# Patient Record
Sex: MALE | Race: WHITE | NOT HISPANIC OR LATINO | Employment: UNEMPLOYED | URBAN - METROPOLITAN AREA
[De-identification: names, ages, dates, MRNs, and addresses within clinical notes are randomized per-mention and may not be internally consistent; named-entity substitution may affect disease eponyms.]

---

## 2017-05-03 LAB
25(OH)D3 SERPL-MCNC: 21 NG/ML (ref 30–100)
ALBUMIN SERPL-MCNC: 4.1 G/DL (ref 3.6–5.1)
ALBUMIN/GLOB SERPL: 1.5 (CALC) (ref 1–2.5)
ALP SERPL-CCNC: 70 U/L (ref 40–115)
ALT SERPL-CCNC: 16 U/L (ref 9–46)
APPEARANCE UR: CLEAR
AST SERPL-CCNC: 14 U/L (ref 10–35)
BASOPHILS # BLD AUTO: 38 CELLS/UL (ref 0–200)
BASOPHILS NFR BLD AUTO: 0.5 %
BILIRUB DIRECT SERPL-MCNC: 0.1 MG/DL
BILIRUB INDIRECT SERPL-MCNC: 0.4 MG/DL (CALC) (ref 0.2–1.2)
BILIRUB SERPL-MCNC: 0.5 MG/DL (ref 0.2–1.2)
BILIRUB UR QL STRIP: NEGATIVE
BUN SERPL-MCNC: 14 MG/DL (ref 7–25)
BUN/CREAT SERPL: ABNORMAL (CALC) (ref 6–22)
CALCIUM SERPL-MCNC: 9.5 MG/DL (ref 8.6–10.3)
CHLORIDE SERPL-SCNC: 104 MMOL/L (ref 98–110)
CHOLEST SERPL-MCNC: 203 MG/DL (ref 125–200)
CHOLEST/HDLC SERPL: 4.7 (CALC)
CO2 SERPL-SCNC: 19 MMOL/L (ref 20–31)
COLOR UR: YELLOW
CREAT SERPL-MCNC: 1.06 MG/DL (ref 0.7–1.33)
EOSINOPHIL # BLD AUTO: 175 CELLS/UL (ref 15–500)
EOSINOPHIL NFR BLD AUTO: 2.3 %
ERYTHROCYTE [DISTWIDTH] IN BLOOD BY AUTOMATED COUNT: 12.9 % (ref 11–15)
EST. AVERAGE GLUCOSE BLD GHB EST-MCNC: 117 (CALC)
EST. AVERAGE GLUCOSE BLD GHB EST-SCNC: 6.5 (CALC)
GLOBULIN SER CALC-MCNC: 2.8 G/DL (CALC) (ref 1.9–3.7)
GLUCOSE SERPL-MCNC: 98 MG/DL (ref 65–99)
GLUCOSE UR QL STRIP: NEGATIVE
HBA1C MFR BLD: 5.7 % OF TOTAL HGB
HCT VFR BLD AUTO: 48.3 % (ref 38.5–50)
HDLC SERPL-MCNC: 43 MG/DL
HGB BLD-MCNC: 16 G/DL (ref 13.2–17.1)
HGB UR QL STRIP: NEGATIVE
KETONES UR QL STRIP: NEGATIVE
LDLC SERPL CALC-MCNC: 123 MG/DL (CALC)
LEUKOCYTE ESTERASE UR QL STRIP: NEGATIVE
LYMPHOCYTES # BLD AUTO: 2219 CELLS/UL (ref 850–3900)
LYMPHOCYTES NFR BLD AUTO: 29.2 %
MAGNESIUM SERPL-MCNC: 2.1 MG/DL (ref 1.5–2.5)
MCH RBC QN AUTO: 30.8 PG (ref 27–33)
MCHC RBC AUTO-ENTMCNC: 33.1 G/DL (ref 32–36)
MCV RBC AUTO: 93 FL (ref 80–100)
MONOCYTES # BLD AUTO: 684 CELLS/UL (ref 200–950)
MONOCYTES NFR BLD AUTO: 9 %
NEUTROPHILS # BLD AUTO: 4484 CELLS/UL (ref 1500–7800)
NEUTROPHILS NFR BLD AUTO: 59 %
NITRITE UR QL STRIP: NEGATIVE
NONHDLC SERPL-MCNC: 160 MG/DL (CALC)
PH UR STRIP: 6 [PH] (ref 5–8)
PLATELET # BLD AUTO: 251 THOUSAND/UL (ref 140–400)
PMV BLD REES-ECKER: 8.2 FL (ref 7.5–12.5)
POTASSIUM SERPL-SCNC: 4.9 MMOL/L (ref 3.5–5.3)
PROT SERPL-MCNC: 6.9 G/DL (ref 6.1–8.1)
PROT UR QL STRIP: NEGATIVE
RBC # BLD AUTO: 5.2 MILLION/UL (ref 4.2–5.8)
SL AMB EGFR AFRICAN AMERICAN: 92 ML/MIN/1.73M2
SL AMB EGFR NON AFRICAN AMERICAN: 80 ML/MIN/1.73M2
SODIUM SERPL-SCNC: 135 MMOL/L (ref 135–146)
SP GR UR STRIP: 1.01 (ref 1–1.03)
T4 FREE SERPL-MCNC: 0.9 NG/DL (ref 0.8–1.8)
TRIGL SERPL-MCNC: 185 MG/DL
TSH SERPL-ACNC: 3.65 MIU/L (ref 0.4–4.5)
VIT B12 SERPL-MCNC: 434 PG/ML (ref 200–1100)
WBC # BLD AUTO: 7.6 THOUSAND/UL (ref 3.8–10.8)

## 2017-08-29 LAB
ALBUMIN SERPL-MCNC: 4.1 G/DL (ref 3.6–5.1)
ALBUMIN/GLOB SERPL: 1.7 (CALC) (ref 1–2.5)
ALP SERPL-CCNC: 69 U/L (ref 40–115)
ALT SERPL-CCNC: 17 U/L (ref 9–46)
APPEARANCE UR: CLEAR
AST SERPL-CCNC: 13 U/L (ref 10–35)
BASOPHILS # BLD AUTO: 81 CELLS/UL (ref 0–200)
BASOPHILS NFR BLD AUTO: 1 %
BILIRUB DIRECT SERPL-MCNC: 0.1 MG/DL
BILIRUB INDIRECT SERPL-MCNC: 0.3 MG/DL (CALC) (ref 0.2–1.2)
BILIRUB SERPL-MCNC: 0.4 MG/DL (ref 0.2–1.2)
BILIRUB UR QL STRIP: NEGATIVE
BUN SERPL-MCNC: 21 MG/DL (ref 7–25)
BUN/CREAT SERPL: ABNORMAL (CALC) (ref 6–22)
CALCIUM SERPL-MCNC: 9.1 MG/DL (ref 8.6–10.3)
CHLORIDE SERPL-SCNC: 102 MMOL/L (ref 98–110)
CHOLEST SERPL-MCNC: 155 MG/DL
CHOLEST/HDLC SERPL: 4.1 (CALC)
CO2 SERPL-SCNC: 24 MMOL/L (ref 20–31)
COLOR UR: YELLOW
CREAT SERPL-MCNC: 0.89 MG/DL (ref 0.7–1.33)
EOSINOPHIL # BLD AUTO: 211 CELLS/UL (ref 15–500)
EOSINOPHIL NFR BLD AUTO: 2.6 %
ERYTHROCYTE [DISTWIDTH] IN BLOOD BY AUTOMATED COUNT: 12 % (ref 11–15)
GLOBULIN SER CALC-MCNC: 2.4 G/DL (CALC) (ref 1.9–3.7)
GLUCOSE SERPL-MCNC: 101 MG/DL (ref 65–99)
GLUCOSE UR QL STRIP: NEGATIVE
HCT VFR BLD AUTO: 46.2 % (ref 38.5–50)
HDLC SERPL-MCNC: 38 MG/DL
HGB BLD-MCNC: 15.7 G/DL (ref 13.2–17.1)
HGB UR QL STRIP: NEGATIVE
KETONES UR QL STRIP: NEGATIVE
LDLC SERPL CALC-MCNC: 90 MG/DL (CALC)
LEUKOCYTE ESTERASE UR QL STRIP: NEGATIVE
LYMPHOCYTES # BLD AUTO: 2122 CELLS/UL (ref 850–3900)
LYMPHOCYTES NFR BLD AUTO: 26.2 %
MCH RBC QN AUTO: 30.6 PG (ref 27–33)
MCHC RBC AUTO-ENTMCNC: 34 G/DL (ref 32–36)
MCV RBC AUTO: 90.1 FL (ref 80–100)
MONOCYTES # BLD AUTO: 1029 CELLS/UL (ref 200–950)
MONOCYTES NFR BLD AUTO: 12.7 %
NEUTROPHILS # BLD AUTO: 4658 CELLS/UL (ref 1500–7800)
NEUTROPHILS NFR BLD AUTO: 57.5 %
NITRITE UR QL STRIP: NEGATIVE
NONHDLC SERPL-MCNC: 117 MG/DL (CALC)
PH UR STRIP: 6 [PH] (ref 5–8)
PLATELET # BLD AUTO: 241 THOUSAND/UL (ref 140–400)
PMV BLD REES-ECKER: 10.3 FL (ref 7.5–12.5)
POTASSIUM SERPL-SCNC: 4.9 MMOL/L (ref 3.5–5.3)
PROT SERPL-MCNC: 6.5 G/DL (ref 6.1–8.1)
PROT UR QL STRIP: NEGATIVE
RBC # BLD AUTO: 5.13 MILLION/UL (ref 4.2–5.8)
SL AMB EGFR AFRICAN AMERICAN: 113 ML/MIN/1.73M2
SL AMB EGFR NON AFRICAN AMERICAN: 98 ML/MIN/1.73M2
SODIUM SERPL-SCNC: 135 MMOL/L (ref 135–146)
SP GR UR STRIP: 1.02 (ref 1–1.03)
TRIGL SERPL-MCNC: 174 MG/DL
WBC # BLD AUTO: 8.1 THOUSAND/UL (ref 3.8–10.8)

## 2018-06-26 DIAGNOSIS — M54.50 LOW BACK PAIN WITHOUT SCIATICA, UNSPECIFIED BACK PAIN LATERALITY, UNSPECIFIED CHRONICITY: Primary | ICD-10-CM

## 2018-06-26 RX ORDER — MELOXICAM 7.5 MG/1
TABLET ORAL
Qty: 30 TABLET | Refills: 1 | Status: SHIPPED | OUTPATIENT
Start: 2018-06-26 | End: 2018-09-01 | Stop reason: SDUPTHER

## 2018-07-02 DIAGNOSIS — I10 ESSENTIAL HYPERTENSION: Primary | ICD-10-CM

## 2018-07-03 PROCEDURE — 4010F ACE/ARB THERAPY RXD/TAKEN: CPT | Performed by: INTERNAL MEDICINE

## 2018-07-03 RX ORDER — LISINOPRIL 10 MG/1
TABLET ORAL
Qty: 90 TABLET | Refills: 3 | Status: SHIPPED | OUTPATIENT
Start: 2018-07-03 | End: 2019-09-19 | Stop reason: SDUPTHER

## 2018-07-09 ENCOUNTER — OFFICE VISIT (OUTPATIENT)
Dept: FAMILY MEDICINE CLINIC | Facility: CLINIC | Age: 53
End: 2018-07-09
Payer: COMMERCIAL

## 2018-07-09 VITALS
SYSTOLIC BLOOD PRESSURE: 132 MMHG | BODY MASS INDEX: 43.98 KG/M2 | HEART RATE: 96 BPM | TEMPERATURE: 98.4 F | WEIGHT: 307.2 LBS | RESPIRATION RATE: 16 BRPM | HEIGHT: 70 IN | DIASTOLIC BLOOD PRESSURE: 78 MMHG | OXYGEN SATURATION: 95 %

## 2018-07-09 DIAGNOSIS — H60.312 CHRONIC DIFFUSE OTITIS EXTERNA OF LEFT EAR: ICD-10-CM

## 2018-07-09 DIAGNOSIS — J32.8 OTHER CHRONIC SINUSITIS: Primary | ICD-10-CM

## 2018-07-09 DIAGNOSIS — I10 ESSENTIAL HYPERTENSION: ICD-10-CM

## 2018-07-09 DIAGNOSIS — J43.9 PULMONARY EMPHYSEMA, UNSPECIFIED EMPHYSEMA TYPE (HCC): ICD-10-CM

## 2018-07-09 DIAGNOSIS — E11.9 DIABETES MELLITUS WITHOUT COMPLICATION (HCC): ICD-10-CM

## 2018-07-09 DIAGNOSIS — F17.200 TOBACCO DEPENDENCE SYNDROME: ICD-10-CM

## 2018-07-09 PROCEDURE — 99214 OFFICE O/P EST MOD 30 MIN: CPT | Performed by: INTERNAL MEDICINE

## 2018-07-09 RX ORDER — TAMSULOSIN HYDROCHLORIDE 0.4 MG/1
CAPSULE ORAL EVERY 24 HOURS
COMMUNITY
Start: 2018-01-29 | End: 2018-07-21 | Stop reason: SDUPTHER

## 2018-07-09 RX ORDER — OFLOXACIN 3 MG/ML
10 SOLUTION AURICULAR (OTIC) 2 TIMES DAILY
Qty: 5 ML | Refills: 1 | Status: SHIPPED | OUTPATIENT
Start: 2018-07-09 | End: 2020-02-20

## 2018-07-09 RX ORDER — PREDNISONE 1 MG/1
5 TABLET ORAL DAILY
Qty: 21 TABLET | Refills: 0 | Status: SHIPPED | OUTPATIENT
Start: 2018-07-09 | End: 2018-12-19 | Stop reason: SURG

## 2018-07-09 RX ORDER — CLINDAMYCIN HYDROCHLORIDE 300 MG/1
300 CAPSULE ORAL 2 TIMES DAILY WITH MEALS
Qty: 20 CAPSULE | Refills: 1 | Status: SHIPPED | OUTPATIENT
Start: 2018-07-09 | End: 2018-07-29

## 2018-07-09 RX ORDER — FLUTICASONE PROPIONATE 50 MCG
2 SPRAY, SUSPENSION (ML) NASAL DAILY
Qty: 1 BOTTLE | Refills: 3 | Status: SHIPPED | OUTPATIENT
Start: 2018-07-09 | End: 2018-09-12 | Stop reason: SDUPTHER

## 2018-07-09 RX ORDER — ERGOCALCIFEROL 1.25 MG/1
CAPSULE ORAL
Refills: 0 | COMMUNITY
Start: 2018-06-30 | End: 2019-03-22 | Stop reason: SDUPTHER

## 2018-07-09 RX ORDER — HALOPERIDOL 2 MG/1
2 TABLET ORAL
Refills: 0 | COMMUNITY
Start: 2018-04-24 | End: 2020-02-20

## 2018-07-09 RX ORDER — LEVOTHYROXINE SODIUM 0.03 MG/1
TABLET ORAL
Refills: 0 | COMMUNITY
Start: 2018-04-24 | End: 2018-07-21 | Stop reason: SDUPTHER

## 2018-07-09 RX ORDER — ATORVASTATIN CALCIUM 20 MG/1
TABLET, FILM COATED ORAL
Refills: 0 | COMMUNITY
Start: 2018-07-05 | End: 2018-10-01 | Stop reason: SDUPTHER

## 2018-07-09 RX ORDER — GLYCOPYRROLATE 25 UG/ML
SOLUTION RESPIRATORY (INHALATION)
Refills: 0 | COMMUNITY
Start: 2018-04-13 | End: 2018-07-09 | Stop reason: ALTCHOICE

## 2018-07-09 NOTE — PROGRESS NOTES
Assessment/Plan:         Diagnoses and all orders for this visit:    Other chronic sinusitis: Stop Smoking  Life Style Mod   Try : 3 weeks of :  -     clindamycin (CLEOCIN) 300 MG capsule; Take 1 capsule (300 mg total) by mouth 2 (two) times a day with meals for 20 days  -     fluticasone (FLONASE) 50 mcg/act nasal spray; 2 sprays into each nostril daily  -     predniSONE 5 mg tablet; Take 1 tablet (5 mg total) by mouth daily With food   RTC in 3-4 weeks w Blood work  Essential hypertension : Stable  Continue same Meds  RTc in 1mo w Blood work  -     Basic metabolic panel; Future  -     CBC and differential; Future  -     Lipid panel; Future  -     Hepatic function panel; Future    Tobacco dependence syndrome : Pt still smokes about 2 PPD? ? Advised in Detail about smoking and Consequences and Complications       Pulmonary emphysema, unspecified emphysema type ([facility] ): He is doing well on Lonhala magnair :  -     Glycopyrrolate (LONHALA MAGNAIR REFILL KIT) 25 MCG/ML SOLN; Inhale 1 mL 2 (two) times a day    Diabetes mellitus without complication ([facility] ):  Life Style Mod  RTC in 1mo w Blood work  -     HEMOGLOBIN A1C W/ EAG ESTIMATION; Future  Left otitis Externa : Try floxin otic Susp  3 drops LT Ear Bid for 1 week    Other orders  -     fluticasone (ARNUITY ELLIPTA) 100 MCG/ACT AEPB inhaler; every 24 hours  -     atorvastatin (LIPITOR) 20 mg tablet;   -     ergocalciferol (VITAMIN D2) 50,000 units;   -     tamsulosin (FLOMAX) 0 4 mg; every 24 hours  -     Discontinue: LONHALA MAGNAIR STARTER KIT 25 MCG/ML SOLN;   -     haloperidol (HALDOL) 2 mg tablet;   -     levothyroxine 25 mcg tablet;   -     metFORMIN (GLUCOPHAGE) 500 mg tablet; Every 12 hours  -     indacaterol-glycopyrrolate (UTIBRON NEOHALER) 27 5-15 6 MCG inhaler; Every 12 hours  -     SHINGRIX 50 MCG SUSR;         Subjective:      Patient ID: Gurinder Hayes is a 46 y o  male  Nice 46 Y O man with H/O COPD/Smoking/D M /HTN/Obesity     Is doing fairly well, Except for Increasing Sinus pressure, Left ear pain, Dizziness, HA    For 3-4 weeks,,  Also would refill on some meds    No Chest pain, palpitations ,Abdominal pain    No Other symptoms    No Recent Blood work      Sinus Problem   Associated symptoms include coughing, shortness of breath, sinus pressure and sneezing  Pertinent negatives include no chills, congestion, headaches or sore throat  The following portions of the patient's history were reviewed and updated as appropriate: allergies, current medications, past family history, past medical history, past social history, past surgical history and problem list     Review of Systems   Constitutional: Positive for fatigue  Negative for chills and fever  HENT: Positive for dental problem, postnasal drip, sinus pain, sinus pressure and sneezing  Negative for congestion, facial swelling, sore throat, trouble swallowing and voice change  Eyes: Negative for pain, discharge and visual disturbance  Respiratory: Positive for cough, shortness of breath and wheezing  Cardiovascular: Negative for chest pain, palpitations and leg swelling  Gastrointestinal: Negative for abdominal pain, blood in stool, constipation, diarrhea and nausea  Endocrine: Negative for polydipsia, polyphagia and polyuria  Genitourinary: Negative for difficulty urinating, hematuria and urgency  Musculoskeletal: Negative for arthralgias and myalgias  Skin: Negative for rash  Neurological: Negative for dizziness, tremors, weakness and headaches  Hematological: Negative for adenopathy  Does not bruise/bleed easily  Psychiatric/Behavioral: Negative for dysphoric mood, sleep disturbance and suicidal ideas           Objective:      /78 (BP Location: Right arm, Patient Position: Sitting, Cuff Size: Adult)   Pulse 96   Temp 98 4 °F (36 9 °C) (Oral)   Resp 16   Ht 5' 10 2" (1 783 m)   Wt (!) 139 kg (307 lb 3 2 oz)   SpO2 95%   BMI 43 83 kg/m²          Physical Exam Constitutional: He is oriented to person, place, and time  He appears well-nourished  No distress  HENT:   Head: Normocephalic  Mouth/Throat: Oropharynx is clear and moist  No oropharyngeal exudate  Chronic Sinusitis,   And left Chronic  Otitis Externa      Eyes: Conjunctivae are normal  Pupils are equal, round, and reactive to light  No scleral icterus  Neck: Neck supple  No thyromegaly present  Cardiovascular: Normal rate, regular rhythm and normal heart sounds  No murmur heard  Pulmonary/Chest: Effort normal  No respiratory distress  He has wheezes  He has rales  Decrease Brathing Sounds Bilt Bases  Pt still smokes about 2 packs Cig/day ???   Abdominal: Soft  Bowel sounds are normal  He exhibits no distension  There is no tenderness  There is no rebound and no guarding  Musculoskeletal: He exhibits no edema  Lymphadenopathy:     He has no cervical adenopathy  Neurological: He is alert and oriented to person, place, and time  No cranial nerve deficit  Coordination normal    Skin: No rash noted  No erythema  Psychiatric: He has a normal mood and affect

## 2018-07-13 DIAGNOSIS — E11.9 DIABETES MELLITUS WITHOUT COMPLICATION (HCC): Primary | ICD-10-CM

## 2018-07-21 DIAGNOSIS — N40.1 BENIGN PROSTATIC HYPERPLASIA WITH LOWER URINARY TRACT SYMPTOMS, SYMPTOM DETAILS UNSPECIFIED: Primary | ICD-10-CM

## 2018-07-21 DIAGNOSIS — E03.9 HYPOTHYROIDISM, UNSPECIFIED TYPE: ICD-10-CM

## 2018-07-24 RX ORDER — LEVOTHYROXINE SODIUM 0.03 MG/1
TABLET ORAL
Qty: 90 TABLET | Refills: 1 | Status: SHIPPED | OUTPATIENT
Start: 2018-07-24 | End: 2020-02-20

## 2018-07-24 RX ORDER — TAMSULOSIN HYDROCHLORIDE 0.4 MG/1
CAPSULE ORAL
Qty: 90 CAPSULE | Refills: 1 | Status: SHIPPED | OUTPATIENT
Start: 2018-07-24 | End: 2019-07-11 | Stop reason: SDUPTHER

## 2018-08-13 ENCOUNTER — APPOINTMENT (OUTPATIENT)
Dept: LAB | Facility: HOSPITAL | Age: 53
End: 2018-08-13
Payer: COMMERCIAL

## 2018-08-13 DIAGNOSIS — E11.9 DIABETES MELLITUS WITHOUT COMPLICATION (HCC): ICD-10-CM

## 2018-08-13 LAB
EST. AVERAGE GLUCOSE BLD GHB EST-MCNC: 137 MG/DL
HBA1C MFR BLD: 6.4 % (ref 4.2–6.3)

## 2018-08-13 PROCEDURE — 83036 HEMOGLOBIN GLYCOSYLATED A1C: CPT

## 2018-08-13 PROCEDURE — 36415 COLL VENOUS BLD VENIPUNCTURE: CPT

## 2018-09-01 DIAGNOSIS — M54.50 LOW BACK PAIN WITHOUT SCIATICA, UNSPECIFIED BACK PAIN LATERALITY, UNSPECIFIED CHRONICITY: ICD-10-CM

## 2018-09-04 RX ORDER — MELOXICAM 7.5 MG/1
TABLET ORAL
Qty: 60 TABLET | Refills: 0 | Status: SHIPPED | OUTPATIENT
Start: 2018-09-04 | End: 2018-11-05 | Stop reason: SDUPTHER

## 2018-09-12 ENCOUNTER — OFFICE VISIT (OUTPATIENT)
Dept: FAMILY MEDICINE CLINIC | Facility: CLINIC | Age: 53
End: 2018-09-12
Payer: COMMERCIAL

## 2018-09-12 VITALS
TEMPERATURE: 97.9 F | WEIGHT: 311 LBS | HEIGHT: 70 IN | OXYGEN SATURATION: 97 % | RESPIRATION RATE: 16 BRPM | SYSTOLIC BLOOD PRESSURE: 134 MMHG | DIASTOLIC BLOOD PRESSURE: 84 MMHG | BODY MASS INDEX: 44.52 KG/M2 | HEART RATE: 87 BPM

## 2018-09-12 DIAGNOSIS — J32.8 OTHER CHRONIC SINUSITIS: ICD-10-CM

## 2018-09-12 DIAGNOSIS — Z72.0 TOBACCO ABUSE: ICD-10-CM

## 2018-09-12 DIAGNOSIS — J43.9 PULMONARY EMPHYSEMA, UNSPECIFIED EMPHYSEMA TYPE (HCC): ICD-10-CM

## 2018-09-12 DIAGNOSIS — H61.22 CERUMEN DEBRIS ON TYMPANIC MEMBRANE OF LEFT EAR: ICD-10-CM

## 2018-09-12 DIAGNOSIS — E11.9 DIABETES MELLITUS WITHOUT COMPLICATION (HCC): ICD-10-CM

## 2018-09-12 DIAGNOSIS — R60.9 EDEMA, UNSPECIFIED TYPE: Primary | ICD-10-CM

## 2018-09-12 DIAGNOSIS — I10 ESSENTIAL HYPERTENSION: ICD-10-CM

## 2018-09-12 PROCEDURE — 99214 OFFICE O/P EST MOD 30 MIN: CPT | Performed by: INTERNAL MEDICINE

## 2018-09-12 RX ORDER — FUROSEMIDE 10 MG/ML
40 INJECTION INTRAMUSCULAR; INTRAVENOUS ONCE
Status: COMPLETED | OUTPATIENT
Start: 2018-09-12 | End: 2018-09-12

## 2018-09-12 RX ORDER — FLUTICASONE PROPIONATE 50 MCG
2 SPRAY, SUSPENSION (ML) NASAL DAILY
Qty: 1 BOTTLE | Refills: 5 | Status: SHIPPED | OUTPATIENT
Start: 2018-09-12 | End: 2019-07-29 | Stop reason: SDUPTHER

## 2018-09-12 RX ORDER — AMITRIPTYLINE HYDROCHLORIDE 25 MG/1
25 TABLET, FILM COATED ORAL
Refills: 0 | COMMUNITY
Start: 2018-07-11 | End: 2022-02-22

## 2018-09-12 RX ORDER — BUPROPION HYDROCHLORIDE 150 MG/1
150 TABLET, EXTENDED RELEASE ORAL DAILY
Refills: 0 | COMMUNITY
Start: 2018-07-11 | End: 2020-02-20

## 2018-09-12 RX ADMIN — FUROSEMIDE 40 MG: 10 INJECTION INTRAMUSCULAR; INTRAVENOUS at 11:44

## 2018-09-12 NOTE — PROGRESS NOTES
Assessment/Plan:         Diagnoses and all orders for this visit:    Edema, unspecified type: Bilt Lower exts :Try :  -     furosemide (LASIX) injection 40 mg; Inject 4 mL (40 mg total) into a muscle once       Tobacco abuse: advised again about smoking and consequences    Essential hypertension: life Style mod  Lose weight    Continue same meds  RTC in 3mos w blood work    Cerumen debris on tympanic membrane of left ear:  -     Ear cerumen removal  Now    Other chronic sinusitis/Allergic Rhinitis : Try :  -     fluticasone (FLONASE) 50 mcg/act nasal spray; 2 sprays into each nostril daily  Stop smoking    Pulmonary emphysema, unspecified emphysema type ([address] ): Stop smoking  Renew :  -     indacaterol-glycopyrrolate (Christella Delavan) 27 5-15 6 MCG inhaler; Place 1 capsule into inhaler and inhale 2 (two) times a day  -     fluticasone (ARNUITY ELLIPTA) 100 MCG/ACT AEPB inhaler; Inhale 1 puff daily    Other orders  -     amitriptyline (ELAVIL) 25 mg tablet; Take 25 mg by mouth daily at bedtime  -     buPROPion (WELLBUTRIN SR) 150 mg 12 hr tablet; Take 150 mg by mouth daily        Subjective:      Patient ID: Luz Vitale is a 46 y o  male  Janet Costello with D M ,HTN,Obesity,smoking,  Is here for Regular check up     Recent blood work and med List Reviewed w pt in Detail    No new Symptoms  he continues to smoke    The following portions of the patient's history were reviewed and updated as appropriate: allergies, current medications, past family history, past medical history, past social history, past surgical history and problem list     Review of Systems   Constitutional: Positive for fatigue  Negative for chills and fever  HENT: Positive for postnasal drip and rhinorrhea  Negative for congestion, facial swelling, sore throat, trouble swallowing and voice change  Eyes: Negative for pain, discharge and visual disturbance  Respiratory: Positive for cough, shortness of breath and wheezing  Cardiovascular: Negative for chest pain, palpitations and leg swelling  Gastrointestinal: Negative for abdominal pain, blood in stool, constipation, diarrhea and nausea  Endocrine: Negative for polydipsia, polyphagia and polyuria  Genitourinary: Negative for difficulty urinating, hematuria and urgency  Musculoskeletal: Negative for arthralgias and myalgias  Skin: Negative for rash  Neurological: Negative for dizziness, tremors, weakness and headaches  Hematological: Negative for adenopathy  Does not bruise/bleed easily  Psychiatric/Behavioral: Negative for dysphoric mood, sleep disturbance and suicidal ideas  Objective:      /84 (BP Location: Left arm, Patient Position: Sitting, Cuff Size: Large)   Pulse 87   Temp 97 9 °F (36 6 °C) (Oral)   Resp 16   Ht 5' 10 2" (1 783 m)   Wt (!) 141 kg (311 lb)   SpO2 97%   BMI 44 37 kg/m²          Physical Exam   Constitutional: He is oriented to person, place, and time  He appears well-nourished  No distress  HENT:   Head: Normocephalic  Mouth/Throat: Oropharynx is clear and moist  No oropharyngeal exudate  Post Nasal Drip   Eyes: Conjunctivae are normal  Pupils are equal, round, and reactive to light  No scleral icterus  Neck: Neck supple  No thyromegaly present  Cardiovascular: Normal rate, regular rhythm and normal heart sounds  No murmur heard  Pulmonary/Chest: Effort normal and breath sounds normal  No respiratory distress  He has no wheezes  He has no rales  Abdominal: Soft  Bowel sounds are normal  He exhibits no distension  There is no tenderness  There is no rebound and no guarding  Severe Obese   Musculoskeletal: He exhibits edema  He exhibits no tenderness  Lymphadenopathy:     He has no cervical adenopathy  Neurological: He is alert and oriented to person, place, and time  No cranial nerve deficit  Coordination normal    Skin: No rash noted  No erythema  No pallor     Psychiatric: He has a normal mood and affect

## 2018-10-01 DIAGNOSIS — E78.49 OTHER HYPERLIPIDEMIA: Primary | ICD-10-CM

## 2018-10-01 RX ORDER — ATORVASTATIN CALCIUM 20 MG/1
TABLET, FILM COATED ORAL
Qty: 90 TABLET | Refills: 1 | Status: SHIPPED | OUTPATIENT
Start: 2018-10-01 | End: 2019-03-06 | Stop reason: SDUPTHER

## 2018-11-05 DIAGNOSIS — M54.50 LOW BACK PAIN WITHOUT SCIATICA, UNSPECIFIED BACK PAIN LATERALITY, UNSPECIFIED CHRONICITY: ICD-10-CM

## 2018-11-05 RX ORDER — MELOXICAM 7.5 MG/1
TABLET ORAL
Qty: 60 TABLET | Refills: 0 | Status: SHIPPED | OUTPATIENT
Start: 2018-11-05 | End: 2019-01-03 | Stop reason: SDUPTHER

## 2018-12-19 ENCOUNTER — OFFICE VISIT (OUTPATIENT)
Dept: FAMILY MEDICINE CLINIC | Facility: CLINIC | Age: 53
End: 2018-12-19
Payer: COMMERCIAL

## 2018-12-19 VITALS
SYSTOLIC BLOOD PRESSURE: 116 MMHG | RESPIRATION RATE: 18 BRPM | HEART RATE: 90 BPM | TEMPERATURE: 98.5 F | DIASTOLIC BLOOD PRESSURE: 66 MMHG | WEIGHT: 313 LBS | BODY MASS INDEX: 44.81 KG/M2 | OXYGEN SATURATION: 96 % | HEIGHT: 70 IN

## 2018-12-19 DIAGNOSIS — J30.1 ALLERGIC RHINITIS DUE TO POLLEN, UNSPECIFIED SEASONALITY: ICD-10-CM

## 2018-12-19 DIAGNOSIS — Z72.0 TOBACCO ABUSE: ICD-10-CM

## 2018-12-19 DIAGNOSIS — E78.5 HYPERLIPIDEMIA ASSOCIATED WITH TYPE 2 DIABETES MELLITUS (HCC): ICD-10-CM

## 2018-12-19 DIAGNOSIS — L98.9 SKIN LESIONS: ICD-10-CM

## 2018-12-19 DIAGNOSIS — E11.8 TYPE 2 DIABETES MELLITUS WITH COMPLICATION, WITHOUT LONG-TERM CURRENT USE OF INSULIN (HCC): Primary | ICD-10-CM

## 2018-12-19 DIAGNOSIS — E11.69 HYPERLIPIDEMIA ASSOCIATED WITH TYPE 2 DIABETES MELLITUS (HCC): ICD-10-CM

## 2018-12-19 DIAGNOSIS — J43.9 PULMONARY EMPHYSEMA, UNSPECIFIED EMPHYSEMA TYPE (HCC): ICD-10-CM

## 2018-12-19 DIAGNOSIS — J44.9 COPD (CHRONIC OBSTRUCTIVE PULMONARY DISEASE) WITH CHRONIC BRONCHITIS (HCC): ICD-10-CM

## 2018-12-19 DIAGNOSIS — Z23 NEED FOR INFLUENZA VACCINATION: ICD-10-CM

## 2018-12-19 DIAGNOSIS — E66.01 MORBID OBESITY (HCC): ICD-10-CM

## 2018-12-19 DIAGNOSIS — E66.9 OBESITY (BMI 30-39.9): ICD-10-CM

## 2018-12-19 PROCEDURE — 90682 RIV4 VACC RECOMBINANT DNA IM: CPT | Performed by: INTERNAL MEDICINE

## 2018-12-19 PROCEDURE — 90471 IMMUNIZATION ADMIN: CPT | Performed by: INTERNAL MEDICINE

## 2018-12-19 PROCEDURE — 99214 OFFICE O/P EST MOD 30 MIN: CPT | Performed by: INTERNAL MEDICINE

## 2018-12-19 RX ORDER — PREDNISONE 10 MG/1
TABLET ORAL
Qty: 20 TABLET | Refills: 0 | Status: SHIPPED | OUTPATIENT
Start: 2018-12-19 | End: 2019-03-21 | Stop reason: ALTCHOICE

## 2018-12-19 NOTE — PATIENT INSTRUCTIONS
Low Fat Diet   AMBULATORY CARE:   A low-fat diet  is an eating plan that is low in total fat, unhealthy fat, and cholesterol  You may need to follow a low-fat diet if you have trouble digesting or absorbing fat  You may also need to follow this diet if you have high cholesterol  You can also lower your cholesterol by increasing the amount of fiber in your diet  Soluble fiber is a type of fiber that helps to decrease cholesterol levels  Different types of fat in food:   · Limit unhealthy fats  A diet that is high in cholesterol, saturated fat, and trans fat may cause unhealthy cholesterol levels  Unhealthy cholesterol levels increase your risk of heart disease  ¨ Cholesterol:  Limit intake of cholesterol to less than 200 mg per day  Cholesterol is found in meat, eggs, and dairy  ¨ Saturated fat:  Limit saturated fat to less than 7% of your total daily calories  Ask your dietitian how many calories you need each day  Saturated fat is found in butter, cheese, ice cream, whole milk, and palm oil  Saturated fat is also found in meat, such as beef, pork, chicken skin, and processed meats  Processed meats include sausage, hot dogs, and bologna  ¨ Trans fat:  Avoid trans fat as much as possible  Trans fat is used in fried and baked foods  Foods that say trans fat free on the label may still have up to 0 5 grams of trans fat per serving  · Include healthy fats  Replace foods that are high in saturated and trans fat with foods high in healthy fats  This may help to decrease high cholesterol levels  ¨ Monounsaturated fats: These are found in avocados, nuts, and vegetable oils, such as olive, canola, and sunflower oil  ¨ Polyunsaturated fats: These can be found in vegetable oils, such as soybean or corn oil  Omega-3 fats can help to decrease the risk of heart disease  Omega-3 fats are found in fish, such as salmon, herring, trout, and tuna   Omega-3 fats can also be found in plant foods, such as walnuts, flaxseed, soybeans, and canola oil    Foods to limit or avoid:   · Grains:      ¨ Snacks that are made with partially hydrogenated oils, such as chips, regular crackers, and butter-flavored popcorn    ¨ High-fat baked goods, such as biscuits, croissants, doughnuts, pies, cookies, and pastries    · Dairy:      ¨ Whole milk, 2% milk, and yogurt and ice cream made with whole milk    ¨ Half and half creamer, heavy cream, and whipping cream    ¨ Cheese, cream cheese, and sour cream    · Meats and proteins:      ¨ High-fat cuts of meat (T-bone steak, regular hamburger, and ribs)    ¨ Fried meat, poultry (turkey and chicken), and fish    ¨ Poultry (chicken and turkey) with skin    ¨ Cold cuts (salami or bologna), hot dogs, taylor, and sausage    ¨ Whole eggs and egg yolks    · Vegetables and fruits with added fat:      ¨ Fried vegetables or vegetables in butter or high-fat sauces, such as cream or cheese sauces    ¨ Fried fruit or fruit served with butter or cream    · Fats:      ¨ Butter, stick margarine, and shortening    ¨ Coconut, palm oil, and palm kernel oil  Foods to include:   · Grains:      ¨ Whole-grain breads, cereals, pasta, and brown rice    ¨ Low-fat crackers and pretzels    · Vegetables and fruits:      ¨ Fresh, frozen, or canned vegetables (no salt or low-sodium)    ¨ Fresh, frozen, dried, or canned fruit (canned in light syrup or fruit juice)    ¨ Avocado    · Low-fat dairy products:      ¨ Nonfat (skim) or 1% milk    ¨ Nonfat or low-fat cheese, yogurt, and cottage cheese    · Meats and proteins:      ¨ Chicken or turkey with no skin    ¨ Baked or broiled fish    ¨ Lean beef and pork (loin, round, extra lean hamburger)    ¨ Beans and peas, unsalted nuts, soy products    ¨ Egg whites and substitutes    ¨ Seeds and nuts    · Fats:      ¨ Unsaturated oil, such as canola, olive, peanut, soybean, or sunflower oil    ¨ Soft or liquid margarine and vegetable oil spread    ¨ Low-fat salad dressing  Other ways to decrease fat:   · Read food labels before you buy foods  Choose foods that have less than 30% of calories from fat  Choose low-fat or fat-free dairy products  Remember that fat free does not mean calorie free  These foods still contain calories, and too many calories can lead to weight gain  · Trim fat from meat and avoid fried food  Trim all visible fat from meat before you cook it  Remove the skin from poultry  Do not barrios meat, fish, or poultry  Bake, roast, boil, or broil these foods instead  Avoid fried foods  Eat a baked potato instead of Western Sindy fries  Steam vegetables instead of sautéing them in butter  · Add less fat to foods  Use imitation taylor bits on salads and baked potatoes instead of regular taylor bits  Use fat-free or low-fat salad dressings instead of regular dressings  Use low-fat or nonfat butter-flavored topping instead of regular butter or margarine on popcorn and other foods  Ways to decrease fat in recipes:  Replace high-fat ingredients with low-fat or nonfat ones  This may cause baked goods to be drier than usual  You may need to use nonfat cooking spray on pans to prevent food from sticking  You also may need to change the amount of other ingredients, such as water, in the recipe  Try the following:  · Use low-fat or light margarine instead of regular margarine or shortening  · Use lean ground turkey breast or chicken, or lean ground beef (less than 5% fat) instead of hamburger  · Add 1 teaspoon of canola oil to 8 ounces of skim milk instead of using cream or half and half  · Use grated zucchini, carrots, or apples in breads instead of coconut  · Use blenderized, low-fat cottage cheese, plain tofu, or low-fat ricotta cheese instead of cream cheese  · Use 1 egg white and 1 teaspoon of canola oil, or use ¼ cup (2 ounces) of fat-free egg substitute instead of a whole egg       · Replace half of the oil that is called for in a recipe with applesauce when you bake  Use 3 tablespoons of cocoa powder and 1 tablespoon of canola oil instead of a square of baking chocolate  How to increase fiber:  Eat enough high-fiber foods to get 20 to 30 grams of fiber every day  Slowly increase your fiber intake to avoid stomach cramps, gas, and other problems  · Eat 3 ounces of whole-grain foods each day  An ounce is about 1 slice of bread  Eat whole-grain breads, such as whole-wheat bread  Whole wheat, whole-wheat flour, or other whole grains should be listed as the first ingredient on the food label  Replace white flour with whole-grain flour or use half of each in recipes  Whole-grain flour is heavier than white flour, so you may have to add more yeast or baking powder  · Eat a high-fiber cereal for breakfast   Oatmeal is a good source of soluble fiber  Look for cereals that have bran or fiber in the name  Choose whole-grain products, such as brown rice, barley, and whole-wheat pasta  · Eat more beans, peas, and lentils  For example, add beans to soups or salads  Eat at least 5 cups of fruits and vegetables each day  Eat fruits and vegetables with the peel because the peel is high in fiber  © 2017 [employer] Information is for End User's use only and may not be sold, redistributed or otherwise used for commercial purposes  All illustrations and images included in CareNotes® are the copyrighted property of [employer]  or [employer]  The above information is an  only  It is not intended as medical advice for individual conditions or treatments  Talk to your doctor, nurse or pharmacist before following any medical regimen to see if it is safe and effective for you  Heart Healthy Diet   AMBULATORY CARE:   A heart healthy diet  is an eating plan low in total fat, unhealthy fats, and sodium (salt)  A heart healthy diet helps decrease your risk for heart disease and stroke   Limit the amount of fat you eat to 25% to 35% of your total daily calories  Limit sodium to less than 2,300 mg each day  Healthy fats:  Healthy fats can help improve cholesterol levels  The risk for heart disease is decreased when cholesterol levels are normal  Choose healthy fats, such as the following:  · Unsaturated fat  is found in foods such as soybean, canola, olive, corn, and safflower oils  It is also found in soft tub margarine that is made with liquid vegetable oil  · Omega-3 fat  is found in certain fish, such as salmon, tuna, and trout, and in walnuts and flaxseed  Unhealthy fats:  Unhealthy fats can cause unhealthy cholesterol levels in your blood and increase your risk of heart disease  Limit unhealthy fats, such as the following:  · Cholesterol  is found in animal foods, such as eggs and lobster, and in dairy products made from whole milk  Limit cholesterol to less than 300 milligrams (mg) each day  You may need to limit cholesterol to 200 mg each day if you have heart disease  · Saturated fat  is found in meats, such as taylor and hamburger  It is also found in chicken or turkey skin, whole milk, and butter  Limit saturated fat to less than 7% of your total daily calories  Limit saturated fat to less than 6% if you have heart disease or are at increased risk for it  · Trans fat  is found in packaged foods, such as potato chips and cookies  It is also in hard margarine, some fried foods, and shortening  Avoid trans fats as much as possible    Heart healthy foods and drinks to include:  Ask your dietitian or healthcare provider how many servings to have from each of the following food groups:  · Grains:      ¨ Whole-wheat breads, cereals, and pastas, and brown rice    ¨ Low-fat, low-sodium crackers and chips    · Vegetables:      ¨ Broccoli, green beans, green peas, and spinach    ¨ Collards, kale, and lima beans    ¨ Carrots, sweet potatoes, tomatoes, and peppers    ¨ Canned vegetables with no salt added    · Fruits:      ¨ Bananas, peaches, pears, and pineapple    ¨ Grapes, raisins, and dates    ¨ Oranges, tangerines, grapefruit, orange juice, and grapefruit juice    ¨ Apricots, mangoes, melons, and papaya    ¨ Raspberries and strawberries    ¨ Canned fruit with no added sugar    · Low-fat dairy products:      ¨ Nonfat (skim) milk, 1% milk, and low-fat almond, cashew, or soy milks fortified with calcium    ¨ Low-fat cheese, regular or frozen yogurt, and cottage cheese    · Meats and proteins , such as lean cuts of beef and pork (loin, leg, round), skinless chicken and turkey, legumes, soy products, egg whites, and nuts  Foods and drinks to limit or avoid:  Ask your dietitian or healthcare provider about these and other foods that are high in unhealthy fat, sodium, and sugar:  · Snack or packaged foods , such as frozen dinners, cookies, macaroni and cheese, and cereals with more than 300 mg of sodium per serving    · Canned or dry mixes  for cakes, soups, sauces, or gravies    · Vegetables with added sodium , such as instant potatoes, vegetables with added sauces, or regular canned vegetables    · Other foods high in sodium , such as ketchup, barbecue sauce, salad dressing, pickles, olives, soy sauce, and miso    · High-fat dairy foods  such as whole or 2% milk, cream cheese, or sour cream, and cheeses     · High-fat protein foods  such as high-fat cuts of beef (T-bone steaks, ribs), chicken or turkey with skin, and organ meats, such as liver    · Cured or smoked meats , such as hot dogs, taylor, and sausage    · Unhealthy fats and oils , such as butter, stick margarine, shortening, and cooking oils such as coconut or palm oil    · Food and drinks high in sugar , such as soft drinks (soda), sports drinks, sweetened tea, candy, cake, cookies, pies, and doughnuts  Other diet guidelines to follow:   · Eat more foods containing omega-3 fats  Eat fish high in omega-3 fats at least 2 times a week  · Limit alcohol    Too much alcohol can damage your heart and raise your blood pressure  Women should limit alcohol to 1 drink a day  Men should limit alcohol to 2 drinks a day  A drink of alcohol is 12 ounces of beer, 5 ounces of wine, or 1½ ounces of liquor  · Choose low-sodium foods  High-sodium foods can lead to high blood pressure  Add little or no salt to food you prepare  Use herbs and spices in place of salt  · Eat more fiber  to help lower cholesterol levels  Eat at least 5 servings of fruits and vegetables each day  Eat 3 ounces of whole-grain foods each day  Legumes (beans) are also a good source of fiber  Lifestyle guidelines:   · Do not smoke  Nicotine and other chemicals in cigarettes and cigars can cause lung and heart damage  Ask your healthcare provider for information if you currently smoke and need help to quit  E-cigarettes or smokeless tobacco still contain nicotine  Talk to your healthcare provider before you use these products  · Exercise regularly  to help you maintain a healthy weight and improve your blood pressure and cholesterol levels  Ask your healthcare provider about the best exercise plan for you  Do not start an exercise program without asking your healthcare provider  Follow up with your healthcare provider as directed:  Write down your questions so you remember to ask them during your visits  © 2017 [facility] [facility] Information is for End User's use only and may not be sold, redistributed or otherwise used for commercial purposes  All illustrations and images included in CareNotes® are the copyrighted property of [employer]  or [employer]  The above information is an  only  It is not intended as medical advice for individual conditions or treatments  Talk to your doctor, nurse or pharmacist before following any medical regimen to see if it is safe and effective for you  Calorie Counting Diet   WHAT YOU NEED TO KNOW:   What is a calorie counting diet?   It is a meal plan based on counting calories each day to reach a healthy body weight  You will need to eat fewer calories if you are trying to lose weight  Weight loss may decrease your risk for certain health problems or improve your health if you have health problems  Some of these health problems include heart disease, high blood pressure, and diabetes  What foods should I avoid? Your dietitian will tell you if you need to avoid certain foods based on your body weight and health condition  You may need to avoid high-fat foods if you are at risk for or have heart disease  You may need to eat fewer foods from the breads and starches food group if you have diabetes  How many calories are in foods? The following is a list of foods and drinks with the approximate number of calories in each  Check the food label to find the exact number of calories  A dietitian can tell you how many calories you should have from each food group each day    · Carbohydrate:      ¨ ½ of a 3-inch bagel, 1 slice of bread, or ½ of a hamburger bun or hot dog bun (80)    ¨ 1 (8-inch) flour tortilla or ½ cup of cooked rice (100)    ¨ 1 (6-inch) corn tortilla (80)    ¨ 1 (6-inch) pancake or 1 cup of bran flakes cereal (110)    ¨ ½ cup of cooked cereal (80)    ¨ ½ cup of cooked pasta (85)    ¨ 1 ounce of pretzels (100)    ¨ 3 cups of air-popped popcorn without butter or oil (80)    · Dairy:      ¨ 1 cup of skim or 1% milk (90)    ¨ 1 cup of 2% milk (120)    ¨ 1 cup of whole milk (160)    ¨ 1 cup of 2% chocolate milk (220)    ¨ 1 ounce of low-fat cheese with 3 grams of fat per ounce (70)    ¨ 1 ounce of cheddar cheese (114)    ¨ ½ cup of 1% fat cottage cheese (80)    ¨ 1 cup of plain or sugar-free, fat-free yogurt (90)    · Protein foods:      ¨ 3 ounces of fish (not breaded or fried) (95)    ¨ 3 ounces of breaded, fried fish (195)    ¨ ¾ cup of tuna canned in water (105)    ¨ 3 ounces of chicken breast without skin (105)    ¨ 1 fried chicken breast with skin (350)    ¨ ¼ cup of fat free egg substitute (40)    ¨ 1 large egg (75)    ¨ 3 ounces of lean beef or pork (165)    ¨ 3 ounces of fried pork chop or ham (185)    ¨ ½ cup of cooked dried beans, such as kidney, alan, lentils, or navy (115)    ¨ 3 ounces of bologna or lunch meat (225)    ¨ 2 links of breakfast sausage (140)    · Vegetables:      ¨ ½ cup of sliced mushrooms (10)    ¨ 1 cup of salad greens, such as lettuce, spinach, or simran (15)    ¨ ½ cup of steamed asparagus (20)    ¨ ½ cup of cooked summer squash, zucchini squash, or green or wax beans (25)    ¨ 1 cup of broccoli or cauliflower florets, or 1 medium tomato (25)    ¨ 1 large raw carrot or ½ cup of cooked carrots (40)    ¨ ? of a medium cucumber or 1 stalk of celery (5)    ¨ 1 small baked potato (160)    ¨ 1 cup of breaded, fried vegetables (230)    · Fruit:      ¨ 1 (6-inch) banana (55)     ¨ ½ of a 4-inch grapefruit (55)    ¨ 15 grapes (60)    ¨ 1 medium orange or apple (70)    ¨ 1 large peach (65)    ¨ 1 cup of fresh pineapple chunks (75)    ¨ 1 cup of melon cubes (50)    ¨ 1¼ cups of whole strawberries (45)    ¨ ½ cup of fruit canned in juice (55)    ¨ ½ cup of fruit canned in heavy syrup (110)    ¨ ?  cup of raisins (130)    ¨ ½ cup of unsweetened fruit juice (60)    ¨ ½ cup of grape, cranberry, or prune juice (90)    · Fat:      ¨ 10 peanuts or 2 teaspoons of peanut butter (55)    ¨ 2 tablespoons of avocado or 1 tablespoon of regular salad dressing (45)    ¨ 2 slices of taylor (90)    ¨ 1 teaspoon of oil, such as safflower, canola, corn, or olive oil (45)    ¨ 2 teaspoons of low-fat margarine, or 1 tablespoon of low-fat mayonnaise (50)    ¨ 1 teaspoon of regular margarine (40)    ¨ 1 tablespoon of regular mayonnaise (135)    ¨ 1 tablespoon of cream cheese or 2 tablespoons of low-fat cream cheese (45)    ¨ 2 tablespoons of vegetable shortening (215)    · Dessert and sweets:      ¨ 8 animal crackers or 5 vanilla wafers (80)    ¨ 1 frozen fruit juice bar (80)    ¨ ½ cup of ice milk or low-fat frozen yogurt (90)    ¨ ½ cup of sherbet or sorbet (125)    ¨ ½ cup of sugar-free pudding or custard (60)    ¨ ½ cup of ice cream (140)    ¨ ½ cup of pudding or custard (175)    ¨ 1 (2-inch) square chocolate brownie (185)    · Combination foods:      ¨ Bean burrito made with an 8-inch tortilla, without cheese (275)    ¨ Chicken breast sandwich with lettuce and tomato (325)    ¨ 1 cup of chicken noodle soup (60)    ¨ 1 beef taco (175)    ¨ Regular hamburger with lettuce and tomato (310)    ¨ Regular cheeseburger with lettuce and tomato (410)     ¨ ¼ of a 12-inch cheese pizza (280)    ¨ Fried fish sandwich with lettuce and tomato (425)    ¨ Hot dog and bun (275)    ¨ 1½ cups of macaroni and cheese (310)    ¨ Taco salad with a fried tortilla shell (870)    · Low-calorie foods:      ¨ 1 tablespoon of ketchup or 1 tablespoon of fat free sour cream (15)    ¨ 1 teaspoon of mustard (5)    ¨ ¼ cup of salsa (20)    ¨ 1 large dill pickle (15)    ¨ 1 tablespoon of fat free salad dressing (10)    ¨ 2 teaspoons of low-sugar, light jam or jelly, or 1 tablespoon of sugar-free syrup (15)    ¨ 1 sugar-free popsicle (15)    ¨ 1 cup of club soda, seltzer water, or diet soda (0)  CARE AGREEMENT:   You have the right to help plan your care  Discuss treatment options with your caregivers to decide what care you want to receive  You always have the right to refuse treatment  The above information is an  only  It is not intended as medical advice for individual conditions or treatments  Talk to your doctor, nurse or pharmacist before following any medical regimen to see if it is safe and effective for you  © 2017 [address] [employer] Information is for End User's use only and may not be sold, redistributed or otherwise used for commercial purposes   All illustrations and images included in CareNotes® are the copyrighted property of [employer]  or [facility] [employer]

## 2018-12-19 NOTE — PROGRESS NOTES
Assessment/Plan:         Diagnoses and all orders for this visit:    Type 2 diabetes mellitus with complication, without long-term current use of insulin ([facility] ): life Style mod  Copntinue same meds  RTC in 3mos w Blood work    Hyperlipidemia associated with type 2 diabetes mellitus ([facility] ): life style mod  Continue same meds    Obesity (BMI 30-39  9): life Style Mod    COPD (chronic obstructive pulmonary disease) with chronic bronchitis ([facility] ): stop smoking  Use inhalers        Tobacco abuse: life style mod    Morbid obesity ([facility] )  -     Ambulatory referral to Nutrition Services; Future     Patient's shoes and socks removed  Right Foot/Ankle   Right Foot Inspection  Skin Exam: skin normal, skin intact, dry skin, callus and callus no warmth, no erythema, no maceration, no abnormal color and no pre-ulcer                          Toe Exam: ROM and strength within normal limits, swelling and erythema  Sensory   Vibration: diminished  Proprioception: diminished     Vascular    The right DP pulse is 1+  The right PT pulse is 1+  Left Foot/Ankle  Left Foot Inspection  Skin Exam: skin normal, skin intact, dry skin and callusno warmth, no maceration, normal color, no pre-ulcer and no ulcer                         Toe Exam: ROM and strength within normal limits and swellingno tenderness                   Sensory   Vibration: diminished  Proprioception: diminished  Monofilament: diminished  Vascular    The left DP pulse is 1+  The left PT pulse is 1+  Assign Risk Category:  No deformity present; No loss of protective sensation; No weak pulses       Risk: 1      Subjective:      Patient ID: Melva Galeazzi is a 46 y o  male  46 Y O man with h/O DM,Obesity,Smoking,  Is here for Regular check up, No Blood work, Med list reviewed w pt in detail, he still smokes             The following portions of the patient's history were reviewed and updated as appropriate: allergies, current medications, past family history, past medical history, past social history, past surgical history and problem list     Review of Systems   Constitutional: Positive for fatigue  Negative for chills and fever  HENT: Positive for postnasal drip and rhinorrhea  Negative for congestion, facial swelling, sore throat, trouble swallowing and voice change  Eyes: Negative for pain, discharge and visual disturbance  Respiratory: Positive for shortness of breath  Negative for cough and wheezing  Cardiovascular: Negative for chest pain, palpitations and leg swelling  Gastrointestinal: Negative for abdominal pain, blood in stool, constipation, diarrhea and nausea  Endocrine: Negative for polydipsia, polyphagia and polyuria  Genitourinary: Negative for difficulty urinating, hematuria and urgency  Musculoskeletal: Negative for arthralgias and myalgias  Skin: Negative for rash  Neurological: Negative for dizziness, tremors, weakness and headaches  Hematological: Negative for adenopathy  Does not bruise/bleed easily  Psychiatric/Behavioral: Negative for dysphoric mood, sleep disturbance and suicidal ideas  Objective:      /66 (BP Location: Left arm, Patient Position: Sitting, Cuff Size: Standard)   Pulse 90   Temp 98 5 °F (36 9 °C) (Oral)   Resp 18   Ht 5' 10 2" (1 783 m)   Wt (!) 142 kg (313 lb)   SpO2 96%   BMI 44 66 kg/m²          Physical Exam   Constitutional: He is oriented to person, place, and time  He appears well-nourished  No distress  HENT:   Head: Normocephalic  Mouth/Throat: Oropharynx is clear and moist  No oropharyngeal exudate  Allergic rhinitis   Eyes: Pupils are equal, round, and reactive to light  Conjunctivae are normal  No scleral icterus  Neck: Neck supple  No thyromegaly present  Cardiovascular: Normal rate, regular rhythm and normal heart sounds  Pulses are no weak pulses  No murmur heard  Pulses:       Dorsalis pedis pulses are 1+ on the right side, and 1+ on the left side  Posterior tibial pulses are 1+ on the right side, and 1+ on the left side  Pulmonary/Chest: Effort normal and breath sounds normal  No respiratory distress  He has no wheezes  He has no rales  Abdominal: Soft  Bowel sounds are normal  He exhibits no distension  There is no tenderness  There is no rebound and no guarding  Obese,severe   Musculoskeletal: He exhibits no edema or tenderness  Feet:   Right Foot:   Skin Integrity: Positive for callus and dry skin  Negative for skin breakdown, erythema or warmth  Left Foot:   Skin Integrity: Positive for callus and dry skin  Negative for ulcer, skin breakdown or warmth  Lymphadenopathy:     He has no cervical adenopathy  Neurological: He is alert and oriented to person, place, and time  No cranial nerve deficit  Coordination normal    Skin: No rash noted  No erythema  No pallor  Psychiatric: He has a normal mood and affect  BMI Counseling: Body mass index is 44 66 kg/m²  Discussed the patient's BMI with him  The BMI is above average  BMI counseling and education was provided to the patient  Nutrition recommendations include reducing portion sizes

## 2019-01-02 ENCOUNTER — OFFICE VISIT (OUTPATIENT)
Dept: URGENT CARE | Age: 54
End: 2019-01-02
Payer: COMMERCIAL

## 2019-01-02 VITALS
BODY MASS INDEX: 44.09 KG/M2 | HEART RATE: 100 BPM | DIASTOLIC BLOOD PRESSURE: 81 MMHG | OXYGEN SATURATION: 95 % | WEIGHT: 308 LBS | RESPIRATION RATE: 22 BRPM | TEMPERATURE: 99.6 F | SYSTOLIC BLOOD PRESSURE: 172 MMHG | HEIGHT: 70 IN

## 2019-01-02 DIAGNOSIS — K08.89 TOOTHACHE: Primary | ICD-10-CM

## 2019-01-02 PROCEDURE — 99203 OFFICE O/P NEW LOW 30 MIN: CPT | Performed by: PHYSICIAN ASSISTANT

## 2019-01-02 RX ORDER — AMOXICILLIN 500 MG/1
500 CAPSULE ORAL EVERY 8 HOURS SCHEDULED
Qty: 21 CAPSULE | Refills: 0 | Status: SHIPPED | OUTPATIENT
Start: 2019-01-02 | End: 2019-01-09

## 2019-01-03 DIAGNOSIS — M54.50 LOW BACK PAIN WITHOUT SCIATICA, UNSPECIFIED BACK PAIN LATERALITY, UNSPECIFIED CHRONICITY: ICD-10-CM

## 2019-01-03 NOTE — PROGRESS NOTES
[address] [facility] [employer]        NAME: Kay Seen is a 46 y o  male  : 1965    MRN: [id number]  DATE: 2019  TIME: 7:37 PM    Assessment and Plan   Toothache [K08 89]  1  Toothache  amoxicillin (AMOXIL) 500 mg capsule         Patient [address]  Address: [address] [place] [address] [address], [facility], [address] [place] [address]   Phone: [phone]    Chief Complaint     Chief Complaint   Patient presents with    Oral Pain     Pt c/o lower right dental pain for the past few days and today woke up with right facial swelling and feeling hot  Pt has been taking extra strength Tylenol for symptoms  History of Present Illness       Oral Pain    Associated symptoms include sinus pressure  Pertinent negatives include no fever  Review of Systems   Review of Systems   Constitutional: Negative for chills, diaphoresis, fatigue and fever  HENT: Positive for dental problem and sinus pressure  Negative for congestion, drooling, postnasal drip, sinus pain, sneezing, sore throat and trouble swallowing  Eyes: Negative  Respiratory: Negative for chest tightness, shortness of breath and wheezing  Cardiovascular: Negative  Negative for chest pain and palpitations  Gastrointestinal: Negative for abdominal pain, diarrhea, nausea and vomiting  Endocrine: Negative  Genitourinary: Negative for dysuria  Musculoskeletal: Negative  Skin: Negative for rash  Allergic/Immunologic: Negative  Neurological: Negative  Negative for light-headedness and headaches  Hematological: Negative  Psychiatric/Behavioral: Negative            Current Medications       Current Outpatient Prescriptions:     amitriptyline (ELAVIL) 25 mg tablet, Take 25 mg by mouth daily at bedtime, Disp: , Rfl: 0    amoxicillin (AMOXIL) 500 mg capsule, Take 1 capsule (500 mg total) by mouth every 8 (eight) hours for 7 days, Disp: 21 capsule, Rfl: 0    atorvastatin (LIPITOR) 20 mg tablet, take 1 tablet by mouth once daily, Disp: 90 tablet, Rfl: 1    buPROPion (WELLBUTRIN SR) 150 mg 12 hr tablet, Take 150 mg by mouth daily, Disp: , Rfl: 0    ergocalciferol (VITAMIN D2) 50,000 units, , Disp: , Rfl: 0    fluticasone (ARNUITY ELLIPTA) 100 MCG/ACT AEPB inhaler, Inhale 1 puff daily, Disp: 1 Inhaler, Rfl: 5    fluticasone (FLONASE) 50 mcg/act nasal spray, 2 sprays into each nostril daily, Disp: 1 Bottle, Rfl: 5    Glycopyrrolate (LONHALA MAGNAIR REFILL KIT) 25 MCG/ML SOLN, Inhale 1 mL 2 (two) times a day, Disp: 60 mL, Rfl: 3    haloperidol (HALDOL) 2 mg tablet, , Disp: , Rfl: 0    indacaterol-glycopyrrolate (Pamela Michelle) 27 5-15 6 MCG inhaler, Place 1 capsule into inhaler and inhale 2 (two) times a day, Disp: 60 capsule, Rfl: 5    levothyroxine 25 mcg tablet, take 1 tablet by mouth once daily, Disp: 90 tablet, Rfl: 1    lisinopril (ZESTRIL) 10 mg tablet, take 1 tablet (10MG)  by oral route  every day, Disp: 90 tablet, Rfl: 3    meloxicam (MOBIC) 7 5 mg tablet, take 1 tablet by mouth once daily with food, Disp: 60 tablet, Rfl: 0    metFORMIN (GLUCOPHAGE) 500 mg tablet, take 1 tablet by mouth twice a day with food, Disp: 60 tablet, Rfl: 3    ofloxacin (FLOXIN) 0 3 % otic solution, Administer 10 drops into the left ear 2 (two) times a day (Patient not taking: Reported on 9/12/2018 ), Disp: 5 mL, Rfl: 1    predniSONE 10 mg tablet, Take 3 Tabs Daily with breakfast for 3 days then 2 Tabs daily for 3 Days Then one Tab daily for 3 Days then stop, Disp: 20 tablet, Rfl: 0    SHINGRIX 50 MCG SUSR, , Disp: , Rfl:     tamsulosin (FLOMAX) 0 4 mg, take 1 capsule by mouth once daily 1/2 HOUR FOLLOWING THE SAME MEAL EACH DAY, Disp: 90 capsule, Rfl: 1    Current Allergies     Allergies as of 01/02/2019 - Reviewed 01/02/2019   Allergen Reaction Noted    No active allergies  06/11/2018            The following portions of the patient's history were reviewed and updated as appropriate: allergies, current medications, past family history, past medical history, past social history, past surgical history and problem list      Past Medical History:   Diagnosis Date    COPD (chronic obstructive pulmonary disease) ([facility] )     Depression     Diabetes mellitus ([facility] )     type 2    High triglycerides     Hypertension     Hypothyroidism 02/20/2013    Obesity     Sciatica 12/03/2013       Past Surgical History:   Procedure Laterality Date    UMBILICAL HERNIA REPAIR         Family History   Problem Relation Age of Onset    No Known Problems Mother     Coronary artery disease Father     Hyperlipidemia Father     Coronary artery disease Family          Medications have been verified  Objective   BP (!) 172/81   Pulse 100   Temp 99 6 °F (37 6 °C)   Resp 22   Ht 5' 10" (1 778 m)   Wt (!) 140 kg (308 lb)   SpO2 95%   BMI 44 19 kg/m²        Physical Exam     Physical Exam   Constitutional: He is oriented to person, place, and time  He appears well-developed and well-nourished  No distress  HENT:   Head: Normocephalic and atraumatic  Right Ear: External ear normal    Left Ear: External ear normal    Nose: Nose normal    Mouth/Throat: Oropharynx is clear and moist  No oropharyngeal exudate  Poor dentition diffusely throughout mouth  Pain is in the right lower gum line  Erythema seen  No abscess seen  Eyes: Conjunctivae are normal  Right eye exhibits no discharge  Left eye exhibits no discharge  Neck: Normal range of motion  Neck supple  Cardiovascular: Normal rate, regular rhythm, normal heart sounds and intact distal pulses  Pulmonary/Chest: Effort normal and breath sounds normal  No respiratory distress  He has no wheezes  He has no rales  Musculoskeletal: He exhibits no deformity  Lymphadenopathy:     He has no cervical adenopathy  Neurological: He is alert and oriented to person, place, and time  Skin: Skin is warm  No rash noted  He is not diaphoretic     Nursing note and vitals reviewed

## 2019-01-03 NOTE — PATIENT INSTRUCTIONS
[facility]  Address: [address], [place], [address] [facility]   Phone: [phone]    Toothache   WHAT YOU NEED TO KNOW:   A toothache is pain that is caused by irritation of the nerves in the center of your tooth  The irritation may be caused by several problems, such as a cavity, an infection, a cracked tooth, or gum disease  It is very important to follow up with your dentist so the cause of your toothache can be diagnosed and treated  This can help prevent more serious problems  DISCHARGE INSTRUCTIONS:   Medicines: You may  need any of the following:  · NSAIDs  decrease swelling and pain  This medicine can be bought with or without a doctor's order  This medicine can cause stomach bleeding or kidney problems in certain people  If you take blood thinner medicine, always ask your healthcare provider if NSAIDs are safe for you  Always read the medicine label and follow the directions on it before using this medicine  · Acetaminophen  decreases pain  It is available without a doctor's order  Ask how much to take and how often to take it  Follow directions  Acetaminophen can cause liver damage if not taken correctly  · Pain medicine  may be given as a pill or as medicine that you put directly on your tooth or gums  Do not wait until the pain is severe before you take this medicine  · Antibiotics  help fight or prevent an infection caused by bacteria  Take them as directed  · Take your medicine as directed  Contact your healthcare provider if you think your medicine is not helping or if you have side effects  Tell him of her if you are allergic to any medicine  Keep a list of the medicines, vitamins, and herbs you take  Include the amounts, and when and why you take them  Bring the list or the pill bottles to follow-up visits  Carry your medicine list with you in case of an emergency  Follow up with your dentist as directed: You may be referred to a dental surgeon   Write down your questions so you remember to ask them during your visits  Self-care:   · Rinse your mouth with warm salt water 4 times a day or as directed  · You may need to eat soft foods to help relieve pain caused by chewing  Contact your dentist if:   · You have questions or concerns about your condition or care  Return to the emergency department if:   · You have trouble breathing  · You have swelling in your face or neck  · You have a fever and chills  · You have trouble speaking or swallowing  · You have trouble opening or closing your mouth  © 2017 [facility] Information is for End User's use only and may not be sold, redistributed or otherwise used for commercial purposes  All illustrations and images included in CareNotes® are the copyrighted property of A D A M [employer] Inc  or [employer]  The above information is an  only  It is not intended as medical advice for individual conditions or treatments  Talk to your doctor, nurse or pharmacist before following any medical regimen to see if it is safe and effective for you

## 2019-01-04 RX ORDER — MELOXICAM 7.5 MG/1
TABLET ORAL
Qty: 60 TABLET | Refills: 0 | Status: SHIPPED | OUTPATIENT
Start: 2019-01-04 | End: 2019-03-21 | Stop reason: ALTCHOICE

## 2019-03-05 ENCOUNTER — APPOINTMENT (OUTPATIENT)
Dept: LAB | Facility: HOSPITAL | Age: 54
End: 2019-03-05
Payer: COMMERCIAL

## 2019-03-05 DIAGNOSIS — Z72.0 TOBACCO ABUSE: ICD-10-CM

## 2019-03-05 DIAGNOSIS — E11.8 TYPE 2 DIABETES MELLITUS WITH COMPLICATION, WITHOUT LONG-TERM CURRENT USE OF INSULIN (HCC): ICD-10-CM

## 2019-03-05 DIAGNOSIS — E66.01 MORBID OBESITY (HCC): ICD-10-CM

## 2019-03-05 LAB
ALBUMIN SERPL BCP-MCNC: 4.5 G/DL (ref 3–5.2)
ALP SERPL-CCNC: 72 U/L (ref 43–122)
ALT SERPL W P-5'-P-CCNC: 19 U/L (ref 9–52)
ANION GAP SERPL CALCULATED.3IONS-SCNC: 7 MMOL/L (ref 5–14)
AST SERPL W P-5'-P-CCNC: 21 U/L (ref 17–59)
BASOPHILS # BLD AUTO: 0.1 THOUSANDS/ΜL (ref 0–0.1)
BASOPHILS NFR BLD AUTO: 1 % (ref 0–1)
BILIRUB DIRECT SERPL-MCNC: 0.1 MG/DL
BILIRUB SERPL-MCNC: 0.6 MG/DL
BILIRUB UR QL STRIP: NEGATIVE
BUN SERPL-MCNC: 16 MG/DL (ref 5–25)
CALCIUM SERPL-MCNC: 9.7 MG/DL (ref 8.4–10.2)
CHLORIDE SERPL-SCNC: 97 MMOL/L (ref 97–108)
CHOLEST SERPL-MCNC: 224 MG/DL
CLARITY UR: CLEAR
CO2 SERPL-SCNC: 32 MMOL/L (ref 22–30)
COLOR UR: YELLOW
CREAT SERPL-MCNC: 1.08 MG/DL (ref 0.7–1.5)
EOSINOPHIL # BLD AUTO: 0.2 THOUSAND/ΜL (ref 0–0.4)
EOSINOPHIL NFR BLD AUTO: 3 % (ref 0–6)
ERYTHROCYTE [DISTWIDTH] IN BLOOD BY AUTOMATED COUNT: 12.5 %
EST. AVERAGE GLUCOSE BLD GHB EST-MCNC: 134 MG/DL
GFR SERPL CREATININE-BSD FRML MDRD: 77 ML/MIN/1.73SQ M
GLUCOSE P FAST SERPL-MCNC: 113 MG/DL (ref 70–99)
GLUCOSE UR STRIP-MCNC: NEGATIVE MG/DL
HBA1C MFR BLD: 6.3 % (ref 4.2–6.3)
HCT VFR BLD AUTO: 48.3 % (ref 41–53)
HDLC SERPL-MCNC: 40 MG/DL (ref 40–59)
HGB BLD-MCNC: 16.2 G/DL (ref 13.5–17.5)
HGB UR QL STRIP.AUTO: NEGATIVE
KETONES UR STRIP-MCNC: NEGATIVE MG/DL
LDLC SERPL CALC-MCNC: 138 MG/DL
LEUKOCYTE ESTERASE UR QL STRIP: NEGATIVE
LYMPHOCYTES # BLD AUTO: 1.5 THOUSANDS/ΜL (ref 0.5–4)
LYMPHOCYTES NFR BLD AUTO: 24 % (ref 25–45)
MAGNESIUM SERPL-MCNC: 2.2 MG/DL (ref 1.6–2.3)
MCH RBC QN AUTO: 31.5 PG (ref 26–34)
MCHC RBC AUTO-ENTMCNC: 33.5 G/DL (ref 31–36)
MCV RBC AUTO: 94 FL (ref 80–100)
MONOCYTES # BLD AUTO: 0.7 THOUSAND/ΜL (ref 0.2–0.9)
MONOCYTES NFR BLD AUTO: 11 % (ref 1–10)
NEUTROPHILS # BLD AUTO: 3.9 THOUSANDS/ΜL (ref 1.8–7.8)
NEUTS SEG NFR BLD AUTO: 62 % (ref 45–65)
NITRITE UR QL STRIP: NEGATIVE
NONHDLC SERPL-MCNC: 184 MG/DL
PH UR STRIP.AUTO: 7 [PH]
PLATELET # BLD AUTO: 257 THOUSANDS/UL (ref 150–450)
PMV BLD AUTO: 8.2 FL (ref 8.9–12.7)
POTASSIUM SERPL-SCNC: 4.9 MMOL/L (ref 3.6–5)
PROT SERPL-MCNC: 7.7 G/DL (ref 5.9–8.4)
PROT UR STRIP-MCNC: NEGATIVE MG/DL
PSA SERPL-MCNC: 0.4 NG/ML (ref 0–4)
RBC # BLD AUTO: 5.15 MILLION/UL (ref 4.5–5.9)
SODIUM SERPL-SCNC: 136 MMOL/L (ref 137–147)
SP GR UR STRIP.AUTO: 1.01 (ref 1–1.04)
TRIGL SERPL-MCNC: 231 MG/DL
TSH SERPL DL<=0.05 MIU/L-ACNC: 2.88 UIU/ML (ref 0.47–4.68)
UROBILINOGEN UA: NEGATIVE MG/DL
WBC # BLD AUTO: 6.4 THOUSAND/UL (ref 4.5–11)

## 2019-03-05 PROCEDURE — 83036 HEMOGLOBIN GLYCOSYLATED A1C: CPT

## 2019-03-05 PROCEDURE — 80048 BASIC METABOLIC PNL TOTAL CA: CPT

## 2019-03-05 PROCEDURE — 85025 COMPLETE CBC W/AUTO DIFF WBC: CPT

## 2019-03-05 PROCEDURE — 36415 COLL VENOUS BLD VENIPUNCTURE: CPT

## 2019-03-05 PROCEDURE — 84443 ASSAY THYROID STIM HORMONE: CPT

## 2019-03-05 PROCEDURE — 84153 ASSAY OF PSA TOTAL: CPT

## 2019-03-05 PROCEDURE — 80076 HEPATIC FUNCTION PANEL: CPT

## 2019-03-05 PROCEDURE — 83735 ASSAY OF MAGNESIUM: CPT

## 2019-03-05 PROCEDURE — 80061 LIPID PANEL: CPT

## 2019-03-06 DIAGNOSIS — E78.49 OTHER HYPERLIPIDEMIA: ICD-10-CM

## 2019-03-06 RX ORDER — FENOFIBRATE 145 MG/1
145 TABLET, COATED ORAL DAILY
Qty: 90 TABLET | Refills: 3 | Status: SHIPPED | OUTPATIENT
Start: 2019-03-06 | End: 2020-07-31

## 2019-03-06 RX ORDER — ATORVASTATIN CALCIUM 20 MG/1
20 TABLET, FILM COATED ORAL DAILY
Qty: 90 TABLET | Refills: 1 | Status: SHIPPED | OUTPATIENT
Start: 2019-03-06 | End: 2020-01-27

## 2019-03-07 ENCOUNTER — DOCUMENTATION (OUTPATIENT)
Dept: FAMILY MEDICINE CLINIC | Facility: CLINIC | Age: 54
End: 2019-03-07

## 2019-03-07 DIAGNOSIS — M54.50 LOW BACK PAIN WITHOUT SCIATICA, UNSPECIFIED BACK PAIN LATERALITY, UNSPECIFIED CHRONICITY: ICD-10-CM

## 2019-03-07 RX ORDER — MELOXICAM 7.5 MG/1
TABLET ORAL
Qty: 60 TABLET | Refills: 0 | Status: SHIPPED | OUTPATIENT
Start: 2019-03-07 | End: 2019-03-21 | Stop reason: ALTCHOICE

## 2019-03-07 NOTE — PROGRESS NOTES
Called Pt re blood work result  Dr Francesco Gomez ordered Atorvastatin 20 mg Daily with Dinner and Fenofibrate 145 Mg daily with dinner and to repeat Fasting : Lipid LFT CK in 2-3 months

## 2019-03-21 ENCOUNTER — OFFICE VISIT (OUTPATIENT)
Dept: FAMILY MEDICINE CLINIC | Facility: CLINIC | Age: 54
End: 2019-03-21
Payer: COMMERCIAL

## 2019-03-21 VITALS
WEIGHT: 309 LBS | BODY MASS INDEX: 44.24 KG/M2 | OXYGEN SATURATION: 97 % | SYSTOLIC BLOOD PRESSURE: 136 MMHG | HEART RATE: 70 BPM | TEMPERATURE: 97.5 F | RESPIRATION RATE: 13 BRPM | DIASTOLIC BLOOD PRESSURE: 82 MMHG | HEIGHT: 70 IN

## 2019-03-21 DIAGNOSIS — Z11.59 ENCOUNTER FOR HEPATITIS C SCREENING TEST FOR LOW RISK PATIENT: ICD-10-CM

## 2019-03-21 DIAGNOSIS — E11.69 HYPERLIPIDEMIA ASSOCIATED WITH TYPE 2 DIABETES MELLITUS (HCC): ICD-10-CM

## 2019-03-21 DIAGNOSIS — F17.210 CIGARETTE SMOKER: ICD-10-CM

## 2019-03-21 DIAGNOSIS — E11.00 TYPE 2 DIABETES MELLITUS WITH HYPEROSMOLARITY WITHOUT COMA, WITH LONG-TERM CURRENT USE OF INSULIN (HCC): ICD-10-CM

## 2019-03-21 DIAGNOSIS — Z79.4 TYPE 2 DIABETES MELLITUS WITH HYPEROSMOLARITY WITHOUT COMA, WITH LONG-TERM CURRENT USE OF INSULIN (HCC): ICD-10-CM

## 2019-03-21 DIAGNOSIS — E78.5 HYPERLIPIDEMIA ASSOCIATED WITH TYPE 2 DIABETES MELLITUS (HCC): ICD-10-CM

## 2019-03-21 DIAGNOSIS — Z00.01 ENCOUNTER FOR GENERAL ADULT MEDICAL EXAMINATION WITH ABNORMAL FINDINGS: Primary | ICD-10-CM

## 2019-03-21 DIAGNOSIS — E11.8 TYPE 2 DIABETES MELLITUS WITH COMPLICATION, WITHOUT LONG-TERM CURRENT USE OF INSULIN (HCC): ICD-10-CM

## 2019-03-21 DIAGNOSIS — E03.9 HYPOTHYROIDISM, UNSPECIFIED TYPE: ICD-10-CM

## 2019-03-21 PROCEDURE — 99214 OFFICE O/P EST MOD 30 MIN: CPT | Performed by: INTERNAL MEDICINE

## 2019-03-21 PROCEDURE — G0439 PPPS, SUBSEQ VISIT: HCPCS | Performed by: INTERNAL MEDICINE

## 2019-03-21 PROCEDURE — 3008F BODY MASS INDEX DOCD: CPT | Performed by: INTERNAL MEDICINE

## 2019-03-21 NOTE — PROGRESS NOTES
Assessment and Plan:    Problem List Items Addressed This Visit        Endocrine    Type 2 diabetes mellitus ([facility] ) - Primary      Other Visit Diagnoses     Encounter for hepatitis C screening test for low risk patient            Health Maintenance Due   Topic Date Due    Hepatitis C Screening  1965   [facility] Medicare Annual Wellness Visit (AWV)  1965    CRC Screening: Colonoscopy  1965    DM Eye Exam  03/09/1974    URINE MICROALBUMIN  03/09/1974    HEPATITIS B VACCINES (1 of 3 - Risk 3-dose series) 03/09/1983    DTaP,Tdap,and Td Vaccines (1 - Tdap) 03/09/1985         HPI:  Harpreet Brandt is a 53 y o  male here for his Subsequent Wellness Visit      Patient Active Problem List   Diagnosis    Hypertension    Type 2 diabetes mellitus ([facility] )    Tobacco dependence syndrome    Chronic obstructive pulmonary disease (HCC)    Benign essential hypertension     Past Medical History:   Diagnosis Date    COPD (chronic obstructive pulmonary disease) ([facility] )     Depression     Diabetes mellitus (HCC)     type 2    High triglycerides     Hypertension     Hypothyroidism 02/20/2013    Obesity     Sciatica 12/03/2013     Past Surgical History:   Procedure Laterality Date    UMBILICAL HERNIA REPAIR       Family History   Problem Relation Age of Onset    No Known Problems Mother     Coronary artery disease Father     Hyperlipidemia Father     Coronary artery disease Family      Social History     Tobacco Use   Smoking Status Current Some Day Smoker    Packs/day: 2 00    Types: Cigarettes   Smokeless Tobacco Never Used     Social History     Substance and Sexual Activity   Alcohol Use No      Social History     Substance and Sexual Activity   Drug Use No       Current Outpatient Medications   Medication Sig Dispense Refill    amitriptyline (ELAVIL) 25 mg tablet Take 25 mg by mouth daily at bedtime  0    atorvastatin (LIPITOR) 20 mg tablet Take 1 tablet (20 mg total) by mouth daily With Dinner 90 tablet 1    buPROPion (WELLBUTRIN SR) 150 mg 12 hr tablet Take 150 mg by mouth daily  0    ergocalciferol (VITAMIN D2) 50,000 units   0    fenofibrate (TRICOR) 145 mg tablet Take 1 tablet (145 mg total) by mouth daily With Dinner daily 90 tablet 3    fluticasone (ARNUITY ELLIPTA) 100 MCG/ACT AEPB inhaler Inhale 1 puff daily 1 Inhaler 5    fluticasone (FLONASE) 50 mcg/act nasal spray 2 sprays into each nostril daily 1 Bottle 5    Glycopyrrolate (LONHALA MAGNAIR REFILL KIT) 25 MCG/ML SOLN Inhale 1 mL 2 (two) times a day 60 mL 3    haloperidol (HALDOL) 2 mg tablet   0    indacaterol-glycopyrrolate (Noelle Jurist) 27 5-15 6 MCG inhaler Place 1 capsule into inhaler and inhale 2 (two) times a day 60 capsule 5    levothyroxine 25 mcg tablet take 1 tablet by mouth once daily 90 tablet 1    lisinopril (ZESTRIL) 10 mg tablet take 1 tablet (10MG)  by oral route  every day 90 tablet 3    meloxicam (MOBIC) 7 5 mg tablet take 1 tablet by mouth once daily with food 60 tablet 0    meloxicam (MOBIC) 7 5 mg tablet take 1 tablet by mouth once daily with food 60 tablet 0    metFORMIN (GLUCOPHAGE) 500 mg tablet take 1 tablet by mouth twice a day with food 60 tablet 3    ofloxacin (FLOXIN) 0 3 % otic solution Administer 10 drops into the left ear 2 (two) times a day (Patient not taking: Reported on 9/12/2018 ) 5 mL 1    predniSONE 10 mg tablet Take 3 Tabs Daily with breakfast for 3 days then 2 Tabs daily for 3 Days Then one Tab daily for 3 Days then stop 20 tablet 0    SHINGRIX 50 MCG SUSR       tamsulosin (FLOMAX) 0 4 mg take 1 capsule by mouth once daily 1/2 HOUR FOLLOWING THE SAME MEAL EACH DAY 90 capsule 1     No current facility-administered medications for this visit        Allergies   Allergen Reactions    No Active Allergies      Immunization History   Administered Date(s) Administered    Fluzone Split Quad 0 25 mL 10/04/2016, 08/30/2017    INFLUENZA 10/30/2013, 11/12/2015, 10/04/2016, 08/30/2017, 2017, 2018    Influenza Split 10/01/2013, 2014    Influenza TIV (IM) 2012, 2015    Influenza, recombinant, quadrivalent,injectable, preservative free 2018    Pneumococcal Conjugate 13-Valent 2017    Pneumococcal Polysaccharide PPV23 2014       Patient Care Team:  Taina Hawkins MD as PCP - General (Internal Medicine)    Medicare Screening Tests and Risk Assessments:      Health Risk Assessment:  Patient rates overall health as good  Patient feels that their physical health rating is Same  Eyesight was rated as Same  Hearing was rated as Same  Patient feels that their emotional and mental health rating is Same  Pain experienced by patient in the last 7 days has been None  Patient states that he has experienced no weight loss or gain in last 6 months  Emotional/Mental Health:  Patient has been feeling nervous/anxious  PHQ-9 Depression Screening:    Frequency of the following problems over the past two weeks:      1  Little interest or pleasure in doing things: 1 - several days      2  Feeling down, depressed, or hopeless: 1 - several days      3  Trouble falling or staying asleep, or sleeping too much: 1 - several days      4  Feeling tired or having little energy: 1 - several days      5  Poor appetite or overeatin - several days      6  Feeling bad about yourself - or that you are a failure or have let yourself or your family down: 1 - several days      7  Trouble concentrating on things, such as reading the newspaper or watching television: 1 - several days      8  Moving or speaking so slowly that other people could have noticed  Or the opposite - being so fidgety or restless that you have been moving around a lot more than usual: 1 - several days  PHQ-2 Score: 2          Broken Bones/Falls:     Fall Risk Assessment:    In the past year, patient has experienced: No history of falling in past year          Bladder/Bowel:  Patient has leaked urine accidently in the last six months  Patient reports loss of bowel control  Immunizations:  Patient has had a flu vaccination within the last year  Patient has received a pneumonia shot  Patient has not received a shingles shot  Patient has not received tetanus/diphtheria shot  Home Safety:  Patient does not have trouble with stairs inside or outside of their home  Patient currently reports that there are no safety hazards present in home, working smoke alarms, working carbon monoxide detectors  Preventative Screenings:   prostate cancer screen performed, colon cancer screen completed, cholesterol screen completed, no glaucoma eye exam completed    Nutrition:  Current diet: Diabetic with servings of the following:    Medications:  Patient is not currently taking any over-the-counter supplements  Patient is able to manage medications  Lifestyle Choices:  Patient reports current tobacco use  Patient reports alcohol use  Alcohol use per week: 1 beer a week  Patient drives a vehicle  Patient wears seat belt  Current level of exercise of physical activity described by patient as: moderately active  Activities of Daily Living:  Can get out of bed by his or her self, able to dress self, able to make own meals, able to do own shopping, able to bathe self, can do own laundry/housekeeping, can manage own money, pay bills and track expenses    Previous Hospitalizations:  Hospitalization or ED visit in past 12 months  Number of hospitalizations within the last year: 1-2        Advanced Directives:  Patient has decided on a power of   Patient has spoken to designated power of   Patient has completed advanced directive

## 2019-03-21 NOTE — PROGRESS NOTES
Assessment/Plan:         Diagnoses and all orders for this visit:    Encounter for general adult medical examination with abnormal findings : done In detail    Pt does Not like to do all preventive /screening tests at one time because of the Cost   -     Microalbumin / creatinine urine ratio    Encounter for hepatitis C screening test for low risk patient  -     Hepatitis C antibody; Future    Type 2 diabetes mellitus with complication, without long-term current use of insulin ([facility] ); Stable, Life style mod  Continue same  RTc in 3mos w Blood work  -     Basic metabolic panel; Future  -     Hemoglobin A1C; Future    Hyperlipidemia associated with type 2 diabetes mellitus ([facility] ); Life style Mod  Continue same NMeds  RTC in 2-3 mos w :  -     Lipid panel; Future  -     Hepatic function panel; Future  -     CK (with reflex to MB); Future    Cigarette smoker; discussed again about smoking     -     CT lung screening program; Future    Hypothyroidism, unspecified type; stable  Continue same  RTC in 3mos w Blood work  -     TSH, 3rd generation; Future  -     T4, free; Future        Subjective:      Patient ID: Concepcion Hanson is a 53 y o  male  53 Y O man is here for AWV and Regular check up, recent Blood work and med list reviewed w pt in detail  No New Symptoms    The following portions of the patient's history were reviewed and updated as appropriate: allergies, current medications, past family history, past medical history, past social history, past surgical history and problem list     Review of Systems   Constitutional: Negative for chills, fatigue and fever  HENT: Negative for congestion, facial swelling, sore throat, trouble swallowing and voice change  Eyes: Negative for pain, discharge and visual disturbance  Respiratory: Negative for cough, shortness of breath and wheezing  Cardiovascular: Negative for chest pain, palpitations and leg swelling     Gastrointestinal: Negative for abdominal pain, blood in stool, constipation, diarrhea and nausea  Endocrine: Negative for polydipsia, polyphagia and polyuria  Genitourinary: Negative for difficulty urinating, hematuria and urgency  Musculoskeletal: Negative for arthralgias and myalgias  Skin: Negative for rash  Neurological: Negative for dizziness, tremors, weakness and headaches  Hematological: Negative for adenopathy  Does not bruise/bleed easily  Psychiatric/Behavioral: Negative for dysphoric mood, sleep disturbance and suicidal ideas  Objective:      /82 (BP Location: Right arm, Patient Position: Sitting, Cuff Size: Standard)   Pulse 70   Temp 97 5 °F (36 4 °C) (Oral)   Resp 13   Ht 5' 10" (1 778 m)   Wt (!) 140 kg (309 lb)   SpO2 97%   BMI 44 34 kg/m²          Physical Exam   Constitutional: He is oriented to person, place, and time  He appears well-nourished  No distress  HENT:   Head: Normocephalic  Mouth/Throat: Oropharynx is clear and moist  No oropharyngeal exudate  Eyes: Pupils are equal, round, and reactive to light  Conjunctivae are normal  No scleral icterus  Neck: Neck supple  No thyromegaly present  Cardiovascular: Normal rate and regular rhythm  Murmur heard  Pulmonary/Chest: Effort normal  No respiratory distress  He has wheezes  He has no rales  Abdominal: Soft  Bowel sounds are normal  He exhibits no distension  There is no tenderness  There is no rebound and no guarding  Severe obese   Musculoskeletal: He exhibits no edema or tenderness  Lymphadenopathy:     He has no cervical adenopathy  Neurological: He is alert and oriented to person, place, and time  No cranial nerve deficit  Coordination normal    Skin: No erythema  Psychiatric: He has a normal mood and affect         Assessment and Plan:    Problem List Items Addressed This Visit        Endocrine    Type 2 diabetes mellitus ([facility] [address] )    Relevant Orders    Microalbumin / creatinine urine ratio    Basic metabolic panel    Hemoglobin A1C      Other Visit Diagnoses     Encounter for general adult medical examination with abnormal findings    -  Primary    Encounter for hepatitis C screening test for low risk patient        Relevant Orders    Hepatitis C antibody    Hyperlipidemia associated with type 2 diabetes mellitus ([facility] )        Relevant Orders    Lipid panel    Hepatic function panel    CK (with reflex to MB)    Cigarette smoker        Relevant Orders    CT lung screening program    Hypothyroidism, unspecified type        Relevant Orders    TSH, 3rd generation    T4, free        Health Maintenance Due   Topic Date Due    Hepatitis C Screening  1965    Medicare Annual Wellness Visit (AWV)  1965    CRC Screening: Colonoscopy  1965    DM Eye Exam  03/09/1974    URINE MICROALBUMIN  03/09/1974    HEPATITIS B VACCINES (1 of 3 - Risk 3-dose series) 03/09/1983    DTaP,Tdap,and Td Vaccines (1 - Tdap) 03/09/1985         HPI:  Harpreet Brandt is a 53 y o  male here for his Subsequent Wellness Visit      Patient Active Problem List   Diagnosis    Hypertension    Type 2 diabetes mellitus ([facility] )    Tobacco dependence syndrome    Chronic obstructive pulmonary disease (HCC)    Benign essential hypertension     Past Medical History:   Diagnosis Date    [facility]     Depression     Diabetes mellitus ([facility] )     type 2    High triglycerides     Hypertension     Hypothyroidism 02/20/2013    Obesity     Sciatica 12/03/2013     Past Surgical History:   Procedure Laterality Date    UMBILICAL HERNIA REPAIR       Family History   Problem Relation Age of Onset    No Known Problems Mother     Coronary artery disease Father     Hyperlipidemia Father     Coronary artery disease Family      Social History     Tobacco Use   Smoking Status Current Some Day Smoker    Packs/day: 2 00    Types: Cigarettes   Smokeless Tobacco Never Used     Social History     Substance and Sexual Activity Alcohol Use No      Social History     Substance and Sexual Activity   Drug Use No       Current Outpatient Medications   Medication Sig Dispense Refill    amitriptyline (ELAVIL) 25 mg tablet Take 25 mg by mouth daily at bedtime  0    atorvastatin (LIPITOR) 20 mg tablet Take 1 tablet (20 mg total) by mouth daily With Dinner 90 tablet 1    buPROPion (WELLBUTRIN SR) 150 mg 12 hr tablet Take 150 mg by mouth daily  0    ergocalciferol (VITAMIN D2) 50,000 units   0    fenofibrate (TRICOR) 145 mg tablet Take 1 tablet (145 mg total) by mouth daily With Dinner daily 90 tablet 3    fluticasone (FLONASE) 50 mcg/act nasal spray 2 sprays into each nostril daily 1 Bottle 5    Glycopyrrolate (LONHALA MAGNAIR REFILL KIT) 25 MCG/ML SOLN Inhale 1 mL 2 (two) times a day 60 mL 3    haloperidol (HALDOL) 2 mg tablet   0    levothyroxine 25 mcg tablet take 1 tablet by mouth once daily 90 tablet 1    lisinopril (ZESTRIL) 10 mg tablet take 1 tablet (10MG)  by oral route  every day 90 tablet 3    metFORMIN (GLUCOPHAGE) 500 mg tablet take 1 tablet by mouth twice a day with food 60 tablet 3    ofloxacin (FLOXIN) 0 3 % otic solution Administer 10 drops into the left ear 2 (two) times a day (Patient not taking: Reported on 9/12/2018 ) 5 mL 1    SHINGRIX 50 MCG SUSR       tamsulosin (FLOMAX) 0 4 mg take 1 capsule by mouth once daily 1/2 HOUR FOLLOWING THE SAME MEAL EACH DAY 90 capsule 1     No current facility-administered medications for this visit        Allergies   Allergen Reactions    No Active Allergies      Immunization History   Administered Date(s) Administered    Fluzone Split Quad 0 25 mL 10/04/2016, 08/30/2017    INFLUENZA 10/30/2013, 11/12/2015, 10/04/2016, 08/30/2017, 09/01/2017, 12/19/2018    Influenza Split 10/01/2013, 09/11/2014    Influenza TIV (IM) 09/14/2012, 11/12/2015    Influenza, recombinant, quadrivalent,injectable, preservative free 12/19/2018    Pneumococcal Conjugate 13-Valent 08/30/2017    Pneumococcal Polysaccharide PPV23 11/12/2014       Patient Care Team:  Chevy Rolon MD as PCP - General (Internal Medicine)    Medicare Screening Tests and Risk Assessments:  Tyrone Barreto is here for his Subsequent Wellness visit  Last Medicare Wellness visit information reviewed, patient interviewed and updates made to the record today  Broken Bones/Falls: Fall Risk Assessment:    In the past year, patient has experienced: visual disturbance    Preventative Screening/Counseling:      Cardiovascular:      General: Risks and Benefits Discussed          Diabetes:      General: Risks and Benefits Discussed and Screening Current          Colorectal Cancer:      General: Risks and Benefits Discussed          Prostate Cancer:      General: Risks and Benefits Discussed          Osteoporosis:      General: Risks and Benefits Discussed          AAA:      General: Risks and Benefits Discussed          Glaucoma:      General: Risks and Benefits Discussed          HIV:      General: Risks and Benefits Discussed          Hepatitis C:      General: Risks and Benefits Discussed        Advanced Directives:   Patient has living will for healthcare, has durable POA for healthcare, patient has an advanced directive  Information on ACP and/or AD provided  5 wishes given  End of life assessment reviewed with patient  Provider agrees with end of life decisions   Other Preventative Counseling (Non-Medicare):   Fall Prevention, Increase physical activity, Nutrition Counseling, Car/seat belt/driving safety reviewed, Skin self-exam, Sunscreen use, Dietary education for weight gain and Weight reduction discussed

## 2019-03-22 DIAGNOSIS — R79.89 LOW VITAMIN D LEVEL: Primary | ICD-10-CM

## 2019-03-22 RX ORDER — ERGOCALCIFEROL 1.25 MG/1
CAPSULE ORAL
Qty: 13 CAPSULE | Refills: 3 | Status: SHIPPED | OUTPATIENT
Start: 2019-03-22 | End: 2020-04-07

## 2019-04-04 ENCOUNTER — HOSPITAL ENCOUNTER (OUTPATIENT)
Dept: CT IMAGING | Facility: HOSPITAL | Age: 54
Discharge: HOME | End: 2019-04-04
Payer: COMMERCIAL

## 2019-04-04 DIAGNOSIS — F17.210 CIGARETTE SMOKER: ICD-10-CM

## 2019-04-10 ENCOUNTER — TELEPHONE (OUTPATIENT)
Dept: FAMILY MEDICINE CLINIC | Facility: CLINIC | Age: 54
End: 2019-04-10

## 2019-05-06 DIAGNOSIS — M54.50 LOW BACK PAIN WITHOUT SCIATICA, UNSPECIFIED BACK PAIN LATERALITY, UNSPECIFIED CHRONICITY: ICD-10-CM

## 2019-05-06 RX ORDER — MELOXICAM 7.5 MG/1
TABLET ORAL
Qty: 60 TABLET | Refills: 0 | Status: SHIPPED | OUTPATIENT
Start: 2019-05-06 | End: 2019-07-18 | Stop reason: SDUPTHER

## 2019-07-08 DIAGNOSIS — J20.9 ACUTE BRONCHITIS, UNSPECIFIED ORGANISM: Primary | ICD-10-CM

## 2019-07-08 RX ORDER — AMOXICILLIN AND CLAVULANATE POTASSIUM 875; 125 MG/1; MG/1
1 TABLET, FILM COATED ORAL EVERY 12 HOURS SCHEDULED
Qty: 14 TABLET | Refills: 0 | Status: SHIPPED | OUTPATIENT
Start: 2019-07-08 | End: 2019-07-15

## 2019-07-08 RX ORDER — BENZONATATE 100 MG/1
100 CAPSULE ORAL 3 TIMES DAILY PRN
Qty: 30 CAPSULE | Refills: 0 | Status: SHIPPED | OUTPATIENT
Start: 2019-07-08 | End: 2019-11-07 | Stop reason: ALTCHOICE

## 2019-07-11 ENCOUNTER — OFFICE VISIT (OUTPATIENT)
Dept: FAMILY MEDICINE CLINIC | Facility: CLINIC | Age: 54
End: 2019-07-11
Payer: COMMERCIAL

## 2019-07-11 VITALS
TEMPERATURE: 98.6 F | WEIGHT: 308.6 LBS | HEIGHT: 70 IN | OXYGEN SATURATION: 97 % | HEART RATE: 82 BPM | BODY MASS INDEX: 44.18 KG/M2 | DIASTOLIC BLOOD PRESSURE: 80 MMHG | SYSTOLIC BLOOD PRESSURE: 138 MMHG | RESPIRATION RATE: 14 BRPM

## 2019-07-11 DIAGNOSIS — Z12.11 ENCOUNTER FOR SCREENING COLONOSCOPY: ICD-10-CM

## 2019-07-11 DIAGNOSIS — E11.9 DIABETIC EYE EXAM (HCC): ICD-10-CM

## 2019-07-11 DIAGNOSIS — E11.00 TYPE 2 DIABETES MELLITUS WITH HYPEROSMOLARITY WITHOUT COMA, WITH LONG-TERM CURRENT USE OF INSULIN (HCC): ICD-10-CM

## 2019-07-11 DIAGNOSIS — N40.1 BENIGN PROSTATIC HYPERPLASIA WITH LOWER URINARY TRACT SYMPTOMS, SYMPTOM DETAILS UNSPECIFIED: ICD-10-CM

## 2019-07-11 DIAGNOSIS — Z79.4 TYPE 2 DIABETES MELLITUS WITH HYPEROSMOLARITY WITHOUT COMA, WITH LONG-TERM CURRENT USE OF INSULIN (HCC): ICD-10-CM

## 2019-07-11 DIAGNOSIS — IMO0002 TYPE II DIABETES MELLITUS WITH MANIFESTATIONS, UNCONTROLLED: Primary | ICD-10-CM

## 2019-07-11 DIAGNOSIS — Z23 NEED FOR TDAP VACCINATION: ICD-10-CM

## 2019-07-11 DIAGNOSIS — J44.1 ACUTE EXACERBATION OF CHRONIC OBSTRUCTIVE PULMONARY DISEASE (COPD) (HCC): ICD-10-CM

## 2019-07-11 DIAGNOSIS — F17.210 CIGARETTE SMOKER: ICD-10-CM

## 2019-07-11 DIAGNOSIS — Z01.00 DIABETIC EYE EXAM (HCC): ICD-10-CM

## 2019-07-11 DIAGNOSIS — J43.9 PULMONARY EMPHYSEMA, UNSPECIFIED EMPHYSEMA TYPE (HCC): ICD-10-CM

## 2019-07-11 PROCEDURE — 99214 OFFICE O/P EST MOD 30 MIN: CPT | Performed by: INTERNAL MEDICINE

## 2019-07-11 PROCEDURE — 3008F BODY MASS INDEX DOCD: CPT | Performed by: INTERNAL MEDICINE

## 2019-07-11 RX ORDER — FLUOXETINE HYDROCHLORIDE 20 MG/1
CAPSULE ORAL
COMMUNITY
Start: 2019-07-09 | End: 2022-07-14 | Stop reason: SDUPTHER

## 2019-07-11 RX ORDER — TAMSULOSIN HYDROCHLORIDE 0.4 MG/1
0.4 CAPSULE ORAL
Qty: 90 CAPSULE | Refills: 1 | Status: SHIPPED | OUTPATIENT
Start: 2019-07-11 | End: 2020-02-20

## 2019-07-11 RX ORDER — HALOPERIDOL 5 MG
2.5 TABLET ORAL
Refills: 0 | COMMUNITY
Start: 2019-05-06

## 2019-07-11 NOTE — PROGRESS NOTES
Assessment/Plan:         Diagnoses and all orders for this visit:    Type II diabetes mellitus with manifestations, uncontrolled ([facility] ); Life style mod  conntinue same meds  RTC in 3mos w Blood work    -     Ambulatory referral to Ophthalmology; Future    Diabetic eye exam [facility])  -     Ambulatory referral to Ophthalmology; Future    Need for Tdap vaccination; discussed w Pt    Encounter for screening colonoscopy  -     Ambulatory referral to Gastroenterology; Future    Benign prostatic hyperplasia with lower urinary tract symptoms, symptom details unspecified; renew ;  -     tamsulosin (FLOMAX) 0 4 mg; Take 1 capsule (0 4 mg total) by mouth daily with dinner  FU w Urology  Pulmonary emphysema, unspecified emphysema type ([facility] ); stop smoking, renew ;  -     Glycopyrrolate (LONHALA MAGNAIR REFILL KIT) 25 MCG/ML SOLN; Inhale 1 mL 2 (two) times a day    Acute exacerbation of chronic obstructive pulmonary disease (COPD) ([facility] ); improving gradually  Finish up Meds  Stop smoking    Cigarette smoker; advised to stop smoking  BMI Counseling: Body mass index is 44 28 kg/m²  Discussed the patient's BMI with him  The BMI is above average  BMI counseling and education was provided to the patient  Nutrition recommendations include reducing portion sizes and decreasing overall calorie intake  Other orders  -     Cancel: TDAP VACCINE GREATER THAN OR EQUAL TO 8YO IM  -     FLUoxetine (PROzac) 20 mg capsule        Subjective:      Patient ID: Arnold Boykin is a 53 y o  male  53 Y O man is here for Regular check up, his acute Bronchitis is improving slowly and Gradually, Recent Blood work and med list reviewed w pt, he still smokes,          The following portions of the patient's history were reviewed and updated as appropriate: allergies, current medications, past family history, past medical history, past social history, past surgical history and problem list     Review of Systems   Constitutional: Negative for chills, fatigue and fever  HENT: Negative for congestion, facial swelling, sore throat, trouble swallowing and voice change  Eyes: Negative for pain, discharge and visual disturbance  Respiratory: Positive for cough  Negative for shortness of breath and wheezing  Cardiovascular: Negative for chest pain, palpitations and leg swelling  Gastrointestinal: Negative for abdominal pain, blood in stool, constipation, diarrhea and nausea  Endocrine: Negative for polydipsia, polyphagia and polyuria  Genitourinary: Negative for difficulty urinating, hematuria and urgency  Musculoskeletal: Negative for arthralgias and myalgias  Skin: Negative for rash  Neurological: Negative for dizziness, tremors, weakness and headaches  Hematological: Negative for adenopathy  Does not bruise/bleed easily  Psychiatric/Behavioral: Negative for dysphoric mood, sleep disturbance and suicidal ideas  Objective:      /80 (BP Location: Left arm, Patient Position: Sitting, Cuff Size: Standard)   Pulse 82   Temp 98 6 °F (37 °C) (Oral)   Resp 14   Ht 5' 10" (1 778 m)   Wt (!) 140 kg (308 lb 9 6 oz)   SpO2 97%   BMI 44 28 kg/m²          Physical Exam   Constitutional: He is oriented to person, place, and time  He appears well-nourished  No distress  HENT:   Head: Normocephalic  Mouth/Throat: Oropharynx is clear and moist  No oropharyngeal exudate  Eyes: Pupils are equal, round, and reactive to light  Conjunctivae are normal  No scleral icterus  Neck: Neck supple  No thyromegaly present  Cardiovascular: Normal rate and regular rhythm  Murmur heard  Pulmonary/Chest: Effort normal  No respiratory distress  He has wheezes  He has no rales  Abdominal: Soft  Bowel sounds are normal  He exhibits no distension  There is no tenderness  There is no rebound and no guarding  Severe Obese   Musculoskeletal: He exhibits no edema or tenderness  Lymphadenopathy:     He has no cervical adenopathy     Neurological: He is alert and oriented to person, place, and time  No cranial nerve deficit  Coordination normal    Skin: No erythema  Psychiatric: He has a normal mood and affect

## 2019-07-18 DIAGNOSIS — M54.50 LOW BACK PAIN WITHOUT SCIATICA, UNSPECIFIED BACK PAIN LATERALITY, UNSPECIFIED CHRONICITY: ICD-10-CM

## 2019-07-18 RX ORDER — MELOXICAM 7.5 MG/1
TABLET ORAL
Qty: 60 TABLET | Refills: 0 | Status: SHIPPED | OUTPATIENT
Start: 2019-07-18 | End: 2019-09-15 | Stop reason: SDUPTHER

## 2019-07-24 ENCOUNTER — TELEPHONE (OUTPATIENT)
Dept: FAMILY MEDICINE CLINIC | Facility: CLINIC | Age: 54
End: 2019-07-24

## 2019-07-24 DIAGNOSIS — J44.1 ACUTE EXACERBATION OF CHRONIC OBSTRUCTIVE PULMONARY DISEASE (COPD) (HCC): Primary | ICD-10-CM

## 2019-07-24 RX ORDER — GUAIFENESIN/DEXTROMETHORPHAN 100-10MG/5
5 SYRUP ORAL 3 TIMES DAILY PRN
Qty: 118 ML | Refills: 1 | Status: SHIPPED | OUTPATIENT
Start: 2019-07-24 | End: 2019-11-07 | Stop reason: ALTCHOICE

## 2019-07-29 DIAGNOSIS — J32.8 OTHER CHRONIC SINUSITIS: ICD-10-CM

## 2019-07-29 RX ORDER — FLUTICASONE PROPIONATE 50 MCG
2 SPRAY, SUSPENSION (ML) NASAL DAILY
Qty: 1 BOTTLE | Refills: 5 | Status: SHIPPED | OUTPATIENT
Start: 2019-07-29 | End: 2020-07-30

## 2019-09-15 DIAGNOSIS — M54.50 LOW BACK PAIN WITHOUT SCIATICA, UNSPECIFIED BACK PAIN LATERALITY, UNSPECIFIED CHRONICITY: ICD-10-CM

## 2019-09-15 RX ORDER — MELOXICAM 7.5 MG/1
TABLET ORAL
Qty: 60 TABLET | Refills: 0 | Status: SHIPPED | OUTPATIENT
Start: 2019-09-15 | End: 2019-11-20 | Stop reason: SDUPTHER

## 2019-09-19 DIAGNOSIS — I10 ESSENTIAL HYPERTENSION: ICD-10-CM

## 2019-09-19 RX ORDER — LISINOPRIL 10 MG/1
TABLET ORAL
Qty: 90 TABLET | Refills: 3 | Status: SHIPPED | OUTPATIENT
Start: 2019-09-19 | End: 2020-09-29

## 2019-09-30 DIAGNOSIS — I10 ESSENTIAL HYPERTENSION: ICD-10-CM

## 2019-09-30 RX ORDER — LISINOPRIL 10 MG/1
TABLET ORAL
Qty: 90 TABLET | Refills: 3 | Status: SHIPPED | OUTPATIENT
Start: 2019-09-30 | End: 2020-01-18 | Stop reason: SDUPTHER

## 2019-10-02 NOTE — PRE-PROCEDURE INSTRUCTIONS
Pre-Surgery Instructions:   Medication Instructions    fluticasone (FLONASE) 50 mcg/act nasal spray Instructed patient per Anesthesia Guidelines   Glycopyrrolate (LONHALA MAGNAIR REFILL KIT) 25 MCG/ML SOLN Instructed patient per Anesthesia Guidelines   lisinopril (ZESTRIL) 10 mg tablet Instructed patient per Anesthesia Guidelines

## 2019-10-03 ENCOUNTER — ANESTHESIA EVENT (OUTPATIENT)
Dept: GASTROENTEROLOGY | Facility: HOSPITAL | Age: 54
End: 2019-10-03

## 2019-10-03 NOTE — ANESTHESIA PREPROCEDURE EVALUATION
Review of Systems/Medical History  Patient summary reviewed  Chart reviewed      Cardiovascular  Exercise tolerance (METS): <4,  Hyperlipidemia, Hypertension , PEREZ,    Pulmonary  Smoker cigarette smoker  , Tobacco cessation counseling given Cumulative Pack Years: 27, COPD , Sleep apnea (non-compliant) CPAP,        GI/Hepatic  Negative GI/hepatic ROS   Bowel prep       Negative  ROS Prostatic disorder, benign prostatic hyperplasia       Endo/Other  Diabetes type 2 Oral agent, History of thyroid disease , hypothyroidism,   Obesity  super morbid obesity   GYN       Hematology  Negative hematology ROS      Musculoskeletal    Arthritis     Neurology  Negative neurology ROS      Psychology   Anxiety, Depression ,              Physical Exam    Airway    Mallampati score: IV  TM Distance: >3 FB  Neck ROM: full     Dental   Comment: Missing many, many chipped, poor dental hygine,     Cardiovascular  Rhythm: regular, Rate: normal,     Pulmonary  Breath sounds clear to auscultation,     Other Findings        Anesthesia Plan  ASA Score- 3     Anesthesia Type- IV sedation with anesthesia with ASA Monitors  Additional Monitors:   Airway Plan:         Plan Factors-    Induction-     Postoperative Plan-     Informed Consent- Anesthetic plan and risks discussed with patient  I personally reviewed this patient with the CRNA  Discussed and agreed on the Anesthesia Plan with the CRNA  Pedro Sher

## 2019-10-04 ENCOUNTER — HOSPITAL ENCOUNTER (OUTPATIENT)
Dept: GASTROENTEROLOGY | Facility: HOSPITAL | Age: 54
Setting detail: OUTPATIENT SURGERY
Discharge: HOME | End: 2019-10-04
Attending: COLON & RECTAL SURGERY | Admitting: COLON & RECTAL SURGERY
Payer: COMMERCIAL

## 2019-10-04 ENCOUNTER — ANESTHESIA (OUTPATIENT)
Dept: GASTROENTEROLOGY | Facility: HOSPITAL | Age: 54
End: 2019-10-04

## 2019-10-04 VITALS
RESPIRATION RATE: 18 BRPM | TEMPERATURE: 97.4 F | HEART RATE: 68 BPM | OXYGEN SATURATION: 95 % | DIASTOLIC BLOOD PRESSURE: 60 MMHG | SYSTOLIC BLOOD PRESSURE: 110 MMHG

## 2019-10-04 DIAGNOSIS — Z12.11 ENCOUNTER FOR SCREENING FOR MALIGNANT NEOPLASM OF COLON: ICD-10-CM

## 2019-10-04 DIAGNOSIS — Z86.010 HISTORY OF COLONIC POLYPS: ICD-10-CM

## 2019-10-04 LAB — GLUCOSE SERPL-MCNC: 120 MG/DL (ref 65–140)

## 2019-10-04 PROCEDURE — 82948 REAGENT STRIP/BLOOD GLUCOSE: CPT

## 2019-10-04 RX ORDER — SODIUM CHLORIDE 9 MG/ML
125 INJECTION, SOLUTION INTRAVENOUS CONTINUOUS
Status: DISCONTINUED | OUTPATIENT
Start: 2019-10-04 | End: 2019-10-08 | Stop reason: HOSPADM

## 2019-10-04 RX ORDER — PROPOFOL 10 MG/ML
INJECTION, EMULSION INTRAVENOUS AS NEEDED
Status: DISCONTINUED | OUTPATIENT
Start: 2019-10-04 | End: 2019-10-04 | Stop reason: SURG

## 2019-10-04 RX ORDER — SODIUM CHLORIDE 9 MG/ML
INJECTION, SOLUTION INTRAVENOUS CONTINUOUS PRN
Status: DISCONTINUED | OUTPATIENT
Start: 2019-10-04 | End: 2019-10-04 | Stop reason: SURG

## 2019-10-04 RX ADMIN — PROPOFOL 50 MG: 10 INJECTION, EMULSION INTRAVENOUS at 10:17

## 2019-10-04 RX ADMIN — PROPOFOL 20 MG: 10 INJECTION, EMULSION INTRAVENOUS at 10:26

## 2019-10-04 RX ADMIN — PROPOFOL 50 MG: 10 INJECTION, EMULSION INTRAVENOUS at 10:21

## 2019-10-04 RX ADMIN — SODIUM CHLORIDE: 0.9 INJECTION, SOLUTION INTRAVENOUS at 09:24

## 2019-10-04 RX ADMIN — PROPOFOL 50 MG: 10 INJECTION, EMULSION INTRAVENOUS at 10:19

## 2019-10-04 RX ADMIN — PROPOFOL 50 MG: 10 INJECTION, EMULSION INTRAVENOUS at 10:24

## 2019-10-04 RX ADMIN — SODIUM CHLORIDE 125 ML/HR: 0.9 INJECTION, SOLUTION INTRAVENOUS at 09:32

## 2019-10-04 NOTE — DISCHARGE INSTRUCTIONS
[facility] [address], [address] [place] [address] [address]  Phone: [phone]    DISCHARGE INSTRUCTIONS:    1   _x__ Complete Exam - Normal    2   ___ Exam normal, but entire colon not seen  We will discuss this with you  3   ___ Polyp(s) removed by "burning" - NO pathology report will follow    4   ___ Polyp(s) removed by excision  Pathology report will be available in 4-5 days   Someone from our office will call you with results  5   ___ Exam prompted biopsies  Pathology report will be available in 4-5 days   Someone from our office will call you with results  6   ___ Exam demonstrated findings that need treatment  Prescriptions will be   Given to you  Return to our office in ____ weeks  Please call for appt  7   ___ Original office visit or colonoscopy findings necessitate an office visit  Please call to set up a new appointment    8   ___ Medication  __________________________________________        [address]:    - Go straight home and rest today    - No driving or drinking alcohol for 24 hours    - Resume regular diet and medications unless otherwise instructed  Coumadin and Plavix are blood thinners  You can resume these medications on __________      IF YOU ARE HAVING ANY FEVER, BLEEDING OR PERSISTENT PAIN IN THE ABDOMEN, PLEASE CALL OUR OFFICE ANY DAY OR TIME  [phone]

## 2019-10-31 LAB
LEFT EYE DIABETIC RETINOPATHY: NORMAL
RIGHT EYE DIABETIC RETINOPATHY: NORMAL

## 2019-11-07 ENCOUNTER — OFFICE VISIT (OUTPATIENT)
Dept: FAMILY MEDICINE CLINIC | Facility: CLINIC | Age: 54
End: 2019-11-07
Payer: COMMERCIAL

## 2019-11-07 VITALS
BODY MASS INDEX: 43.09 KG/M2 | SYSTOLIC BLOOD PRESSURE: 136 MMHG | WEIGHT: 301 LBS | HEART RATE: 84 BPM | DIASTOLIC BLOOD PRESSURE: 86 MMHG | RESPIRATION RATE: 14 BRPM | TEMPERATURE: 98.7 F | HEIGHT: 70 IN | OXYGEN SATURATION: 97 %

## 2019-11-07 DIAGNOSIS — J43.9 PULMONARY EMPHYSEMA, UNSPECIFIED EMPHYSEMA TYPE (HCC): ICD-10-CM

## 2019-11-07 DIAGNOSIS — E11.69 HYPERLIPIDEMIA ASSOCIATED WITH TYPE 2 DIABETES MELLITUS (HCC): ICD-10-CM

## 2019-11-07 DIAGNOSIS — E78.5 HYPERLIPIDEMIA ASSOCIATED WITH TYPE 2 DIABETES MELLITUS (HCC): ICD-10-CM

## 2019-11-07 DIAGNOSIS — E03.9 HYPOTHYROIDISM, UNSPECIFIED TYPE: ICD-10-CM

## 2019-11-07 DIAGNOSIS — IMO0002 TYPE II DIABETES MELLITUS WITH MANIFESTATIONS, UNCONTROLLED: Primary | ICD-10-CM

## 2019-11-07 DIAGNOSIS — F17.210 CIGARETTE SMOKER: ICD-10-CM

## 2019-11-07 DIAGNOSIS — E11.9 TYPE 2 DIABETES MELLITUS WITHOUT COMPLICATION, UNSPECIFIED WHETHER LONG TERM INSULIN USE (HCC): ICD-10-CM

## 2019-11-07 DIAGNOSIS — Z11.59 NEED FOR HEPATITIS C SCREENING TEST: ICD-10-CM

## 2019-11-07 DIAGNOSIS — Z23 NEED FOR INFLUENZA VACCINATION: ICD-10-CM

## 2019-11-07 DIAGNOSIS — N40.0 ENLARGED PROSTATE: ICD-10-CM

## 2019-11-07 LAB — SL AMB POCT HEMOGLOBIN AIC: 6.1 (ref ?–6.5)

## 2019-11-07 PROCEDURE — 99214 OFFICE O/P EST MOD 30 MIN: CPT

## 2019-11-07 PROCEDURE — 90682 RIV4 VACC RECOMBINANT DNA IM: CPT

## 2019-11-07 PROCEDURE — 3008F BODY MASS INDEX DOCD: CPT | Performed by: INTERNAL MEDICINE

## 2019-11-07 PROCEDURE — G0008 ADMIN INFLUENZA VIRUS VAC: HCPCS

## 2019-11-07 PROCEDURE — 83036 HEMOGLOBIN GLYCOSYLATED A1C: CPT

## 2019-11-07 NOTE — PROGRESS NOTES
Assessment/Plan:         Diagnoses and all orders for this visit:    Type II diabetes mellitus with manifestations, uncontrolled ([facility] ); Improving  Continue same  RTc in 3mos w :  -     Comprehensive metabolic panel; Future  -     CBC and differential; Future  -     Hemoglobin A1C; Future  -     Magnesium; Future  -     Lipid panel; Future  -     Urinalysis with reflex to microscopic    -     POCT hemoglobin A1c; 6 1%  -     Microalbumin / creatinine urine ratio    Need for influenza vaccination  -     influenza vaccine, [id number], quadrivalent, recombinant, PF, 0 5 mL, for patients 18 yr+ (FLUBLOK)    Need for hepatitis C screening test  -     Hepatitis C antibody; Future    Hyperlipidemia associated with type 2 diabetes mellitus ([facility] ); Life style mod  Continue same  RTc in 3mos w  Blood work    Hypothyroidism, unspecified type; Continue same  RTc in 3mos w :  -     Thyroid Antibodies Panel; Future  -     T4, free; Future  -     TSH, 3rd generation; Future    Pulmonary emphysema, unspecified emphysema type ([place] [address] ); stop smoking  RTC in 3mos  Use Inhalers    Cigarette smoker; advised to quit smoking    Enlarged prostate  -     PSA Total, Diagnostic; Future        Subjective:      Patient ID: Teofilo Deras is a 53 y o  male  53 Y O lady is here for Regular check up, No new Symptoms, he feels Ok, No recent blood work, med List reviewed  w pt in detail    The following portions of the patient's history were reviewed and updated as appropriate: allergies, current medications, past family history, past medical history, past social history, past surgical history and problem list     Review of Systems   Constitutional: Negative for chills, fatigue and fever  HENT: Negative for congestion, facial swelling, sore throat, trouble swallowing and voice change  Eyes: Negative for pain, discharge and visual disturbance  Respiratory: Positive for shortness of breath  Negative for cough and wheezing  Cardiovascular: Negative for chest pain, palpitations and leg swelling  Gastrointestinal: Negative for abdominal pain, blood in stool, constipation, diarrhea and nausea  Endocrine: Negative for polydipsia, polyphagia and polyuria  Genitourinary: Negative for difficulty urinating, hematuria and urgency  Musculoskeletal: Negative for arthralgias and myalgias  Skin: Negative for rash  Neurological: Negative for dizziness, tremors, weakness and headaches  Hematological: Negative for adenopathy  Does not bruise/bleed easily  Psychiatric/Behavioral: Negative for dysphoric mood, sleep disturbance and suicidal ideas  Objective:      /86 (BP Location: Left arm, Patient Position: Sitting, Cuff Size: Standard)   Pulse 84   Temp 98 7 °F (37 1 °C) (Tympanic)   Resp 14   Ht 5' 10" (1 778 m)   Wt (!) 137 kg (301 lb)   SpO2 97%   BMI 43 19 kg/m²          Physical Exam   Constitutional: He is oriented to person, place, and time  He appears well-nourished  No distress  HENT:   Head: Normocephalic  Mouth/Throat: Oropharynx is clear and moist  No oropharyngeal exudate  Eyes: Pupils are equal, round, and reactive to light  Conjunctivae are normal  No scleral icterus  Neck: Neck supple  No thyromegaly present  Cardiovascular: Normal rate and regular rhythm  Murmur heard  Pulmonary/Chest: Effort normal  No respiratory distress  He has wheezes  He has no rales  Abdominal: Soft  Bowel sounds are normal  He exhibits no distension  There is no tenderness  There is no rebound and no guarding  Severe Obese   Musculoskeletal: He exhibits no edema or tenderness  Lymphadenopathy:     He has no cervical adenopathy  Neurological: He is alert and oriented to person, place, and time  No cranial nerve deficit  Coordination normal    Skin: No erythema  No pallor  Many skin tags face and neck   Psychiatric: He has a normal mood and affect

## 2019-11-20 DIAGNOSIS — M54.50 LOW BACK PAIN WITHOUT SCIATICA, UNSPECIFIED BACK PAIN LATERALITY, UNSPECIFIED CHRONICITY: ICD-10-CM

## 2019-11-20 RX ORDER — MELOXICAM 7.5 MG/1
TABLET ORAL
Qty: 60 TABLET | Refills: 0 | Status: SHIPPED | OUTPATIENT
Start: 2019-11-20 | End: 2019-11-21 | Stop reason: SDUPTHER

## 2019-11-21 DIAGNOSIS — M54.50 LOW BACK PAIN WITHOUT SCIATICA, UNSPECIFIED BACK PAIN LATERALITY, UNSPECIFIED CHRONICITY: ICD-10-CM

## 2019-11-21 RX ORDER — MELOXICAM 7.5 MG/1
7.5 TABLET ORAL DAILY
Qty: 30 TABLET | Refills: 3 | Status: SHIPPED | OUTPATIENT
Start: 2019-11-21 | End: 2020-02-20

## 2020-01-18 ENCOUNTER — OFFICE VISIT (OUTPATIENT)
Dept: URGENT CARE | Age: 55
End: 2020-01-18
Payer: COMMERCIAL

## 2020-01-18 VITALS
RESPIRATION RATE: 20 BRPM | TEMPERATURE: 98.7 F | SYSTOLIC BLOOD PRESSURE: 135 MMHG | WEIGHT: 306 LBS | OXYGEN SATURATION: 95 % | HEART RATE: 79 BPM | DIASTOLIC BLOOD PRESSURE: 76 MMHG | HEIGHT: 70 IN | BODY MASS INDEX: 43.81 KG/M2

## 2020-01-18 DIAGNOSIS — K04.7 DENTAL ABSCESS: Primary | ICD-10-CM

## 2020-01-18 PROCEDURE — 99213 OFFICE O/P EST LOW 20 MIN: CPT | Performed by: PHYSICIAN ASSISTANT

## 2020-01-18 RX ORDER — CLINDAMYCIN HYDROCHLORIDE 300 MG/1
300 CAPSULE ORAL 3 TIMES DAILY
Qty: 21 CAPSULE | Refills: 0 | Status: SHIPPED | OUTPATIENT
Start: 2020-01-18 | End: 2020-01-25

## 2020-01-18 NOTE — PATIENT INSTRUCTIONS
Take antibiotic as directed until completed  Motrin and/or Tylenol as needed for pain control  Follow-up with dentist  Follow up with PCP in 3-5 days  Proceed to  ER if symptoms worsen  Dental Abscess   AMBULATORY CARE:   A dental abscess  is a collection of pus in or around a tooth  A dental abscess is caused by bacteria  The bacteria usually enter the tooth when the enamel (outer part of the tooth) is damaged by tooth decay  Bacteria may also enter the tooth through a break or chip in the tooth, or a cut in the gum  Food particles that are stuck between the teeth for a long time may also lead to an abscess  Signs and symptoms of a dental abscess:   · Toothache, a loose tooth, or a tooth that is very sensitive to pressure or temperature    · Bad breath, unpleasant taste, and drooling    · Fever    · Pain, redness, and swelling of the gums, or swelling of your face and neck    · Pain when you open or close your mouth    · Trouble opening your mouth  Seek care immediately if:   · You have severe pain  · You have trouble breathing because of pain or swelling  Contact your healthcare provider if:   · Your symptoms get worse, even after treatment  · Your mouth is bleeding  · You cannot eat or drink because of pain or swelling  · Your abscess returns  · You have an injury that causes a crack in your tooth  · You have questions or concerns about your condition or care  Treatment:  You may  need any of the following:  · Medicines  may be given to treat a bacterial infection and decrease pain  · Incision and drainage  is a cut in the abscess to allow the pus to drain  A sample of fluid may be collected from your abscess  The fluid is sent to a lab and tested for bacteria  Ask your healthcare provider for more information  · A root canal  is a procedure to remove the bacteria and prevent more infection  It is usually done after an incision and drainage   A filling or crown will be placed over the tooth after you have healed from your root canal      · Tooth removal  may be needed if the infection affects deeper tissues  This is usually done after an incision and drainage  Self-care:   · Rinse your mouth every 2 hours with salt water  This will help keep the area clean  · Gently brush your teeth twice a day with a soft tooth brush  This will help keep the area clean  · Eat soft foods as directed  Soft foods may cause less pain  Examples include applesauce, yogurt, and cooked pasta  Ask your healthcare provider how long to follow this instruction  · Apply a warm compress to your tooth or gum  Use a cotton ball or gauze soaked in warm water  Remove the compress in 10 minutes or when it becomes cool  Repeat 3 times a day  Prevent another abscess:   · Brush your teeth at least 2 times a day with fluoride toothpaste  · Use dental floss to clean between your teeth at least once a day  · Rinse your mouth with water or mouthwash after meals and snacks  · Chew sugarless gum after meals and snacks  · Limit foods that are sticky and high in sugar such as raisons  Also limit drinks high in sugar, such as soda  · See your dentist every 6 months for dental cleanings and oral exams  Follow up with your healthcare provider in 24 hours: Your healthcare provider will need to check your teeth and gums  Write down your questions so you remember to ask them during your visits  © 2017 [employer] Information is for End User's use only and may not be sold, redistributed or otherwise used for commercial purposes  All illustrations and images included in CareNotes® are the copyrighted property of [employer]  or [employer]  The above information is an  only  It is not intended as medical advice for individual conditions or treatments   Talk to your doctor, nurse or pharmacist before following any medical regimen to see if it is safe and effective for you

## 2020-01-18 NOTE — PROGRESS NOTES
[facility]        NAME: Alicia Betancourt is a 53 y o  male  : 1965    MRN: [id number]  DATE: 2020  TIME: 2:41 PM    Assessment and Plan   Dental abscess [K04 7]  1  Dental abscess  clindamycin (CLEOCIN) 300 MG capsule         Patient Instructions     Take antibiotic as directed until completed  Motrin and/or Tylenol as needed for pain control  Follow-up with dentist  Follow up with PCP in 3-5 days  Proceed to  ER if symptoms worsen  Chief Complaint     Chief Complaint   Patient presents with    Dental Pain     pt states he is in the process of having many damaged teeth removed, but an upper tooth broke a few days ago and he thinks he has an abscess  Pt due to have more dental work on Monday  History of Present Illness       79-year-old male presents with dental pain and swelling  Started couple days ago progressively gotten worse  Has been using some Motrin time for pain control but continues to have dental pain and swelling in his mouth  Denies any fevers or chills  No other complaints    Dental Pain    This is a new problem  The current episode started in the past 7 days  The problem has been gradually worsening  The pain is moderate  Associated symptoms include facial pain  Pertinent negatives include no difficulty swallowing, fever, oral bleeding, sinus pressure or thermal sensitivity  He has tried nothing for the symptoms  The treatment provided no relief  Review of Systems   Review of Systems   Constitutional: Negative  Negative for fever  HENT: Positive for dental problem  Negative for sinus pressure  Respiratory: Negative  Cardiovascular: Negative  Gastrointestinal: Negative  Musculoskeletal: Negative  Skin: Negative  Neurological: Negative            Current Medications       Current Outpatient Medications:     amitriptyline (ELAVIL) 25 mg tablet, Take 25 mg by mouth daily at bedtime, Disp: , Rfl: 0    atorvastatin (LIPITOR) 20 mg tablet, Take 1 tablet (20 mg total) by mouth daily With Dinner, Disp: 90 tablet, Rfl: 1    ergocalciferol (VITAMIN D2) 50,000 units, take 1 capsule by mouth every week, Disp: 13 capsule, Rfl: 3    fenofibrate (TRICOR) 145 mg tablet, Take 1 tablet (145 mg total) by mouth daily With Dinner daily, Disp: 90 tablet, Rfl: 3    FLUoxetine (PROzac) 20 mg capsule, , Disp: , Rfl:     fluticasone (FLONASE) 50 mcg/act nasal spray, 2 sprays into each nostril daily, Disp: 1 Bottle, Rfl: 5    Glycopyrrolate (LONHALA MAGNAIR REFILL KIT) 25 MCG/ML SOLN, Inhale 1 mL 2 (two) times a day, Disp: 60 mL, Rfl: 3    haloperidol (HALDOL) 2 mg tablet, 2 mg daily at bedtime , Disp: , Rfl: 0    haloperidol (HALDOL) 5 mg tablet, 2 5 mg daily at bedtime , Disp: , Rfl: 0    levothyroxine 25 mcg tablet, take 1 tablet by mouth once daily, Disp: 90 tablet, Rfl: 1    lisinopril (ZESTRIL) 10 mg tablet, take 1 tablet by mouth once daily, Disp: 90 tablet, Rfl: 3    meloxicam (MOBIC) 7 5 mg tablet, Take 1 tablet (7 5 mg total) by mouth daily, Disp: 30 tablet, Rfl: 3    metFORMIN (GLUCOPHAGE) 500 mg tablet, take 1 tablet by mouth twice a day with food, Disp: 60 tablet, Rfl: 3    tamsulosin (FLOMAX) 0 4 mg, Take 1 capsule (0 4 mg total) by mouth daily with dinner, Disp: 90 capsule, Rfl: 1    buPROPion (WELLBUTRIN SR) 150 mg 12 hr tablet, Take 150 mg by mouth daily, Disp: , Rfl: 0    clindamycin (CLEOCIN) 300 MG capsule, Take 1 capsule (300 mg total) by mouth 3 (three) times a day for 7 days, Disp: 21 capsule, Rfl: 0    ofloxacin (FLOXIN) 0 3 % otic solution, Administer 10 drops into the left ear 2 (two) times a day (Patient not taking: Reported on 1/18/2020), Disp: 5 mL, Rfl: 1    Current Allergies     Allergies as of 01/18/2020 - Reviewed 01/18/2020   Allergen Reaction Noted    No active allergies  06/11/2018            The following portions of the patient's history were reviewed and updated as appropriate: allergies, current medications, past family history, past medical history, past social history, past surgical history and problem list      Past Medical History:   Diagnosis Date    Anxiety     Arthritis     Bipolar disorder ([place] [address] )     Colon polyp     COPD (chronic obstructive pulmonary disease) ([place] [address] )     Depression     Diabetes mellitus ([place] [address] )     type 2    Gout     High triglycerides     Hypertension     Hypothyroidism 02/20/2013    Obesity     Paranoid schizophrenia ([place] [address] )     Psychiatric disorder     Sciatica 12/03/2013    Seasonal allergies     Sleep apnea        Past Surgical History:   Procedure Laterality Date    COLONOSCOPY      UMBILICAL HERNIA REPAIR      WISDOM TOOTH EXTRACTION         Family History   Problem Relation Age of Onset    Colon cancer Mother     Coronary artery disease Father     Hyperlipidemia Father     Coronary artery disease Family          Medications have been verified  Objective   /76   Pulse 79   Temp 98 7 °F (37 1 °C)   Resp 20   Ht 5' 10" (1 778 m)   Wt (!) 139 kg (306 lb)   SpO2 95%   BMI 43 91 kg/m²        Physical Exam     Physical Exam   Constitutional: He is oriented to person, place, and time  He appears well-developed and well-nourished  No distress  HENT:   Head: Normocephalic and atraumatic  Right Ear: External ear normal    Left Ear: External ear normal    Nose: Nose normal    Mouth/Throat: Uvula is midline, oropharynx is clear and moist and mucous membranes are normal  Abnormal dentition  Dental abscesses and dental caries present  In general patient has very poor dentition   Eyes: Conjunctivae are normal  Right eye exhibits no discharge  Left eye exhibits no discharge  Neck: Normal range of motion  Neck supple  Cardiovascular: Normal rate, regular rhythm and intact distal pulses  Pulmonary/Chest: Effort normal  No respiratory distress  Musculoskeletal: Normal range of motion  Neurological: He is alert and oriented to person, place, and time  Skin: Skin is warm and dry  Psychiatric: He has a normal mood and affect  Nursing note and vitals reviewed

## 2020-01-27 DIAGNOSIS — E78.49 OTHER HYPERLIPIDEMIA: ICD-10-CM

## 2020-01-27 RX ORDER — ATORVASTATIN CALCIUM 20 MG/1
TABLET, FILM COATED ORAL
Qty: 90 TABLET | Refills: 0 | Status: SHIPPED | OUTPATIENT
Start: 2020-01-27 | End: 2021-03-15 | Stop reason: SDUPTHER

## 2020-02-10 ENCOUNTER — APPOINTMENT (OUTPATIENT)
Dept: LAB | Facility: HOSPITAL | Age: 55
End: 2020-02-10
Payer: COMMERCIAL

## 2020-02-10 DIAGNOSIS — E03.9 HYPOTHYROIDISM, UNSPECIFIED TYPE: ICD-10-CM

## 2020-02-10 DIAGNOSIS — Z11.59 ENCOUNTER FOR HEPATITIS C SCREENING TEST FOR LOW RISK PATIENT: ICD-10-CM

## 2020-02-10 DIAGNOSIS — N40.0 ENLARGED PROSTATE: ICD-10-CM

## 2020-02-10 DIAGNOSIS — Z11.59 NEED FOR HEPATITIS C SCREENING TEST: ICD-10-CM

## 2020-02-10 DIAGNOSIS — IMO0002 TYPE II DIABETES MELLITUS WITH MANIFESTATIONS, UNCONTROLLED: ICD-10-CM

## 2020-02-10 LAB
ALBUMIN SERPL BCP-MCNC: 4.4 G/DL (ref 3–5.2)
ALP SERPL-CCNC: 69 U/L (ref 43–122)
ALT SERPL W P-5'-P-CCNC: 29 U/L (ref 9–52)
ANION GAP SERPL CALCULATED.3IONS-SCNC: 10 MMOL/L (ref 5–14)
AST SERPL W P-5'-P-CCNC: 21 U/L (ref 17–59)
BASOPHILS # BLD AUTO: 0.07 THOUSAND/UL (ref 0–0.1)
BASOPHILS NFR MAR MANUAL: 1 % (ref 0–1)
BILIRUB SERPL-MCNC: 0.6 MG/DL
BILIRUB UR QL STRIP: NEGATIVE
BUN SERPL-MCNC: 14 MG/DL (ref 5–25)
CALCIUM SERPL-MCNC: 9.4 MG/DL (ref 8.4–10.2)
CHLORIDE SERPL-SCNC: 98 MMOL/L (ref 97–108)
CHOLEST SERPL-MCNC: 181 MG/DL
CLARITY UR: CLEAR
CO2 SERPL-SCNC: 29 MMOL/L (ref 22–30)
COLOR UR: NORMAL
CREAT SERPL-MCNC: 1.05 MG/DL (ref 0.7–1.5)
CREAT UR-MCNC: 73.5 MG/DL
EOSINOPHIL # BLD AUTO: 0.26 THOUSAND/UL (ref 0–0.4)
EOSINOPHIL NFR BLD MANUAL: 4 % (ref 0–6)
ERYTHROCYTE [DISTWIDTH] IN BLOOD BY AUTOMATED COUNT: 12.5 %
EST. AVERAGE GLUCOSE BLD GHB EST-MCNC: 134 MG/DL
GFR SERPL CREATININE-BSD FRML MDRD: 80 ML/MIN/1.73SQ M
GLUCOSE P FAST SERPL-MCNC: 108 MG/DL (ref 70–99)
GLUCOSE UR STRIP-MCNC: NEGATIVE MG/DL
HBA1C MFR BLD: 6.3 % (ref 4.2–6.3)
HCT VFR BLD AUTO: 46.3 % (ref 41–53)
HCV AB SER QL: NORMAL
HDLC SERPL-MCNC: 45 MG/DL
HGB BLD-MCNC: 15.9 G/DL (ref 13.5–17.5)
HGB UR QL STRIP.AUTO: NEGATIVE
KETONES UR STRIP-MCNC: NEGATIVE MG/DL
LDLC SERPL CALC-MCNC: 96 MG/DL
LEUKOCYTE ESTERASE UR QL STRIP: NEGATIVE
LYMPHOCYTES # BLD AUTO: 1.65 THOUSAND/UL (ref 0.5–4)
LYMPHOCYTES # BLD AUTO: 25 % (ref 25–45)
MAGNESIUM SERPL-MCNC: 2.2 MG/DL (ref 1.6–2.3)
MCH RBC QN AUTO: 32 PG (ref 26–34)
MCHC RBC AUTO-ENTMCNC: 34.4 G/DL (ref 31–36)
MCV RBC AUTO: 93 FL (ref 80–100)
MICROALBUMIN UR-MCNC: <5 MG/L (ref 0–20)
MICROALBUMIN/CREAT 24H UR: <7 MG/G CREATININE (ref 0–30)
MONOCYTES # BLD AUTO: 0.33 THOUSAND/UL (ref 0.2–0.9)
MONOCYTES NFR BLD AUTO: 5 % (ref 1–10)
NEUTS SEG # BLD: 4.29 THOUSAND/UL (ref 1.8–7.8)
NEUTS SEG NFR BLD AUTO: 65 %
NITRITE UR QL STRIP: NEGATIVE
NONHDLC SERPL-MCNC: 136 MG/DL
PH UR STRIP.AUTO: 6 [PH]
PLATELET # BLD AUTO: 269 THOUSANDS/UL (ref 150–450)
PLATELET BLD QL SMEAR: ADEQUATE
PMV BLD AUTO: 8 FL (ref 8.9–12.7)
POTASSIUM SERPL-SCNC: 4.7 MMOL/L (ref 3.6–5)
PROT SERPL-MCNC: 7.7 G/DL (ref 5.9–8.4)
PROT UR STRIP-MCNC: NEGATIVE MG/DL
PSA SERPL-MCNC: 0.7 NG/ML (ref 0–4)
RBC # BLD AUTO: 4.99 MILLION/UL (ref 4.5–5.9)
RBC MORPH BLD: NORMAL
SODIUM SERPL-SCNC: 137 MMOL/L (ref 137–147)
SP GR UR STRIP.AUTO: 1.01 (ref 1–1.04)
T4 FREE SERPL-MCNC: 0.84 NG/DL (ref 0.76–1.46)
TOTAL CELLS COUNTED SPEC: 100
TRIGL SERPL-MCNC: 201 MG/DL
TSH SERPL DL<=0.05 MIU/L-ACNC: 7.09 UIU/ML (ref 0.47–4.68)
UROBILINOGEN UA: NEGATIVE MG/DL
WBC # BLD AUTO: 6.6 THOUSAND/UL (ref 4.5–11)

## 2020-02-10 PROCEDURE — 86803 HEPATITIS C AB TEST: CPT

## 2020-02-10 PROCEDURE — 85007 BL SMEAR W/DIFF WBC COUNT: CPT

## 2020-02-10 PROCEDURE — 83036 HEMOGLOBIN GLYCOSYLATED A1C: CPT

## 2020-02-10 PROCEDURE — 86800 THYROGLOBULIN ANTIBODY: CPT

## 2020-02-10 PROCEDURE — 85027 COMPLETE CBC AUTOMATED: CPT

## 2020-02-10 PROCEDURE — 86376 MICROSOMAL ANTIBODY EACH: CPT

## 2020-02-10 PROCEDURE — 82043 UR ALBUMIN QUANTITATIVE: CPT | Performed by: INTERNAL MEDICINE

## 2020-02-10 PROCEDURE — 36415 COLL VENOUS BLD VENIPUNCTURE: CPT

## 2020-02-10 PROCEDURE — 84153 ASSAY OF PSA TOTAL: CPT

## 2020-02-10 PROCEDURE — 80053 COMPREHEN METABOLIC PANEL: CPT

## 2020-02-10 PROCEDURE — 84443 ASSAY THYROID STIM HORMONE: CPT

## 2020-02-10 PROCEDURE — 82570 ASSAY OF URINE CREATININE: CPT | Performed by: INTERNAL MEDICINE

## 2020-02-10 PROCEDURE — 84439 ASSAY OF FREE THYROXINE: CPT

## 2020-02-10 PROCEDURE — 80061 LIPID PANEL: CPT

## 2020-02-10 PROCEDURE — 83735 ASSAY OF MAGNESIUM: CPT

## 2020-02-11 LAB
THYROGLOB AB SERPL-ACNC: <1 IU/ML (ref 0–0.9)
THYROPEROXIDASE AB SERPL-ACNC: 13 IU/ML (ref 0–34)

## 2020-02-20 ENCOUNTER — OFFICE VISIT (OUTPATIENT)
Dept: FAMILY MEDICINE CLINIC | Facility: CLINIC | Age: 55
End: 2020-02-20
Payer: COMMERCIAL

## 2020-02-20 VITALS
TEMPERATURE: 98.7 F | RESPIRATION RATE: 14 BRPM | SYSTOLIC BLOOD PRESSURE: 136 MMHG | OXYGEN SATURATION: 96 % | BODY MASS INDEX: 43.23 KG/M2 | HEART RATE: 78 BPM | HEIGHT: 70 IN | WEIGHT: 302 LBS | DIASTOLIC BLOOD PRESSURE: 80 MMHG

## 2020-02-20 DIAGNOSIS — J43.9 PULMONARY EMPHYSEMA, UNSPECIFIED EMPHYSEMA TYPE (HCC): ICD-10-CM

## 2020-02-20 DIAGNOSIS — R35.0 URINARY FREQUENCY: ICD-10-CM

## 2020-02-20 DIAGNOSIS — E03.8 HYPOTHYROIDISM DUE TO HASHIMOTO'S THYROIDITIS: ICD-10-CM

## 2020-02-20 DIAGNOSIS — E11.69 HYPERLIPIDEMIA ASSOCIATED WITH TYPE 2 DIABETES MELLITUS (HCC): ICD-10-CM

## 2020-02-20 DIAGNOSIS — E11.8 TYPE II DIABETES MELLITUS WITH MANIFESTATIONS (HCC): Primary | ICD-10-CM

## 2020-02-20 DIAGNOSIS — E78.5 HYPERLIPIDEMIA ASSOCIATED WITH TYPE 2 DIABETES MELLITUS (HCC): ICD-10-CM

## 2020-02-20 DIAGNOSIS — E06.3 HYPOTHYROIDISM DUE TO HASHIMOTO'S THYROIDITIS: ICD-10-CM

## 2020-02-20 DIAGNOSIS — Z11.4 SCREENING FOR HUMAN IMMUNODEFICIENCY VIRUS: ICD-10-CM

## 2020-02-20 PROCEDURE — 4004F PT TOBACCO SCREEN RCVD TLK: CPT | Performed by: INTERNAL MEDICINE

## 2020-02-20 PROCEDURE — 3044F HG A1C LEVEL LT 7.0%: CPT | Performed by: INTERNAL MEDICINE

## 2020-02-20 PROCEDURE — 3075F SYST BP GE 130 - 139MM HG: CPT | Performed by: INTERNAL MEDICINE

## 2020-02-20 PROCEDURE — 2022F DILAT RTA XM EVC RTNOPTHY: CPT | Performed by: INTERNAL MEDICINE

## 2020-02-20 PROCEDURE — 3079F DIAST BP 80-89 MM HG: CPT | Performed by: INTERNAL MEDICINE

## 2020-02-20 PROCEDURE — 99214 OFFICE O/P EST MOD 30 MIN: CPT | Performed by: INTERNAL MEDICINE

## 2020-02-20 PROCEDURE — 3008F BODY MASS INDEX DOCD: CPT | Performed by: INTERNAL MEDICINE

## 2020-02-20 RX ORDER — LEVOTHYROXINE SODIUM 0.07 MG/1
75 TABLET ORAL
Qty: 30 TABLET | Refills: 5 | Status: SHIPPED | OUTPATIENT
Start: 2020-02-20 | End: 2021-03-15

## 2020-02-20 NOTE — PROGRESS NOTES
Assessment/Plan:         Diagnoses and all orders for this visit:    Type II [facility] mellitus with manifestations [facility] ); life style mod  Continue same  RTC in 3mos w :  -     Comprehensive metabolic panel; Future  -     Hemoglobin A1C; Future    Screening for human immunodeficiency virus  -     Human Immunodeficiency Virus 1/2 Antigen / Antibody ( Fourth Generation) with Reflex Testing; Future    Pulmonary emphysema, unspecified emphysema type ([facility] ); stop smoking  Use inhalers    Hyperlipidemia associated with type 2 diabetes mellitus ([facility] ); continue Atorvastatin and Fenofibrate  RTC in 3mos w blood work    Hypothyroidism due to Hashimoto's thyroiditis; Increase   -     levothyroxine 75 mcg tablet; Take 1 tablet (75 mcg total) by mouth daily in the e TO bette morning  RTC in 3mos w :  -     TSH, 3rd generation; Future  -     T4, free; Future    Urinary frequency;  -     US kidney and bladder with pvr; Future      Patient's shoes and socks removed  Right Foot/Ankle   Right Foot Inspection  Skin Exam: skin normal and skin intact no dry skin, no warmth, no callus, no erythema, no maceration, no abnormal color, no pre-ulcer, no ulcer and no callus                          Toe Exam: swellingno tenderness  Sensory     Proprioception: diminished   Monofilament testing: diminished  Vascular    The right DP pulse is 1+  The right PT pulse is 1+  Left Foot/Ankle  Left Foot Inspection  Skin Exam: skin normal and skin intactno dry skin, no warmth, no erythema, no maceration, normal color, no pre-ulcer, no ulcer and no callus                         Toe Exam: swellingno tenderness                   Sensory   Vibration: diminished  Proprioception: diminished  Monofilament: diminished  Vascular    The left DP pulse is 1+  The left PT pulse is 1+  Assign Risk Category:  No deformity present; Loss of protective sensation; Weak pulses       Risk: 2      Subjective:      Patient ID: Vikki Schwartz is a 53 y o  male      Ashish Tiki Man is here for Regular Check up, He feels ok, no New symptoms, recent Blood work and med list reviewed w pt in Detail    The following portions of the patient's history were reviewed and updated as appropriate: allergies, current medications, past family history, past social history, past surgical history and problem list     Review of Systems   Constitutional: Negative for chills, fatigue and fever  HENT: Negative for congestion, facial swelling, sore throat, trouble swallowing and voice change  Eyes: Negative for pain, discharge and visual disturbance  Respiratory: Negative for cough, shortness of breath and wheezing  Cardiovascular: Negative for chest pain, palpitations and leg swelling  Gastrointestinal: Negative for abdominal pain, blood in stool, constipation, diarrhea and nausea  Endocrine: Negative for polydipsia, polyphagia and polyuria  Genitourinary: Positive for frequency  Negative for difficulty urinating, hematuria and urgency  Musculoskeletal: Negative for arthralgias and myalgias  Skin: Negative for rash  Neurological: Negative for dizziness, tremors, weakness and headaches  Hematological: Negative for adenopathy  Does not bruise/bleed easily  Psychiatric/Behavioral: Negative for dysphoric mood, sleep disturbance and suicidal ideas  Objective:      /80 (BP Location: Left arm, Patient Position: Sitting, Cuff Size: Standard)   Pulse 78   Temp 98 7 °F (37 1 °C) (Tympanic)   Resp 14   Ht 5' 10" (1 778 m)   Wt (!) 137 kg (302 lb)   SpO2 96%   BMI 43 33 kg/m²          Physical Exam   Constitutional: He is oriented to person, place, and time  He appears well-nourished  No distress  HENT:   Head: Normocephalic  Mouth/Throat: Oropharynx is clear and moist  No oropharyngeal exudate  Eyes: Pupils are equal, round, and reactive to light  Conjunctivae are normal  No scleral icterus  Neck: Neck supple  No thyromegaly present     Cardiovascular: Normal rate, regular rhythm and normal heart sounds  Pulses are weak pulses  No murmur heard  Pulses:       Dorsalis pedis pulses are 1+ on the right side, and 1+ on the left side  Posterior tibial pulses are 1+ on the right side, and 1+ on the left side  Pulmonary/Chest: Effort normal  No respiratory distress  He has wheezes  He has no rales  Abdominal: Soft  Bowel sounds are normal  He exhibits no distension  There is no tenderness  There is no rebound and no guarding  Musculoskeletal: He exhibits no edema or tenderness  Feet:   Right Foot:   Skin Integrity: Negative for ulcer, skin breakdown, erythema, warmth, callus or dry skin  Left Foot:   Skin Integrity: Negative for ulcer, skin breakdown, erythema, warmth, callus or dry skin  Lymphadenopathy:     He has no cervical adenopathy  Neurological: He is alert and oriented to person, place, and time  No cranial nerve deficit  Coordination normal    Skin: No erythema  No pallor  Multiple skin tags facial neck   Psychiatric: He has a normal mood and affect

## 2020-02-21 DIAGNOSIS — M54.50 LOW BACK PAIN WITHOUT SCIATICA, UNSPECIFIED BACK PAIN LATERALITY, UNSPECIFIED CHRONICITY: ICD-10-CM

## 2020-02-21 DIAGNOSIS — E11.8 TYPE II DIABETES MELLITUS WITH MANIFESTATIONS (HCC): Primary | ICD-10-CM

## 2020-02-21 RX ORDER — MELOXICAM 7.5 MG/1
TABLET ORAL
Qty: 60 TABLET | Refills: 0 | Status: SHIPPED | OUTPATIENT
Start: 2020-02-21 | End: 2020-04-17

## 2020-04-07 ENCOUNTER — TELEPHONE (OUTPATIENT)
Dept: FAMILY MEDICINE CLINIC | Facility: CLINIC | Age: 55
End: 2020-04-07

## 2020-04-07 DIAGNOSIS — R79.89 LOW VITAMIN D LEVEL: Primary | ICD-10-CM

## 2020-04-07 RX ORDER — MELATONIN
1000 DAILY
Qty: 90 TABLET | Refills: 3 | Status: SHIPPED | OUTPATIENT
Start: 2020-04-07

## 2020-04-17 DIAGNOSIS — M54.50 LOW BACK PAIN WITHOUT SCIATICA, UNSPECIFIED BACK PAIN LATERALITY, UNSPECIFIED CHRONICITY: ICD-10-CM

## 2020-04-17 RX ORDER — MELOXICAM 7.5 MG/1
TABLET ORAL
Qty: 60 TABLET | Refills: 0 | Status: SHIPPED | OUTPATIENT
Start: 2020-04-17 | End: 2020-06-21

## 2020-06-21 DIAGNOSIS — M54.50 LOW BACK PAIN WITHOUT SCIATICA, UNSPECIFIED BACK PAIN LATERALITY, UNSPECIFIED CHRONICITY: ICD-10-CM

## 2020-06-21 RX ORDER — MELOXICAM 7.5 MG/1
TABLET ORAL
Qty: 60 TABLET | Refills: 0 | Status: SHIPPED | OUTPATIENT
Start: 2020-06-21 | End: 2020-07-30

## 2020-07-27 PROBLEM — E66.01 MORBID (SEVERE) OBESITY DUE TO EXCESS CALORIES (HCC): Status: ACTIVE | Noted: 2020-07-27

## 2020-07-30 ENCOUNTER — OFFICE VISIT (OUTPATIENT)
Dept: FAMILY MEDICINE CLINIC | Facility: CLINIC | Age: 55
End: 2020-07-30
Payer: COMMERCIAL

## 2020-07-30 VITALS
HEART RATE: 86 BPM | WEIGHT: 299.9 LBS | HEIGHT: 70 IN | BODY MASS INDEX: 42.93 KG/M2 | OXYGEN SATURATION: 97 % | RESPIRATION RATE: 16 BRPM | SYSTOLIC BLOOD PRESSURE: 140 MMHG | DIASTOLIC BLOOD PRESSURE: 84 MMHG | TEMPERATURE: 98.6 F

## 2020-07-30 DIAGNOSIS — Z12.11 SCREEN FOR COLON CANCER: ICD-10-CM

## 2020-07-30 DIAGNOSIS — E11.69 HYPERLIPIDEMIA ASSOCIATED WITH TYPE 2 DIABETES MELLITUS (HCC): ICD-10-CM

## 2020-07-30 DIAGNOSIS — E03.8 HYPOTHYROIDISM DUE TO HASHIMOTO'S THYROIDITIS: ICD-10-CM

## 2020-07-30 DIAGNOSIS — E78.5 HYPERLIPIDEMIA ASSOCIATED WITH TYPE 2 DIABETES MELLITUS (HCC): ICD-10-CM

## 2020-07-30 DIAGNOSIS — Z23 NEED FOR TDAP VACCINATION: ICD-10-CM

## 2020-07-30 DIAGNOSIS — E06.3 HYPOTHYROIDISM DUE TO HASHIMOTO'S THYROIDITIS: ICD-10-CM

## 2020-07-30 DIAGNOSIS — E66.01 MORBID OBESITY WITH BMI OF 40.0-44.9, ADULT (HCC): ICD-10-CM

## 2020-07-30 DIAGNOSIS — Z00.01 ENCOUNTER FOR GENERAL ADULT MEDICAL EXAMINATION WITH ABNORMAL FINDINGS: Primary | ICD-10-CM

## 2020-07-30 DIAGNOSIS — F17.210 CIGARETTE SMOKER: ICD-10-CM

## 2020-07-30 DIAGNOSIS — J43.9 PULMONARY EMPHYSEMA, UNSPECIFIED EMPHYSEMA TYPE (HCC): ICD-10-CM

## 2020-07-30 DIAGNOSIS — E11.8 TYPE II DIABETES MELLITUS WITH MANIFESTATIONS (HCC): ICD-10-CM

## 2020-07-30 LAB
NON VENT ROOM AIR: 400 %
SL AMB POCT GLUCOSE BLD: 89
SL AMB POCT HEMOGLOBIN AIC: 6.1 (ref ?–6.5)

## 2020-07-30 PROCEDURE — 90471 IMMUNIZATION ADMIN: CPT

## 2020-07-30 PROCEDURE — 82948 REAGENT STRIP/BLOOD GLUCOSE: CPT | Performed by: INTERNAL MEDICINE

## 2020-07-30 PROCEDURE — 94150 VITAL CAPACITY TEST: CPT | Performed by: INTERNAL MEDICINE

## 2020-07-30 PROCEDURE — 3077F SYST BP >= 140 MM HG: CPT | Performed by: INTERNAL MEDICINE

## 2020-07-30 PROCEDURE — 3044F HG A1C LEVEL LT 7.0%: CPT | Performed by: INTERNAL MEDICINE

## 2020-07-30 PROCEDURE — 2022F DILAT RTA XM EVC RTNOPTHY: CPT | Performed by: INTERNAL MEDICINE

## 2020-07-30 PROCEDURE — 83036 HEMOGLOBIN GLYCOSYLATED A1C: CPT | Performed by: INTERNAL MEDICINE

## 2020-07-30 PROCEDURE — 3079F DIAST BP 80-89 MM HG: CPT | Performed by: INTERNAL MEDICINE

## 2020-07-30 PROCEDURE — 3008F BODY MASS INDEX DOCD: CPT | Performed by: INTERNAL MEDICINE

## 2020-07-30 PROCEDURE — G0439 PPPS, SUBSEQ VISIT: HCPCS | Performed by: INTERNAL MEDICINE

## 2020-07-30 PROCEDURE — 90715 TDAP VACCINE 7 YRS/> IM: CPT

## 2020-07-30 PROCEDURE — 4004F PT TOBACCO SCREEN RCVD TLK: CPT | Performed by: INTERNAL MEDICINE

## 2020-07-30 PROCEDURE — 99214 OFFICE O/P EST MOD 30 MIN: CPT | Performed by: INTERNAL MEDICINE

## 2020-07-30 NOTE — PATIENT INSTRUCTIONS
Medicare Preventive Visit Patient Instructions  Thank you for completing your Welcome to Medicare Visit or Medicare Annual Wellness Visit today  Your next wellness visit will be due in one year (7/30/2021)  The screening/preventive services that you may require over the next 5-10 years are detailed below  Some tests may not apply to you based off risk factors and/or age  Screening tests ordered at today's visit but not completed yet may show as past due  Also, please note that scanned in results may not display below  Preventive Screenings:  Service Recommendations Previous Testing/Comments   Colorectal Cancer Screening  · Colonoscopy    · Fecal Occult Blood Test (FOBT)/Fecal Immunochemical Test (FIT)  · Fecal DNA/Cologuard Test  · Flexible Sigmoidoscopy Age: 54-65 years old   Colonoscopy: every 10 years (May be performed more frequently if at higher risk)  OR  FOBT/FIT: every 1 year  OR  Cologuard: every 3 years  OR  Sigmoidoscopy: every 5 years  Screening may be recommended earlier than age 48 if at higher risk for colorectal cancer  Also, an individualized decision between you and your healthcare provider will decide whether screening between the ages of 74-80 would be appropriate   Colonoscopy: 10/04/2019  FOBT/FIT: Not on file  Cologuard: Not on file  Sigmoidoscopy: Not on file    Screening Current     Prostate Cancer Screening Individualized decision between patient and health care provider in men between ages of 53-78   Medicare will cover every 12 months beginning on the day after your 50th birthday PSA: 0 7 ng/mL     Screening Current     Hepatitis C Screening Once for adults born between 1945 and 1965  More frequently in patients at high risk for Hepatitis C Hep C Antibody: 02/10/2020    Screening Current   Diabetes Screening 1-2 times per year if you're at risk for diabetes or have pre-diabetes Fasting glucose: 108 mg/dL   A1C: 6 3 %    Screening Not Indicated  History Diabetes   Cholesterol Screening Once every 5 years if you don't have a lipid disorder  May order more often based on risk factors  Lipid panel: 02/10/2020    Screening Not Indicated  History Lipid Disorder      Other Preventive Screenings Covered by Medicare:  1  Abdominal Aortic Aneurysm (AAA) Screening: covered once if your at risk  You're considered to be at risk if you have a family history of AAA or a male between the age of 73-68 who smoking at least 100 cigarettes in your lifetime  2  Lung Cancer Screening: covers low dose CT scan once per year if you meet all of the following conditions: (1) Age 50-69; (2) No signs or symptoms of lung cancer; (3) Current smoker or have quit smoking within the last 15 years; (4) You have a tobacco smoking history of at least 30 pack years (packs per day x number of years you smoked); (5) You get a written order from a healthcare provider  3  Glaucoma Screening: covered annually if you're considered high risk: (1) You have diabetes OR (2) Family history of glaucoma OR (3)  aged 48 and older OR (3)  American aged 72 and older  3  Osteoporosis Screening: covered every 2 years if you meet one of the following conditions: (1) Have a vertebral abnormality; (2) On glucocorticoid therapy for more than 3 months; (3) Have primary hyperparathyroidism; (4) On osteoporosis medications and need to assess response to drug therapy  5  HIV Screening: covered annually if you're between the age of 12-76  Also covered annually if you are younger than 13 and older than 72 with risk factors for HIV infection  For pregnant patients, it is covered up to 3 times per pregnancy      Immunizations:  Immunization Recommendations   Influenza Vaccine Annual influenza vaccination during flu season is recommended for all persons aged >= 6 months who do not have contraindications   Pneumococcal Vaccine (Prevnar and Pneumovax)  * Prevnar = PCV13  * Pneumovax = PPSV23 Adults 25-60 years old: 1-3 doses may be recommended based on certain risk factors  Adults 72 years old: Prevnar (PCV13) vaccine recommended followed by Pneumovax (PPSV23) vaccine  If already received PPSV23 since turning 65, then PCV13 recommended at least one year after PPSV23 dose  Hepatitis B Vaccine 3 dose series if at intermediate or high risk (ex: diabetes, end stage renal disease, liver disease)   Tetanus (Td) Vaccine - COST NOT COVERED BY MEDICARE PART B Following completion of primary series, a booster dose should be given every 10 years to maintain immunity against tetanus  Td may also be given as tetanus wound prophylaxis  Tdap Vaccine - COST NOT COVERED BY MEDICARE PART B Recommended at least once for all adults  For pregnant patients, recommended with each pregnancy  Shingles Vaccine (Shingrix) - COST NOT COVERED BY MEDICARE PART B  2 shot series recommended in those aged 48 and above     Health Maintenance Due:      Topic Date Due    CRC Screening: Colonoscopy  10/04/2029    Hepatitis C Screening  Completed     Immunizations Due:      Topic Date Due    DTaP,Tdap,and Td Vaccines (1 - Tdap) 03/09/1976     Advance Directives   What are advance directives? Advance directives are legal documents that state your wishes and plans for medical care  These plans are made ahead of time in case you lose your ability to make decisions for yourself  Advance directives can apply to any medical decision, such as the treatments you want, and if you want to donate organs  What are the types of advance directives? There are many types of advance directives, and each state has rules about how to use them  You may choose a combination of any of the following:  · Living will: This is a written record of the treatment you want  You can also choose which treatments you do not want, which to limit, and which to stop at a certain time  This includes surgery, medicine, IV fluid, and tube feedings     · Durable power of  for healthcare [facility]): This is a written record that states who you want to make healthcare choices for you when you are unable to make them for yourself  This person, called a proxy, is usually a family member or a friend  You may choose more than 1 proxy  · Do not resuscitate (DNR) order:  A DNR order is used in case your heart stops beating or you stop breathing  It is a request not to have certain forms of treatment, such as CPR  A DNR order may be included in other types of advance directives  · Medical directive: This covers the care that you want if you are in a coma, near death, or unable to make decisions for yourself  You can list the treatments you want for each condition  Treatment may include pain medicine, surgery, blood transfusions, dialysis, IV or tube feedings, and a ventilator (breathing machine)  · Values history: This document has questions about your views, beliefs, and how you feel and think about life  This information can help others choose the care that you would choose  Why are advance directives important? An advance directive helps you control your care  Although spoken wishes may be used, it is better to have your wishes written down  Spoken wishes can be misunderstood, or not followed  Treatments may be given even if you do not want them  An advance directive may make it easier for your family to make difficult choices about your care  Cigarette Smoking and Your Health   Risks to your health if you smoke:  Nicotine and other chemicals found in tobacco damage every cell in your body  Even if you are a light smoker, you have an increased risk for cancer, heart disease, and lung disease  If you are pregnant or have diabetes, smoking increases your risk for complications  Benefits to your health if you stop smoking:   · You decrease respiratory symptoms such as coughing, wheezing, and shortness of breath     · You reduce your risk for cancers of the lung, mouth, throat, kidney, bladder, pancreas, stomach, and cervix  If you already have cancer, you increase the benefits of chemotherapy  You also reduce your risk for cancer returning or a second cancer from developing  · You reduce your risk for heart disease, blood clots, heart attack, and stroke  · You reduce your risk for lung infections, and diseases such as pneumonia, asthma, chronic bronchitis, and emphysema  · Your circulation improves  More oxygen can be delivered to your body  If you have diabetes, you lower your risk for complications, such as kidney, artery, and eye diseases  You also lower your risk for nerve damage  Nerve damage can lead to amputations, poor vision, and blindness  · You improve your body's ability to heal and to fight infections  For more information and support to stop smoking:   · [employer]  Phone: 8- 365 - [phone]  Web Address: [employer]  Weight Management   Why it is important to manage your weight:  Being overweight increases your risk of health conditions such as heart disease, high blood pressure, type 2 diabetes, and certain types of cancer  It can also increase your risk for osteoarthritis, sleep apnea, and other respiratory problems  Aim for a slow, steady weight loss  Even a small amount of weight loss can lower your risk of health problems  How to lose weight safely:  A safe and healthy way to lose weight is to eat fewer calories and get regular exercise  You can lose up about 1 pound a week by decreasing the number of calories you eat by 500 calories each day  Healthy meal plan for weight management:  A healthy meal plan includes a variety of foods, contains fewer calories, and helps you stay healthy  A healthy meal plan includes the following:  · Eat whole-grain foods more often  A healthy meal plan should contain fiber  Fiber is the part of grains, fruits, and vegetables that is not broken down by your body  Whole-grain foods are healthy and provide extra fiber in your diet   Some examples of whole-grain foods are whole-wheat breads and pastas, oatmeal, brown rice, and bulgur  · Eat a variety of vegetables every day  Include dark, leafy greens such as spinach, kale, digna greens, and mustard greens  Eat yellow and orange vegetables such as carrots, sweet potatoes, and winter squash  · Eat a variety of fruits every day  Choose fresh or canned fruit (canned in its own juice or light syrup) instead of juice  Fruit juice has very little or no fiber  · Eat low-fat dairy foods  Drink fat-free (skim) milk or 1% milk  Eat fat-free yogurt and low-fat cottage cheese  Try low-fat cheeses such as mozzarella and other reduced-fat cheeses  · Choose meat and other protein foods that are low in fat  Choose beans or other legumes such as split peas or lentils  Choose fish, skinless poultry (chicken or turkey), or lean cuts of red meat (beef or pork)  Before you cook meat or poultry, cut off any visible fat  · Use less fat and oil  Try baking foods instead of frying them  Add less fat, such as margarine, sour cream, regular salad dressing and mayonnaise to foods  Eat fewer high-fat foods  Some examples of high-fat foods include french fries, doughnuts, ice cream, and cakes  · Eat fewer sweets  Limit foods and drinks that are high in sugar  This includes candy, cookies, regular soda, and sweetened drinks  Exercise:  Exercise at least 30 minutes per day on most days of the week  Some examples of exercise include walking, biking, dancing, and swimming  You can also fit in more physical activity by taking the stairs instead of the elevator or parking farther away from stores  Ask your healthcare provider about the best exercise plan for you  © Copyright [employer] 2018 Information is for End User's use only and may not be sold, redistributed or otherwise used for commercial purposes   All illustrations and images included in CareNotes® are the copyrighted property of [employer] D A M [employer] Inc  or [employer]  Low Fat Diet   AMBULATORY CARE:   A low-fat diet  is an eating plan that is low in total fat, unhealthy fat, and cholesterol  You may need to follow a low-fat diet if you have trouble digesting or absorbing fat  You may also need to follow this diet if you have high cholesterol  You can also lower your cholesterol by increasing the amount of fiber in your diet  Soluble fiber is a type of fiber that helps to decrease cholesterol levels  Different types of fat in food:   · Limit unhealthy fats  A diet that is high in cholesterol, saturated fat, and trans fat may cause unhealthy cholesterol levels  Unhealthy cholesterol levels increase your risk of heart disease  ¨ Cholesterol:  Limit intake of cholesterol to less than 200 mg per day  Cholesterol is found in meat, eggs, and dairy  ¨ Saturated fat:  Limit saturated fat to less than 7% of your total daily calories  Ask your dietitian how many calories you need each day  Saturated fat is found in butter, cheese, ice cream, whole milk, and palm oil  Saturated fat is also found in meat, such as beef, pork, chicken skin, and processed meats  Processed meats include sausage, hot dogs, and bologna  ¨ Trans fat:  Avoid trans fat as much as possible  Trans fat is used in fried and baked foods  Foods that say trans fat free on the label may still have up to 0 5 grams of trans fat per serving  · Include healthy fats  Replace foods that are high in saturated and trans fat with foods high in healthy fats  This may help to decrease high cholesterol levels  ¨ Monounsaturated fats: These are found in avocados, nuts, and vegetable oils, such as olive, canola, and sunflower oil  ¨ Polyunsaturated fats: These can be found in vegetable oils, such as soybean or corn oil  Omega-3 fats can help to decrease the risk of heart disease  Omega-3 fats are found in fish, such as salmon, herring, trout, and tuna   Omega-3 fats can also be found in plant foods, such as walnuts, flaxseed, soybeans, and canola oil    Foods to limit or avoid:   · Grains:      ¨ Snacks that are made with partially hydrogenated oils, such as chips, regular crackers, and butter-flavored popcorn    ¨ High-fat baked goods, such as biscuits, croissants, doughnuts, pies, cookies, and pastries    · Dairy:      ¨ Whole milk, 2% milk, and yogurt and ice cream made with whole milk    ¨ Half and half creamer, heavy cream, and whipping cream    ¨ Cheese, cream cheese, and sour cream    · Meats and proteins:      ¨ High-fat cuts of meat (T-bone steak, regular hamburger, and ribs)    ¨ Fried meat, poultry (turkey and chicken), and fish    ¨ Poultry (chicken and turkey) with skin    ¨ Cold cuts (salami or bologna), hot dogs, taylor, and sausage    ¨ Whole eggs and egg yolks    · Vegetables and fruits with added fat:      ¨ Fried vegetables or vegetables in butter or high-fat sauces, such as cream or cheese sauces    ¨ Fried fruit or fruit served with butter or cream    · Fats:      ¨ Butter, stick margarine, and shortening    ¨ Coconut, palm oil, and palm kernel oil  Foods to include:   · Grains:      ¨ Whole-grain breads, cereals, pasta, and brown rice    ¨ Low-fat crackers and pretzels    · Vegetables and fruits:      ¨ Fresh, frozen, or canned vegetables (no salt or low-sodium)    ¨ Fresh, frozen, dried, or canned fruit (canned in light syrup or fruit juice)    ¨ Avocado    · Low-fat dairy products:      ¨ Nonfat (skim) or 1% milk    ¨ Nonfat or low-fat cheese, yogurt, and cottage cheese    · Meats and proteins:      ¨ Chicken or turkey with no skin    ¨ Baked or broiled fish    ¨ Lean beef and pork (loin, round, extra lean hamburger)    ¨ Beans and peas, unsalted nuts, soy products    ¨ Egg whites and substitutes    ¨ Seeds and nuts    · Fats:      ¨ Unsaturated oil, such as canola, olive, peanut, soybean, or sunflower oil    ¨ Soft or liquid margarine and vegetable oil spread    ¨ Low-fat salad dressing  Other ways to decrease fat:   · Read food labels before you buy foods  Choose foods that have less than 30% of calories from fat  Choose low-fat or fat-free dairy products  Remember that fat free does not mean calorie free  These foods still contain calories, and too many calories can lead to weight gain  · Trim fat from meat and avoid fried food  Trim all visible fat from meat before you cook it  Remove the skin from poultry  Do not barrios meat, fish, or poultry  Bake, roast, boil, or broil these foods instead  Avoid fried foods  Eat a baked potato instead of Western [place] fries  Steam vegetables instead of sautéing them in butter  · Add less fat to foods  Use imitation taylor bits on salads and baked potatoes instead of regular taylor bits  Use fat-free or low-fat salad dressings instead of regular dressings  Use low-fat or nonfat butter-flavored topping instead of regular butter or margarine on popcorn and other foods  Ways to decrease fat in recipes:  Replace high-fat ingredients with low-fat or nonfat ones  This may cause baked goods to be drier than usual  You may need to use nonfat cooking spray on pans to prevent food from sticking  You also may need to change the amount of other ingredients, such as water, in the recipe  Try the following:  · Use low-fat or light margarine instead of regular margarine or shortening  · Use lean ground turkey breast or chicken, or lean ground beef (less than 5% fat) instead of hamburger  · Add 1 teaspoon of canola oil to 8 ounces of skim milk instead of using cream or half and half  · Use grated zucchini, carrots, or apples in breads instead of coconut  · Use blenderized, low-fat cottage cheese, plain tofu, or low-fat ricotta cheese instead of cream cheese  · Use 1 egg white and 1 teaspoon of canola oil, or use ¼ cup (2 ounces) of fat-free egg substitute instead of a whole egg       · Replace half of the oil that is called for in a recipe with applesauce when you bake  Use 3 tablespoons of cocoa powder and 1 tablespoon of canola oil instead of a square of baking chocolate  How to increase fiber:  Eat enough high-fiber foods to get 20 to 30 grams of fiber every day  Slowly increase your fiber intake to avoid stomach cramps, gas, and other problems  · Eat 3 ounces of whole-grain foods each day  An ounce is about 1 slice of bread  Eat whole-grain breads, such as whole-wheat bread  Whole wheat, whole-wheat flour, or other whole grains should be listed as the first ingredient on the food label  Replace white flour with whole-grain flour or use half of each in recipes  Whole-grain flour is heavier than white flour, so you may have to add more yeast or baking powder  · Eat a high-fiber cereal for breakfast   Oatmeal is a good source of soluble fiber  Look for cereals that have bran or fiber in the name  Choose whole-grain products, such as brown rice, barley, and whole-wheat pasta  · Eat more beans, peas, and lentils  For example, add beans to soups or salads  Eat at least 5 cups of fruits and vegetables each day  Eat fruits and vegetables with the peel because the peel is high in fiber  © 2017 [address] Information is for End User's use only and may not be sold, redistributed or otherwise used for commercial purposes  All illustrations and images included in CareNotes® are the copyrighted property of [employer] Inc  or [employer]  The above information is an  only  It is not intended as medical advice for individual conditions or treatments  Talk to your doctor, nurse or pharmacist before following any medical regimen to see if it is safe and effective for you  Heart Healthy Diet   AMBULATORY CARE:   A heart healthy diet  is an eating plan low in total fat, unhealthy fats, and sodium (salt)  A heart healthy diet helps decrease your risk for heart disease and stroke   Limit the amount of fat you eat to 25% to 35% of your total daily calories  Limit sodium to less than 2,300 mg each day  Healthy fats:  Healthy fats can help improve cholesterol levels  The risk for heart disease is decreased when cholesterol levels are normal  Choose healthy fats, such as the following:  · Unsaturated fat  is found in foods such as soybean, canola, olive, corn, and safflower oils  It is also found in soft tub margarine that is made with liquid vegetable oil  · Omega-3 fat  is found in certain fish, such as salmon, tuna, and trout, and in walnuts and flaxseed  Unhealthy fats:  Unhealthy fats can cause unhealthy cholesterol levels in your blood and increase your risk of heart disease  Limit unhealthy fats, such as the following:  · Cholesterol  is found in animal foods, such as eggs and lobster, and in dairy products made from whole milk  Limit cholesterol to less than 300 milligrams (mg) each day  You may need to limit cholesterol to 200 mg each day if you have heart disease  · Saturated fat  is found in meats, such as taylor and hamburger  It is also found in chicken or turkey skin, whole milk, and butter  Limit saturated fat to less than 7% of your total daily calories  Limit saturated fat to less than 6% if you have heart disease or are at increased risk for it  · Trans fat  is found in packaged foods, such as potato chips and cookies  It is also in hard margarine, some fried foods, and shortening  Avoid trans fats as much as possible    Heart healthy foods and drinks to include:  Ask your dietitian or healthcare provider how many servings to have from each of the following food groups:  · Grains:      ¨ Whole-wheat breads, cereals, and pastas, and brown rice    ¨ Low-fat, low-sodium crackers and chips    · Vegetables:      ¨ Broccoli, green beans, green peas, and spinach    ¨ Collards, kale, and lima beans    ¨ Carrots, sweet potatoes, tomatoes, and peppers    ¨ Canned vegetables with no salt added    · Fruits:      ¨ Bananas, peaches, pears, and pineapple    ¨ Grapes, raisins, and dates    ¨ Oranges, tangerines, grapefruit, orange juice, and grapefruit juice    ¨ Apricots, mangoes, melons, and papaya    ¨ Raspberries and strawberries    ¨ Canned fruit with no added sugar    · Low-fat dairy products:      ¨ Nonfat (skim) milk, 1% milk, and low-fat almond, cashew, or soy milks fortified with calcium    ¨ Low-fat cheese, regular or frozen yogurt, and cottage cheese    · Meats and proteins , such as lean cuts of beef and pork (loin, leg, round), skinless chicken and turkey, legumes, soy products, egg whites, and nuts  Foods and drinks to limit or avoid:  Ask your dietitian or healthcare provider about these and other foods that are high in unhealthy fat, sodium, and sugar:  · Snack or packaged foods , such as frozen dinners, cookies, macaroni and cheese, and cereals with more than 300 mg of sodium per serving    · Canned or dry mixes  for cakes, soups, sauces, or gravies    · Vegetables with added sodium , such as instant potatoes, vegetables with added sauces, or regular canned vegetables    · Other foods high in sodium , such as ketchup, barbecue sauce, salad dressing, pickles, olives, soy sauce, and miso    · High-fat dairy foods  such as whole or 2% milk, cream cheese, or sour cream, and cheeses     · High-fat protein foods  such as high-fat cuts of beef (T-bone steaks, ribs), chicken or turkey with skin, and organ meats, such as liver    · Cured or smoked meats , such as hot dogs, taylor, and sausage    · Unhealthy fats and oils , such as butter, stick margarine, shortening, and cooking oils such as coconut or palm oil    · Food and drinks high in sugar , such as soft drinks (soda), sports drinks, sweetened tea, candy, cake, cookies, pies, and doughnuts  Other diet guidelines to follow:   · Eat more foods containing omega-3 fats  Eat fish high in omega-3 fats at least 2 times a week  · Limit alcohol    Too much alcohol can damage your heart and raise your blood pressure  Women should limit alcohol to 1 drink a day  Men should limit alcohol to 2 drinks a day  A drink of alcohol is 12 ounces of beer, 5 ounces of wine, or 1½ ounces of liquor  · Choose low-sodium foods  High-sodium foods can lead to high blood pressure  Add little or no salt to food you prepare  Use herbs and spices in place of salt  · Eat more fiber  to help lower cholesterol levels  Eat at least 5 servings of fruits and vegetables each day  Eat 3 ounces of whole-grain foods each day  Legumes (beans) are also a good source of fiber  Lifestyle guidelines:   · Do not smoke  Nicotine and other chemicals in cigarettes and cigars can cause lung and heart damage  Ask your healthcare provider for information if you currently smoke and need help to quit  E-cigarettes or smokeless tobacco still contain nicotine  Talk to your healthcare provider before you use these products  · Exercise regularly  to help you maintain a healthy weight and improve your blood pressure and cholesterol levels  Ask your healthcare provider about the best exercise plan for you  Do not start an exercise program without asking your healthcare provider  Follow up with your healthcare provider as directed:  Write down your questions so you remember to ask them during your visits  © 2017 [facility] [facility] Information is for End User's use only and may not be sold, redistributed or otherwise used for commercial purposes  All illustrations and images included in CareNotes® are the copyrighted property of [employer]  or [employer]  The above information is an  only  It is not intended as medical advice for individual conditions or treatments  Talk to your doctor, nurse or pharmacist before following any medical regimen to see if it is safe and effective for you  Calorie Counting Diet   WHAT YOU NEED TO KNOW:   What is a calorie counting diet?   It is a meal plan based on counting calories each day to reach a healthy body weight  You will need to eat fewer calories if you are trying to lose weight  Weight loss may decrease your risk for certain health problems or improve your health if you have health problems  Some of these health problems include heart disease, high blood pressure, and diabetes  What foods should I avoid? Your dietitian will tell you if you need to avoid certain foods based on your body weight and health condition  You may need to avoid high-fat foods if you are at risk for or have heart disease  You may need to eat fewer foods from the breads and starches food group if you have diabetes  How many calories are in foods? The following is a list of foods and drinks with the approximate number of calories in each  Check the food label to find the exact number of calories  A dietitian can tell you how many calories you should have from each food group each day    · Carbohydrate:      ¨ ½ of a 3-inch bagel, 1 slice of bread, or ½ of a hamburger bun or hot dog bun (80)    ¨ 1 (8-inch) flour tortilla or ½ cup of cooked rice (100)    ¨ 1 (6-inch) corn tortilla (80)    ¨ 1 (6-inch) pancake or 1 cup of bran flakes cereal (110)    ¨ ½ cup of cooked cereal (80)    ¨ ½ cup of cooked pasta (85)    ¨ 1 ounce of pretzels (100)    ¨ 3 cups of air-popped popcorn without butter or oil (80)    · Dairy:      ¨ 1 cup of skim or 1% milk (90)    ¨ 1 cup of 2% milk (120)    ¨ 1 cup of whole milk (160)    ¨ 1 cup of 2% chocolate milk (220)    ¨ 1 ounce of low-fat cheese with 3 grams of fat per ounce (70)    ¨ 1 ounce of cheddar cheese (114)    ¨ ½ cup of 1% fat cottage cheese (80)    ¨ 1 cup of plain or sugar-free, fat-free yogurt (90)    · Protein foods:      ¨ 3 ounces of fish (not breaded or fried) (95)    ¨ 3 ounces of breaded, fried fish (195)    ¨ ¾ cup of tuna canned in water (105)    ¨ 3 ounces of chicken breast without skin (105)    ¨ 1 fried chicken breast with skin (350)    ¨ ¼ cup of fat free egg substitute (40)    ¨ 1 large egg (75)    ¨ 3 ounces of lean beef or pork (165)    ¨ 3 ounces of fried pork chop or ham (185)    ¨ ½ cup of cooked dried beans, such as kidney, alan, lentils, or navy (115)    ¨ 3 ounces of bologna or lunch meat (225)    ¨ 2 links of breakfast sausage (140)    · Vegetables:      ¨ ½ cup of sliced mushrooms (10)    ¨ 1 cup of salad greens, such as lettuce, spinach, or simran (15)    ¨ ½ cup of steamed asparagus (20)    ¨ ½ cup of cooked summer squash, zucchini squash, or green or wax beans (25)    ¨ 1 cup of broccoli or cauliflower florets, or 1 medium tomato (25)    ¨ 1 large raw carrot or ½ cup of cooked carrots (40)    ¨ ? of a medium cucumber or 1 stalk of celery (5)    ¨ 1 small baked potato (160)    ¨ 1 cup of breaded, fried vegetables (230)    · Fruit:      ¨ 1 (6-inch) banana (55)     ¨ ½ of a 4-inch grapefruit (55)    ¨ 15 grapes (60)    ¨ 1 medium orange or apple (70)    ¨ 1 large peach (65)    ¨ 1 cup of fresh pineapple chunks (75)    ¨ 1 cup of melon cubes (50)    ¨ 1¼ cups of whole strawberries (45)    ¨ ½ cup of fruit canned in juice (55)    ¨ ½ cup of fruit canned in heavy syrup (110)    ¨ ?  cup of raisins (130)    ¨ ½ cup of unsweetened fruit juice (60)    ¨ ½ cup of grape, cranberry, or prune juice (90)    · Fat:      ¨ 10 peanuts or 2 teaspoons of peanut butter (55)    ¨ 2 tablespoons of avocado or 1 tablespoon of regular salad dressing (45)    ¨ 2 slices of taylor (90)    ¨ 1 teaspoon of oil, such as safflower, canola, corn, or olive oil (45)    ¨ 2 teaspoons of low-fat margarine, or 1 tablespoon of low-fat mayonnaise (50)    ¨ 1 teaspoon of regular margarine (40)    ¨ 1 tablespoon of regular mayonnaise (135)    ¨ 1 tablespoon of cream cheese or 2 tablespoons of low-fat cream cheese (45)    ¨ 2 tablespoons of vegetable shortening (215)    · Dessert and sweets:      ¨ 8 animal crackers or 5 vanilla wafers (80)    ¨ 1 frozen fruit juice bar (80)    ¨ ½ cup of ice milk or low-fat frozen yogurt (90)    ¨ ½ cup of sherbet or sorbet (125)    ¨ ½ cup of sugar-free pudding or custard (60)    ¨ ½ cup of ice cream (140)    ¨ ½ cup of pudding or custard (175)    ¨ 1 (2-inch) square chocolate brownie (185)    · Combination foods:      ¨ Bean burrito made with an 8-inch tortilla, without cheese (275)    ¨ Chicken breast sandwich with lettuce and tomato (325)    ¨ 1 cup of chicken noodle soup (60)    ¨ 1 beef taco (175)    ¨ Regular hamburger with lettuce and tomato (310)    ¨ Regular cheeseburger with lettuce and tomato (410)     ¨ ¼ of a 12-inch cheese pizza (280)    ¨ Fried fish sandwich with lettuce and tomato (425)    ¨ Hot dog and bun (275)    ¨ 1½ cups of macaroni and cheese (310)    ¨ Taco salad with a fried tortilla shell (870)    · Low-calorie foods:      ¨ 1 tablespoon of ketchup or 1 tablespoon of fat free sour cream (15)    ¨ 1 teaspoon of mustard (5)    ¨ ¼ cup of salsa (20)    ¨ 1 large dill pickle (15)    ¨ 1 tablespoon of fat free salad dressing (10)    ¨ 2 teaspoons of low-sugar, light jam or jelly, or 1 tablespoon of sugar-free syrup (15)    ¨ 1 sugar-free popsicle (15)    ¨ 1 cup of club soda, seltzer water, or diet soda (0)  CARE AGREEMENT:   You have the right to help plan your care  Discuss treatment options with your caregivers to decide what care you want to receive  You always have the right to refuse treatment  The above information is an  only  It is not intended as medical advice for individual conditions or treatments  Talk to your doctor, nurse or pharmacist before following any medical regimen to see if it is safe and effective for you  © 2017 [address] [employer] Information is for End User's use only and may not be sold, redistributed or otherwise used for commercial purposes   All illustrations and images included in CareNotes® are the copyrighted property of [employer]  or [employer]

## 2020-07-30 NOTE — PROGRESS NOTES
Assessment and Plan:     Problem List Items Addressed This Visit     None      Visit Diagnoses     Need for Tdap vaccination    -  Primary           Preventive health issues were discussed with patient, and age appropriate screening tests were ordered as noted in patient's After Visit Summary  Personalized health advice and appropriate referrals for health education or preventive services given if needed, as noted in patient's After Visit Summary       History of Present Illness:     Patient presents for Medicare Annual Wellness visit    Patient Care Team:  Julianne Oates MD as PCP - General (Internal Medicine)     Problem List:     Patient Active Problem List   Diagnosis    Hypertension    Type 2 diabetes mellitus ([place] [address] )    Tobacco dependence syndrome    Chronic obstructive pulmonary disease ([place] [address] )    Benign essential hypertension    Body mass index (BMI) 40 0-44 9, adult ([place] [address] )    Morbid (severe) obesity due to excess calories [facility])      Past Medical and Surgical History:     Past Medical History:   Diagnosis Date    Anxiety     Arthritis     Bipolar disorder ([place] [address] )     Colon polyp     COPD (chronic obstructive pulmonary disease) ([place] [address] )     Depression     Diabetes mellitus ([place] [address] )     type 2    Gout     High triglycerides     Hypertension     Hypothyroidism 02/20/2013    Obesity     Paranoid schizophrenia ([place] [address] )     Psychiatric disorder     Sciatica 12/03/2013    Seasonal allergies     Sleep apnea      Past Surgical History:   Procedure Laterality Date    COLONOSCOPY      UMBILICAL HERNIA REPAIR      WISDOM TOOTH EXTRACTION        Family History:     Family History   Problem Relation Age of Onset    Colon cancer Mother     Coronary artery disease Father     Hyperlipidemia Father     Coronary artery disease Family       Social History:     E-Cigarette/Vaping    E-Cigarette Use Never User      E-Cigarette/Vaping Substances    Nicotine No     THC No     CBD No     Flavoring No      Social History     Socioeconomic History    Marital status: Single     Spouse name: None    Number of children: None    Years of education: None    Highest education level: None   Occupational History    None   Social Needs    Financial resource strain: Not hard at all   [place] insecurity:     Worry: Never true     Inability: Never true   [employer] needs:     Medical: No     Non-medical: No   Tobacco Use    Smoking status: Current Every Day Smoker     Packs/day: 2 00     Years: 35 00     Pack years: 70 00     Types: Cigarettes    Smokeless tobacco: Never Used   Substance and Sexual Activity    Alcohol use: No    Drug use: No    Sexual activity: Not Currently     Partners: Female   Lifestyle    Physical activity:     Days per week: 4 days     Minutes per session: 30 min    Stress: Not at all   Relationships    Social connections:     Talks on phone: Patient refused     Gets together: Patient refused     Attends Hindu service: Patient refused     Active member of club or organization: Patient refused     Attends meetings of clubs or organizations: Patient refused     Relationship status: Patient refused    Intimate partner violence:     Fear of current or ex partner: No     Emotionally abused: No     Physically abused: No     Forced sexual activity: No   Other Topics Concern    None   Social History Narrative    None      Medications and Allergies:     Current Outpatient Medications   Medication Sig Dispense Refill    amitriptyline (ELAVIL) 25 mg tablet Take 25 mg by mouth daily at bedtime  0    atorvastatin (LIPITOR) 20 mg tablet take 1 tablet by mouth once daily WITH DINNER 90 tablet 0    cholecalciferol (VITAMIN D3) 1,000 units tablet Take 1 tablet (1,000 Units total) by mouth daily 90 tablet 3    fenofibrate (TRICOR) 145 mg tablet Take 1 tablet (145 mg total) by mouth daily With Dinner daily 90 tablet 3    FLUoxetine (PROzac) 20 mg capsule       haloperidol (HALDOL) 5 mg tablet 2 5 mg daily at bedtime   0    levothyroxine 75 mcg tablet Take 1 tablet (75 mcg total) by mouth daily in the early morning 30 tablet 5    lisinopril (ZESTRIL) 10 mg tablet take 1 tablet by mouth once daily 90 tablet 3    fluticasone (FLONASE) 50 mcg/act nasal spray 2 sprays into each nostril daily (Patient not taking: Reported on 7/30/2020) 1 Bottle 5    Glycopyrrolate (LONHALA MAGNAIR REFILL KIT) 25 MCG/ML SOLN Inhale 1 mL 2 (two) times a day (Patient not taking: Reported on 7/30/2020) 60 mL 3    meloxicam (MOBIC) 7 5 mg tablet take 1 tablet by mouth once daily (Patient not taking: Reported on 7/30/2020) 60 tablet 0    metFORMIN (GLUCOPHAGE) 500 mg tablet take 1 tablet by mouth twice a day with food (Patient not taking: Reported on 7/30/2020) 60 tablet 5     No current facility-administered medications for this visit  Allergies   Allergen Reactions    No Active Allergies       Immunizations:     Immunization History   Administered Date(s) Administered    Fluzone Split Quad 0 25 mL 10/04/2016, 08/30/2017    INFLUENZA 10/30/2013, 11/12/2015, 10/04/2016, 08/30/2017, 09/01/2017, 12/19/2018    Influenza Split 10/01/2013, 09/11/2014    Influenza TIV (IM) 09/14/2012, 11/12/2015    Influenza, recombinant, quadrivalent,injectable, preservative free 12/19/2018, 11/07/2019    Pneumococcal Conjugate 13-Valent 08/30/2017    Pneumococcal Polysaccharide PPV23 11/12/2014    Zoster 06/16/2018      Health Maintenance:         Topic Date Due    CRC Screening: Colonoscopy  10/04/2029    Hepatitis C Screening  Completed         Topic Date Due    DTaP,Tdap,and Td Vaccines (1 - Tdap) [birth year]1975      Medicare Health Risk Assessment:     /84 (BP Location: Left arm, Patient Position: Sitting, Cuff Size: Standard)   Pulse 86   Temp 98 6 °F (37 °C) (Probe)   Resp 16   Ht 5' 10" (1 778 m)   Wt 136 kg (299 lb 14 4 oz)   SpO2 97%   BMI 43 03 kg/m²      Radha Watters is here for his Subsequent Wellness visit   Last Medicare Wellness visit information reviewed, patient interviewed, no change since last AWV  Health Risk Assessment:   Patient rates overall health as good  Patient feels that their physical health rating is same  Eyesight was rated as same  Hearing was rated as same  Patient feels that their emotional and mental health rating is same  Pain experienced in the last 7 days has been none  Patient states that he has experienced no weight loss or gain in last 6 months  Depression Screening:   PHQ-2 Score: 0      Home Safety:  Patient does not have trouble with stairs inside or outside of their home  Patient has working smoke alarms and has working carbon monoxide detector  Home safety hazards include: none  Nutrition:   Current diet is Regular  Medications:   Patient is not currently taking any over-the-counter supplements  Patient is able to manage medications  Activities of Daily Living (ADLs)/Instrumental Activities of Daily Living (IADLs):   Walk and transfer into and out of bed and chair?: Yes  Dress and groom yourself?: Yes    Bathe or shower yourself?: Yes    Feed yourself?  Yes  Do your laundry/housekeeping?: Yes  Manage your money, pay your bills and track your expenses?: Yes  Make your own meals?: Yes    Do your own shopping?: Yes    Previous Hospitalizations:   Any hospitalizations or ED visits within the last 12 months?: No      Advance Care Planning:   Living will: No    Durable POA for healthcare: No    Advanced directive: No    Five wishes given: Yes      Cognitive Screening:   Provider or family/friend/caregiver concerned regarding cognition?: No    PREVENTIVE SCREENINGS      Cardiovascular Screening:    General: Screening Not Indicated, History Lipid Disorder and Risks and Benefits Discussed      Diabetes Screening:     General: Screening Not Indicated, History Diabetes, Risks and Benefits Discussed and Screening Current      Colorectal Cancer Screening:     General: Screening Current      Prostate Cancer Screening:    General: Screening Current and Risks and Benefits Discussed      Osteoporosis Screening:    General: Risks and Benefits Discussed      Abdominal Aortic Aneurysm (AAA) Screening:    Risk factors include: tobacco use        General: Risks and Benefits Discussed      Lung Cancer Screening:     General: Risks and Benefits Discussed      Hepatitis C Screening:    General: Screening Current and Risks and Benefits Discussed    Other Counseling Topics:   Car/seat belt/driving safety, skin self-exam, sunscreen and calcium and vitamin D intake and regular weightbearing exercise         Froy Do MD

## 2020-07-30 NOTE — PROGRESS NOTES
Assessment/Plan:         Diagnoses and all orders for this visit:    Encounter for general adult medical examination with abnormal findings; Done in Detail    Need for Tdap vaccination  -     TDAP VACCINE GREATER THAN OR EQUAL TO 6YO IM    Pulmonary emphysema, unspecified emphysema type ([facility] ); advised to stop smoking  Anoro Daily samples given  RTC in 3mos   -     POCT peak flow    Type II diabetes mellitus with manifestations ([place] [address] ); stable On Life style Mod  RTC in 3mos w Blood work  -     POCT hemoglobin A1c  -     POCT blood glucose  -     Comprehensive metabolic panel; Future  -     CBC and differential; Future  -     Magnesium; Future  -     Lipid panel; Future  -     UA (URINE) with reflex to Scope    Hypothyroidism due to Hashimoto's thyroiditis; continue synthroid 75 mcg AM daily  RTC in 3mos w :  -     Thyroid Antibodies Panel; Future  -     TSH, 3rd generation; Future    Hyperlipidemia associated with type 2 diabetes mellitus ([place] [address] ); continue Fenofibrate and Atorvastatin  RTC in 3mos w :  -     Lipid panel; Future  -     UA (URINE) with reflex to Scope    Cigarette smoker; advised to quit  -     CT lung screening program; Future  -     UA (URINE) with reflex to Scope    Screen for colon cancer  -     Cologuard; Future      Patient's shoes and socks removed  Right Foot/Ankle   Right Foot Inspection  Skin Exam: skin normal and skin intact no dry skin, no warmth, no callus, no erythema, no maceration, no abnormal color, no pre-ulcer, no ulcer and no callus                          Toe Exam: swellingno tenderness  Sensory   Vibration: diminished  Proprioception: diminished   Monofilament testing: diminished  Vascular    The right DP pulse is 1+  The right PT pulse is 1+       Left Foot/Ankle  Left Foot Inspection  Skin Exam: skin normal and skin intactno dry skin, no warmth, no erythema, no maceration, normal color, no pre-ulcer, no ulcer and no callus                         Toe Exam: swellingno tenderness Sensory   Vibration: diminished  Proprioception: diminished  Monofilament: diminished  Vascular    The left DP pulse is 1+  The left PT pulse is 1+  Assign Risk Category:  No deformity present; Loss of protective sensation; Weak pulses       Risk: 2      Subjective:      Patient ID: Chidi Hobbs is a 63 y o  male  63 Y O Man is here for AWV and regular Check up, He still smokes, No new symptoms, med list reviewed w pt in detail, No recent Blood work    The following portions of the patient's history were reviewed and updated as appropriate: allergies, current medications, past family history, past social history, past surgical history and problem list     Review of Systems   Constitutional: Negative for chills, fatigue and fever  HENT: Negative for congestion, facial swelling, sore throat, trouble swallowing and voice change  Eyes: Negative for pain, discharge and visual disturbance  Respiratory: Negative for cough, shortness of breath and wheezing  Cardiovascular: Negative for chest pain, palpitations and leg swelling  Gastrointestinal: Negative for abdominal pain, blood in stool, constipation, diarrhea and nausea  Endocrine: Negative for polydipsia, polyphagia and polyuria  Genitourinary: Negative for difficulty urinating, hematuria and urgency  Musculoskeletal: Negative for arthralgias and myalgias  Skin: Negative for rash  Neurological: Negative for dizziness, tremors, weakness and headaches  Hematological: Negative for adenopathy  Does not bruise/bleed easily  Psychiatric/Behavioral: Negative for dysphoric mood, sleep disturbance and suicidal ideas           Objective:      /84 (BP Location: Left arm, Patient Position: Sitting, Cuff Size: Standard)   Pulse 86   Temp 98 6 °F (37 °C) (Probe)   Resp 16   Ht 5' 10" (1 778 m)   Wt 136 kg (299 lb 14 4 oz)   SpO2 97%   BMI 43 03 kg/m²          Physical Exam   Constitutional: He is oriented to person, place, and time  He appears well-nourished  No distress  HENT:   Head: Normocephalic  Mouth/Throat: Oropharynx is clear and moist  No oropharyngeal exudate  Eyes: Pupils are equal, round, and reactive to light  Conjunctivae are normal  No scleral icterus  Neck: Neck supple  No thyromegaly present  Cardiovascular: Normal rate and regular rhythm  Pulses are weak pulses  Murmur heard  Pulses:       Dorsalis pedis pulses are 1+ on the right side, and 1+ on the left side  Posterior tibial pulses are 1+ on the right side, and 1+ on the left side  Pulmonary/Chest: Effort normal  No respiratory distress  He has wheezes  He has no rales  Abdominal: Soft  Bowel sounds are normal  He exhibits no distension  There is no tenderness  There is no rebound and no guarding  Musculoskeletal: He exhibits no edema or tenderness  Feet:   Right Foot:   Skin Integrity: Negative for ulcer, skin breakdown, erythema, warmth, callus or dry skin  Left Foot:   Skin Integrity: Negative for ulcer, skin breakdown, erythema, warmth, callus or dry skin  Lymphadenopathy:     He has no cervical adenopathy  Neurological: He is alert and oriented to person, place, and time  No cranial nerve deficit  Coordination normal    Skin: No erythema  No pallor  Psychiatric: He has a normal mood and affect  BMI Counseling: Body mass index is 43 03 kg/m²  The BMI is above normal  Nutrition recommendations include reducing portion sizes and decreasing overall calorie intake

## 2020-07-31 DIAGNOSIS — E78.49 OTHER HYPERLIPIDEMIA: ICD-10-CM

## 2020-07-31 RX ORDER — FENOFIBRATE 145 MG/1
TABLET, COATED ORAL
Qty: 90 TABLET | Refills: 3 | Status: SHIPPED | OUTPATIENT
Start: 2020-07-31 | End: 2021-03-15 | Stop reason: SDUPTHER

## 2020-08-27 ENCOUNTER — APPOINTMENT (OUTPATIENT)
Dept: LAB | Facility: HOSPITAL | Age: 55
End: 2020-08-27
Payer: COMMERCIAL

## 2020-08-27 ENCOUNTER — HOSPITAL ENCOUNTER (OUTPATIENT)
Dept: CT IMAGING | Facility: HOSPITAL | Age: 55
Discharge: HOME | End: 2020-08-27
Payer: COMMERCIAL

## 2020-08-27 DIAGNOSIS — Z11.4 SCREENING FOR HUMAN IMMUNODEFICIENCY VIRUS: ICD-10-CM

## 2020-08-27 DIAGNOSIS — E78.5 HYPERLIPIDEMIA ASSOCIATED WITH TYPE 2 DIABETES MELLITUS (HCC): ICD-10-CM

## 2020-08-27 DIAGNOSIS — E06.3 HYPOTHYROIDISM DUE TO HASHIMOTO'S THYROIDITIS: ICD-10-CM

## 2020-08-27 DIAGNOSIS — E03.8 HYPOTHYROIDISM DUE TO HASHIMOTO'S THYROIDITIS: ICD-10-CM

## 2020-08-27 DIAGNOSIS — F17.210 CIGARETTE SMOKER: ICD-10-CM

## 2020-08-27 DIAGNOSIS — E11.8 TYPE II DIABETES MELLITUS WITH MANIFESTATIONS (HCC): ICD-10-CM

## 2020-08-27 DIAGNOSIS — E11.69 HYPERLIPIDEMIA ASSOCIATED WITH TYPE 2 DIABETES MELLITUS (HCC): ICD-10-CM

## 2020-08-27 LAB
ALBUMIN SERPL BCP-MCNC: 4 G/DL (ref 3–5.2)
ALP SERPL-CCNC: 62 U/L (ref 43–122)
ALT SERPL W P-5'-P-CCNC: 25 U/L (ref 9–52)
ANION GAP SERPL CALCULATED.3IONS-SCNC: 5 MMOL/L (ref 5–14)
AST SERPL W P-5'-P-CCNC: 20 U/L (ref 17–59)
BASOPHILS # BLD AUTO: 0.1 THOUSANDS/ΜL (ref 0–0.1)
BASOPHILS NFR BLD AUTO: 1 % (ref 0–1)
BILIRUB SERPL-MCNC: 0.4 MG/DL
BILIRUB UR QL STRIP: NEGATIVE
BUN SERPL-MCNC: 16 MG/DL (ref 5–25)
CALCIUM SERPL-MCNC: 9.2 MG/DL (ref 8.4–10.2)
CHLORIDE SERPL-SCNC: 101 MMOL/L (ref 97–108)
CHOLEST SERPL-MCNC: 154 MG/DL
CLARITY UR: CLEAR
CO2 SERPL-SCNC: 29 MMOL/L (ref 22–30)
COLOR UR: YELLOW
CREAT SERPL-MCNC: 1.02 MG/DL (ref 0.7–1.5)
EOSINOPHIL # BLD AUTO: 0.1 THOUSAND/ΜL (ref 0–0.4)
EOSINOPHIL NFR BLD AUTO: 2 % (ref 0–6)
ERYTHROCYTE [DISTWIDTH] IN BLOOD BY AUTOMATED COUNT: 12.6 %
EST. AVERAGE GLUCOSE BLD GHB EST-MCNC: 126 MG/DL
GFR SERPL CREATININE-BSD FRML MDRD: 82 ML/MIN/1.73SQ M
GLUCOSE P FAST SERPL-MCNC: 104 MG/DL (ref 70–99)
GLUCOSE UR STRIP-MCNC: NEGATIVE MG/DL
HBA1C MFR BLD: 6 %
HCT VFR BLD AUTO: 45 % (ref 41–53)
HDLC SERPL-MCNC: 37 MG/DL
HGB BLD-MCNC: 15.7 G/DL (ref 13.5–17.5)
HGB UR QL STRIP.AUTO: NEGATIVE
KETONES UR STRIP-MCNC: NEGATIVE MG/DL
LDLC SERPL CALC-MCNC: 82 MG/DL
LEUKOCYTE ESTERASE UR QL STRIP: NEGATIVE
LYMPHOCYTES # BLD AUTO: 1.4 THOUSANDS/ΜL (ref 0.5–4)
LYMPHOCYTES NFR BLD AUTO: 21 % (ref 25–45)
MAGNESIUM SERPL-MCNC: 2.2 MG/DL (ref 1.6–2.3)
MCH RBC QN AUTO: 32.7 PG (ref 26–34)
MCHC RBC AUTO-ENTMCNC: 34.9 G/DL (ref 31–36)
MCV RBC AUTO: 94 FL (ref 80–100)
MONOCYTES # BLD AUTO: 0.8 THOUSAND/ΜL (ref 0.2–0.9)
MONOCYTES NFR BLD AUTO: 12 % (ref 1–10)
NEUTROPHILS # BLD AUTO: 4.4 THOUSANDS/ΜL (ref 1.8–7.8)
NEUTS SEG NFR BLD AUTO: 64 % (ref 45–65)
NITRITE UR QL STRIP: NEGATIVE
NONHDLC SERPL-MCNC: 117 MG/DL
PH UR STRIP.AUTO: 6 [PH]
PLATELET # BLD AUTO: 244 THOUSANDS/UL (ref 150–450)
PMV BLD AUTO: 8.5 FL (ref 8.9–12.7)
POTASSIUM SERPL-SCNC: 4.7 MMOL/L (ref 3.6–5)
PROT SERPL-MCNC: 7.1 G/DL (ref 5.9–8.4)
PROT UR STRIP-MCNC: NEGATIVE MG/DL
RBC # BLD AUTO: 4.8 MILLION/UL (ref 4.5–5.9)
SODIUM SERPL-SCNC: 135 MMOL/L (ref 137–147)
SP GR UR STRIP.AUTO: 1.02 (ref 1–1.04)
T4 FREE SERPL-MCNC: 0.82 NG/DL (ref 0.76–1.46)
TRIGL SERPL-MCNC: 174 MG/DL
TSH SERPL DL<=0.05 MIU/L-ACNC: 5.24 UIU/ML (ref 0.47–4.68)
UROBILINOGEN UA: NEGATIVE MG/DL
WBC # BLD AUTO: 6.8 THOUSAND/UL (ref 4.5–11)

## 2020-08-27 PROCEDURE — 83735 ASSAY OF MAGNESIUM: CPT

## 2020-08-27 PROCEDURE — 84443 ASSAY THYROID STIM HORMONE: CPT

## 2020-08-27 PROCEDURE — G0297 LDCT FOR LUNG CA SCREEN: HCPCS

## 2020-08-27 PROCEDURE — 87389 HIV-1 AG W/HIV-1&-2 AB AG IA: CPT

## 2020-08-27 PROCEDURE — 36415 COLL VENOUS BLD VENIPUNCTURE: CPT

## 2020-08-27 PROCEDURE — 86800 THYROGLOBULIN ANTIBODY: CPT

## 2020-08-27 PROCEDURE — 86376 MICROSOMAL ANTIBODY EACH: CPT

## 2020-08-27 PROCEDURE — 80053 COMPREHEN METABOLIC PANEL: CPT

## 2020-08-27 PROCEDURE — 83036 HEMOGLOBIN GLYCOSYLATED A1C: CPT

## 2020-08-27 PROCEDURE — 84439 ASSAY OF FREE THYROXINE: CPT

## 2020-08-27 PROCEDURE — 85025 COMPLETE CBC W/AUTO DIFF WBC: CPT

## 2020-08-27 PROCEDURE — 80061 LIPID PANEL: CPT

## 2020-08-28 ENCOUNTER — TELEPHONE (OUTPATIENT)
Dept: OTHER | Facility: OTHER | Age: 55
End: 2020-08-28

## 2020-08-28 LAB
HIV 1+2 AB+HIV1 P24 AG SERPL QL IA: NORMAL
THYROGLOB AB SERPL-ACNC: <1 IU/ML (ref 0–0.9)
THYROPEROXIDASE AB SERPL-ACNC: <9 IU/ML (ref 0–34)

## 2020-08-28 NOTE — TELEPHONE ENCOUNTER
Pt calling requesting refills on his meloxicam 7 5 mg to be send over to [facility]  Please call back to confirm

## 2020-08-30 DIAGNOSIS — E11.8 TYPE II DIABETES MELLITUS WITH MANIFESTATIONS (HCC): Primary | ICD-10-CM

## 2020-08-30 DIAGNOSIS — F17.210 CIGARETTE SMOKER: ICD-10-CM

## 2020-08-30 DIAGNOSIS — I25.10 ASHD (ARTERIOSCLEROTIC HEART DISEASE): ICD-10-CM

## 2020-08-30 DIAGNOSIS — R91.8 LUNG NODULES: Primary | ICD-10-CM

## 2020-08-31 ENCOUNTER — TELEPHONE (OUTPATIENT)
Dept: FAMILY MEDICINE CLINIC | Facility: CLINIC | Age: 55
End: 2020-08-31

## 2020-08-31 DIAGNOSIS — M19.90 ARTHRITIS: Primary | ICD-10-CM

## 2020-08-31 RX ORDER — ETODOLAC 400 MG/1
400 TABLET, FILM COATED ORAL 2 TIMES DAILY
Qty: 60 TABLET | Refills: 1 | Status: SHIPPED | OUTPATIENT
Start: 2020-08-31 | End: 2020-11-05

## 2020-09-29 DIAGNOSIS — I10 ESSENTIAL HYPERTENSION: ICD-10-CM

## 2020-09-29 RX ORDER — LISINOPRIL 10 MG/1
TABLET ORAL
Qty: 90 TABLET | Refills: 3 | Status: SHIPPED | OUTPATIENT
Start: 2020-09-29 | End: 2021-03-15 | Stop reason: SDUPTHER

## 2020-11-05 ENCOUNTER — OFFICE VISIT (OUTPATIENT)
Dept: FAMILY MEDICINE CLINIC | Facility: CLINIC | Age: 55
End: 2020-11-05
Payer: COMMERCIAL

## 2020-11-05 VITALS
OXYGEN SATURATION: 98 % | DIASTOLIC BLOOD PRESSURE: 72 MMHG | BODY MASS INDEX: 42.23 KG/M2 | RESPIRATION RATE: 16 BRPM | HEIGHT: 70 IN | HEART RATE: 86 BPM | WEIGHT: 295 LBS | SYSTOLIC BLOOD PRESSURE: 134 MMHG | TEMPERATURE: 97.8 F

## 2020-11-05 DIAGNOSIS — R91.8 LUNG NODULES: ICD-10-CM

## 2020-11-05 DIAGNOSIS — E03.8 HYPOTHYROIDISM DUE TO HASHIMOTO'S THYROIDITIS: ICD-10-CM

## 2020-11-05 DIAGNOSIS — E11.8 TYPE II DIABETES MELLITUS WITH MANIFESTATIONS (HCC): Primary | ICD-10-CM

## 2020-11-05 DIAGNOSIS — E78.5 HYPERLIPIDEMIA ASSOCIATED WITH TYPE 2 DIABETES MELLITUS (HCC): ICD-10-CM

## 2020-11-05 DIAGNOSIS — E11.69 HYPERLIPIDEMIA ASSOCIATED WITH TYPE 2 DIABETES MELLITUS (HCC): ICD-10-CM

## 2020-11-05 DIAGNOSIS — Z00.01 ENCOUNTER FOR GENERAL ADULT MEDICAL EXAMINATION WITH ABNORMAL FINDINGS: ICD-10-CM

## 2020-11-05 DIAGNOSIS — F17.210 CIGARETTE SMOKER: ICD-10-CM

## 2020-11-05 DIAGNOSIS — Z23 NEED FOR INFLUENZA VACCINATION: ICD-10-CM

## 2020-11-05 DIAGNOSIS — E06.3 HYPOTHYROIDISM DUE TO HASHIMOTO'S THYROIDITIS: ICD-10-CM

## 2020-11-05 PROCEDURE — G0008 ADMIN INFLUENZA VIRUS VAC: HCPCS

## 2020-11-05 PROCEDURE — T1015 CLINIC SERVICE: HCPCS | Performed by: INTERNAL MEDICINE

## 2020-11-05 PROCEDURE — 90682 RIV4 VACC RECOMBINANT DNA IM: CPT

## 2020-11-05 PROCEDURE — 99214 OFFICE O/P EST MOD 30 MIN: CPT | Performed by: INTERNAL MEDICINE

## 2020-11-13 ENCOUNTER — VBI (OUTPATIENT)
Dept: ADMINISTRATIVE | Facility: OTHER | Age: 55
End: 2020-11-13

## 2020-12-21 DIAGNOSIS — M19.90 ARTHRITIS: Primary | ICD-10-CM

## 2020-12-21 RX ORDER — ETODOLAC 400 MG/1
400 TABLET, FILM COATED ORAL 2 TIMES DAILY
Qty: 60 TABLET | Refills: 0 | Status: SHIPPED | OUTPATIENT
Start: 2020-12-21 | End: 2021-02-24

## 2021-02-24 DIAGNOSIS — M19.90 ARTHRITIS: ICD-10-CM

## 2021-02-24 RX ORDER — ETODOLAC 400 MG/1
TABLET, FILM COATED ORAL
Qty: 60 TABLET | Refills: 0 | Status: SHIPPED | OUTPATIENT
Start: 2021-02-24 | End: 2021-03-15 | Stop reason: SDUPTHER

## 2021-03-04 ENCOUNTER — LAB (OUTPATIENT)
Dept: LAB | Facility: HOSPITAL | Age: 56
End: 2021-03-04
Payer: COMMERCIAL

## 2021-03-04 DIAGNOSIS — E03.8 HYPOTHYROIDISM DUE TO HASHIMOTO'S THYROIDITIS: ICD-10-CM

## 2021-03-04 DIAGNOSIS — E78.5 HYPERLIPIDEMIA ASSOCIATED WITH TYPE 2 DIABETES MELLITUS (HCC): ICD-10-CM

## 2021-03-04 DIAGNOSIS — E11.8 TYPE II DIABETES MELLITUS WITH MANIFESTATIONS (HCC): ICD-10-CM

## 2021-03-04 DIAGNOSIS — E11.69 HYPERLIPIDEMIA ASSOCIATED WITH TYPE 2 DIABETES MELLITUS (HCC): ICD-10-CM

## 2021-03-04 DIAGNOSIS — E06.3 HYPOTHYROIDISM DUE TO HASHIMOTO'S THYROIDITIS: ICD-10-CM

## 2021-03-04 LAB
ALBUMIN SERPL BCP-MCNC: 4.3 G/DL (ref 3–5.2)
ALP SERPL-CCNC: 68 U/L (ref 43–122)
ALT SERPL W P-5'-P-CCNC: 12 U/L (ref 9–52)
ANION GAP SERPL CALCULATED.3IONS-SCNC: 6 MMOL/L (ref 5–14)
AST SERPL W P-5'-P-CCNC: 20 U/L (ref 17–59)
BILIRUB SERPL-MCNC: 0.7 MG/DL
BUN SERPL-MCNC: 16 MG/DL (ref 5–25)
CALCIUM SERPL-MCNC: 9.2 MG/DL (ref 8.4–10.2)
CHLORIDE SERPL-SCNC: 99 MMOL/L (ref 97–108)
CHOLEST SERPL-MCNC: 182 MG/DL
CO2 SERPL-SCNC: 30 MMOL/L (ref 22–30)
CREAT SERPL-MCNC: 1.13 MG/DL (ref 0.7–1.5)
EOSINOPHIL # BLD AUTO: 0.06 THOUSAND/UL (ref 0–0.4)
EOSINOPHIL NFR BLD MANUAL: 1 % (ref 0–6)
ERYTHROCYTE [DISTWIDTH] IN BLOOD BY AUTOMATED COUNT: 12.8 %
EST. AVERAGE GLUCOSE BLD GHB EST-MCNC: 120 MG/DL
GFR SERPL CREATININE-BSD FRML MDRD: 72 ML/MIN/1.73SQ M
GLUCOSE P FAST SERPL-MCNC: 108 MG/DL (ref 70–99)
HBA1C MFR BLD: 5.8 %
HCT VFR BLD AUTO: 47.6 % (ref 41–53)
HDLC SERPL-MCNC: 41 MG/DL
HGB BLD-MCNC: 15.5 G/DL (ref 13.5–17.5)
LDLC SERPL CALC-MCNC: 104 MG/DL
LYMPHOCYTES # BLD AUTO: 1.58 THOUSAND/UL (ref 0.5–4)
LYMPHOCYTES # BLD AUTO: 25 % (ref 25–45)
MCH RBC QN AUTO: 30.3 PG (ref 26–34)
MCHC RBC AUTO-ENTMCNC: 32.6 G/DL (ref 31–36)
MCV RBC AUTO: 93 FL (ref 80–100)
MONOCYTES # BLD AUTO: 0.5 THOUSAND/UL (ref 0.2–0.9)
MONOCYTES NFR BLD AUTO: 8 % (ref 1–10)
NEUTS SEG # BLD: 4.16 THOUSAND/UL (ref 1.8–7.8)
NEUTS SEG NFR BLD AUTO: 66 %
NONHDLC SERPL-MCNC: 141 MG/DL
PLATELET # BLD AUTO: 247 THOUSANDS/UL (ref 150–450)
PLATELET BLD QL SMEAR: ADEQUATE
PMV BLD AUTO: 7.9 FL (ref 8.9–12.7)
POTASSIUM SERPL-SCNC: 4.8 MMOL/L (ref 3.6–5)
PROT SERPL-MCNC: 7.6 G/DL (ref 5.9–8.4)
RBC # BLD AUTO: 5.12 MILLION/UL (ref 4.5–5.9)
RBC MORPH BLD: NORMAL
SODIUM SERPL-SCNC: 135 MMOL/L (ref 137–147)
T4 FREE SERPL-MCNC: 0.79 NG/DL (ref 0.76–1.46)
TOTAL CELLS COUNTED SPEC: 100
TRIGL SERPL-MCNC: 184 MG/DL
TSH SERPL DL<=0.05 MIU/L-ACNC: 6.21 UIU/ML (ref 0.47–4.68)
WBC # BLD AUTO: 6.3 THOUSAND/UL (ref 4.5–11)

## 2021-03-04 PROCEDURE — 80053 COMPREHEN METABOLIC PANEL: CPT

## 2021-03-04 PROCEDURE — 84443 ASSAY THYROID STIM HORMONE: CPT

## 2021-03-04 PROCEDURE — 85007 BL SMEAR W/DIFF WBC COUNT: CPT

## 2021-03-04 PROCEDURE — 83036 HEMOGLOBIN GLYCOSYLATED A1C: CPT

## 2021-03-04 PROCEDURE — 85027 COMPLETE CBC AUTOMATED: CPT

## 2021-03-04 PROCEDURE — 86800 THYROGLOBULIN ANTIBODY: CPT

## 2021-03-04 PROCEDURE — 86376 MICROSOMAL ANTIBODY EACH: CPT

## 2021-03-04 PROCEDURE — 80061 LIPID PANEL: CPT

## 2021-03-04 PROCEDURE — 36415 COLL VENOUS BLD VENIPUNCTURE: CPT

## 2021-03-04 PROCEDURE — 84439 ASSAY OF FREE THYROXINE: CPT

## 2021-03-05 LAB — THYROGLOB AB SERPL-ACNC: <1 IU/ML (ref 0–0.9)

## 2021-03-06 LAB — THYROPEROXIDASE AB SERPL-ACNC: <9 IU/ML (ref 0–34)

## 2021-03-08 ENCOUNTER — RA CDI HCC (OUTPATIENT)
Dept: OTHER | Facility: HOSPITAL | Age: 56
End: 2021-03-08

## 2021-03-08 NOTE — PROGRESS NOTES
[facility]  coding oppertunities          Chart reviewed, no opportunity found: CHART REVIEWED, NO OPPORTUNITY FOUND

## 2021-03-10 DIAGNOSIS — Z23 ENCOUNTER FOR IMMUNIZATION: ICD-10-CM

## 2021-03-15 ENCOUNTER — OFFICE VISIT (OUTPATIENT)
Dept: FAMILY MEDICINE CLINIC | Facility: CLINIC | Age: 56
End: 2021-03-15
Payer: COMMERCIAL

## 2021-03-15 VITALS
HEART RATE: 74 BPM | WEIGHT: 289 LBS | HEIGHT: 70 IN | DIASTOLIC BLOOD PRESSURE: 86 MMHG | TEMPERATURE: 98.1 F | OXYGEN SATURATION: 97 % | BODY MASS INDEX: 41.37 KG/M2 | RESPIRATION RATE: 14 BRPM | SYSTOLIC BLOOD PRESSURE: 132 MMHG

## 2021-03-15 DIAGNOSIS — Z00.01 ENCOUNTER FOR GENERAL ADULT MEDICAL EXAMINATION WITH ABNORMAL FINDINGS: Primary | ICD-10-CM

## 2021-03-15 DIAGNOSIS — E66.01 MORBID (SEVERE) OBESITY DUE TO EXCESS CALORIES (HCC): ICD-10-CM

## 2021-03-15 DIAGNOSIS — E03.8 HYPOTHYROIDISM DUE TO HASHIMOTO'S THYROIDITIS: ICD-10-CM

## 2021-03-15 DIAGNOSIS — E11.8 TYPE II DIABETES MELLITUS WITH MANIFESTATIONS (HCC): ICD-10-CM

## 2021-03-15 DIAGNOSIS — M77.8 LEFT SHOULDER TENDONITIS: ICD-10-CM

## 2021-03-15 DIAGNOSIS — I10 ESSENTIAL HYPERTENSION: ICD-10-CM

## 2021-03-15 DIAGNOSIS — E66.01 MORBID OBESITY WITH BMI OF 40.0-44.9, ADULT (HCC): ICD-10-CM

## 2021-03-15 DIAGNOSIS — E06.3 HYPOTHYROIDISM DUE TO HASHIMOTO'S THYROIDITIS: ICD-10-CM

## 2021-03-15 DIAGNOSIS — M19.90 ARTHRITIS: ICD-10-CM

## 2021-03-15 DIAGNOSIS — E78.49 OTHER HYPERLIPIDEMIA: ICD-10-CM

## 2021-03-15 PROCEDURE — 99214 OFFICE O/P EST MOD 30 MIN: CPT | Performed by: INTERNAL MEDICINE

## 2021-03-15 PROCEDURE — T1015 CLINIC SERVICE: HCPCS | Performed by: INTERNAL MEDICINE

## 2021-03-15 RX ORDER — LEVOTHYROXINE SODIUM 88 UG/1
88 TABLET ORAL DAILY
Qty: 30 TABLET | Refills: 3 | Status: SHIPPED | OUTPATIENT
Start: 2021-03-15 | End: 2021-11-23

## 2021-03-15 RX ORDER — ATORVASTATIN CALCIUM 20 MG/1
20 TABLET, FILM COATED ORAL
Qty: 90 TABLET | Refills: 3 | Status: SHIPPED | OUTPATIENT
Start: 2021-03-15 | End: 2021-08-12 | Stop reason: SDUPTHER

## 2021-03-15 RX ORDER — LISINOPRIL 10 MG/1
10 TABLET ORAL DAILY
Qty: 90 TABLET | Refills: 3 | Status: SHIPPED | OUTPATIENT
Start: 2021-03-15 | End: 2021-12-14 | Stop reason: SDUPTHER

## 2021-03-15 RX ORDER — ETODOLAC 400 MG/1
400 TABLET, FILM COATED ORAL 2 TIMES DAILY
Qty: 180 TABLET | Refills: 1 | Status: SHIPPED | OUTPATIENT
Start: 2021-03-15 | End: 2021-07-08

## 2021-03-15 RX ORDER — FENOFIBRATE 145 MG/1
145 TABLET, COATED ORAL
Qty: 90 TABLET | Refills: 3 | Status: SHIPPED | OUTPATIENT
Start: 2021-03-15 | End: 2021-12-14 | Stop reason: SDUPTHER

## 2021-03-15 NOTE — PROGRESS NOTES
Assessment/Plan:         Diagnoses and all orders for this visit:    Encounter for general adult medical examination with abnormal findings; done in detail    Essential hypertension; continue   -     lisinopril (ZESTRIL) 10 mg tablet; Take 1 tablet (10 mg total) by mouth daily    Other hyperlipidemia; continue  -     fenofibrate (TRICOR) 145 mg tablet; Take 1 tablet (145 mg total) by mouth daily with dinner  adn  -     atorvastatin (LIPITOR) 20 mg tablet; Take 1 tablet (20 mg total) by mouth daily with dinner  RTC in 3 mos w blood work    Arthritis; moist heat, try :  -     etodolac (LODINE) 400 MG tablet; Take 1 tablet (400 mg total) by mouth 2 (two) times a day      Hypothyroidism due to Hashimoto's thyroiditis; Increase;  -     levothyroxine 88 mcg tablet; Take 1 tablet (88 mcg total) by mouth daily  RTC in 3mos  w Blood work    Left shoulder tendonitis; moist heat  As above    Subjective:      Patient ID: Harvey Boudreaux is a 61 y o  male  61 Y O Man is here for Enhanced visit and Regular check up, He feels ok, except for left should er pain, and LOM,  recent Blood work and med list reviewed,  The following portions of the patient's history were reviewed and updated as appropriate: allergies, current medications, past family history, past social history, past surgical history and problem list     Review of Systems   Constitutional: Negative for chills, fatigue and fever  HENT: Negative for congestion, facial swelling, sore throat, trouble swallowing and voice change  Eyes: Negative for pain, discharge and visual disturbance  Respiratory: Negative for cough, shortness of breath and wheezing  Cardiovascular: Negative for chest pain, palpitations and leg swelling  Gastrointestinal: Negative for abdominal pain, blood in stool, constipation, diarrhea and nausea  Endocrine: Negative for polydipsia, polyphagia and polyuria     Genitourinary: Negative for difficulty urinating, hematuria and urgency  Musculoskeletal: Positive for arthralgias  Negative for myalgias  Skin: Negative for rash  Neurological: Negative for dizziness, tremors, weakness and headaches  Hematological: Negative for adenopathy  Does not bruise/bleed easily  Psychiatric/Behavioral: Negative for dysphoric mood, sleep disturbance and suicidal ideas  Objective:      /86 (BP Location: Left arm, Patient Position: Sitting, Cuff Size: Standard)   Pulse 74   Temp 98 1 °F (36 7 °C) (Tympanic)   Resp 14   Ht 5' 10" (1 778 m)   Wt 131 kg (289 lb)   SpO2 97%   BMI 41 47 kg/m²          Physical Exam  Constitutional:       General: He is not in acute distress  HENT:      Head: Normocephalic  Mouth/Throat:      Pharynx: No oropharyngeal exudate  Eyes:      General: No scleral icterus  Conjunctiva/sclera: Conjunctivae normal       Pupils: Pupils are equal, round, and reactive to light  Neck:      Musculoskeletal: Neck supple  Thyroid: No thyromegaly  Cardiovascular:      Rate and Rhythm: Normal rate and regular rhythm  Heart sounds: Murmur present  Pulmonary:      Effort: Pulmonary effort is normal  No respiratory distress  Breath sounds: Wheezing present  No rales  Abdominal:      General: Bowel sounds are normal  There is no distension  Palpations: Abdomen is soft  Tenderness: There is no abdominal tenderness  There is no guarding or rebound  Musculoskeletal:         General: Tenderness present  Lymphadenopathy:      Cervical: No cervical adenopathy  Skin:     Coloration: Skin is not pale  Findings: No rash  Neurological:      Mental Status: He is alert and oriented to person, place, and time  Sensory: No sensory deficit  Motor: No weakness  BMI Counseling: Body mass index is 41 47 kg/m²  The BMI is above normal  Nutrition recommendations include reducing portion sizes and decreasing overall calorie intake

## 2021-03-16 NOTE — PATIENT INSTRUCTIONS
Low Fat Diet   AMBULATORY CARE:   A low-fat diet  is an eating plan that is low in total fat, unhealthy fat, and cholesterol  You may need to follow a low-fat diet if you have trouble digesting or absorbing fat  You may also need to follow this diet if you have high cholesterol  You can also lower your cholesterol by increasing the amount of fiber in your diet  Soluble fiber is a type of fiber that helps to decrease cholesterol levels  Different types of fat in food:   · Limit unhealthy fats  A diet that is high in cholesterol, saturated fat, and trans fat may cause unhealthy cholesterol levels  Unhealthy cholesterol levels increase your risk of heart disease  ? Cholesterol:  Limit intake of cholesterol to less than 200 mg per day  Cholesterol is found in meat, eggs, and dairy  ? Saturated fat:  Limit saturated fat to less than 7% of your total daily calories  Ask your dietitian how many calories you need each day  Saturated fat is found in butter, cheese, ice cream, whole milk, and palm oil  Saturated fat is also found in meat, such as beef, pork, chicken skin, and processed meats  Processed meats include sausage, hot dogs, and bologna  ? Trans fat:  Avoid trans fat as much as possible  Trans fat is used in fried and baked foods  Foods that say trans fat free on the label may still have up to 0 5 grams of trans fat per serving  · Include healthy fats  Replace foods that are high in saturated and trans fat with foods high in healthy fats  This may help to decrease high cholesterol levels  ? Monounsaturated fats: These are found in avocados, nuts, and vegetable oils, such as olive, canola, and sunflower oil  ? Polyunsaturated fats: These can be found in vegetable oils, such as soybean or corn oil  Omega-3 fats can help to decrease the risk of heart disease  Omega-3 fats are found in fish, such as salmon, herring, trout, and tuna   Omega-3 fats can also be found in plant foods, such as walnuts, flaxseed, soybeans, and canola oil  Foods to limit or avoid:   · Grains:      ? Snacks that are made with partially hydrogenated oils, such as chips, regular crackers, and butter-flavored popcorn    ? High-fat baked goods, such as biscuits, croissants, doughnuts, pies, cookies, and pastries    · Dairy:      ? Whole milk, 2% milk, and yogurt and ice cream made with whole milk    ? Half and half creamer, heavy cream, and whipping cream    ? Cheese, cream cheese, and sour cream    · Meats and proteins:      ? High-fat cuts of meat (T-bone steak, regular hamburger, and ribs)    ? Fried meat, poultry (turkey and chicken), and fish    ? Poultry (chicken and turkey) with skin    ? Cold cuts (salami or bologna), hot dogs, taylor, and sausage    ? Whole eggs and egg yolks    · Vegetables and fruits with added fat:      ? Fried vegetables or vegetables in butter or high-fat sauces, such as cream or cheese sauces    ? Fried fruit or fruit served with butter or cream    · Fats:      ? Butter, stick margarine, and shortening    ? Coconut, palm oil, and palm kernel oil    Foods to include:   · Grains:      ? Whole-grain breads, cereals, pasta, and brown rice    ? Low-fat crackers and pretzels    · Vegetables and fruits:      ? Fresh, frozen, or canned vegetables (no salt or low-sodium)    ? Fresh, frozen, dried, or canned fruit (canned in light syrup or fruit juice)    ? Avocado    · Low-fat dairy products:      ? Nonfat (skim) or 1% milk    ? Nonfat or low-fat cheese, yogurt, and cottage cheese    · Meats and proteins:      ? Chicken or turkey with no skin    ? Baked or broiled fish    ? Lean beef and pork (loin, round, extra lean hamburger)    ? Beans and peas, unsalted nuts, soy products    ? Egg whites and substitutes    ? Seeds and nuts    · Fats:      ? Unsaturated oil, such as canola, olive, peanut, soybean, or sunflower oil    ? Soft or liquid margarine and vegetable oil spread    ?  Low-fat salad dressing    Other ways to decrease fat:   · Read food labels before you buy foods  Choose foods that have less than 30% of calories from fat  Choose low-fat or fat-free dairy products  Remember that fat free does not mean calorie free  These foods still contain calories, and too many calories can lead to weight gain  · Trim fat from meat and avoid fried food  Trim all visible fat from meat before you cook it  Remove the skin from poultry  Do not barrios meat, fish, or poultry  Bake, roast, boil, or broil these foods instead  Avoid fried foods  Eat a baked potato instead of Western Sindy fries  Steam vegetables instead of sautéing them in butter  · Add less fat to foods  Use imitation taylor bits on salads and baked potatoes instead of regular taylor bits  Use fat-free or low-fat salad dressings instead of regular dressings  Use low-fat or nonfat butter-flavored topping instead of regular butter or margarine on popcorn and other foods  Ways to decrease fat in recipes:  Replace high-fat ingredients with low-fat or nonfat ones  This may cause baked goods to be drier than usual  You may need to use nonfat cooking spray on pans to prevent food from sticking  You also may need to change the amount of other ingredients, such as water, in the recipe  Try the following:  · Use low-fat or light margarine instead of regular margarine or shortening  · Use lean ground turkey breast or chicken, or lean ground beef (less than 5% fat) instead of hamburger  · Add 1 teaspoon of canola oil to 8 ounces of skim milk instead of using cream or half and half  · Use grated zucchini, carrots, or apples in breads instead of coconut  · Use blenderized, low-fat cottage cheese, plain tofu, or low-fat ricotta cheese instead of cream cheese  · Use 1 egg white and 1 teaspoon of canola oil, or use ¼ cup (2 ounces) of fat-free egg substitute instead of a whole egg       · Replace half of the oil that is called for in a recipe with applesauce when you bake  Use 3 tablespoons of cocoa powder and 1 tablespoon of canola oil instead of a square of baking chocolate  How to increase fiber:  Eat enough high-fiber foods to get 20 to 30 grams of fiber every day  Slowly increase your fiber intake to avoid stomach cramps, gas, and other problems  · Eat 3 ounces of whole-grain foods each day  An ounce is about 1 slice of bread  Eat whole-grain breads, such as whole-wheat bread  Whole wheat, whole-wheat flour, or other whole grains should be listed as the first ingredient on the food label  Replace white flour with whole-grain flour or use half of each in recipes  Whole-grain flour is heavier than white flour, so you may have to add more yeast or baking powder  · Eat a high-fiber cereal for breakfast   Oatmeal is a good source of soluble fiber  Look for cereals that have bran or fiber in the name  Choose whole-grain products, such as brown rice, barley, and whole-wheat pasta  · Eat more beans, peas, and lentils  For example, add beans to soups or salads  Eat at least 5 cups of fruits and vegetables each day  Eat fruits and vegetables with the peel because the peel is high in fiber  © Copyright [address] Information is for End User's use only and may not be sold, redistributed or otherwise used for commercial purposes  All illustrations and images included in CareNotes® are the copyrighted property of A D A M , Inc  or [address] [place] [address]  The above information is an  only  It is not intended as medical advice for individual conditions or treatments  Talk to your doctor, nurse or pharmacist before following any medical regimen to see if it is safe and effective for you  Heart Healthy Diet   AMBULATORY CARE:   A heart healthy diet  is an eating plan low in unhealthy fats and sodium (salt)  The plan is high in healthy fats and fiber   A heart healthy diet helps improve your cholesterol levels and lowers your risk for heart disease and stroke  A dietitian will teach you how to read and understand food labels  Heart healthy diet guidelines to follow:   · Choose foods that contain healthy fats  ? Unsaturated fats  include monounsaturated and polyunsaturated fats  Unsaturated fat is found in foods such as soybean, canola, olive, corn, and safflower oils  It is also found in soft tub margarine that is made with liquid vegetable oil  ? Omega-3 fat  is found in certain fish, such as salmon, tuna, and trout, and in walnuts and flaxseed  Eat fish high in omega-3 fats at least 2 times a week  · Get 20 to 30 grams of fiber each day  Fruits, vegetables, whole-grain foods, and legumes (cooked beans) are good sources of fiber  · Limit or do not have unhealthy fats  ? Cholesterol  is found in animal foods, such as eggs and lobster, and in dairy products made from whole milk  Limit cholesterol to less than 200 mg each day  ? Saturated fat  is found in meats, such as taylor and hamburger  It is also found in chicken or turkey skin, whole milk, and butter  Limit saturated fat to less than 7% of your total daily calories  ? Trans fat  is found in packaged foods, such as potato chips and cookies  It is also in hard margarine, some fried foods, and shortening  Do not eat foods that contain trans fats  · Limit sodium as directed  You may be told to limit sodium to 2,000 to 2,300 mg each day  Choose low-sodium or no-salt-added foods  Add little or no salt to food you prepare  Use herbs and spices in place of salt  Include the following in your heart healthy plan:  Ask your dietitian or healthcare provider how many servings to have from each of the following food groups:  · Grains:      ? Whole-wheat breads, cereals, and pastas, and brown rice    ? Low-fat, low-sodium crackers and chips    · Vegetables:      ? Broccoli, green beans, green peas, and spinach    ? Collards, kale, and lima beans    ?  Carrots, sweet potatoes, tomatoes, and peppers    ? Canned vegetables with no salt added    · Fruits:      ? Bananas, peaches, pears, and pineapple    ? Grapes, raisins, and dates    ? Oranges, tangerines, grapefruit, orange juice, and grapefruit juice    ? Apricots, mangoes, melons, and papaya    ? Raspberries and strawberries    ? Canned fruit with no added sugar    · Low-fat dairy:      ? Nonfat (skim) milk, 1% milk, and low-fat almond, cashew, or soy milks fortified with calcium    ? Low-fat cheese, regular or frozen yogurt, and cottage cheese    · Meats and proteins:      ? Lean cuts of beef and pork (loin, leg, round), skinless chicken and turkey    ? Legumes, soy products, egg whites, or nuts    Limit or do not include the following in your heart healthy plan:   · Unhealthy fats and oils:      ? Whole or 2% milk, cream cheese, sour cream, or cheese    ? High-fat cuts of beef (T-bone steaks, ribs), chicken or turkey with skin, and organ meats such as liver    ? Butter, stick margarine, shortening, and cooking oils such as coconut or palm oil    · Foods and liquids high in sodium:      ? Packaged foods, such as frozen dinners, cookies, macaroni and cheese, and cereals with more than 300 mg of sodium per serving    ? Vegetables with added sodium, such as instant potatoes, vegetables with added sauces, or regular canned vegetables    ? Cured or smoked meats, such as hot dogs, taylor, and sausage    ? High-sodium ketchup, barbecue sauce, salad dressing, pickles, olives, soy sauce, or miso    · Foods and liquids high in sugar:      ? Candy, cake, cookies, pies, or doughnuts    ? Soft drinks (soda), sports drinks, or sweetened tea    ? Canned or dry mixes for cakes, soups, sauces, or gravies    Other healthy heart guidelines:   · Do not smoke  Nicotine and other chemicals in cigarettes and cigars can cause lung and heart damage  Ask your healthcare provider for information if you currently smoke and need help to quit  E-cigarettes or smokeless tobacco still contain nicotine  Talk to your healthcare provider before you use these products  · Limit or do not drink alcohol as directed  Alcohol can damage your heart and raise your blood pressure  Your healthcare provider may give you specific daily and weekly limits  The general recommended limit is 1 drink a day for women 21 or older and for men 72 or older  Do not have more than 3 drinks in a day or 7 in a week  The recommended limit is 2 drinks a day for men 24to 59years of age  Do not have more than 4 drinks in a day or 14 in a week  A drink of alcohol is 12 ounces of beer, 5 ounces of wine, or 1½ ounces of liquor  · Exercise regularly  Exercise can help you maintain a healthy weight and improve your blood pressure and cholesterol levels  Regular exercise can also decrease your risk for heart problems  Ask your healthcare provider about the best exercise plan for you  Do not start an exercise program without asking your healthcare provider  Follow up with your doctor or cardiologist as directed:  Write down your questions so you remember to ask them during your visits  © Copyright [address] [place] [address] Drive Information is for End User's use only and may not be sold, redistributed or otherwise used for commercial purposes  All illustrations and images included in CareNotes® are the copyrighted property of A D A M , Inc  or [address] [place] [address]  The above information is an  only  It is not intended as medical advice for individual conditions or treatments  Talk to your doctor, nurse or pharmacist before following any medical regimen to see if it is safe and effective for you  Calorie Counting Diet   WHAT YOU NEED TO KNOW:   What is a calorie counting diet? It is a meal plan based on counting calories each day to reach a healthy body weight  You will need to eat fewer calories if you are trying to lose weight   Weight loss may decrease your risk for certain health problems or improve your health if you have health problems  Some of these health problems include heart disease, high blood pressure, and diabetes  What foods should I avoid? Your dietitian will tell you if you need to avoid certain foods based on your body weight and health condition  You may need to avoid high-fat foods if you are at risk for or have heart disease  You may need to eat fewer foods from the breads and starches food group if you have diabetes  How many calories are in foods? The following is a list of foods and drinks with the approximate number of calories in each  Check the food label to find the exact number of calories  A dietitian can tell you how many calories you should have from each food group each day  · Carbohydrate:      ? ½ of a 3-inch bagel, 1 slice of bread, or ½ of a hamburger bun or hot dog bun (80)    ? 1 (8-inch) flour tortilla or ½ cup of cooked rice (100)    ? 1 (6-inch) corn tortilla (80)    ? 1 (6-inch) pancake or 1 cup of bran flakes cereal (110)    ? ½ cup of cooked cereal (80)    ? ½ cup of cooked pasta (85)    ? 1 ounce of pretzels (100)    ? 3 cups of air-popped popcorn without butter or oil (80)    · Dairy:      ? 1 cup of skim or 1% milk (90)    ? 1 cup of 2% milk (120)    ? 1 cup of whole milk (160)    ? 1 cup of 2% chocolate milk (220)    ? 1 ounce of low-fat cheese with 3 grams of fat per ounce (70)    ? 1 ounce of cheddar cheese (114)    ? ½ cup of 1% fat cottage cheese (80)    ? 1 cup of plain or sugar-free, fat-free yogurt (90)    · Protein foods:      ? 3 ounces of fish (not breaded or fried) (95)    ? 3 ounces of breaded, fried fish (195)    ? ¾ cup of tuna canned in water (105)    ? 3 ounces of chicken breast without skin (105)    ? 1 fried chicken breast with skin (350)    ? ¼ cup of fat free egg substitute (40)    ? 1 large egg (75)    ? 3 ounces of lean beef or pork (165)    ? 3 ounces of fried pork chop or ham (185)    ?  ½ cup of cooked dried beans, such as kidney, alan, lentils, or navy (115)    ? 3 ounces of bologna or lunch meat (225)    ? 2 links of breakfast sausage (140)    · Vegetables:      ? ½ cup of sliced mushrooms (10)    ? 1 cup of salad greens, such as lettuce, spinach, or simran (15)    ? ½ cup of steamed asparagus (20)    ? ½ cup of cooked summer squash, zucchini squash, or green or wax beans (25)    ? 1 cup of broccoli or cauliflower florets, or 1 medium tomato (25)    ? 1 large raw carrot or ½ cup of cooked carrots (40)    ? ? of a medium cucumber or 1 stalk of celery (5)    ? 1 small baked potato (160)    ? 1 cup of breaded, fried vegetables (230)    · Fruit:      ? 1 (6-inch) banana (55)     ? ½ of a 4-inch grapefruit (55)    ? 15 grapes (60)    ? 1 medium orange or apple (70)    ? 1 large peach (65)    ? 1 cup of fresh pineapple chunks (75)    ? 1 cup of melon cubes (50)    ? 1¼ cups of whole strawberries (45)    ? ½ cup of fruit canned in juice (55)    ? ½ cup of fruit canned in heavy syrup (110)    ? ? cup of raisins (130)    ? ½ cup of unsweetened fruit juice (60)    ? ½ cup of grape, cranberry, or prune juice (90)    · Fat:      ? 10 peanuts or 2 teaspoons of peanut butter (55)    ? 2 tablespoons of avocado or 1 tablespoon of regular salad dressing (45)    ? 2 slices of taylor (90)    ? 1 teaspoon of oil, such as safflower, canola, corn, or olive oil (45)    ? 2 teaspoons of low-fat margarine, or 1 tablespoon of low-fat mayonnaise (50)    ? 1 teaspoon of regular margarine (40)    ? 1 tablespoon of regular mayonnaise (135)    ? 1 tablespoon of cream cheese or 2 tablespoons of low-fat cream cheese (45)    ? 2 tablespoons of vegetable shortening (215)    · Dessert and sweets:      ? 8 animal crackers or 5 vanilla wafers (80)    ? 1 frozen fruit juice bar (80)    ? ½ cup of ice milk or low-fat frozen yogurt (90)    ? ½ cup of sherbet or sorbet (125)    ? ½ cup of sugar-free pudding or custard (60)    ?  ½ cup of ice cream (140)    ? ½ cup of pudding or custard (175)    ? 1 (2-inch) square chocolate brownie (185)    · Combination foods:      ? Bean burrito made with an 8-inch tortilla, without cheese (275)    ? Chicken breast sandwich with lettuce and tomato (325)    ? 1 cup of chicken noodle soup (60)    ? 1 beef taco (175)    ? Regular hamburger with lettuce and tomato (310)    ? Regular cheeseburger with lettuce and tomato (410)     ? ¼ of a 12-inch cheese pizza (280)    ? Fried fish sandwich with lettuce and tomato (425)    ? Hot dog and bun (275)    ? 1½ cups of macaroni and cheese (310)    ? Taco salad with a fried tortilla shell (870)    · Low-calorie foods:      ? 1 tablespoon of ketchup or 1 tablespoon of fat free sour cream (15)    ? 1 teaspoon of mustard (5)    ? ¼ cup of salsa (20)    ? 1 large dill pickle (15)    ? 1 tablespoon of fat free salad dressing (10)    ? 2 teaspoons of low-sugar, light jam or jelly, or 1 tablespoon of sugar-free syrup (15)    ? 1 sugar-free popsicle (15)    ? 1 cup of club soda, seltzer water, or diet soda (0)    CARE AGREEMENT:   You have the right to help plan your care  Discuss treatment options with your healthcare provider to decide what care you want to receive  You always have the right to refuse treatment  The above information is an  only  It is not intended as medical advice for individual conditions or treatments  Talk to your doctor, nurse or pharmacist before following any medical regimen to see if it is safe and effective for you  © Copyright [address] Information is for End User's use only and may not be sold, redistributed or otherwise used for commercial purposes   All illustrations and images included in CareNotes® are the copyrighted property of A D A M , Inc  or [facility] [employer]

## 2021-03-31 ENCOUNTER — TELEPHONE (OUTPATIENT)
Dept: FAMILY MEDICINE CLINIC | Facility: CLINIC | Age: 56
End: 2021-03-31

## 2021-03-31 DIAGNOSIS — J01.90 ACUTE SINUSITIS, RECURRENCE NOT SPECIFIED, UNSPECIFIED LOCATION: Primary | ICD-10-CM

## 2021-03-31 RX ORDER — AMOXICILLIN AND CLAVULANATE POTASSIUM 875; 125 MG/1; MG/1
1 TABLET, FILM COATED ORAL EVERY 12 HOURS SCHEDULED
Qty: 14 TABLET | Refills: 0 | Status: SHIPPED | OUTPATIENT
Start: 2021-03-31 | End: 2021-04-07

## 2021-03-31 RX ORDER — FLUTICASONE PROPIONATE 50 MCG
2 SPRAY, SUSPENSION (ML) NASAL DAILY
Qty: 1 BOTTLE | Refills: 3 | Status: SHIPPED | OUTPATIENT
Start: 2021-03-31 | End: 2021-12-13

## 2021-03-31 NOTE — TELEPHONE ENCOUNTER
Patient c/o right ear pain x 2 days  It is blocked, and crackles sometimes, like there is fluid in the ear  No other symptoms  Patient asking if you can send something to pharmacy for him  Please advise

## 2021-04-04 ENCOUNTER — OFFICE VISIT (OUTPATIENT)
Dept: URGENT CARE | Age: 56
End: 2021-04-04
Payer: COMMERCIAL

## 2021-04-04 VITALS
TEMPERATURE: 98 F | DIASTOLIC BLOOD PRESSURE: 75 MMHG | OXYGEN SATURATION: 100 % | WEIGHT: 239.6 LBS | BODY MASS INDEX: 34.3 KG/M2 | HEART RATE: 73 BPM | RESPIRATION RATE: 18 BRPM | SYSTOLIC BLOOD PRESSURE: 119 MMHG | HEIGHT: 70 IN

## 2021-04-04 DIAGNOSIS — T16.1XXA FOREIGN BODY OF RIGHT EAR, INITIAL ENCOUNTER: Primary | ICD-10-CM

## 2021-04-04 PROCEDURE — 99213 OFFICE O/P EST LOW 20 MIN: CPT | Performed by: NURSE PRACTITIONER

## 2021-04-04 PROCEDURE — 69200 CLEAR OUTER EAR CANAL: CPT | Performed by: NURSE PRACTITIONER

## 2021-04-04 NOTE — PATIENT INSTRUCTIONS
Ear Foreign Body   WHAT YOU NEED TO KNOW:   An ear foreign body is an object that is stuck in your ear  Foreign bodies are usually trapped in the outer ear canal  This is the tube from the opening of your ear to your eardrum  DISCHARGE INSTRUCTIONS:   Call your doctor if:   · You have severe ear pain  · You have pus or blood draining from your ear  · You have a fever or chills  · You have trouble hearing, or you have ringing in your ears  · You have questions or concerns about your condition or care  Medicines:   · Medicines  may be give to decrease pain, inflammation, or treat an infection  · Take your medicine as directed  Contact your healthcare provider if you think your medicine is not helping or if you have side effects  Tell him of her if you are allergic to any medicine  Keep a list of the medicines, vitamins, and herbs you take  Include the amounts, and when and why you take them  Bring the list or the pill bottles to follow-up visits  Carry your medicine list with you in case of an emergency  Follow up with your healthcare provider as directed:  Write down your questions so you remember to ask them during your visits  © Copyright [address] Information is for End User's use only and may not be sold, redistributed or otherwise used for commercial purposes  All illustrations and images included in CareNotes® are the copyrighted property of [employer]  or [facility] Nahum Vasquez  The above information is an  only  It is not intended as medical advice for individual conditions or treatments  Talk to your doctor, nurse or pharmacist before following any medical regimen to see if it is safe and effective for you

## 2021-04-04 NOTE — PROGRESS NOTES
[facility]        NAME: Joey Oshea is a 61 y o  male  : 1965    MRN: [id number]  DATE: 2021  TIME: 11:36 AM    Assessment and Plan   Foreign body of right ear, initial encounter Lazaro Frazier  1XXA]  1  Foreign body of right ear, initial encounter  Foreign body removal     Removed   Pt tolerated well   F/u with pcp prn  No qtips in ears! Patient Instructions     Follow up with PCP in 3-5 days  Proceed to  ER if symptoms worsen  Chief Complaint     Chief Complaint   Patient presents with   Beronica Candelario     pt has a clogged right ear for about 5 days  pt states he was on abt therapy and a nasal spray and still feels like something is "blocked"         History of Present Illness   Joey Oshea presents to the clinic c/o    Pt states started with right ear pain and popping/clogged sensation on 3/29   Called pcp on 3/31 and was given flonase and augmentin  Finished medication - pain improved but sensation still present  Had ears cleaned before and was noted to have cotton from qtips in his ear  States was told not to use them but they feel good  Review of Systems   Review of Systems   All other systems reviewed and are negative          Current Medications     Long-Term Medications   Medication Sig Dispense Refill    amitriptyline (ELAVIL) 25 mg tablet Take 25 mg by mouth daily at bedtime  0    atorvastatin (LIPITOR) 20 mg tablet Take 1 tablet (20 mg total) by mouth daily with dinner 90 tablet 3    cholecalciferol (VITAMIN D3) 1,000 units tablet Take 1 tablet (1,000 Units total) by mouth daily 90 tablet 3    etodolac (LODINE) 400 MG tablet Take 1 tablet (400 mg total) by mouth 2 (two) times a day 180 tablet 1    fenofibrate (TRICOR) 145 mg tablet Take 1 tablet (145 mg total) by mouth daily with dinner 90 tablet 3    FLUoxetine (PROzac) 20 mg capsule       fluticasone (FLONASE) 50 mcg/act nasal spray 2 sprays into each nostril daily 1 Bottle 3    haloperidol (HALDOL) 5 mg tablet 2 5 mg daily at bedtime   0    levothyroxine 88 mcg tablet Take 1 tablet (88 mcg total) by mouth daily 30 tablet 3    lisinopril (ZESTRIL) 10 mg tablet Take 1 tablet (10 mg total) by mouth daily 90 tablet 3       Current Allergies     Allergies as of 04/04/2021 - Reviewed 04/04/2021   Allergen Reaction Noted    No active allergies  06/11/2018            The following portions of the patient's history were reviewed and updated as appropriate: allergies, current medications, past family history, past medical history, past social history, past surgical history and problem list     Objective   /75   Pulse 73   Temp 98 °F (36 7 °C)   Resp 18   Ht 5' 10" (1 778 m)   Wt 109 kg (239 lb 9 6 oz)   SpO2 100%   BMI 34 38 kg/m²          Physical Exam     Physical Exam  Constitutional:       Appearance: He is well-developed  HENT:      Head: Normocephalic and atraumatic  Right Ear: Hearing, tympanic membrane and external ear normal  A foreign body is present  Left Ear: Hearing, tympanic membrane, ear canal and external ear normal    Eyes:      General: Lids are normal       Conjunctiva/sclera: Conjunctivae normal       Pupils: Pupils are equal, round, and reactive to light  Cardiovascular:      Rate and Rhythm: Normal rate and regular rhythm  Heart sounds: Normal heart sounds, S1 normal and S2 normal    Pulmonary:      Effort: Pulmonary effort is normal       Breath sounds: Normal breath sounds  Skin:     General: Skin is warm and dry  Psychiatric:         Speech: Speech normal          Behavior: Behavior normal          Thought Content: Thought content normal          Judgment: Judgment normal            Universal Protocol:  Consent: Verbal consent obtained    Risks and benefits: risks, benefits and alternatives were discussed  Consent given by: patient  Time out: Immediately prior to procedure a "time out" was called to verify the correct patient, procedure, equipment, support staff and site/side marked as required  Timeout called at: 4/4/2021 11:35 AM   Patient understanding: patient states understanding of the procedure being performed  Patient consent: the patient's understanding of the procedure matches consent given  Procedure consent: procedure consent matches procedure scheduled  Relevant documents: relevant documents present and verified  Test results: test results available and properly labeled  Site marked: the operative site was marked  Radiology Images displayed and confirmed  If images not available, report reviewed: imaging studies available  Required items: required blood products, implants, devices, and special equipment available  Patient identity confirmed: verbally with patient    Foreign body removal    Date/Time: 4/4/2021 11:35 AM  Performed by: AMANDA Meléndez  Authorized by: AMANDA Meléndez   Body area: ear  Location details: right ear    Sedation:  Patient sedated: no  Patient restrained: no  Patient cooperative: yes  Removal mechanism: ear scoop (elephant)  Complexity: simple  2 objects recovered    Objects recovered: qtip cotton swabs  Post-procedure assessment: foreign body removed  Patient tolerance: patient tolerated the procedure well with no immediate complications

## 2021-04-14 ENCOUNTER — OFFICE VISIT (OUTPATIENT)
Dept: FAMILY MEDICINE CLINIC | Facility: CLINIC | Age: 56
End: 2021-04-14
Payer: COMMERCIAL

## 2021-04-14 VITALS
BODY MASS INDEX: 40.94 KG/M2 | OXYGEN SATURATION: 97 % | HEIGHT: 70 IN | DIASTOLIC BLOOD PRESSURE: 86 MMHG | WEIGHT: 286 LBS | HEART RATE: 79 BPM | RESPIRATION RATE: 16 BRPM | TEMPERATURE: 97.5 F | SYSTOLIC BLOOD PRESSURE: 132 MMHG

## 2021-04-14 DIAGNOSIS — E06.3 HYPOTHYROIDISM DUE TO HASHIMOTO'S THYROIDITIS: ICD-10-CM

## 2021-04-14 DIAGNOSIS — E03.8 HYPOTHYROIDISM DUE TO HASHIMOTO'S THYROIDITIS: ICD-10-CM

## 2021-04-14 DIAGNOSIS — H60.311 ACUTE DIFFUSE OTITIS EXTERNA OF RIGHT EAR: ICD-10-CM

## 2021-04-14 DIAGNOSIS — Z00.01 ENCOUNTER FOR GENERAL ADULT MEDICAL EXAMINATION WITH ABNORMAL FINDINGS: Primary | ICD-10-CM

## 2021-04-14 PROCEDURE — 99214 OFFICE O/P EST MOD 30 MIN: CPT | Performed by: INTERNAL MEDICINE

## 2021-04-14 NOTE — PROGRESS NOTES
Assessment/Plan:         Diagnoses and all orders for this visit:    Encounter for general adult medical examination with abnormal findings; Done in Detail    Acute diffuse otitis externa of right ear; Try :  -     neomycin-polymyxin-hydrocortisone (CORTISPORIN) otic solution; Administer 3 drops to the right ear every 6 (six) hours    Hypothyroidism due to Hashimoto's thyroiditis; continue synthroid 88 mcg AM  RTC in 2-3 mos w Blood  work        Subjective:      Patient ID: Kay Seen is a 61 y o  male  61 Y O Man is here for Enhanced visit and regular check up, he has few symptoms, he still smokes, recent blood work and med list reviewed,  The following portions of the patient's history were reviewed and updated as appropriate: allergies, current medications, past family history, past social history, past surgical history and problem list     Review of Systems   Constitutional: Negative for chills, fatigue and fever  HENT: Positive for ear pain  Negative for congestion, facial swelling, sore throat, trouble swallowing and voice change  Eyes: Negative for pain, discharge and visual disturbance  Respiratory: Negative for cough, shortness of breath and wheezing  Cardiovascular: Negative for chest pain, palpitations and leg swelling  Gastrointestinal: Negative for abdominal pain, blood in stool, constipation, diarrhea and nausea  Endocrine: Negative for polydipsia, polyphagia and polyuria  Genitourinary: Negative for difficulty urinating, hematuria and urgency  Musculoskeletal: Negative for arthralgias and myalgias  Skin: Negative for rash  Neurological: Positive for dizziness  Negative for tremors, weakness and headaches  Hematological: Negative for adenopathy  Does not bruise/bleed easily  Psychiatric/Behavioral: Negative for dysphoric mood, sleep disturbance and suicidal ideas           Objective:      /86 (BP Location: Left arm, Patient Position: Sitting, Cuff Size: Standard)   Pulse 79   Temp 97 5 °F (36 4 °C) (Tympanic)   Resp 16   Ht 5' 10" (1 778 m)   Wt 130 kg (286 lb)   SpO2 97%   BMI 41 04 kg/m²          Physical Exam  Constitutional:       General: He is not in acute distress  HENT:      Head: Normocephalic  Ears:      Comments: Right otitis externa     Mouth/Throat:      Pharynx: No oropharyngeal exudate  Eyes:      General: No scleral icterus  Conjunctiva/sclera: Conjunctivae normal       Pupils: Pupils are equal, round, and reactive to light  Neck:      Musculoskeletal: Neck supple  Thyroid: No thyromegaly  Cardiovascular:      Rate and Rhythm: Normal rate and regular rhythm  Heart sounds: Normal heart sounds  No murmur  Pulmonary:      Effort: Pulmonary effort is normal  No respiratory distress  Breath sounds: Wheezing present  No rales  Abdominal:      General: Bowel sounds are normal  There is no distension  Palpations: Abdomen is soft  Tenderness: There is no abdominal tenderness  There is no guarding or rebound  Musculoskeletal:         General: No tenderness  Lymphadenopathy:      Cervical: No cervical adenopathy  Skin:     Coloration: Skin is not pale  Findings: No rash  Neurological:      Mental Status: He is alert and oriented to person, place, and time  Sensory: No sensory deficit  Motor: No weakness

## 2021-04-15 ENCOUNTER — VBI (OUTPATIENT)
Dept: ADMINISTRATIVE | Facility: OTHER | Age: 56
End: 2021-04-15

## 2021-04-26 ENCOUNTER — TELEPHONE (OUTPATIENT)
Dept: FAMILY MEDICINE CLINIC | Facility: CLINIC | Age: 56
End: 2021-04-26

## 2021-04-26 DIAGNOSIS — M19.90 ARTHRITIS: Primary | ICD-10-CM

## 2021-04-26 RX ORDER — HALOPERIDOL 5 MG
TABLET ORAL
Qty: 90 TABLET | Refills: 1 | OUTPATIENT
Start: 2021-04-26

## 2021-04-26 RX ORDER — CAPSAICIN 0.07 G/100G
CREAM TOPICAL 3 TIMES DAILY
Qty: 28.3 G | Refills: 0 | Status: SHIPPED | OUTPATIENT
Start: 2021-04-26 | End: 2021-07-08

## 2021-04-26 RX ORDER — FLUOXETINE HYDROCHLORIDE 20 MG/1
CAPSULE ORAL
Qty: 90 CAPSULE | Refills: 1 | OUTPATIENT
Start: 2021-04-26

## 2021-05-03 RX ORDER — FLUOXETINE HYDROCHLORIDE 20 MG/1
CAPSULE ORAL
Qty: 90 CAPSULE | Refills: 1 | OUTPATIENT
Start: 2021-05-03

## 2021-05-03 RX ORDER — HALOPERIDOL 5 MG
TABLET ORAL
Qty: 90 TABLET | Refills: 1 | OUTPATIENT
Start: 2021-05-03

## 2021-06-08 ENCOUNTER — OFFICE VISIT (OUTPATIENT)
Dept: DENTISTRY | Facility: CLINIC | Age: 56
End: 2021-06-08

## 2021-06-08 DIAGNOSIS — K08.109 MISSING TEETH, ACQUIRED: Primary | ICD-10-CM

## 2021-06-08 PROCEDURE — D0191 ASSESSMENT OF A PATIENT: HCPCS | Performed by: DENTIST

## 2021-06-08 NOTE — PROGRESS NOTES
Pt presented to clinic for tooth try in for lower cu-danny denture  Tried in lower tooth try in  Pt happy with shade and fit  Left side was hitting gums harder than right side  Adjusted slightly  Difficulty with occlusion due to patient now having any prosthesis on the UA  NV: deliver cu-danny denture   Adjust as necessary    Dr Olga Deng

## 2021-06-10 ENCOUNTER — RA CDI HCC (OUTPATIENT)
Dept: OTHER | Facility: HOSPITAL | Age: 56
End: 2021-06-10

## 2021-06-10 NOTE — PROGRESS NOTES
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

## 2021-06-16 ENCOUNTER — TELEPHONE (OUTPATIENT)
Dept: OTHER | Facility: OTHER | Age: 56
End: 2021-06-16

## 2021-07-08 ENCOUNTER — TELEPHONE (OUTPATIENT)
Dept: FAMILY MEDICINE CLINIC | Facility: CLINIC | Age: 56
End: 2021-07-08

## 2021-07-08 DIAGNOSIS — J01.90 ACUTE SINUSITIS, RECURRENCE NOT SPECIFIED, UNSPECIFIED LOCATION: Primary | ICD-10-CM

## 2021-07-08 RX ORDER — AMOXICILLIN 500 MG/1
500 CAPSULE ORAL EVERY 8 HOURS SCHEDULED
Qty: 30 CAPSULE | Refills: 0 | Status: SHIPPED | OUTPATIENT
Start: 2021-07-08 | End: 2021-07-18

## 2021-07-08 RX ORDER — PREDNISONE 1 MG/1
5 TABLET ORAL DAILY
Qty: 21 TABLET | Refills: 0 | Status: SHIPPED | OUTPATIENT
Start: 2021-07-08 | End: 2021-07-29

## 2021-07-08 RX ORDER — CETIRIZINE HYDROCHLORIDE 10 MG/1
10 TABLET ORAL DAILY
Qty: 30 TABLET | Refills: 1 | Status: SHIPPED | OUTPATIENT
Start: 2021-07-08 | End: 2022-01-06 | Stop reason: SDUPTHER

## 2021-07-09 ENCOUNTER — OFFICE VISIT (OUTPATIENT)
Dept: DENTISTRY | Facility: CLINIC | Age: 56
End: 2021-07-09

## 2021-07-09 DIAGNOSIS — K08.409 PARTIAL EDENTULISM, UNSPECIFIED EDENTULISM CLASS: Primary | ICD-10-CM

## 2021-07-09 PROCEDURE — D5212 MANDIBULAR PARTIAL DENTURE - RESIN BASE (INCLUDING, RETENTIVE/CLASPING MATERIALS, RESTS, AND TEETH): HCPCS | Performed by: DENTIST

## 2021-07-10 NOTE — PROGRESS NOTES
Zeferino Bazan reports to [facility] for lower RPD delivery with cc:" I here to get my denture "    No changes to PMH  Manibular cu-danny was adjusting using PIP and acrylic burs on the intaglio surface in area of the left lingual flange and the lingual cusps of teeth #19-21 on RPD  Patient left satisfied and in stable condition  Patient was informed that he can contact clinic if RPD feeling uncomfortable      DONE TODAY  Adjustment and delivery of mandibular cu-danny    NEXT: 6mrc and prostho consult for complete max denture    Dr Evelyn Wayne

## 2021-07-15 ENCOUNTER — OFFICE VISIT (OUTPATIENT)
Dept: DENTISTRY | Facility: CLINIC | Age: 56
End: 2021-07-15

## 2021-07-15 VITALS — HEART RATE: 87 BPM | TEMPERATURE: 96 F | SYSTOLIC BLOOD PRESSURE: 139 MMHG | DIASTOLIC BLOOD PRESSURE: 84 MMHG

## 2021-07-15 DIAGNOSIS — K08.401 PARTIAL EDENTULISM, CLASS I: Primary | ICD-10-CM

## 2021-07-15 PROCEDURE — WIS5009 POST-OP DENTURE ADJUSTMENT: Performed by: DENTIST

## 2021-07-15 NOTE — PROGRESS NOTES
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

## 2021-07-29 ENCOUNTER — TELEPHONE (OUTPATIENT)
Dept: FAMILY MEDICINE CLINIC | Facility: CLINIC | Age: 56
End: 2021-07-29

## 2021-07-29 DIAGNOSIS — K04.7 ABSCESSED TOOTH: Primary | ICD-10-CM

## 2021-07-29 RX ORDER — CLINDAMYCIN HYDROCHLORIDE 300 MG/1
300 CAPSULE ORAL 2 TIMES DAILY WITH MEALS
Qty: 20 CAPSULE | Refills: 0 | Status: SHIPPED | OUTPATIENT
Start: 2021-07-29 | End: 2021-08-08

## 2021-08-12 DIAGNOSIS — E78.49 OTHER HYPERLIPIDEMIA: ICD-10-CM

## 2021-08-12 RX ORDER — ATORVASTATIN CALCIUM 20 MG/1
20 TABLET, FILM COATED ORAL
Qty: 90 TABLET | Refills: 3 | Status: SHIPPED | OUTPATIENT
Start: 2021-08-12 | End: 2021-12-14 | Stop reason: SDUPTHER

## 2021-09-03 ENCOUNTER — VBI (OUTPATIENT)
Dept: ADMINISTRATIVE | Facility: OTHER | Age: 56
End: 2021-09-03

## 2021-09-30 ENCOUNTER — OFFICE VISIT (OUTPATIENT)
Dept: FAMILY MEDICINE CLINIC | Facility: CLINIC | Age: 56
End: 2021-09-30
Payer: COMMERCIAL

## 2021-09-30 VITALS
RESPIRATION RATE: 16 BRPM | SYSTOLIC BLOOD PRESSURE: 134 MMHG | BODY MASS INDEX: 40.52 KG/M2 | HEART RATE: 69 BPM | OXYGEN SATURATION: 96 % | WEIGHT: 283 LBS | TEMPERATURE: 96.9 F | DIASTOLIC BLOOD PRESSURE: 86 MMHG | HEIGHT: 70 IN

## 2021-09-30 DIAGNOSIS — Z00.01 ENCOUNTER FOR GENERAL ADULT MEDICAL EXAMINATION WITH ABNORMAL FINDINGS: Primary | ICD-10-CM

## 2021-09-30 DIAGNOSIS — R91.8 LUNG NODULES: ICD-10-CM

## 2021-09-30 DIAGNOSIS — E03.8 HYPOTHYROIDISM DUE TO HASHIMOTO'S THYROIDITIS: ICD-10-CM

## 2021-09-30 DIAGNOSIS — I10 ESSENTIAL HYPERTENSION: ICD-10-CM

## 2021-09-30 DIAGNOSIS — F17.210 CIGARETTE SMOKER: ICD-10-CM

## 2021-09-30 DIAGNOSIS — E78.49 OTHER HYPERLIPIDEMIA: ICD-10-CM

## 2021-09-30 DIAGNOSIS — E11.9 TYPE 2 DIABETES MELLITUS WITHOUT COMPLICATION, UNSPECIFIED WHETHER LONG TERM INSULIN USE (HCC): ICD-10-CM

## 2021-09-30 DIAGNOSIS — E06.3 HYPOTHYROIDISM DUE TO HASHIMOTO'S THYROIDITIS: ICD-10-CM

## 2021-09-30 DIAGNOSIS — M19.90 ARTHRITIS: ICD-10-CM

## 2021-09-30 DIAGNOSIS — E66.01 MORBID OBESITY WITH BMI OF 40.0-44.9, ADULT (HCC): ICD-10-CM

## 2021-09-30 DIAGNOSIS — Z23 NEED FOR INFLUENZA VACCINATION: ICD-10-CM

## 2021-09-30 LAB — SL AMB POCT HEMOGLOBIN AIC: 5.6 (ref ?–6.5)

## 2021-09-30 PROCEDURE — 93000 ELECTROCARDIOGRAM COMPLETE: CPT | Performed by: INTERNAL MEDICINE

## 2021-09-30 PROCEDURE — G0438 PPPS, INITIAL VISIT: HCPCS | Performed by: INTERNAL MEDICINE

## 2021-09-30 PROCEDURE — 83036 HEMOGLOBIN GLYCOSYLATED A1C: CPT | Performed by: INTERNAL MEDICINE

## 2021-09-30 PROCEDURE — 99214 OFFICE O/P EST MOD 30 MIN: CPT | Performed by: INTERNAL MEDICINE

## 2021-09-30 RX ORDER — CELECOXIB 200 MG/1
200 CAPSULE ORAL DAILY
Qty: 90 CAPSULE | Refills: 2 | Status: SHIPPED | OUTPATIENT
Start: 2021-09-30 | End: 2022-06-28

## 2021-09-30 NOTE — PATIENT INSTRUCTIONS
Low Fat Diet   AMBULATORY CARE:   A low-fat diet  is an eating plan that is low in total fat, unhealthy fat, and cholesterol  You may need to follow a low-fat diet if you have trouble digesting or absorbing fat  You may also need to follow this diet if you have high cholesterol  You can also lower your cholesterol by increasing the amount of fiber in your diet  Soluble fiber is a type of fiber that helps to decrease cholesterol levels  Different types of fat in food:   · Limit unhealthy fats  A diet that is high in cholesterol, saturated fat, and trans fat may cause unhealthy cholesterol levels  Unhealthy cholesterol levels increase your risk of heart disease  ? Cholesterol:  Limit intake of cholesterol to less than 200 mg per day  Cholesterol is found in meat, eggs, and dairy  ? Saturated fat:  Limit saturated fat to less than 7% of your total daily calories  Ask your dietitian how many calories you need each day  Saturated fat is found in butter, cheese, ice cream, whole milk, and palm oil  Saturated fat is also found in meat, such as beef, pork, chicken skin, and processed meats  Processed meats include sausage, hot dogs, and bologna  ? Trans fat:  Avoid trans fat as much as possible  Trans fat is used in fried and baked foods  Foods that say trans fat free on the label may still have up to 0 5 grams of trans fat per serving  · Include healthy fats  Replace foods that are high in saturated and trans fat with foods high in healthy fats  This may help to decrease high cholesterol levels  ? Monounsaturated fats: These are found in avocados, nuts, and vegetable oils, such as olive, canola, and sunflower oil  ? Polyunsaturated fats: These can be found in vegetable oils, such as soybean or corn oil  Omega-3 fats can help to decrease the risk of heart disease  Omega-3 fats are found in fish, such as salmon, herring, trout, and tuna   Omega-3 fats can also be found in plant foods, such as walnuts, flaxseed, soybeans, and canola oil  Foods to limit or avoid:   · Grains:      ? Snacks that are made with partially hydrogenated oils, such as chips, regular crackers, and butter-flavored popcorn    ? High-fat baked goods, such as biscuits, croissants, doughnuts, pies, cookies, and pastries    · Dairy:      ? Whole milk, 2% milk, and yogurt and ice cream made with whole milk    ? Half and half creamer, heavy cream, and whipping cream    ? Cheese, cream cheese, and sour cream    · Meats and proteins:      ? High-fat cuts of meat (T-bone steak, regular hamburger, and ribs)    ? Fried meat, poultry (turkey and chicken), and fish    ? Poultry (chicken and turkey) with skin    ? Cold cuts (salami or bologna), hot dogs, taylor, and sausage    ? Whole eggs and egg yolks    · Vegetables and fruits with added fat:      ? Fried vegetables or vegetables in butter or high-fat sauces, such as cream or cheese sauces    ? Fried fruit or fruit served with butter or cream    · Fats:      ? Butter, stick margarine, and shortening    ? Coconut, palm oil, and palm kernel oil    Foods to include:   · Grains:      ? Whole-grain breads, cereals, pasta, and brown rice    ? Low-fat crackers and pretzels    · Vegetables and fruits:      ? Fresh, frozen, or canned vegetables (no salt or low-sodium)    ? Fresh, frozen, dried, or canned fruit (canned in light syrup or fruit juice)    ? Avocado    · Low-fat dairy products:      ? Nonfat (skim) or 1% milk    ? Nonfat or low-fat cheese, yogurt, and cottage cheese    · Meats and proteins:      ? Chicken or turkey with no skin    ? Baked or broiled fish    ? Lean beef and pork (loin, round, extra lean hamburger)    ? Beans and peas, unsalted nuts, soy products    ? Egg whites and substitutes    ? Seeds and nuts    · Fats:      ? Unsaturated oil, such as canola, olive, peanut, soybean, or sunflower oil    ? Soft or liquid margarine and vegetable oil spread    ?  Low-fat salad dressing    Other ways to decrease fat:   · Read food labels before you buy foods  Choose foods that have less than 30% of calories from fat  Choose low-fat or fat-free dairy products  Remember that fat free does not mean calorie free  These foods still contain calories, and too many calories can lead to weight gain  · Trim fat from meat and avoid fried food  Trim all visible fat from meat before you cook it  Remove the skin from poultry  Do not barrios meat, fish, or poultry  Bake, roast, boil, or broil these foods instead  Avoid fried foods  Eat a baked potato instead of Western Sindy fries  Steam vegetables instead of sautéing them in butter  · Add less fat to foods  Use imitation taylor bits on salads and baked potatoes instead of regular taylor bits  Use fat-free or low-fat salad dressings instead of regular dressings  Use low-fat or nonfat butter-flavored topping instead of regular butter or margarine on popcorn and other foods  Ways to decrease fat in recipes:  Replace high-fat ingredients with low-fat or nonfat ones  This may cause baked goods to be drier than usual  You may need to use nonfat cooking spray on pans to prevent food from sticking  You also may need to change the amount of other ingredients, such as water, in the recipe  Try the following:  · Use low-fat or light margarine instead of regular margarine or shortening  · Use lean ground turkey breast or chicken, or lean ground beef (less than 5% fat) instead of hamburger  · Add 1 teaspoon of canola oil to 8 ounces of skim milk instead of using cream or half and half  · Use grated zucchini, carrots, or apples in breads instead of coconut  · Use blenderized, low-fat cottage cheese, plain tofu, or low-fat ricotta cheese instead of cream cheese  · Use 1 egg white and 1 teaspoon of canola oil, or use ¼ cup (2 ounces) of fat-free egg substitute instead of a whole egg       · Replace half of the oil that is called for in a recipe with applesauce when you bake  Use 3 tablespoons of cocoa powder and 1 tablespoon of canola oil instead of a square of baking chocolate  How to increase fiber:  Eat enough high-fiber foods to get 20 to 30 grams of fiber every day  Slowly increase your fiber intake to avoid stomach cramps, gas, and other problems  · Eat 3 ounces of whole-grain foods each day  An ounce is about 1 slice of bread  Eat whole-grain breads, such as whole-wheat bread  Whole wheat, whole-wheat flour, or other whole grains should be listed as the first ingredient on the food label  Replace white flour with whole-grain flour or use half of each in recipes  Whole-grain flour is heavier than white flour, so you may have to add more yeast or baking powder  · Eat a high-fiber cereal for breakfast   Oatmeal is a good source of soluble fiber  Look for cereals that have bran or fiber in the name  Choose whole-grain products, such as brown rice, barley, and whole-wheat pasta  · Eat more beans, peas, and lentils  For example, add beans to soups or salads  Eat at least 5 cups of fruits and vegetables each day  Eat fruits and vegetables with the peel because the peel is high in fiber  © Copyright [employer] 2021 Information is for End User's use only and may not be sold, redistributed or otherwise used for commercial purposes  All illustrations and images included in CareNotes® are the copyrighted property of A D A M , Inc  or [address] [place], [place] [address]delilah   The above information is an  only  It is not intended as medical advice for individual conditions or treatments  Talk to your doctor, nurse or pharmacist before following any medical regimen to see if it is safe and effective for you  Heart Healthy Diet   AMBULATORY CARE:   A heart healthy diet  is an eating plan low in unhealthy fats and sodium (salt)  The plan is high in healthy fats and fiber   A heart healthy diet helps improve your cholesterol levels and lowers your risk for heart disease and stroke  A dietitian will teach you how to read and understand food labels  Heart healthy diet guidelines to follow:   · Choose foods that contain healthy fats  ? Unsaturated fats  include monounsaturated and polyunsaturated fats  Unsaturated fat is found in foods such as soybean, canola, olive, corn, and safflower oils  It is also found in soft tub margarine that is made with liquid vegetable oil  ? Omega-3 fat  is found in certain fish, such as salmon, tuna, and trout, and in walnuts and flaxseed  Eat fish high in omega-3 fats at least 2 times a week  · Get 20 to 30 grams of fiber each day  Fruits, vegetables, whole-grain foods, and legumes (cooked beans) are good sources of fiber  · Limit or do not have unhealthy fats  ? Cholesterol  is found in animal foods, such as eggs and lobster, and in dairy products made from whole milk  Limit cholesterol to less than 200 mg each day  ? Saturated fat  is found in meats, such as taylor and hamburger  It is also found in chicken or turkey skin, whole milk, and butter  Limit saturated fat to less than 7% of your total daily calories  ? Trans fat  is found in packaged foods, such as potato chips and cookies  It is also in hard margarine, some fried foods, and shortening  Do not eat foods that contain trans fats  · Limit sodium as directed  You may be told to limit sodium to 2,000 to 2,300 mg each day  Choose low-sodium or no-salt-added foods  Add little or no salt to food you prepare  Use herbs and spices in place of salt  Include the following in your heart healthy plan:  Ask your dietitian or healthcare provider how many servings to have from each of the following food groups:  · Grains:      ? Whole-wheat breads, cereals, and pastas, and brown rice    ? Low-fat, low-sodium crackers and chips    · Vegetables:      ? Broccoli, green beans, green peas, and spinach    ? Collards, kale, and lima beans    ?  Carrots, sweet potatoes, tomatoes, and peppers    ? Canned vegetables with no salt added    · Fruits:      ? Bananas, peaches, pears, and pineapple    ? Grapes, raisins, and dates    ? Oranges, tangerines, grapefruit, orange juice, and grapefruit juice    ? Apricots, mangoes, melons, and papaya    ? Raspberries and strawberries    ? Canned fruit with no added sugar    · Low-fat dairy:      ? Nonfat (skim) milk, 1% milk, and low-fat almond, cashew, or soy milks fortified with calcium    ? Low-fat cheese, regular or frozen yogurt, and cottage cheese    · Meats and proteins:      ? Lean cuts of beef and pork (loin, leg, round), skinless chicken and turkey    ? Legumes, soy products, egg whites, or nuts    Limit or do not include the following in your heart healthy plan:   · Unhealthy fats and oils:      ? Whole or 2% milk, cream cheese, sour cream, or cheese    ? High-fat cuts of beef (T-bone steaks, ribs), chicken or turkey with skin, and organ meats such as liver    ? Butter, stick margarine, shortening, and cooking oils such as coconut or palm oil    · Foods and liquids high in sodium:      ? Packaged foods, such as frozen dinners, cookies, macaroni and cheese, and cereals with more than 300 mg of sodium per serving    ? Vegetables with added sodium, such as instant potatoes, vegetables with added sauces, or regular canned vegetables    ? Cured or smoked meats, such as hot dogs, taylor, and sausage    ? High-sodium ketchup, barbecue sauce, salad dressing, pickles, olives, soy sauce, or miso    · Foods and liquids high in sugar:      ? Candy, cake, cookies, pies, or doughnuts    ? Soft drinks (soda), sports drinks, or sweetened tea    ? Canned or dry mixes for cakes, soups, sauces, or gravies    Other healthy heart guidelines:   · Do not smoke  Nicotine and other chemicals in cigarettes and cigars can cause lung and heart damage  Ask your healthcare provider for information if you currently smoke and need help to quit  E-cigarettes or smokeless tobacco still contain nicotine  Talk to your healthcare provider before you use these products  · Limit or do not drink alcohol as directed  Alcohol can damage your heart and raise your blood pressure  Your healthcare provider may give you specific daily and weekly limits  The general recommended limit is 1 drink a day for women 21 or older and for men 72 or older  Do not have more than 3 drinks in a day or 7 in a week  The recommended limit is 2 drinks a day for men 24to 59years of age  Do not have more than 4 drinks in a day or 14 in a week  A drink of alcohol is 12 ounces of beer, 5 ounces of wine, or 1½ ounces of liquor  · Exercise regularly  Exercise can help you maintain a healthy weight and improve your blood pressure and cholesterol levels  Regular exercise can also decrease your risk for heart problems  Ask your healthcare provider about the best exercise plan for you  Do not start an exercise program without asking your healthcare provider  Follow up with your doctor or cardiologist as directed:  Write down your questions so you remember to ask them during your visits  © Copyright [employer] 2021 Information is for End User's use only and may not be sold, redistributed or otherwise used for commercial purposes  All illustrations and images included in CareNotes® are the copyrighted property of A [employer] A M , Inc  or [facility]   The above information is an  only  It is not intended as medical advice for individual conditions or treatments  Talk to your doctor, nurse or pharmacist before following any medical regimen to see if it is safe and effective for you  Calorie Counting Diet   WHAT YOU NEED TO KNOW:   What is a calorie counting diet? It is a meal plan based on counting calories each day to reach a healthy body weight  You will need to eat fewer calories if you are trying to lose weight   Weight loss may decrease your risk for certain health problems or improve your health if you have health problems  Some of these health problems include heart disease, high blood pressure, and diabetes  What foods should I avoid? Your dietitian will tell you if you need to avoid certain foods based on your body weight and health condition  You may need to avoid high-fat foods if you are at risk for or have heart disease  You may need to eat fewer foods from the breads and starches food group if you have diabetes  How many calories are in foods? The following is a list of foods and drinks with the approximate number of calories in each  Check the food label to find the exact number of calories  A dietitian can tell you how many calories you should have from each food group each day  · Carbohydrate:      ? ½ of a 3-inch bagel, 1 slice of bread, or ½ of a hamburger bun or hot dog bun (80)    ? 1 (8-inch) flour tortilla or ½ cup of cooked rice (100)    ? 1 (6-inch) corn tortilla (80)    ? 1 (6-inch) pancake or 1 cup of bran flakes cereal (110)    ? ½ cup of cooked cereal (80)    ? ½ cup of cooked pasta (85)    ? 1 ounce of pretzels (100)    ? 3 cups of air-popped popcorn without butter or oil (80)    · Dairy:      ? 1 cup of skim or 1% milk (90)    ? 1 cup of 2% milk (120)    ? 1 cup of whole milk (160)    ? 1 cup of 2% chocolate milk (220)    ? 1 ounce of low-fat cheese with 3 grams of fat per ounce (70)    ? 1 ounce of cheddar cheese (114)    ? ½ cup of 1% fat cottage cheese (80)    ? 1 cup of plain or sugar-free, fat-free yogurt (90)    · Protein foods:      ? 3 ounces of fish (not breaded or fried) (95)    ? 3 ounces of breaded, fried fish (195)    ? ¾ cup of tuna canned in water (105)    ? 3 ounces of chicken breast without skin (105)    ? 1 fried chicken breast with skin (350)    ? ¼ cup of fat free egg substitute (40)    ? 1 large egg (75)    ? 3 ounces of lean beef or pork (165)    ? 3 ounces of fried pork chop or ham (185)    ?  ½ cup of cooked dried beans, such as kidney, alan, lentils, or navy (115)    ? 3 ounces of bologna or lunch meat (225)    ? 2 links of breakfast sausage (140)    · Vegetables:      ? ½ cup of sliced mushrooms (10)    ? 1 cup of salad greens, such as lettuce, spinach, or simran (15)    ? ½ cup of steamed asparagus (20)    ? ½ cup of cooked summer squash, zucchini squash, or green or wax beans (25)    ? 1 cup of broccoli or cauliflower florets, or 1 medium tomato (25)    ? 1 large raw carrot or ½ cup of cooked carrots (40)    ? ? of a medium cucumber or 1 stalk of celery (5)    ? 1 small baked potato (160)    ? 1 cup of breaded, fried vegetables (230)    · Fruit:      ? 1 (6-inch) banana (55)     ? ½ of a 4-inch grapefruit (55)    ? 15 grapes (60)    ? 1 medium orange or apple (70)    ? 1 large peach (65)    ? 1 cup of fresh pineapple chunks (75)    ? 1 cup of melon cubes (50)    ? 1¼ cups of whole strawberries (45)    ? ½ cup of fruit canned in juice (55)    ? ½ cup of fruit canned in heavy syrup (110)    ? ? cup of raisins (130)    ? ½ cup of unsweetened fruit juice (60)    ? ½ cup of grape, cranberry, or prune juice (90)    · Fat:      ? 10 peanuts or 2 teaspoons of peanut butter (55)    ? 2 tablespoons of avocado or 1 tablespoon of regular salad dressing (45)    ? 2 slices of taylor (90)    ? 1 teaspoon of oil, such as safflower, canola, corn, or olive oil (45)    ? 2 teaspoons of low-fat margarine, or 1 tablespoon of low-fat mayonnaise (50)    ? 1 teaspoon of regular margarine (40)    ? 1 tablespoon of regular mayonnaise (135)    ? 1 tablespoon of cream cheese or 2 tablespoons of low-fat cream cheese (45)    ? 2 tablespoons of vegetable shortening (215)    · Dessert and sweets:      ? 8 animal crackers or 5 vanilla wafers (80)    ? 1 frozen fruit juice bar (80)    ? ½ cup of ice milk or low-fat frozen yogurt (90)    ? ½ cup of sherbet or sorbet (125)    ? ½ cup of sugar-free pudding or custard (60)    ?  ½ cup of ice cream (140)    ? ½ cup of pudding or custard (175)    ? 1 (2-inch) square chocolate brownie (185)    · Combination foods:      ? Bean burrito made with an 8-inch tortilla, without cheese (275)    ? Chicken breast sandwich with lettuce and tomato (325)    ? 1 cup of chicken noodle soup (60)    ? 1 beef taco (175)    ? Regular hamburger with lettuce and tomato (310)    ? Regular cheeseburger with lettuce and tomato (410)     ? ¼ of a 12-inch cheese pizza (280)    ? Fried fish sandwich with lettuce and tomato (425)    ? Hot dog and bun (275)    ? 1½ cups of macaroni and cheese (310)    ? Taco salad with a fried tortilla shell (870)    · Low-calorie foods:      ? 1 tablespoon of ketchup or 1 tablespoon of fat free sour cream (15)    ? 1 teaspoon of mustard (5)    ? ¼ cup of salsa (20)    ? 1 large dill pickle (15)    ? 1 tablespoon of fat free salad dressing (10)    ? 2 teaspoons of low-sugar, light jam or jelly, or 1 tablespoon of sugar-free syrup (15)    ? 1 sugar-free popsicle (15)    ? 1 cup of club soda, seltzer water, or diet soda (0)    CARE AGREEMENT:   You have the right to help plan your care  Discuss treatment options with your healthcare provider to decide what care you want to receive  You always have the right to refuse treatment  The above information is an  only  It is not intended as medical advice for individual conditions or treatments  Talk to your doctor, nurse or pharmacist before following any medical regimen to see if it is safe and effective for you  © Copyright [employer] 2021 Information is for End User's use only and may not be sold, redistributed or otherwise used for commercial purposes   All illustrations and images included in CareNotes® are the copyrighted property of A [employer] A M , Inc  or [facility]

## 2021-09-30 NOTE — PROGRESS NOTES
Patient's shoes and socks removed  Right Foot/Ankle   Right Foot Inspection  Skin Exam: skin normal, skin intact, callus and callus no dry skin, no warmth, no erythema, no maceration, no abnormal color, no pre-ulcer and no ulcer                          Toe Exam: ROM and strength within normal limits  Sensory       Monofilament testing: intact  Vascular  Capillary refills: < 3 seconds  The right DP pulse is 1+  The right PT pulse is 1+  Left Foot/Ankle  Left Foot Inspection  Skin Exam: skin normal, skin intact and callusno dry skin, no warmth, no erythema, no maceration, normal color, no pre-ulcer and no ulcer                         Toe Exam: ROM and strength within normal limits                   Sensory       Monofilament: intact  Vascular  Capillary refills: < 3 seconds  The left DP pulse is 1+  The left PT pulse is 1+  Assign Risk Category:  No deformity present; No loss of protective sensation;  No weak pulses       Risk: 0

## 2021-09-30 NOTE — PROGRESS NOTES
Assessment/Plan:         Diagnoses and all orders for this visit:    Encounter for general adult medical examination with abnormal findings; Done in Detail    Elevated HgA1c :  -     POCT hemoglobin A1c    Need for influenza vaccination; pt Refused    Arthritis; Try :  -     celecoxib (CeleBREX) 200 mg capsule; Take 1 capsule (200 mg total) by mouth daily With food/Breakfast  RTC in 3mos w Blood  work    Hypothyroidism due to Hashimoto's thyroiditis; continue Synthroid,  RTC in 3mos w :  -     TSH, 3rd generation; Future  -     T4, free; Future    Essential hypertension; continue Lisinopril,  -     POCT ECG  RTC in 3mos w :  -     UA (URINE) with reflex to Scope; Future  -     Comprehensive metabolic panel; Future  -     CBC and differential; Future  -     Magnesium; Future  -     Lipid panel; Future    Lung nodules    Cigarette smoker  -     CT lung screening program; Future    Other hyperlipidemia  -     Lipid panel; Future    Other orders  -     Cancel: influenza vaccine, quadrivalent, recombinant, PF, 0 5 mL, for patients 18 yr+ (FLUBLOK)        Subjective:      Patient ID: Yariel Ross is a 61 y o  male  61 Y O Man is here for Annual Physical Exam and Regular check up, he has few symptoms, No recent Blood  Work, Med list reviewed,  The following portions of the patient's history were reviewed and updated as appropriate: allergies, current medications, past family history, past social history, past surgical history and problem list     Review of Systems   Constitutional: Negative for chills, fatigue and fever  HENT: Negative for congestion, facial swelling, sore throat, trouble swallowing and voice change  Eyes: Negative for pain, discharge and visual disturbance  Respiratory: Negative for cough, shortness of breath and wheezing  Cardiovascular: Negative for chest pain, palpitations and leg swelling     Gastrointestinal: Negative for abdominal pain, blood in stool, constipation, diarrhea and nausea  Endocrine: Negative for polydipsia, polyphagia and polyuria  Genitourinary: Negative for difficulty urinating, hematuria and urgency  Musculoskeletal: Positive for arthralgias and back pain  Negative for myalgias  Skin: Negative for rash  Neurological: Negative for dizziness, tremors, weakness and headaches  Hematological: Negative for adenopathy  Does not bruise/bleed easily  Psychiatric/Behavioral: Negative for dysphoric mood, sleep disturbance and suicidal ideas  Objective:      /86 (BP Location: Left arm, Patient Position: Sitting, Cuff Size: Standard)   Pulse 69   Temp (!) 96 9 °F (36 1 °C) (Tympanic)   Resp 16   Ht 5' 10" (1 778 m)   Wt 128 kg (283 lb)   SpO2 96%   BMI 40 61 kg/m²          Physical Exam  Constitutional:       General: He is not in acute distress  HENT:      Head: Normocephalic  Mouth/Throat:      Pharynx: No oropharyngeal exudate  Eyes:      General: No scleral icterus  Conjunctiva/sclera: Conjunctivae normal       Pupils: Pupils are equal, round, and reactive to light  Neck:      Thyroid: No thyromegaly  Cardiovascular:      Rate and Rhythm: Normal rate and regular rhythm  Heart sounds: Normal heart sounds  No murmur heard  Pulmonary:      Effort: Pulmonary effort is normal  No respiratory distress  Breath sounds: Normal breath sounds  No wheezing or rales  Abdominal:      General: Bowel sounds are normal  There is no distension  Palpations: Abdomen is soft  Tenderness: There is no abdominal tenderness  There is no guarding or rebound  Musculoskeletal:         General: Tenderness present  Cervical back: Neck supple  Lymphadenopathy:      Cervical: No cervical adenopathy  Skin:     Coloration: Skin is not pale  Findings: No rash  Neurological:      Mental Status: He is alert and oriented to person, place, and time  Sensory: No sensory deficit  Motor: No weakness  BMI Counseling: Body mass index is 40 61 kg/m²  The BMI is above normal  Nutrition recommendations include reducing portion sizes and decreasing overall calorie intake

## 2021-10-15 ENCOUNTER — VBI (OUTPATIENT)
Dept: ADMINISTRATIVE | Facility: OTHER | Age: 56
End: 2021-10-15

## 2021-11-01 ENCOUNTER — APPOINTMENT (EMERGENCY)
Dept: CT IMAGING | Facility: HOSPITAL | Age: 56
End: 2021-11-01
Payer: COMMERCIAL

## 2021-11-01 ENCOUNTER — OFFICE VISIT (OUTPATIENT)
Dept: URGENT CARE | Age: 56
End: 2021-11-01
Payer: COMMERCIAL

## 2021-11-01 ENCOUNTER — APPOINTMENT (EMERGENCY)
Dept: RADIOLOGY | Facility: HOSPITAL | Age: 56
End: 2021-11-01
Payer: COMMERCIAL

## 2021-11-01 ENCOUNTER — HOSPITAL ENCOUNTER (EMERGENCY)
Facility: HOSPITAL | Age: 56
Discharge: HOME | End: 2021-11-01
Attending: EMERGENCY MEDICINE | Admitting: EMERGENCY MEDICINE
Payer: COMMERCIAL

## 2021-11-01 VITALS
RESPIRATION RATE: 17 BRPM | DIASTOLIC BLOOD PRESSURE: 90 MMHG | WEIGHT: 286 LBS | HEIGHT: 70 IN | TEMPERATURE: 98.6 F | SYSTOLIC BLOOD PRESSURE: 137 MMHG | HEART RATE: 66 BPM | BODY MASS INDEX: 40.94 KG/M2 | OXYGEN SATURATION: 97 %

## 2021-11-01 VITALS
WEIGHT: 285.3 LBS | TEMPERATURE: 96.9 F | BODY MASS INDEX: 40.94 KG/M2 | HEART RATE: 85 BPM | SYSTOLIC BLOOD PRESSURE: 170 MMHG | RESPIRATION RATE: 19 BRPM | OXYGEN SATURATION: 96 % | DIASTOLIC BLOOD PRESSURE: 95 MMHG

## 2021-11-01 DIAGNOSIS — R42 DIZZINESS: Primary | ICD-10-CM

## 2021-11-01 DIAGNOSIS — H53.9 VISUAL DISTURBANCE: ICD-10-CM

## 2021-11-01 DIAGNOSIS — I65.23 MILD ATHEROSCLEROSIS OF CAROTID ARTERY, BILATERAL: ICD-10-CM

## 2021-11-01 DIAGNOSIS — J32.9 SINUSITIS: ICD-10-CM

## 2021-11-01 LAB
ALBUMIN SERPL BCP-MCNC: 4.2 G/DL (ref 3–5.2)
ALP SERPL-CCNC: 65 U/L (ref 43–122)
ALT SERPL W P-5'-P-CCNC: 19 U/L
ANION GAP SERPL CALCULATED.3IONS-SCNC: 5 MMOL/L (ref 5–14)
APTT PPP: 33 SECONDS (ref 23–37)
AST SERPL W P-5'-P-CCNC: 20 U/L (ref 17–59)
ATRIAL RATE: 61 BPM
BASOPHILS # BLD AUTO: 0.2 THOUSANDS/ΜL (ref 0–0.1)
BASOPHILS NFR BLD AUTO: 2 % (ref 0–1)
BILIRUB SERPL-MCNC: 0.51 MG/DL
BILIRUB UR QL STRIP: NEGATIVE
BUN SERPL-MCNC: 15 MG/DL (ref 5–25)
CALCIUM SERPL-MCNC: 9.5 MG/DL (ref 8.4–10.2)
CHLORIDE SERPL-SCNC: 103 MMOL/L (ref 97–108)
CLARITY UR: CLEAR
CO2 SERPL-SCNC: 25 MMOL/L (ref 22–30)
COLOR UR: NORMAL
CREAT SERPL-MCNC: 1.02 MG/DL (ref 0.7–1.5)
EOSINOPHIL # BLD AUTO: 0.4 THOUSAND/ΜL (ref 0–0.4)
EOSINOPHIL NFR BLD AUTO: 5 % (ref 0–6)
ERYTHROCYTE [DISTWIDTH] IN BLOOD BY AUTOMATED COUNT: 12.2 %
GFR SERPL CREATININE-BSD FRML MDRD: 81 ML/MIN/1.73SQ M
GLUCOSE SERPL-MCNC: 125 MG/DL (ref 70–99)
GLUCOSE UR STRIP-MCNC: NEGATIVE MG/DL
HCT VFR BLD AUTO: 44.3 % (ref 41–53)
HGB BLD-MCNC: 15.2 G/DL (ref 13.5–17.5)
HGB UR QL STRIP.AUTO: NEGATIVE
INR PPP: 0.98 (ref 0.84–1.19)
KETONES UR STRIP-MCNC: NEGATIVE MG/DL
LEUKOCYTE ESTERASE UR QL STRIP: NEGATIVE
LYMPHOCYTES # BLD AUTO: 2.6 THOUSANDS/ΜL (ref 0.5–4)
LYMPHOCYTES NFR BLD AUTO: 32 % (ref 25–45)
MAGNESIUM SERPL-MCNC: 1.9 MG/DL (ref 1.6–2.3)
MCH RBC QN AUTO: 31.7 PG (ref 26–34)
MCHC RBC AUTO-ENTMCNC: 34.4 G/DL (ref 31–36)
MCV RBC AUTO: 92 FL (ref 80–100)
MONOCYTES # BLD AUTO: 0.4 THOUSAND/ΜL (ref 0.2–0.9)
MONOCYTES NFR BLD AUTO: 5 % (ref 1–10)
NEUTROPHILS # BLD AUTO: 4.6 THOUSANDS/ΜL (ref 1.8–7.8)
NEUTS SEG NFR BLD AUTO: 56 % (ref 45–65)
NITRITE UR QL STRIP: NEGATIVE
P AXIS: 15 DEGREES
PH UR STRIP.AUTO: 6 [PH]
PLATELET # BLD AUTO: 245 THOUSANDS/UL (ref 150–450)
PMV BLD AUTO: 8 FL (ref 8.9–12.7)
POTASSIUM SERPL-SCNC: 4 MMOL/L (ref 3.6–5)
PR INTERVAL: 174 MS
PROT SERPL-MCNC: 7.4 G/DL (ref 5.9–8.4)
PROT UR STRIP-MCNC: NEGATIVE MG/DL
PROTHROMBIN TIME: 12.6 SECONDS (ref 11.6–14.5)
QRS AXIS: 24 DEGREES
QRSD INTERVAL: 86 MS
QT INTERVAL: 370 MS
QTC INTERVAL: 372 MS
RBC # BLD AUTO: 4.8 MILLION/UL (ref 4.5–5.9)
SODIUM SERPL-SCNC: 133 MMOL/L (ref 137–147)
SP GR UR STRIP.AUTO: 1.01 (ref 1–1.04)
T WAVE AXIS: 68 DEGREES
TROPONIN I SERPL-MCNC: <0.01 NG/ML (ref 0–0.03)
UROBILINOGEN UA: NEGATIVE MG/DL
VENTRICULAR RATE: 61 BPM
WBC # BLD AUTO: 8.2 THOUSAND/UL (ref 4.5–11)

## 2021-11-01 PROCEDURE — 93005 ELECTROCARDIOGRAM TRACING: CPT | Performed by: PHYSICIAN ASSISTANT

## 2021-11-01 PROCEDURE — 85610 PROTHROMBIN TIME: CPT | Performed by: PHYSICIAN ASSISTANT

## 2021-11-01 PROCEDURE — 70496 CT ANGIOGRAPHY HEAD: CPT

## 2021-11-01 PROCEDURE — 99213 OFFICE O/P EST LOW 20 MIN: CPT | Performed by: PHYSICIAN ASSISTANT

## 2021-11-01 PROCEDURE — 99284 EMERGENCY DEPT VISIT MOD MDM: CPT

## 2021-11-01 PROCEDURE — 70498 CT ANGIOGRAPHY NECK: CPT

## 2021-11-01 PROCEDURE — 80053 COMPREHEN METABOLIC PANEL: CPT | Performed by: PHYSICIAN ASSISTANT

## 2021-11-01 PROCEDURE — 96361 HYDRATE IV INFUSION ADD-ON: CPT

## 2021-11-01 PROCEDURE — 85025 COMPLETE CBC W/AUTO DIFF WBC: CPT | Performed by: PHYSICIAN ASSISTANT

## 2021-11-01 PROCEDURE — 96360 HYDRATION IV INFUSION INIT: CPT

## 2021-11-01 PROCEDURE — 85730 THROMBOPLASTIN TIME PARTIAL: CPT | Performed by: PHYSICIAN ASSISTANT

## 2021-11-01 PROCEDURE — 99285 EMERGENCY DEPT VISIT HI MDM: CPT | Performed by: PHYSICIAN ASSISTANT

## 2021-11-01 PROCEDURE — 84484 ASSAY OF TROPONIN QUANT: CPT | Performed by: PHYSICIAN ASSISTANT

## 2021-11-01 PROCEDURE — 93010 ELECTROCARDIOGRAM REPORT: CPT | Performed by: PHYSICIAN ASSISTANT

## 2021-11-01 PROCEDURE — 93005 ELECTROCARDIOGRAM TRACING: CPT

## 2021-11-01 PROCEDURE — 71046 X-RAY EXAM CHEST 2 VIEWS: CPT

## 2021-11-01 PROCEDURE — 36415 COLL VENOUS BLD VENIPUNCTURE: CPT | Performed by: PHYSICIAN ASSISTANT

## 2021-11-01 PROCEDURE — 83735 ASSAY OF MAGNESIUM: CPT | Performed by: PHYSICIAN ASSISTANT

## 2021-11-01 PROCEDURE — 81003 URINALYSIS AUTO W/O SCOPE: CPT | Performed by: PHYSICIAN ASSISTANT

## 2021-11-01 RX ORDER — AMOXICILLIN AND CLAVULANATE POTASSIUM 875; 125 MG/1; MG/1
1 TABLET, FILM COATED ORAL ONCE
Status: COMPLETED | OUTPATIENT
Start: 2021-11-01 | End: 2021-11-01

## 2021-11-01 RX ORDER — AMOXICILLIN AND CLAVULANATE POTASSIUM 875; 125 MG/1; MG/1
1 TABLET, FILM COATED ORAL EVERY 12 HOURS
Qty: 14 TABLET | Refills: 0 | Status: SHIPPED | OUTPATIENT
Start: 2021-11-01 | End: 2021-11-08

## 2021-11-01 RX ORDER — MECLIZINE HYDROCHLORIDE 25 MG/1
25 TABLET ORAL 3 TIMES DAILY PRN
Qty: 30 TABLET | Refills: 0 | Status: SHIPPED | OUTPATIENT
Start: 2021-11-01 | End: 2021-12-14

## 2021-11-01 RX ADMIN — IOHEXOL 100 ML: 350 INJECTION, SOLUTION INTRAVENOUS at 21:28

## 2021-11-01 RX ADMIN — SODIUM CHLORIDE 1000 ML: 0.9 INJECTION, SOLUTION INTRAVENOUS at 20:50

## 2021-11-01 RX ADMIN — AMOXICILLIN AND CLAVULANATE POTASSIUM 1 TABLET: 875; 125 TABLET, FILM COATED ORAL at 22:30

## 2021-11-02 LAB
ATRIAL RATE: 70 BPM
P AXIS: 36 DEGREES
PR INTERVAL: 178 MS
QRS AXIS: 38 DEGREES
QRSD INTERVAL: 88 MS
QT INTERVAL: 356 MS
QTC INTERVAL: 384 MS
T WAVE AXIS: 68 DEGREES
VENTRICULAR RATE: 70 BPM

## 2021-11-02 PROCEDURE — 93010 ELECTROCARDIOGRAM REPORT: CPT | Performed by: INTERNAL MEDICINE

## 2021-11-16 ENCOUNTER — CLINICAL SUPPORT (OUTPATIENT)
Dept: FAMILY MEDICINE CLINIC | Facility: CLINIC | Age: 56
End: 2021-11-16
Payer: COMMERCIAL

## 2021-11-16 DIAGNOSIS — Z23 NEED FOR INFLUENZA VACCINATION: Primary | ICD-10-CM

## 2021-11-16 PROCEDURE — 90682 RIV4 VACC RECOMBINANT DNA IM: CPT

## 2021-11-16 PROCEDURE — G0008 ADMIN INFLUENZA VIRUS VAC: HCPCS

## 2021-11-23 ENCOUNTER — HOSPITAL ENCOUNTER (OUTPATIENT)
Dept: CT IMAGING | Facility: HOSPITAL | Age: 56
Discharge: HOME | End: 2021-11-23
Payer: COMMERCIAL

## 2021-11-23 ENCOUNTER — APPOINTMENT (OUTPATIENT)
Dept: LAB | Facility: HOSPITAL | Age: 56
End: 2021-11-23
Payer: COMMERCIAL

## 2021-11-23 ENCOUNTER — VBI (OUTPATIENT)
Dept: ADMINISTRATIVE | Facility: OTHER | Age: 56
End: 2021-11-23

## 2021-11-23 DIAGNOSIS — E03.8 HYPOTHYROIDISM DUE TO HASHIMOTO'S THYROIDITIS: ICD-10-CM

## 2021-11-23 DIAGNOSIS — E06.3 HYPOTHYROIDISM DUE TO HASHIMOTO'S THYROIDITIS: ICD-10-CM

## 2021-11-23 DIAGNOSIS — F17.210 CIGARETTE SMOKER: ICD-10-CM

## 2021-11-23 DIAGNOSIS — E06.3 HYPOTHYROIDISM DUE TO HASHIMOTO'S THYROIDITIS: Primary | ICD-10-CM

## 2021-11-23 DIAGNOSIS — E03.8 HYPOTHYROIDISM DUE TO HASHIMOTO'S THYROIDITIS: Primary | ICD-10-CM

## 2021-11-23 DIAGNOSIS — I10 ESSENTIAL HYPERTENSION: ICD-10-CM

## 2021-11-23 DIAGNOSIS — E78.49 OTHER HYPERLIPIDEMIA: ICD-10-CM

## 2021-11-23 LAB
ALBUMIN SERPL BCP-MCNC: 4.2 G/DL (ref 3–5.2)
ALP SERPL-CCNC: 77 U/L (ref 43–122)
ALT SERPL W P-5'-P-CCNC: 16 U/L
ANION GAP SERPL CALCULATED.3IONS-SCNC: 3 MMOL/L (ref 5–14)
AST SERPL W P-5'-P-CCNC: 23 U/L (ref 17–59)
BASOPHILS # BLD AUTO: 0.1 THOUSANDS/ΜL (ref 0–0.1)
BASOPHILS NFR BLD AUTO: 2 % (ref 0–1)
BILIRUB SERPL-MCNC: 0.62 MG/DL
BUN SERPL-MCNC: 18 MG/DL (ref 5–25)
CALCIUM SERPL-MCNC: 9.6 MG/DL (ref 8.4–10.2)
CHLORIDE SERPL-SCNC: 98 MMOL/L (ref 97–108)
CHOLEST SERPL-MCNC: 197 MG/DL
CO2 SERPL-SCNC: 31 MMOL/L (ref 22–30)
CREAT SERPL-MCNC: 1.04 MG/DL (ref 0.7–1.5)
EOSINOPHIL # BLD AUTO: 0.4 THOUSAND/ΜL (ref 0–0.4)
EOSINOPHIL NFR BLD AUTO: 7 % (ref 0–6)
ERYTHROCYTE [DISTWIDTH] IN BLOOD BY AUTOMATED COUNT: 12.4 %
GFR SERPL CREATININE-BSD FRML MDRD: 79 ML/MIN/1.73SQ M
GLUCOSE P FAST SERPL-MCNC: 108 MG/DL (ref 70–99)
HCT VFR BLD AUTO: 46.2 % (ref 41–53)
HDLC SERPL-MCNC: 38 MG/DL
HGB BLD-MCNC: 15.8 G/DL (ref 13.5–17.5)
LDLC SERPL CALC-MCNC: 124 MG/DL
LYMPHOCYTES # BLD AUTO: 1.5 THOUSANDS/ΜL (ref 0.5–4)
LYMPHOCYTES NFR BLD AUTO: 23 % (ref 25–45)
MAGNESIUM SERPL-MCNC: 2.2 MG/DL (ref 1.6–2.3)
MCH RBC QN AUTO: 32.2 PG (ref 26–34)
MCHC RBC AUTO-ENTMCNC: 34.2 G/DL (ref 31–36)
MCV RBC AUTO: 94 FL (ref 80–100)
MONOCYTES # BLD AUTO: 0.8 THOUSAND/ΜL (ref 0.2–0.9)
MONOCYTES NFR BLD AUTO: 12 % (ref 1–10)
NEUTROPHILS # BLD AUTO: 3.8 THOUSANDS/ΜL (ref 1.8–7.8)
NEUTS SEG NFR BLD AUTO: 57 % (ref 45–65)
NONHDLC SERPL-MCNC: 159 MG/DL
PLATELET # BLD AUTO: 286 THOUSANDS/UL (ref 150–450)
PMV BLD AUTO: 8.2 FL (ref 8.9–12.7)
POTASSIUM SERPL-SCNC: 4.8 MMOL/L (ref 3.6–5)
PROT SERPL-MCNC: 7.9 G/DL (ref 5.9–8.4)
RBC # BLD AUTO: 4.91 MILLION/UL (ref 4.5–5.9)
SODIUM SERPL-SCNC: 132 MMOL/L (ref 137–147)
T4 FREE SERPL-MCNC: 0.86 NG/DL (ref 0.76–1.46)
TRIGL SERPL-MCNC: 175 MG/DL
TSH SERPL DL<=0.05 MIU/L-ACNC: 6.06 UIU/ML (ref 0.47–4.68)
WBC # BLD AUTO: 6.6 THOUSAND/UL (ref 4.5–11)

## 2021-11-23 PROCEDURE — 71271 CT THORAX LUNG CANCER SCR C-: CPT

## 2021-11-23 PROCEDURE — 84443 ASSAY THYROID STIM HORMONE: CPT

## 2021-11-23 PROCEDURE — 83735 ASSAY OF MAGNESIUM: CPT

## 2021-11-23 PROCEDURE — 80061 LIPID PANEL: CPT

## 2021-11-23 PROCEDURE — 80053 COMPREHEN METABOLIC PANEL: CPT

## 2021-11-23 PROCEDURE — 36415 COLL VENOUS BLD VENIPUNCTURE: CPT

## 2021-11-23 PROCEDURE — 85025 COMPLETE CBC W/AUTO DIFF WBC: CPT

## 2021-11-23 PROCEDURE — 84439 ASSAY OF FREE THYROXINE: CPT

## 2021-11-23 RX ORDER — LEVOTHYROXINE SODIUM 0.1 MG/1
100 TABLET ORAL DAILY
Qty: 30 TABLET | Refills: 3 | Status: SHIPPED | OUTPATIENT
Start: 2021-11-23 | End: 2021-12-14 | Stop reason: SDUPTHER

## 2021-11-29 DIAGNOSIS — R06.02 SOB (SHORTNESS OF BREATH): Primary | ICD-10-CM

## 2021-12-06 ENCOUNTER — RA CDI HCC (OUTPATIENT)
Dept: OTHER | Facility: HOSPITAL | Age: 56
End: 2021-12-06

## 2021-12-08 ENCOUNTER — HOSPITAL ENCOUNTER (OUTPATIENT)
Dept: NON INVASIVE DIAGNOSTICS | Facility: HOSPITAL | Age: 56
Discharge: HOME | End: 2021-12-08
Payer: COMMERCIAL

## 2021-12-08 VITALS — WEIGHT: 285 LBS | BODY MASS INDEX: 40.8 KG/M2 | HEIGHT: 70 IN

## 2021-12-08 DIAGNOSIS — R06.02 SOB (SHORTNESS OF BREATH): ICD-10-CM

## 2021-12-08 LAB
BASELINE ST DEPRESSION: 0 MM
MAX HR PERCENT: 111 %
MAX HR: 163 BPM
RATE PRESSURE PRODUCT: NORMAL
SL CV STRESS RECOVERY BP: NORMAL MMHG
SL CV STRESS RECOVERY HR: 77 BPM
SL CV STRESS RECOVERY O2 SAT: 92 %
SL CV STRESS STAGE REACHED: 1
STRESS ANGINA INDEX: 0
STRESS BASELINE BP: NORMAL MMHG
STRESS BASELINE HR: 73 BPM
STRESS DUKE TREADMILL SCORE: 3
STRESS O2 SAT REST: 96 %
STRESS PEAK HR: 181 BPM
STRESS PERCENT HR: 111 %
STRESS POST ESTIMATED WORKLOAD: 4.6 METS
STRESS POST EXERCISE DUR MIN: 2 MIN
STRESS POST EXERCISE DUR SEC: 52 SEC
STRESS POST O2 SAT PEAK: 92 %
STRESS POST PEAK BP: 202 MMHG
STRESS ST DEPRESSION: 0 MM
STRESS TARGET HR: 163 BPM

## 2021-12-08 PROCEDURE — 93016 CV STRESS TEST SUPVJ ONLY: CPT

## 2021-12-08 PROCEDURE — 93017 CV STRESS TEST TRACING ONLY: CPT

## 2021-12-08 PROCEDURE — 93018 CV STRESS TEST I&R ONLY: CPT

## 2021-12-09 DIAGNOSIS — I10 ESSENTIAL HYPERTENSION: ICD-10-CM

## 2021-12-09 DIAGNOSIS — E11.8 TYPE II DIABETES MELLITUS WITH MANIFESTATIONS (HCC): ICD-10-CM

## 2021-12-09 DIAGNOSIS — E78.5 HYPERLIPIDEMIA ASSOCIATED WITH TYPE 2 DIABETES MELLITUS (HCC): ICD-10-CM

## 2021-12-09 DIAGNOSIS — I25.10 ASHD (ARTERIOSCLEROTIC HEART DISEASE): Primary | ICD-10-CM

## 2021-12-09 DIAGNOSIS — R06.02 SOB (SHORTNESS OF BREATH): ICD-10-CM

## 2021-12-09 DIAGNOSIS — E11.69 HYPERLIPIDEMIA ASSOCIATED WITH TYPE 2 DIABETES MELLITUS (HCC): ICD-10-CM

## 2021-12-09 LAB
CHEST PAIN STATEMENT: NORMAL
MAX DIASTOLIC BP: 80 MMHG
MAX HEART RATE: 181 BPM
MAX PREDICTED HEART RATE: 163 BPM
MAX. SYSTOLIC BP: 292 MMHG
PROTOCOL NAME: NORMAL
TARGET HR FORMULA: NORMAL
TEST INDICATION: NORMAL
TIME IN EXERCISE PHASE: NORMAL

## 2021-12-14 ENCOUNTER — OFFICE VISIT (OUTPATIENT)
Dept: FAMILY MEDICINE CLINIC | Facility: CLINIC | Age: 56
End: 2021-12-14
Payer: COMMERCIAL

## 2021-12-14 VITALS
RESPIRATION RATE: 16 BRPM | BODY MASS INDEX: 41.09 KG/M2 | WEIGHT: 287 LBS | OXYGEN SATURATION: 98 % | HEART RATE: 68 BPM | DIASTOLIC BLOOD PRESSURE: 90 MMHG | TEMPERATURE: 98.1 F | SYSTOLIC BLOOD PRESSURE: 132 MMHG | HEIGHT: 70 IN

## 2021-12-14 DIAGNOSIS — I10 ESSENTIAL HYPERTENSION: ICD-10-CM

## 2021-12-14 DIAGNOSIS — E06.3 HYPOTHYROIDISM DUE TO HASHIMOTO'S THYROIDITIS: ICD-10-CM

## 2021-12-14 DIAGNOSIS — Z12.11 SCREEN FOR COLON CANCER: ICD-10-CM

## 2021-12-14 DIAGNOSIS — E03.8 HYPOTHYROIDISM DUE TO HASHIMOTO'S THYROIDITIS: ICD-10-CM

## 2021-12-14 DIAGNOSIS — R91.8 LUNG NODULES: Primary | ICD-10-CM

## 2021-12-14 DIAGNOSIS — E78.49 OTHER HYPERLIPIDEMIA: ICD-10-CM

## 2021-12-14 DIAGNOSIS — R73.09 ELEVATED HEMOGLOBIN A1C: ICD-10-CM

## 2021-12-14 DIAGNOSIS — E78.5 HYPERLIPIDEMIA ASSOCIATED WITH TYPE 2 DIABETES MELLITUS (HCC): ICD-10-CM

## 2021-12-14 DIAGNOSIS — J32.9 CHRONIC SINUSITIS, UNSPECIFIED LOCATION: ICD-10-CM

## 2021-12-14 DIAGNOSIS — E11.69 HYPERLIPIDEMIA ASSOCIATED WITH TYPE 2 DIABETES MELLITUS (HCC): ICD-10-CM

## 2021-12-14 DIAGNOSIS — Z12.5 SCREENING FOR PROSTATE CANCER: ICD-10-CM

## 2021-12-14 PROCEDURE — 4010F ACE/ARB THERAPY RXD/TAKEN: CPT | Performed by: INTERNAL MEDICINE

## 2021-12-14 PROCEDURE — 99214 OFFICE O/P EST MOD 30 MIN: CPT | Performed by: INTERNAL MEDICINE

## 2021-12-14 PROCEDURE — 3008F BODY MASS INDEX DOCD: CPT | Performed by: INTERNAL MEDICINE

## 2021-12-14 RX ORDER — FENOFIBRATE 145 MG/1
145 TABLET, COATED ORAL
Qty: 90 TABLET | Refills: 3 | Status: SHIPPED | OUTPATIENT
Start: 2021-12-14 | End: 2022-02-01 | Stop reason: SDUPTHER

## 2021-12-14 RX ORDER — ATORVASTATIN CALCIUM 20 MG/1
20 TABLET, FILM COATED ORAL
Qty: 90 TABLET | Refills: 3 | Status: SHIPPED | OUTPATIENT
Start: 2021-12-14 | End: 2022-01-11

## 2021-12-14 RX ORDER — AMLODIPINE BESYLATE 5 MG/1
5 TABLET ORAL DAILY
Qty: 90 TABLET | Refills: 3 | Status: SHIPPED | OUTPATIENT
Start: 2021-12-14 | End: 2022-02-01 | Stop reason: SDUPTHER

## 2021-12-14 RX ORDER — LISINOPRIL 10 MG/1
10 TABLET ORAL DAILY
Qty: 90 TABLET | Refills: 3 | Status: SHIPPED | OUTPATIENT
Start: 2021-12-14 | End: 2022-02-01 | Stop reason: SDUPTHER

## 2021-12-14 RX ORDER — LEVOTHYROXINE SODIUM 0.1 MG/1
100 TABLET ORAL DAILY
Qty: 30 TABLET | Refills: 3 | Status: SHIPPED | OUTPATIENT
Start: 2021-12-14 | End: 2022-02-01 | Stop reason: SDUPTHER

## 2021-12-22 ENCOUNTER — VBI (OUTPATIENT)
Dept: ADMINISTRATIVE | Facility: OTHER | Age: 56
End: 2021-12-22

## 2021-12-27 ENCOUNTER — TELEPHONE (OUTPATIENT)
Dept: FAMILY MEDICINE CLINIC | Facility: CLINIC | Age: 56
End: 2021-12-27

## 2021-12-31 ENCOUNTER — OFFICE VISIT (OUTPATIENT)
Dept: URGENT CARE | Age: 56
End: 2021-12-31
Payer: COMMERCIAL

## 2021-12-31 VITALS
SYSTOLIC BLOOD PRESSURE: 152 MMHG | TEMPERATURE: 97.7 F | DIASTOLIC BLOOD PRESSURE: 74 MMHG | HEART RATE: 67 BPM | BODY MASS INDEX: 41.95 KG/M2 | WEIGHT: 293 LBS | RESPIRATION RATE: 18 BRPM | OXYGEN SATURATION: 97 % | HEIGHT: 70 IN

## 2021-12-31 DIAGNOSIS — H65.01 RIGHT ACUTE SEROUS OTITIS MEDIA, RECURRENCE NOT SPECIFIED: Primary | ICD-10-CM

## 2021-12-31 PROCEDURE — 99213 OFFICE O/P EST LOW 20 MIN: CPT | Performed by: PHYSICIAN ASSISTANT

## 2021-12-31 RX ORDER — FLUTICASONE PROPIONATE 50 MCG
2 SPRAY, SUSPENSION (ML) NASAL DAILY
Qty: 1 G | Refills: 0 | Status: SHIPPED | OUTPATIENT
Start: 2021-12-31

## 2021-12-31 RX ORDER — PREDNISONE 20 MG/1
40 TABLET ORAL DAILY
Qty: 10 TABLET | Refills: 0 | Status: SHIPPED | OUTPATIENT
Start: 2021-12-31 | End: 2022-01-06

## 2021-12-31 RX ORDER — CETIRIZINE HYDROCHLORIDE 10 MG/1
10 TABLET ORAL DAILY
Qty: 300 TABLET | Refills: 0 | Status: SHIPPED | OUTPATIENT
Start: 2021-12-31 | End: 2022-01-06

## 2022-01-03 ENCOUNTER — RA CDI HCC (OUTPATIENT)
Dept: OTHER | Facility: HOSPITAL | Age: 57
End: 2022-01-03

## 2022-01-03 NOTE — PROGRESS NOTES
[facility]  coding opportunities    Number of diagnosis code(s) already on the problem list added to FYI flag: 3      Patients insurance company: [employer])      [facility] its learning  coding opportunities    Number of diagnosis code(s) already on the problem list added to [employer] flag: 3      Number of suggestions used: 1     Number of suggestions NOT actually used: 2   Patients insurance company: [employer])     Visit status: Patient arrived for their scheduled appointment     Appt on 1/6/2022    1) E11 9: Type 2 diabetes mellitus ([facility] )    2) J44 9: Chronic obstructive pulmonary disease ([place] [id number] )    3) Please review if this is correct and assess and document if applicable       E66 01: Morbid (severe) obesity due to excess calories (HCC)     Per CMS/ICD 10 coding guidelines, to be used when BMI >=40

## 2022-01-06 ENCOUNTER — OFFICE VISIT (OUTPATIENT)
Dept: FAMILY MEDICINE CLINIC | Facility: CLINIC | Age: 57
End: 2022-01-06
Payer: COMMERCIAL

## 2022-01-06 VITALS
SYSTOLIC BLOOD PRESSURE: 130 MMHG | HEART RATE: 69 BPM | DIASTOLIC BLOOD PRESSURE: 70 MMHG | BODY MASS INDEX: 41.75 KG/M2 | OXYGEN SATURATION: 98 % | WEIGHT: 291.6 LBS | HEIGHT: 70 IN | RESPIRATION RATE: 16 BRPM | TEMPERATURE: 97.5 F

## 2022-01-06 DIAGNOSIS — E66.01 MORBID OBESITY WITH BMI OF 40.0-44.9, ADULT (HCC): ICD-10-CM

## 2022-01-06 DIAGNOSIS — H66.90 ACUTE OTITIS MEDIA, UNSPECIFIED OTITIS MEDIA TYPE: ICD-10-CM

## 2022-01-06 DIAGNOSIS — J01.90 ACUTE SINUSITIS, RECURRENCE NOT SPECIFIED, UNSPECIFIED LOCATION: ICD-10-CM

## 2022-01-06 DIAGNOSIS — J32.9 CHRONIC SINUSITIS, UNSPECIFIED LOCATION: Primary | ICD-10-CM

## 2022-01-06 DIAGNOSIS — E78.5 HYPERLIPIDEMIA ASSOCIATED WITH TYPE 2 DIABETES MELLITUS (HCC): ICD-10-CM

## 2022-01-06 DIAGNOSIS — E11.69 HYPERLIPIDEMIA ASSOCIATED WITH TYPE 2 DIABETES MELLITUS (HCC): ICD-10-CM

## 2022-01-06 DIAGNOSIS — H65.01 RIGHT ACUTE SEROUS OTITIS MEDIA, RECURRENCE NOT SPECIFIED: ICD-10-CM

## 2022-01-06 PROCEDURE — 99213 OFFICE O/P EST LOW 20 MIN: CPT | Performed by: INTERNAL MEDICINE

## 2022-01-06 PROCEDURE — 3008F BODY MASS INDEX DOCD: CPT | Performed by: INTERNAL MEDICINE

## 2022-01-06 PROCEDURE — 3078F DIAST BP <80 MM HG: CPT | Performed by: INTERNAL MEDICINE

## 2022-01-06 PROCEDURE — 3075F SYST BP GE 130 - 139MM HG: CPT | Performed by: INTERNAL MEDICINE

## 2022-01-06 RX ORDER — LEVOFLOXACIN 500 MG/1
500 TABLET, FILM COATED ORAL EVERY 24 HOURS
Qty: 10 TABLET | Refills: 0 | Status: SHIPPED | OUTPATIENT
Start: 2022-01-06 | End: 2022-01-16

## 2022-01-06 RX ORDER — PREDNISONE 1 MG/1
5 TABLET ORAL DAILY
Qty: 21 TABLET | Refills: 0 | Status: SHIPPED | OUTPATIENT
Start: 2022-01-06 | End: 2022-02-01

## 2022-01-06 RX ORDER — CETIRIZINE HYDROCHLORIDE 10 MG/1
10 TABLET ORAL DAILY
Qty: 30 TABLET | Refills: 1 | Status: SHIPPED | OUTPATIENT
Start: 2022-01-06 | End: 2022-07-14 | Stop reason: SDUPTHER

## 2022-01-06 RX ORDER — OFLOXACIN 3 MG/ML
5 SOLUTION AURICULAR (OTIC) 2 TIMES DAILY
Qty: 5 ML | Refills: 1 | Status: SHIPPED | OUTPATIENT
Start: 2022-01-06 | End: 2022-02-01

## 2022-01-06 NOTE — PROGRESS NOTES
Assessment/Plan:         Diagnoses and all orders for this visit:    Chronic sinusitis, unspecified location;start :  -     levofloxacin (LEVAQUIN) 500 mg tablet; Take 1 tablet (500 mg total) by mouth every 24 hours for 10 days  -     predniSONE 5 mg tablet; Take 1 tablet (5 mg total) by mouth daily With food/Breakfast  RTC in 4 weeks    Acute sinusitis, recurrence not specified, unspecified location; as  Above     -     cetirizine (ZyrTEC) 10 mg tablet; Take 1 tablet (10 mg total) by mouth daily In the evening    Acute otitis externa , Right side : Start :  -     ofloxacin (FLOXIN) 0 3 % otic solution; Administer 5 drops to the right ear 2 (two) times a day    Hyperlipidemia associated with type 2 diabetes mellitus ([facility] )    Morbid obesity with BMI of 40 0-44 9, adult ([facility])        Subjective:      Patient ID: Joey Oshea is a 61 y o  male  61 Y O Man is here for Regular Check up, and Post ER Visit, he has few symptoms, med list reviewed w Pt in Detail    The following portions of the patient's history were reviewed and updated as appropriate: allergies, current medications, past family history, past medical history, past social history, past surgical history and problem list     Review of Systems   Constitutional: Negative for chills, fatigue and fever  HENT: Positive for congestion, ear pain and postnasal drip  Negative for facial swelling, sore throat, trouble swallowing and voice change  Eyes: Negative for pain, discharge and visual disturbance  Respiratory: Positive for wheezing  Negative for cough and shortness of breath  Cardiovascular: Negative for chest pain, palpitations and leg swelling  Gastrointestinal: Negative for abdominal pain, blood in stool, constipation, diarrhea and nausea  Endocrine: Negative for polydipsia, polyphagia and polyuria  Genitourinary: Negative for difficulty urinating, hematuria and urgency  Musculoskeletal: Negative for arthralgias and myalgias  Skin: Negative for rash  Neurological: Positive for dizziness  Negative for tremors, weakness and headaches  Hematological: Negative for adenopathy  Does not bruise/bleed easily  Psychiatric/Behavioral: Negative for dysphoric mood, sleep disturbance and suicidal ideas  Objective:      /70 (BP Location: Left arm, Patient Position: Sitting, Cuff Size: Large)   Pulse 69   Temp 97 5 °F (36 4 °C)   Resp 16   Ht 5' 10" (1 778 m)   Wt 132 kg (291 lb 9 6 oz)   SpO2 98%   BMI 41 84 kg/m²          Physical Exam  Constitutional:       General: He is not in acute distress  HENT:      Head: Normocephalic  Comments: Chronic sinusitis  Right otitis externa     Mouth/Throat:      Pharynx: No oropharyngeal exudate  Eyes:      General: No scleral icterus  Conjunctiva/sclera: Conjunctivae normal       Pupils: Pupils are equal, round, and reactive to light  Neck:      Thyroid: No thyromegaly  Cardiovascular:      Rate and Rhythm: Normal rate and regular rhythm  Heart sounds: Normal heart sounds  No murmur heard  Pulmonary:      Effort: Pulmonary effort is normal  No respiratory distress  Breath sounds: Wheezing present  No rales  Abdominal:      General: Bowel sounds are normal  There is no distension  Palpations: Abdomen is soft  Tenderness: There is no abdominal tenderness  There is no guarding or rebound  Musculoskeletal:         General: Tenderness present  Cervical back: Neck supple  Lymphadenopathy:      Cervical: No cervical adenopathy  Skin:     Coloration: Skin is not pale  Findings: No rash  Neurological:      Mental Status: He is alert and oriented to person, place, and time  Sensory: No sensory deficit  Motor: No weakness

## 2022-01-10 ENCOUNTER — VBI (OUTPATIENT)
Dept: ADMINISTRATIVE | Facility: OTHER | Age: 57
End: 2022-01-10

## 2022-01-10 NOTE — PROGRESS NOTES
Cardiology Office Note  MD Felisha Naqvi MD Bynum Mass, DO, [address] [facility] MD Lyndsay Schneider DO, Sara De Leon DO, [facility]  ----------------------------------------------------------------  [address] [place], [address] [place] [address]    Shola Alexander 61 y o  male MRN: [id number]  Unit/Bed#:  Encounter: [id number]      History of Present Illness: It was a pleasure to see Shola Alexander in the office today for initial CV evaluation  He has a past medical history of hypertension, dyslipidemia, morbid obesity, COPD, TAMARA and tobacco use  He established care with us in January 2022  Admits to his father having had coronary artery disease and family history of atrial fibrillation  He has been having dyspnea on exertion that has been somewhat progressive but also chronic over the course of the past couple months to years  He has smoked for over many years and has tried to quit in the past   Due to his smoking, primary care orders annual CT scans look at his lungs  On recent CT scan, he was found to have severe coronary calcifications  He was ordered an exercise stress test that was found to be negative for myocardial ischemia, but the test occurred in extremely short period of time and was stopped early due to his inability to walk on a treadmill  He denies any chest pain, pressure, tightness or squeezing  Prior to being told BS severe heart artery disease, he was not aware of any significant cardiovascular disease  Denies lower extremity swelling, orthopnea or paroxysmal nocturnal dyspnea  Admits to lower extremity pain with ambulation  Review of Systems:  Review of Systems   Constitutional: Negative for decreased appetite, fever, weight gain and weight loss  HENT: Negative for congestion and sore throat  Eyes: Negative for visual disturbance  Cardiovascular: Positive for claudication and dyspnea on exertion   Negative for chest pain, leg swelling, near-syncope and palpitations  Respiratory: Positive for shortness of breath  Negative for cough  Hematologic/Lymphatic: Negative for bleeding problem  Skin: Negative for rash  Musculoskeletal: Negative for myalgias and neck pain  Gastrointestinal: Negative for abdominal pain and nausea  Neurological: Negative for light-headedness and weakness  Psychiatric/Behavioral: Negative for depression         Past Medical History:   Diagnosis Date    Anxiety     Arthritis     Bipolar disorder (HCC)     Colon polyp     COPD (chronic obstructive pulmonary disease) (HCC)     Depression     Diabetes mellitus (HCC)     type 2    Gout     High triglycerides     Hypertension     Hypothyroidism 02/20/2013    Obesity     Paranoid schizophrenia ([facility] )     Psychiatric disorder     Sciatica 12/03/2013    Seasonal allergies     Sleep apnea        Past Surgical History:   Procedure Laterality Date    COLONOSCOPY      UMBILICAL HERNIA REPAIR      WISDOM TOOTH EXTRACTION         Social History     Socioeconomic History    Marital status: Single     Spouse name: Not on file    Number of children: Not on file    Years of education: Not on file    Highest education level: Not on file   Occupational History    Not on file   Tobacco Use    Smoking status: Current Every Day Smoker     Packs/day: 2 00     Years: 35 00     Pack years: 70 00     Types: Cigarettes    Smokeless tobacco: Never Used   Vaping Use    Vaping Use: Never used   Substance and Sexual Activity    Alcohol use: No    Drug use: No    Sexual activity: Not Currently     Partners: Female   Other Topics Concern    Not on file   Social History Narrative    Not on file     Social Determinants of Health     Financial Resource Strain: Low Risk     Difficulty of Paying Living Expenses: Not hard at all   Food Insecurity: No Food Insecurity    Worried About Running Out of Food in the Last Year: Never true    Sg of Food in the Last Year: Never true   Transportation Needs: No Transportation Needs    Lack of Transportation (Medical): No    Lack of Transportation (Non-Medical):  No   Physical Activity: Not on file   Stress: Not on file   Social Connections: Not on file   Intimate Partner Violence: Not on file   Housing Stability: Not on file       Family History   Problem Relation Age of Onset    Colon cancer Mother     Coronary artery disease Father     Hyperlipidemia Father     Coronary artery disease Family        Allergies   Allergen Reactions    No Active Allergies          Current Outpatient Medications:     amLODIPine (NORVASC) 5 mg tablet, Take 1 tablet (5 mg total) by mouth daily, Disp: 90 tablet, Rfl: 3    atorvastatin (LIPITOR) 20 mg tablet, Take 1 tablet (20 mg total) by mouth daily with dinner, Disp: 90 tablet, Rfl: 3    celecoxib (CeleBREX) 200 mg capsule, Take 1 capsule (200 mg total) by mouth daily With food/Breakfast, Disp: 90 capsule, Rfl: 2    cetirizine (ZyrTEC) 10 mg tablet, Take 1 tablet (10 mg total) by mouth daily In the evening, Disp: 30 tablet, Rfl: 1    cholecalciferol (VITAMIN D3) 1,000 units tablet, Take 1 tablet (1,000 Units total) by mouth daily, Disp: 90 tablet, Rfl: 3    fenofibrate (TRICOR) 145 mg tablet, Take 1 tablet (145 mg total) by mouth daily with dinner, Disp: 90 tablet, Rfl: 3    FLUoxetine (PROzac) 20 mg capsule, , Disp: , Rfl:     fluticasone (FLONASE) 50 mcg/act nasal spray, 2 sprays into each nostril daily, Disp: 1 g, Rfl: 0    haloperidol (HALDOL) 5 mg tablet, 2 5 mg daily at bedtime , Disp: , Rfl: 0    levofloxacin (LEVAQUIN) 500 mg tablet, Take 1 tablet (500 mg total) by mouth every 24 hours for 10 days, Disp: 10 tablet, Rfl: 0    levothyroxine (Synthroid) 100 mcg tablet, Take 1 tablet (100 mcg total) by mouth daily Please stop 88 mcg, Disp: 30 tablet, Rfl: 3    lisinopril (ZESTRIL) 10 mg tablet, Take 1 tablet (10 mg total) by mouth daily, Disp: 90 tablet, Rfl: 3   ofloxacin (FLOXIN) 0 3 % otic solution, Administer 5 drops to the right ear 2 (two) times a day, Disp: 5 mL, Rfl: 1    predniSONE 5 mg tablet, Take 1 tablet (5 mg total) by mouth daily With food/Breakfast, Disp: 21 tablet, Rfl: 0    amitriptyline (ELAVIL) 25 mg tablet, Take 25 mg by mouth daily at bedtime (Patient not taking: Reported on 11/1/2021), Disp: , Rfl: 0    Vitals:    01/11/22 1017   BP: 110/70   BP Location: Left arm   Patient Position: Sitting   Cuff Size: Large   Pulse: 59   Weight: 130 kg (286 lb 3 2 oz)       PHYSICAL EXAMINATION:  Gen: Awake, Alert, NAD   Head/eyes: AT/NC, pupils equal and round, Anicteric  ENT: mmm  Neck: Supple, No elevated JVP, trachea midline  Resp:  Decreased breath sounds bilaterally  CV: RRR +S1, S2, No m/r/g  Abd: Soft, obese, NT/ND + BS  Ext: no LE edema bilaterally  Neuro: Follows commands, moves all extermities  Psych: Appropriate affect, normal mood, pleasant attitude, non-combative  Skin: warm; no rash, erythema or venous stasis changes on exposed skin    --------------------------------------------------------------------------------  TREADMILL STRESS  Results for orders placed during the hospital encounter of 12/08/21    Stress test only, exercise    Interpretation Summary    Stress ECG: The patient reached stage 1 0 of the protocol after exercising for 2 min and 52 sec and had a maximal HR of 181 bpm (111 % of MPHR) and 4 6 METS  The patient reported dyspnea and leg pain during the stress test  Onset of symptoms occurred at stage 1 of the protocol  Symptoms began during stress and ended during recovery  Blood pressure demonstrated a hypertensive response for the amount of exercise performed (systolic blood pressure increased by 72 mmHg with only 2 minutes of exercise) and heart rate demonstrated an exaggerated response to stress    Stress ECG: No ST deviation is noted  Arrhythmias during recovery: PACs in pairs  The ECG was negative for ischemia   The stress ECG is negative for ischemia  There were no ECG changes to suggest ischemia however the patient did have a significantly exaggerated blood pressure and heart rate response to exercise  Overall poor exercise tolerance/functional capacity  If clinical concern for cardiac ischemia still exists, would recommend repeating stress test with myocardial perfusion imaging such as with nuclear stress  --------------------------------------------------------------------------------  NUCLEAR STRESS TEST: No results found for this or any previous visit  No results found for this or any previous visit       --------------------------------------------------------------------------------  CATH:  No results found for this or any previous visit     --------------------------------------------------------------------------------  ECHO:   No results found for this or any previous visit  No results found for this or any previous visit     --------------------------------------------------------------------------------  HOLTER  No results found for this or any previous visit      No results found for this or any previous visit     --------------------------------------------------------------------------------  CAROTIDS  No results found for this or any previous visit      --------------------------------------------------------------------------------  ECGs:  Results for orders placed or performed in visit on 01/11/22   POCT ECG    Impression    Sinus bradycardia 59 bpm, otherwise normal ECG        Lab Results   Component Value Date    WBC 6 60 11/23/2021    HGB 15 8 11/23/2021    HCT 46 2 11/23/2021    MCV 94 11/23/2021     11/23/2021      Lab Results   Component Value Date    SODIUM 132 (L) 11/23/2021    K 4 8 11/23/2021    CL 98 11/23/2021    CO2 31 (H) 11/23/2021    BUN 18 11/23/2021    CREATININE 1 04 11/23/2021    GLUC 125 (H) 11/01/2021    CALCIUM 9 6 11/23/2021      Lab Results   Component Value Date    HGBA1C 5 6 09/30/2021      No results found for: CHOL  Lab Results   Component Value Date    HDL 38 (L) 11/23/2021    HDL 41 03/04/2021    HDL 37 (L) 08/27/2020     Lab Results   Component Value Date    LDLCALC 124 11/23/2021    LDLCALC 104 03/04/2021    LDLCALC 82 08/27/2020     Lab Results   Component Value Date    TRIG 175 (H) 11/23/2021    TRIG 184 (H) 03/04/2021    TRIG 174 (H) 08/27/2020     No results found for: [place]   Lab Results   Component Value Date    INR 0 98 11/01/2021    PROTIME 12 6 11/01/2021        1  SOB (shortness of breath)  -     Ambulatory referral to Cardiology  -     POCT ECG    2  Essential hypertension  -     Ambulatory referral to Cardiology  -     POCT ECG    3  Type II diabetes mellitus with manifestations ([place] [address] )  -     Ambulatory referral to Cardiology    4  Hyperlipidemia associated with type 2 diabetes mellitus ([place] [address] )  -     Ambulatory referral to Cardiology    5  ASHD (arteriosclerotic heart disease)  -     Ambulatory referral to Cardiology        IMPRESSION:   Shortness of breath with dyspnea on exertion   CAD with severe coronary calcifications by CT chest, November 2021   Claudication   Hypertension   Dyslipidemia w/  mg/dL,  mg/dL, November 2021   Morbid obesity   COPD   Tobacco use    Exercise stress test negative for myocardial ischemia, ET 2:52, 111% MPHR, 4 6 METs, November 2021   Paranoid schizophrenia   TAMARA   Lung nodules    PLAN:  It was a pleasure to see Shannan Sawant in the office today for initial CV evaluation  He is here today due to his diagnosis coronary artery disease with severe coronary calcifications on recent CT scan of the lungs  He has been having dyspnea on exertion that has been progressive over the course of several years but recently stable in the past months  He has no signs of heart failure and examines to be euvolemic  He does get lower extremity discomfort with physical activity and climbing stairs concerning for claudication    ECG without acute ischemic changes  Recent stress test was suboptimal to adequately diagnosis coronary disease especially given the severity of his findings on CT scan  Based on his clinical presentation, I have the following recommendations:    1  Recommend CTA of the coronary arteries to assess for any evidence of obstructive coronary disease  Check BMP prior to test   Risks of contrast induced nephropathy discussed  Heart rate is currently under control without any AV vira blockers  2  Check 2D echocardiogram to assess cardiac structure and function  3  As he reports lower extremity claudication, will check arterial Doppler of the bilateral lower extremities  I believe this is reasonable specially given his history of active tobacco use  4  Continue current antihypertensive regimen including amlodipine and lisinopril  5  Will increase atorvastatin to 80 mg daily with significant CAD noted on CT scan  Goal LDL is less than 70 mg/dL  Continue fenofibrate  6   Should his shortness of breath worsen in frequency or severity or change in quality, recommend seeking medical attention  7  Unfortunately, the patient continues to smoke  I have counseled the patient on tobacco cessation for greater than 3 min and referred the patient to the [employer] web site on tobacco cessation, smokefree gov   8  We will follow up with him after testing  As always, please do not hesitate to call with any questions  Portions of the record may have been created with voice recognition software  Occasional wrong word or "sound a like" substitutions may have occurred due to the inherent limitations of voice recognition software  Read the chart carefully and recognize, using context, where substitutions have occurred        Signed: Sophia Oliveira DO, Isidro Justin

## 2022-01-11 ENCOUNTER — CONSULT (OUTPATIENT)
Dept: CARDIOLOGY CLINIC | Facility: CLINIC | Age: 57
End: 2022-01-11
Payer: COMMERCIAL

## 2022-01-11 VITALS
SYSTOLIC BLOOD PRESSURE: 110 MMHG | HEART RATE: 59 BPM | DIASTOLIC BLOOD PRESSURE: 70 MMHG | BODY MASS INDEX: 41.07 KG/M2 | WEIGHT: 286.2 LBS

## 2022-01-11 DIAGNOSIS — I25.10 CORONARY ARTERY DISEASE INVOLVING NATIVE CORONARY ARTERY OF NATIVE HEART WITHOUT ANGINA PECTORIS: Primary | ICD-10-CM

## 2022-01-11 DIAGNOSIS — E11.8 TYPE II DIABETES MELLITUS WITH MANIFESTATIONS (HCC): ICD-10-CM

## 2022-01-11 DIAGNOSIS — R06.02 SOB (SHORTNESS OF BREATH): ICD-10-CM

## 2022-01-11 DIAGNOSIS — Z71.6 TOBACCO ABUSE COUNSELING: ICD-10-CM

## 2022-01-11 DIAGNOSIS — E78.49 OTHER HYPERLIPIDEMIA: ICD-10-CM

## 2022-01-11 DIAGNOSIS — Z72.0 TOBACCO ABUSE: ICD-10-CM

## 2022-01-11 DIAGNOSIS — I10 ESSENTIAL HYPERTENSION: ICD-10-CM

## 2022-01-11 DIAGNOSIS — I73.9 CLAUDICATION (HCC): ICD-10-CM

## 2022-01-11 PROCEDURE — 99204 OFFICE O/P NEW MOD 45 MIN: CPT | Performed by: INTERNAL MEDICINE

## 2022-01-11 PROCEDURE — 93000 ELECTROCARDIOGRAM COMPLETE: CPT | Performed by: INTERNAL MEDICINE

## 2022-01-11 PROCEDURE — 99407 BEHAV CHNG SMOKING > 10 MIN: CPT | Performed by: INTERNAL MEDICINE

## 2022-01-11 RX ORDER — ATORVASTATIN CALCIUM 80 MG/1
80 TABLET, FILM COATED ORAL
Qty: 90 TABLET | Refills: 1 | Status: SHIPPED | OUTPATIENT
Start: 2022-01-11 | End: 2022-02-01 | Stop reason: SDUPTHER

## 2022-01-17 ENCOUNTER — TELEPHONE (OUTPATIENT)
Dept: FAMILY MEDICINE CLINIC | Facility: CLINIC | Age: 57
End: 2022-01-17

## 2022-01-17 DIAGNOSIS — J32.9 CHRONIC SINUSITIS, UNSPECIFIED LOCATION: Primary | ICD-10-CM

## 2022-01-17 NOTE — TELEPHONE ENCOUNTER
Pt was in last week for right ear pain  He took antibiotic and prednisone you prescribed him  Today he reports that his right ear is still full and keeps popping  Please advise

## 2022-01-17 NOTE — NURSING NOTE
Cardiac CTA Questionairre      Cardiac history     Reason for exam: SOB on exertion, evalation for CAD     Have you been diagnosed with heart disease? NO     Do you have a family history of heart disease? YES     Have you ever experienced chest pain? NO     Have you had a CABG, angioplasty, cardiac cath, stent placement? NO     Do you have a pacemaker or defibrillator? NO     Have you ever been diagnosed with an irregular heart beat? NO     Do you have a history of having COPD or asthma? COPD     Is your asthma controlled? Do you have high blood pressure? YES     Do you have diabetes? NO, diet controlled pre-DM     Do you have high cholesterol? YES     Do you smoke? YES    Did you ever smoke? YES , 2PPD for over 35 years     General Information     Do you take any male enhancement medications such as Viagra, Levitra, or Cialis (PDE5 inhibitors) ? NO       Due to nitrate given during test, this needs to be held for 3 days prior to testing and may be            resumed 24 hrs  Do you have allergies? NKDA  Allergy to intravenous contrast or dye? NO     Do you have kidney disease? NO                              Blood work done:  11/1/21   BUN:  15    CREATINE:  1 02       GFR: 81     Height:    5'10"                      Weight: 286     Call placed to patient to discuss upcoming appointment at [place] [address] radiology department and complete consultation and Questionnaire with patient via phone  Patient is having a Cardiac CTA  Reviewed patient's allergies, also reviewed medications  No Beta blocker ordered by Cardiologist  Test instructions given, patient aware to hold all caffeine products for 12 hours prior ( 0230 )to the exam this includes coffee, decaf, tea, soda and chocolate  Also made aware to avoid solid food for 4 hours prior (1030 ) to exam and to increase fluids one day prior to exam unless contraindicated    Patient was instructed that they may take all medications as usual unless otherwise directed by physician  Patient instructed to not use inhalers prior to exam, if uses may need to reschedule the study to another day  Discussed the pre procedure expectations, post procedure expectations, time entailed to perform the study and also the medications that may be used in exam ( beta blocker if needed to  heart rate to under 65 beats per minute for optimal exam results and NTG given during scan for vasodilation)  Reminded patient of location and time expected for procedure, instructed to bring photo ID, insurance card and script  Informed the patient that the study will last approximately 30-45 minutes  Pt verbalizes understanding of education and instructions  My number was also supplied to patient to call if any further questions or concerns should arise pre or post procedure

## 2022-01-18 ENCOUNTER — APPOINTMENT (OUTPATIENT)
Dept: LAB | Age: 57
End: 2022-01-18
Payer: COMMERCIAL

## 2022-01-18 LAB
ANION GAP SERPL CALCULATED.3IONS-SCNC: 3 MMOL/L (ref 4–13)
BUN SERPL-MCNC: 17 MG/DL (ref 5–25)
CALCIUM SERPL-MCNC: 9.4 MG/DL (ref 8.3–10.1)
CHLORIDE SERPL-SCNC: 103 MMOL/L (ref 100–108)
CO2 SERPL-SCNC: 28 MMOL/L (ref 21–32)
CREAT SERPL-MCNC: 1.07 MG/DL (ref 0.6–1.3)
GFR SERPL CREATININE-BSD FRML MDRD: 76 ML/MIN/1.73SQ M
GLUCOSE SERPL-MCNC: 106 MG/DL (ref 65–140)
POTASSIUM SERPL-SCNC: 4.3 MMOL/L (ref 3.5–5.3)
SODIUM SERPL-SCNC: 134 MMOL/L (ref 136–145)

## 2022-01-18 PROCEDURE — 36415 COLL VENOUS BLD VENIPUNCTURE: CPT | Performed by: INTERNAL MEDICINE

## 2022-01-18 PROCEDURE — 80048 BASIC METABOLIC PNL TOTAL CA: CPT | Performed by: INTERNAL MEDICINE

## 2022-01-25 ENCOUNTER — HOSPITAL ENCOUNTER (OUTPATIENT)
Dept: RADIOLOGY | Facility: HOSPITAL | Age: 57
Discharge: HOME | End: 2022-01-25
Attending: INTERNAL MEDICINE
Payer: COMMERCIAL

## 2022-01-25 VITALS
SYSTOLIC BLOOD PRESSURE: 107 MMHG | RESPIRATION RATE: 20 BRPM | HEART RATE: 74 BPM | DIASTOLIC BLOOD PRESSURE: 68 MMHG | OXYGEN SATURATION: 95 %

## 2022-01-25 DIAGNOSIS — I25.10 CORONARY ARTERY DISEASE INVOLVING NATIVE CORONARY ARTERY OF NATIVE HEART WITHOUT ANGINA PECTORIS: ICD-10-CM

## 2022-01-25 PROCEDURE — 75574 CT ANGIO HRT W/3D IMAGE: CPT

## 2022-01-25 PROCEDURE — G1004 CDSM NDSC: HCPCS

## 2022-01-25 RX ORDER — NITROGLYCERIN 0.4 MG/1
0.4 TABLET SUBLINGUAL
Status: COMPLETED | OUTPATIENT
Start: 2022-01-25 | End: 2022-01-25

## 2022-01-25 RX ORDER — METOPROLOL TARTRATE 5 MG/5ML
5 INJECTION INTRAVENOUS
Status: DISCONTINUED | OUTPATIENT
Start: 2022-01-25 | End: 2022-01-26 | Stop reason: HOSPADM

## 2022-01-25 RX ADMIN — NITROGLYCERIN 0.4 MG: 0.4 TABLET SUBLINGUAL at 14:34

## 2022-01-25 RX ADMIN — METOROPROLOL TARTRATE 5 MG: 5 INJECTION, SOLUTION INTRAVENOUS at 14:28

## 2022-01-25 RX ADMIN — IODIXANOL 100 ML: 320 INJECTION, SOLUTION INTRAVASCULAR at 14:47

## 2022-01-26 ENCOUNTER — TELEPHONE (OUTPATIENT)
Dept: CARDIOLOGY CLINIC | Facility: CLINIC | Age: 57
End: 2022-01-26

## 2022-01-26 ENCOUNTER — RA CDI HCC (OUTPATIENT)
Dept: OTHER | Facility: HOSPITAL | Age: 57
End: 2022-01-26

## 2022-01-26 NOTE — PROGRESS NOTES
[place] [address]  coding opportunities      Chart Reviewed * (Number of) [place] suggestions sent to Provider: 1        Patients insurance company: [id number] (Medicare Advantage and Commercial)     Appt on 2/1/22    Refer to 1/11/22 note   E11 51: Type 2 diabetes mellitus with diabetic peripheral angiopathy without gangrene ([place] [address] )     DM & PVD are presumed to have a causal-effect relationship unless documented as unrelated

## 2022-01-26 NOTE — TELEPHONE ENCOUNTER
Pt called for review of results in my chart of CTA he had yesterday  Has additional testing due this week has scheduled f/u with Dr Liliana Rodriguez 2/22/22 told him Dr Liliana Rodriguez will review all at [address] [facility]  Please advise

## 2022-02-01 ENCOUNTER — OFFICE VISIT (OUTPATIENT)
Dept: FAMILY MEDICINE CLINIC | Facility: CLINIC | Age: 57
End: 2022-02-01
Payer: COMMERCIAL

## 2022-02-01 VITALS
TEMPERATURE: 97.4 F | DIASTOLIC BLOOD PRESSURE: 80 MMHG | HEIGHT: 70 IN | RESPIRATION RATE: 16 BRPM | HEART RATE: 66 BPM | SYSTOLIC BLOOD PRESSURE: 110 MMHG | OXYGEN SATURATION: 97 % | WEIGHT: 288 LBS | BODY MASS INDEX: 41.23 KG/M2

## 2022-02-01 DIAGNOSIS — E03.8 HYPOTHYROIDISM DUE TO HASHIMOTO'S THYROIDITIS: Primary | ICD-10-CM

## 2022-02-01 DIAGNOSIS — E11.9 TYPE 2 DIABETES MELLITUS WITHOUT COMPLICATION, UNSPECIFIED WHETHER LONG TERM INSULIN USE (HCC): ICD-10-CM

## 2022-02-01 DIAGNOSIS — I10 ESSENTIAL HYPERTENSION: ICD-10-CM

## 2022-02-01 DIAGNOSIS — E11.69 HYPERLIPIDEMIA ASSOCIATED WITH TYPE 2 DIABETES MELLITUS (HCC): ICD-10-CM

## 2022-02-01 DIAGNOSIS — H61.21 CERUMEN DEBRIS ON TYMPANIC MEMBRANE OF RIGHT EAR: ICD-10-CM

## 2022-02-01 DIAGNOSIS — E06.3 HYPOTHYROIDISM DUE TO HASHIMOTO'S THYROIDITIS: Primary | ICD-10-CM

## 2022-02-01 DIAGNOSIS — F17.210 CIGARETTE SMOKER: ICD-10-CM

## 2022-02-01 DIAGNOSIS — E78.5 HYPERLIPIDEMIA ASSOCIATED WITH TYPE 2 DIABETES MELLITUS (HCC): ICD-10-CM

## 2022-02-01 DIAGNOSIS — R73.09 ELEVATED HEMOGLOBIN A1C: ICD-10-CM

## 2022-02-01 DIAGNOSIS — R91.8 LUNG NODULES: ICD-10-CM

## 2022-02-01 DIAGNOSIS — E78.49 OTHER HYPERLIPIDEMIA: ICD-10-CM

## 2022-02-01 LAB — SL AMB POCT HEMOGLOBIN AIC: 6.1 (ref ?–6.5)

## 2022-02-01 PROCEDURE — 4010F ACE/ARB THERAPY RXD/TAKEN: CPT | Performed by: INTERNAL MEDICINE

## 2022-02-01 PROCEDURE — 3074F SYST BP LT 130 MM HG: CPT | Performed by: INTERNAL MEDICINE

## 2022-02-01 PROCEDURE — 3044F HG A1C LEVEL LT 7.0%: CPT | Performed by: INTERNAL MEDICINE

## 2022-02-01 PROCEDURE — 3079F DIAST BP 80-89 MM HG: CPT | Performed by: INTERNAL MEDICINE

## 2022-02-01 PROCEDURE — 83036 HEMOGLOBIN GLYCOSYLATED A1C: CPT | Performed by: INTERNAL MEDICINE

## 2022-02-01 PROCEDURE — 99214 OFFICE O/P EST MOD 30 MIN: CPT | Performed by: INTERNAL MEDICINE

## 2022-02-01 RX ORDER — LEVOTHYROXINE SODIUM 0.1 MG/1
100 TABLET ORAL DAILY
Qty: 30 TABLET | Refills: 3 | Status: SHIPPED | OUTPATIENT
Start: 2022-02-01 | End: 2022-07-14 | Stop reason: SDUPTHER

## 2022-02-01 RX ORDER — AMLODIPINE BESYLATE 5 MG/1
5 TABLET ORAL DAILY
Qty: 90 TABLET | Refills: 3 | Status: SHIPPED | OUTPATIENT
Start: 2022-02-01 | End: 2022-07-14 | Stop reason: SDUPTHER

## 2022-02-01 RX ORDER — LISINOPRIL 10 MG/1
10 TABLET ORAL DAILY
Qty: 90 TABLET | Refills: 3 | Status: SHIPPED | OUTPATIENT
Start: 2022-02-01 | End: 2022-07-14 | Stop reason: SDUPTHER

## 2022-02-01 RX ORDER — TOBRAMYCIN AND DEXAMETHASONE 3; 1 MG/ML; MG/ML
SUSPENSION/ DROPS OPHTHALMIC
Qty: 5 ML | Refills: 0 | Status: SHIPPED | OUTPATIENT
Start: 2022-02-01 | End: 2022-07-14

## 2022-02-01 RX ORDER — FENOFIBRATE 145 MG/1
145 TABLET, COATED ORAL
Qty: 90 TABLET | Refills: 3 | Status: SHIPPED | OUTPATIENT
Start: 2022-02-01 | End: 2022-07-14 | Stop reason: SDUPTHER

## 2022-02-01 RX ORDER — ATORVASTATIN CALCIUM 80 MG/1
80 TABLET, FILM COATED ORAL
Qty: 90 TABLET | Refills: 1 | Status: SHIPPED | OUTPATIENT
Start: 2022-02-01 | End: 2022-04-12 | Stop reason: SDUPTHER

## 2022-02-01 NOTE — PROGRESS NOTES
Assessment/Plan:         Diagnoses and all orders for this visit:    Hypothyroidism due to Hashimoto's thyroiditis;   -     levothyroxine (Synthroid) 100 mcg tablet; Take 1 tablet (100 mcg total) by mouth daily Please stop 88 mcg  RTC in 3mos w Blood  work  -     POCT hemoglobin A1c    Essential hypertension; continue and Renew :  -     lisinopril (ZESTRIL) 10 mg tablet; Take 1 tablet (10 mg total) by mouth daily  -     amLODIPine (NORVASC) 5 mg tablet; Take 1 tablet (5 mg total) by mouth daily    Renew :  -     fenofibrate (TRICOR) 145 mg tablet; Take 1 tablet (145 mg total) by mouth daily with dinner  -     atorvastatin (LIPITOR) 80 mg tablet; Take 1 tablet (80 mg total) by mouth daily with dinner    Hyperlipidemia associated with type 2 diabetes mellitus ([facility] ); continue Atorvastatin and Fenofibrate  RTC in 3mos w Blood  Work,   -     UA (URINE) with reflex to Scope; Future    Elevated hemoglobin A1c; Life style mod  -     UA (URINE) with reflex to Scope; Future    Cigarette smoker; advised to quit    Lung nodules; Yearly Ct Lungs    Cerumen debris on tympanic membrane of right ear;  -     Ear cerumen removal, done in office Without using any instruments  -     tobramycin-dexamethasone (TOBRADEX) ophthalmic suspension; USE 2-3 DROPS IN THE RIGHT EAR bid        Subjective:      Patient ID: April Barlow is a 61 y o  male  61 Y O Man is here for Regular check up, he still smokes, recent Blood work and med list reviewed w Pt, in detail    The following portions of the patient's history were reviewed and updated as appropriate: allergies, current medications, past family history, past social history, past surgical history and problem list     Review of Systems   Constitutional: Negative for chills, fatigue and fever  HENT: Positive for ear pain and hearing loss  Negative for congestion, facial swelling, sore throat, trouble swallowing and voice change      Eyes: Negative for pain, discharge and visual disturbance  Respiratory: Negative for cough, shortness of breath and wheezing  Cardiovascular: Negative for chest pain, palpitations and leg swelling  Gastrointestinal: Negative for abdominal pain, blood in stool, constipation, diarrhea and nausea  Endocrine: Negative for polydipsia, polyphagia and polyuria  Genitourinary: Negative for difficulty urinating, hematuria and urgency  Musculoskeletal: Negative for arthralgias and myalgias  Skin: Negative for rash  Neurological: Negative for dizziness, tremors, weakness and headaches  Hematological: Negative for adenopathy  Does not bruise/bleed easily  Psychiatric/Behavioral: Negative for dysphoric mood, sleep disturbance and suicidal ideas  Objective:      /80 (BP Location: Left arm, Patient Position: Sitting, Cuff Size: Large)   Pulse 66   Temp (!) 97 4 °F (36 3 °C)   Resp 16   Ht 5' 10" (1 778 m)   Wt 131 kg (288 lb)   SpO2 97%   BMI 41 32 kg/m²          Physical Exam  Constitutional:       General: He is not in acute distress  HENT:      Head: Normocephalic  Mouth/Throat:      Pharynx: No oropharyngeal exudate  Eyes:      General: No scleral icterus  Conjunctiva/sclera: Conjunctivae normal       Pupils: Pupils are equal, round, and reactive to light  Neck:      Thyroid: No thyromegaly  Cardiovascular:      Rate and Rhythm: Normal rate and regular rhythm  Heart sounds: Normal heart sounds  No murmur heard  Pulmonary:      Effort: Pulmonary effort is normal  No respiratory distress  Breath sounds: Wheezing present  No rales  Abdominal:      General: Bowel sounds are normal  There is no distension  Palpations: Abdomen is soft  Tenderness: There is no abdominal tenderness  There is no guarding or rebound  Musculoskeletal:         General: No tenderness  Cervical back: Neck supple  Lymphadenopathy:      Cervical: No cervical adenopathy  Skin:     Coloration: Skin is not pale  Findings: No rash  Neurological:      Mental Status: He is alert and oriented to person, place, and time  Sensory: No sensory deficit  Motor: No weakness

## 2022-02-15 ENCOUNTER — HOSPITAL ENCOUNTER (OUTPATIENT)
Dept: NON INVASIVE DIAGNOSTICS | Facility: HOSPITAL | Age: 57
Discharge: HOME | End: 2022-02-15
Attending: INTERNAL MEDICINE
Payer: COMMERCIAL

## 2022-02-15 VITALS
HEIGHT: 70 IN | WEIGHT: 288 LBS | HEART RATE: 63 BPM | BODY MASS INDEX: 41.23 KG/M2 | SYSTOLIC BLOOD PRESSURE: 110 MMHG | DIASTOLIC BLOOD PRESSURE: 80 MMHG

## 2022-02-15 DIAGNOSIS — R06.02 SOB (SHORTNESS OF BREATH): ICD-10-CM

## 2022-02-15 DIAGNOSIS — I73.9 CLAUDICATION (HCC): ICD-10-CM

## 2022-02-15 DIAGNOSIS — I10 ESSENTIAL HYPERTENSION: ICD-10-CM

## 2022-02-15 PROCEDURE — 93925 LOWER EXTREMITY STUDY: CPT

## 2022-02-15 PROCEDURE — 93923 UPR/LXTR ART STDY 3+ LVLS: CPT

## 2022-02-15 PROCEDURE — 93925 LOWER EXTREMITY STUDY: CPT | Performed by: SURGERY

## 2022-02-15 PROCEDURE — 93922 UPR/L XTREMITY ART 2 LEVELS: CPT | Performed by: SURGERY

## 2022-02-15 PROCEDURE — 93306 TTE W/DOPPLER COMPLETE: CPT

## 2022-02-16 LAB
AORTIC ROOT: 3.8 CM
APICAL FOUR CHAMBER EJECTION FRACTION: 67 %
E WAVE DECELERATION TIME: 301 MS
FRACTIONAL SHORTENING: 36 % (ref 28–44)
INTERVENTRICULAR SEPTUM IN DIASTOLE (PARASTERNAL SHORT AXIS VIEW): 1.3 CM (ref 0.59–1.11)
INTERVENTRICULAR SEPTUM: 1.3 CM (ref 0.6–1.1)
LEFT ATRIUM AREA SYSTOLE SINGLE PLANE A4C: 25.8 CM2
LEFT ATRIUM SIZE: 5.3 CM
LEFT INTERNAL DIMENSION IN SYSTOLE: 3.5 CM (ref 2.1–4)
LEFT VENTRICULAR INTERNAL DIMENSION IN DIASTOLE: 5.5 CM (ref 15.12–22.55)
LEFT VENTRICULAR POSTERIOR WALL IN END DIASTOLE: 1.3 CM (ref 0.58–1.09)
LEFT VENTRICULAR STROKE VOLUME: 92 ML
LVSV (TEICH): 92 ML
MV E'TISSUE VEL-SEP: 6 CM/S
MV PEAK A VEL: 0.73 M/S
MV PEAK E VEL: 79 CM/S
MV STENOSIS PRESSURE HALF TIME: 0 MS
RIGHT ATRIUM AREA SYSTOLE A4C: 21.7 CM2
RIGHT VENTRICLE ID DIMENSION: 4.1 CM
SL CV LV EF: 62
SL CV PED ECHO LEFT VENTRICLE DIASTOLIC VOLUME (MOD BIPLANE) 2D: 145 ML
SL CV PED ECHO LEFT VENTRICLE SYSTOLIC VOLUME (MOD BIPLANE) 2D: 53 ML
TR MAX PG: 24 MMHG
TR PEAK VELOCITY: 2.4 M/S
TRICUSPID VALVE PEAK REGURGITATION VELOCITY: 2.44 M/S
Z-SCORE OF INTERVENTRICULAR SEPTUM IN END DIASTOLE: 3.41
Z-SCORE OF LEFT VENTRICULAR DIMENSION IN END SYSTOLE: -12.11
Z-SCORE OF LEFT VENTRICULAR POSTERIOR WALL IN END DIASTOLE: 3.52

## 2022-02-16 PROCEDURE — 93306 TTE W/DOPPLER COMPLETE: CPT | Performed by: INTERNAL MEDICINE

## 2022-02-22 ENCOUNTER — OFFICE VISIT (OUTPATIENT)
Dept: CARDIOLOGY CLINIC | Facility: CLINIC | Age: 57
End: 2022-02-22
Payer: COMMERCIAL

## 2022-02-22 VITALS
WEIGHT: 286.4 LBS | HEART RATE: 65 BPM | HEIGHT: 70 IN | OXYGEN SATURATION: 98 % | BODY MASS INDEX: 41 KG/M2 | DIASTOLIC BLOOD PRESSURE: 80 MMHG | SYSTOLIC BLOOD PRESSURE: 118 MMHG

## 2022-02-22 DIAGNOSIS — E11.8 TYPE II DIABETES MELLITUS WITH MANIFESTATIONS (HCC): ICD-10-CM

## 2022-02-22 DIAGNOSIS — E78.49 OTHER HYPERLIPIDEMIA: ICD-10-CM

## 2022-02-22 DIAGNOSIS — I25.10 CORONARY ARTERY DISEASE INVOLVING NATIVE CORONARY ARTERY OF NATIVE HEART WITHOUT ANGINA PECTORIS: Primary | ICD-10-CM

## 2022-02-22 DIAGNOSIS — R06.02 SOB (SHORTNESS OF BREATH): ICD-10-CM

## 2022-02-22 DIAGNOSIS — I10 ESSENTIAL HYPERTENSION: ICD-10-CM

## 2022-02-22 DIAGNOSIS — I77.819 AORTIC ECTASIA (HCC): ICD-10-CM

## 2022-02-22 PROCEDURE — 3008F BODY MASS INDEX DOCD: CPT | Performed by: INTERNAL MEDICINE

## 2022-02-22 PROCEDURE — 99214 OFFICE O/P EST MOD 30 MIN: CPT | Performed by: INTERNAL MEDICINE

## 2022-02-22 RX ORDER — METOPROLOL SUCCINATE 25 MG/1
12.5 TABLET, EXTENDED RELEASE ORAL DAILY
Qty: 90 TABLET | Refills: 1 | Status: SHIPPED | OUTPATIENT
Start: 2022-02-22 | End: 2022-07-14 | Stop reason: SDUPTHER

## 2022-02-22 NOTE — PROGRESS NOTES
Cardiology Office Note  Brown Bale, MD Radene Sandifer, MD Lus Mustache, DO, MD Олег Mejia DO, Mary Medina DO, [facility] [place]  ----------------------------------------------------------------  [address] [address]    Moisés Kapoor 61 y o  male MRN: [id number]  Unit/Bed#:  Encounter: [id number]      History of Present Illness: It was a pleasure to see Moisés Kapoor in the office today for follow up CV evaluation  He has a past medical history of hypertension, dyslipidemia, morbid obesity, COPD, TAMARA and tobacco use  He established care with us in January 2022  Admits to his father having had coronary artery disease and family history of atrial fibrillation  He has been having dyspnea on exertion that has been somewhat progressive but also chronic over the course of the past couple months to years  He has smoked for over many years and has tried to quit in the past   Due to his smoking, primary care orders annual CT scans look at his lungs  On recent CT scan, he was found to have severe coronary calcifications  He was ordered an exercise stress test that was found to be negative for myocardial ischemia, but the test occurred in extremely short period of time and was stopped early due to his inability to walk on a treadmill  He denies any chest pain, pressure, tightness or squeezing  Prior to being told by us severe heart artery disease, he was not aware of any significant cardiovascular disease  Due to these findings and his symptoms of shortness of breath, he was sent for echocardiogram and CTA of the coronary arteries  He also experience discomfort in his lower extremities  He was sent for vascular ultrasound of the lower extremities  He is here today to discuss the results  He denies any chest pain, pressure, tightness or squeezing    He can climb a flight of stairs without significant chest discomfort or shortness of breath  Denies lower extremity swelling, orthopnea or paroxysmal nocturnal dyspnea  Denies lightheadedness, dizziness or palpitations  He does have some chronic dyspnea on exertion that he has had for which he reports is many years now related to his smoking  Review of Systems:  Review of Systems   Constitutional: Negative for decreased appetite, fever, weight gain and weight loss  HENT: Negative for congestion and sore throat  Eyes: Negative for visual disturbance  Cardiovascular: Positive for dyspnea on exertion  Negative for chest pain, claudication, leg swelling, near-syncope and palpitations  Respiratory: Positive for shortness of breath  Negative for cough  Hematologic/Lymphatic: Negative for bleeding problem  Skin: Negative for rash  Musculoskeletal: Negative for myalgias and neck pain  Gastrointestinal: Negative for abdominal pain and nausea  Neurological: Negative for light-headedness and weakness  Psychiatric/Behavioral: Negative for depression         Past Medical History:   Diagnosis Date    Anxiety     Arthritis     Bipolar disorder ([facility] )     Colon polyp     [facility]chronic obstructive pulmonary disease) [facility])     Depression     Diabetes mellitus ([facility])     type 2    Gout     High triglycerides     Hypertension     Hypothyroidism 02/20/2013    Obesity     Paranoid schizophrenia ([facility] )     Psychiatric disorder     Sciatica 12/03/2013    Seasonal allergies     Sleep apnea        Past Surgical History:   Procedure Laterality Date    COLONOSCOPY      UMBILICAL HERNIA REPAIR      WISDOM TOOTH EXTRACTION         Social History     Socioeconomic History    Marital status: Single     Spouse name: Not on file    Number of children: Not on file    Years of education: Not on file    Highest education level: Not on file   Occupational History    Not on file   Tobacco Use    Smoking status: Current Every Day Smoker     Packs/day: 2 00     Years: 35 00     Pack years: 70 00     Types: Cigarettes    Smokeless tobacco: Never Used   Vaping Use    Vaping Use: Never used   Substance and Sexual Activity    Alcohol use: No    Drug use: No    Sexual activity: Not Currently     Partners: Female   Other Topics Concern    Not on file   Social History Narrative    Not on file     Social Determinants of Health     Financial Resource Strain: Low Risk     Difficulty of Paying Living Expenses: Not hard at all   Food Insecurity: No Food Insecurity    Worried About Running Out of Food in the Last Year: Never true    Sg of Food in the Last Year: Never true   Transportation Needs: No Transportation Needs    Lack of Transportation (Medical): No    Lack of Transportation (Non-Medical):  No   Physical Activity: Not on file   Stress: Not on file   Social Connections: Not on file   Intimate Partner Violence: Not on file   Housing Stability: Not on file       Family History   Problem Relation Age of Onset    Colon cancer Mother     Coronary artery disease Father     Hyperlipidemia Father     Coronary artery disease Family        Allergies   Allergen Reactions    No Active Allergies          Current Outpatient Medications:     amLODIPine (NORVASC) 5 mg tablet, Take 1 tablet (5 mg total) by mouth daily, Disp: 90 tablet, Rfl: 3    atorvastatin (LIPITOR) 80 mg tablet, Take 1 tablet (80 mg total) by mouth daily with dinner, Disp: 90 tablet, Rfl: 1    cetirizine (ZyrTEC) 10 mg tablet, Take 1 tablet (10 mg total) by mouth daily In the evening, Disp: 30 tablet, Rfl: 1    cholecalciferol (VITAMIN D3) 1,000 units tablet, Take 1 tablet (1,000 Units total) by mouth daily, Disp: 90 tablet, Rfl: 3    FLUoxetine (PROzac) 20 mg capsule, , Disp: , Rfl:     fluticasone (FLONASE) 50 mcg/act nasal spray, 2 sprays into each nostril daily, Disp: 1 g, Rfl: 0    haloperidol (HALDOL) 5 mg tablet, 2 5 mg daily at bedtime , Disp: , Rfl: 0    levothyroxine (Synthroid) 100 mcg tablet, Take 1 tablet (100 mcg total) by mouth daily Please stop 88 mcg, Disp: 30 tablet, Rfl: 3    lisinopril (ZESTRIL) 10 mg tablet, Take 1 tablet (10 mg total) by mouth daily, Disp: 90 tablet, Rfl: 3    sodium chloride (OCEAN) 0 65 % nasal spray, 1 spray into each nostril 3 (three) times a day, Disp: 30 mL, Rfl: 1    celecoxib (CeleBREX) 200 mg capsule, Take 1 capsule (200 mg total) by mouth daily With food/Breakfast, Disp: 90 capsule, Rfl: 2    fenofibrate (TRICOR) 145 mg tablet, Take 1 tablet (145 mg total) by mouth daily with dinner, Disp: 90 tablet, Rfl: 3    metoprolol succinate (TOPROL-XL) 25 mg 24 hr tablet, Take 0 5 tablets (12 5 mg total) by mouth daily, Disp: 90 tablet, Rfl: 1    tobramycin-dexamethasone (TOBRADEX) ophthalmic suspension, USE 2-3 DROPS IN THE RIGHT EAR bid (Patient not taking: Reported on 2/22/2022 ), Disp: 5 mL, Rfl: 0    Vitals:    02/22/22 1055   BP: 118/80   Pulse: 65   SpO2: 98%   Weight: 130 kg (286 lb 6 4 oz)   Height: 5' 10" (1 778 m)       PHYSICAL EXAMINATION:  Gen: Awake, Alert, NAD   Head/eyes: AT/NC, pupils equal and round, Anicteric  ENT: mmm  Neck: Supple, No elevated JVP, trachea midline  Resp:  Decreased breath sounds bilaterally  CV: RRR +S1, S2, No m/r/g  Abd: Soft, obese, NT/ND + BS  Ext: no LE edema bilaterally  Neuro: Follows commands, moves all extermities  Psych: Appropriate affect, happy mood, pleasant attitude, non-combative  Skin: warm; no rash, erythema or venous stasis changes on exposed skin    --------------------------------------------------------------------------------  TREADMILL STRESS  Results for orders placed during the hospital encounter of 12/08/21    Stress test only, exercise    Interpretation Summary    Stress ECG: The patient reached stage 1 0 of the protocol after exercising for 2 min and 52 sec and had a maximal HR of 181 bpm (111 % of MPHR) and 4 6 METS   The patient reported dyspnea and leg pain during the stress test  Onset of symptoms occurred at stage 1 of the protocol  Symptoms began during stress and ended during recovery  Blood pressure demonstrated a hypertensive response for the amount of exercise performed (systolic blood pressure increased by 72 mmHg with only 2 minutes of exercise) and heart rate demonstrated an exaggerated response to stress    Stress ECG: No ST deviation is noted  Arrhythmias during recovery: PACs in pairs  The ECG was negative for ischemia  The stress ECG is negative for ischemia  There were no ECG changes to suggest ischemia however the patient did have a significantly exaggerated blood pressure and heart rate response to exercise  Overall poor exercise tolerance/functional capacity  If clinical concern for cardiac ischemia still exists, would recommend repeating stress test with myocardial perfusion imaging such as with nuclear stress  --------------------------------------------------------------------------------  NUCLEAR STRESS TEST: No results found for this or any previous visit  No results found for this or any previous visit       --------------------------------------------------------------------------------  CATH:  No results found for this or any previous visit     --------------------------------------------------------------------------------  ECHO:   No results found for this or any previous visit  No results found for this or any previous visit     --------------------------------------------------------------------------------  HOLTER  No results found for this or any previous visit  No results found for this or any previous visit     --------------------------------------------------------------------------------  CAROTIDS  No results found for this or any previous visit      --------------------------------------------------------------------------------  ECGs:  No results found for this visit on 02/22/22       Lab Results   Component Value Date    WBC 6 60 11/23/2021    HGB 15 8 11/23/2021    HCT 46 2 11/23/2021    MCV 94 11/23/2021     11/23/2021      Lab Results   Component Value Date    SODIUM 134 (L) 01/18/2022    K 4 3 01/18/2022     01/18/2022    CO2 28 01/18/2022    BUN 17 01/18/2022    CREATININE 1 07 01/18/2022    GLUC 106 01/18/2022    CALCIUM 9 4 01/18/2022      Lab Results   Component Value Date    HGBA1C 6 1 02/01/2022      No results found for: CHOL  Lab Results   Component Value Date    HDL 38 (L) 11/23/2021    HDL 41 03/04/2021    HDL 37 (L) 08/27/2020     Lab Results   Component Value Date    LDLCALC 124 11/23/2021    LDLCALC 104 03/04/2021    LDLCALC 82 08/27/2020     Lab Results   Component Value Date    TRIG 175 (H) 11/23/2021    TRIG 184 (H) 03/04/2021    TRIG 174 (H) 08/27/2020     No results found for: [place], [place]   Lab Results   Component Value Date    INR 0 98 11/01/2021    PROTIME 12 6 11/01/2021        1  Coronary artery disease involving native coronary artery of native heart without angina pectoris  -     metoprolol succinate (TOPROL-XL) 25 mg 24 hr tablet; Take 0 5 tablets (12 5 mg total) by mouth daily    2  SOB (shortness of breath)    3  Essential hypertension  -     metoprolol succinate (TOPROL-XL) 25 mg 24 hr tablet; Take 0 5 tablets (12 5 mg total) by mouth daily    4  Type II diabetes mellitus with manifestations ([facility] )    5  Other hyperlipidemia    6  Aortic ectasia (HCC)  -     metoprolol succinate (TOPROL-XL) 25 mg 24 hr tablet;  Take 0 5 tablets (12 5 mg total) by mouth daily        IMPRESSION:   Shortness of breath with dyspnea on exertion   CAD w/ <50% p-mLAD (w/ some limitations in visualization), <50% LCx w/ diffuse mild-mod stenosis, small RCA <50% stenosis, January 2022   LVEF 62%, mild LVH, grade 2 diastolic dysfunction, mild biatrial dilatation, AV sclerosis, mild MAC, trace MR/TR w/ PASP 25-30 mmHg, aortic root ectasia 3 8 cm, February 2022    Leg pain   Hypertension   Dyslipidemia w/  mg/dL,  mg/dL, November 2021   Morbid obesity   COPD   Tobacco use    Exercise stress test negative for myocardial ischemia, ET 2:52, 111% MPHR, 4 6 METs, November 2021   Paranoid schizophrenia   TAMARA   Lung nodules    PLAN:  It was a pleasure to see Brittanie Dotson in the office today for initial CV evaluation  He is here today to review the results of his echocardiogram, lower extremity Doppler and CTA of the coronary arteries  The echocardiogram demonstrated normal left ventricular function with diastolic dysfunction  There is no significant valvular disease  He was noted to have aortic ectasia 3 8 cm  The lower extremity arterial Doppler was negative for arterial disease  The CT of the coronary arteries did demonstrate extensive coronary calcifications, but no evidence of obstructive disease of greater than 50%  He did have an area in the proximal to mid LAD with some limitation in evaluating the extensiveness this particular lesion  Currently, he has no symptoms of chest discomfort concerning for angina  He feels similar to how he has in the past several years without significant change  He examines to be euvolemic without signs of heart failure  Blood pressure and heart rate are currently stable  He has been tolerating his current medications without any reported adverse effects  Based on his clinical presentation, the following recommendations:    1  Due to his aortic ectasia, was started on Toprol-XL 12 5 mg daily  If he has any adverse effects on the medication, recommend discontinuing medication and calling the office  Risks and benefits discussed  2  We have discussed his CTA and area of poor visualization  We discussed the possibility of cardiac catheterization versus continue with current therapy  As he states he has been feeling well without any changes compared to his baseline shortness of breath, we will hold off for now on additional testing    Should he have any changes in his symptoms, to call the office for additional testing  3  Tobacco cessation have revised to prevent progression of obstructive coronary disease  4  Continue amlodipine and lisinopril for antihypertensive control  5  Should he have any significant symptoms of shortness of breath or chest discomfort, recommend seeking immediate medical attention/dial 911  6  We will follow up with him in 6 months re-evaluate his symptoms  As always, please do not hesitate to call with any questions  Portions of the record may have been created with voice recognition software  Occasional wrong word or "sound a like" substitutions may have occurred due to the inherent limitations of voice recognition software  Read the chart carefully and recognize, using context, where substitutions have occurred        Signed: Tina Morrow DO, Clary Chamber

## 2022-03-07 ENCOUNTER — TELEPHONE (OUTPATIENT)
Dept: CARDIOLOGY CLINIC | Facility: CLINIC | Age: 57
End: 2022-03-07

## 2022-03-07 NOTE — TELEPHONE ENCOUNTER
Pt is calling started on metoprolol  States has been having terrible nightmares and increased depression (is on medication for depression) held metoprolol last PM and slept better Please advise

## 2022-04-12 DIAGNOSIS — E78.49 OTHER HYPERLIPIDEMIA: ICD-10-CM

## 2022-04-12 RX ORDER — ATORVASTATIN CALCIUM 80 MG/1
80 TABLET, FILM COATED ORAL
Qty: 90 TABLET | Refills: 1 | Status: SHIPPED | OUTPATIENT
Start: 2022-04-12 | End: 2022-07-14 | Stop reason: SDUPTHER

## 2022-04-26 ENCOUNTER — RA CDI HCC (OUTPATIENT)
Dept: OTHER | Facility: HOSPITAL | Age: 57
End: 2022-04-26

## 2022-04-26 NOTE — PROGRESS NOTES
[facility]  coding opportunities          Chart Reviewed number of suggestions sent to Provider: 1     Patients Insurance     Medicare Insurance: [employer]          [id number]

## 2022-05-04 ENCOUNTER — VBI (OUTPATIENT)
Dept: ADMINISTRATIVE | Facility: OTHER | Age: 57
End: 2022-05-04

## 2022-05-12 ENCOUNTER — APPOINTMENT (OUTPATIENT)
Dept: LAB | Age: 57
End: 2022-05-12
Payer: COMMERCIAL

## 2022-05-12 DIAGNOSIS — E11.69 HYPERLIPIDEMIA ASSOCIATED WITH TYPE 2 DIABETES MELLITUS (HCC): ICD-10-CM

## 2022-05-12 DIAGNOSIS — R73.09 ELEVATED HEMOGLOBIN A1C: ICD-10-CM

## 2022-05-12 DIAGNOSIS — E78.5 HYPERLIPIDEMIA ASSOCIATED WITH TYPE 2 DIABETES MELLITUS (HCC): ICD-10-CM

## 2022-05-12 DIAGNOSIS — E06.3 HYPOTHYROIDISM DUE TO HASHIMOTO'S THYROIDITIS: ICD-10-CM

## 2022-05-12 DIAGNOSIS — E03.8 HYPOTHYROIDISM DUE TO HASHIMOTO'S THYROIDITIS: ICD-10-CM

## 2022-05-12 DIAGNOSIS — Z12.5 SCREENING FOR PROSTATE CANCER: ICD-10-CM

## 2022-05-12 LAB
ALBUMIN SERPL BCP-MCNC: 3.8 G/DL (ref 3.5–5)
ALP SERPL-CCNC: 84 U/L (ref 46–116)
ALT SERPL W P-5'-P-CCNC: 35 U/L (ref 12–78)
ANION GAP SERPL CALCULATED.3IONS-SCNC: 5 MMOL/L (ref 4–13)
AST SERPL W P-5'-P-CCNC: 16 U/L (ref 5–45)
BASOPHILS # BLD AUTO: 0.08 THOUSANDS/ΜL (ref 0–0.1)
BASOPHILS NFR BLD AUTO: 1 % (ref 0–1)
BILIRUB SERPL-MCNC: 0.64 MG/DL (ref 0.2–1)
BUN SERPL-MCNC: 15 MG/DL (ref 5–25)
CALCIUM SERPL-MCNC: 9.5 MG/DL (ref 8.3–10.1)
CHLORIDE SERPL-SCNC: 102 MMOL/L (ref 100–108)
CHOLEST SERPL-MCNC: 117 MG/DL
CO2 SERPL-SCNC: 27 MMOL/L (ref 21–32)
CREAT SERPL-MCNC: 1.01 MG/DL (ref 0.6–1.3)
EOSINOPHIL # BLD AUTO: 0.21 THOUSAND/ΜL (ref 0–0.61)
EOSINOPHIL NFR BLD AUTO: 3 % (ref 0–6)
ERYTHROCYTE [DISTWIDTH] IN BLOOD BY AUTOMATED COUNT: 11.9 % (ref 11.6–15.1)
EST. AVERAGE GLUCOSE BLD GHB EST-MCNC: 120 MG/DL
GFR SERPL CREATININE-BSD FRML MDRD: 81 ML/MIN/1.73SQ M
GLUCOSE P FAST SERPL-MCNC: 107 MG/DL (ref 65–99)
HBA1C MFR BLD: 5.8 %
HCT VFR BLD AUTO: 45.4 % (ref 36.5–49.3)
HDLC SERPL-MCNC: 47 MG/DL
HGB BLD-MCNC: 14.9 G/DL (ref 12–17)
IMM GRANULOCYTES # BLD AUTO: 0.02 THOUSAND/UL (ref 0–0.2)
IMM GRANULOCYTES NFR BLD AUTO: 0 % (ref 0–2)
LDLC SERPL CALC-MCNC: 49 MG/DL (ref 0–100)
LYMPHOCYTES # BLD AUTO: 2.03 THOUSANDS/ΜL (ref 0.6–4.47)
LYMPHOCYTES NFR BLD AUTO: 25 % (ref 14–44)
MAGNESIUM SERPL-MCNC: 2.4 MG/DL (ref 1.6–2.6)
MCH RBC QN AUTO: 31.1 PG (ref 26.8–34.3)
MCHC RBC AUTO-ENTMCNC: 32.8 G/DL (ref 31.4–37.4)
MCV RBC AUTO: 95 FL (ref 82–98)
MONOCYTES # BLD AUTO: 1.02 THOUSAND/ΜL (ref 0.17–1.22)
MONOCYTES NFR BLD AUTO: 13 % (ref 4–12)
NEUTROPHILS # BLD AUTO: 4.76 THOUSANDS/ΜL (ref 1.85–7.62)
NEUTS SEG NFR BLD AUTO: 58 % (ref 43–75)
NONHDLC SERPL-MCNC: 70 MG/DL
NRBC BLD AUTO-RTO: 0 /100 WBCS
PLATELET # BLD AUTO: 252 THOUSANDS/UL (ref 149–390)
PMV BLD AUTO: 9.9 FL (ref 8.9–12.7)
POTASSIUM SERPL-SCNC: 4.7 MMOL/L (ref 3.5–5.3)
PROT SERPL-MCNC: 7.6 G/DL (ref 6.4–8.2)
PSA SERPL-MCNC: 0.3 NG/ML (ref 0–4)
RBC # BLD AUTO: 4.79 MILLION/UL (ref 3.88–5.62)
SODIUM SERPL-SCNC: 134 MMOL/L (ref 136–145)
T4 FREE SERPL-MCNC: 1.03 NG/DL (ref 0.76–1.46)
TRIGL SERPL-MCNC: 104 MG/DL
TSH SERPL DL<=0.05 MIU/L-ACNC: 2.91 UIU/ML (ref 0.45–4.5)
WBC # BLD AUTO: 8.12 THOUSAND/UL (ref 4.31–10.16)

## 2022-05-12 PROCEDURE — G0103 PSA SCREENING: HCPCS

## 2022-05-12 PROCEDURE — 80061 LIPID PANEL: CPT

## 2022-05-12 PROCEDURE — 36415 COLL VENOUS BLD VENIPUNCTURE: CPT

## 2022-05-12 PROCEDURE — 85025 COMPLETE CBC W/AUTO DIFF WBC: CPT

## 2022-05-12 PROCEDURE — 83735 ASSAY OF MAGNESIUM: CPT

## 2022-05-12 PROCEDURE — 84439 ASSAY OF FREE THYROXINE: CPT

## 2022-05-12 PROCEDURE — 80053 COMPREHEN METABOLIC PANEL: CPT

## 2022-05-12 PROCEDURE — 84443 ASSAY THYROID STIM HORMONE: CPT

## 2022-05-12 PROCEDURE — 83036 HEMOGLOBIN GLYCOSYLATED A1C: CPT

## 2022-06-22 ENCOUNTER — VBI (OUTPATIENT)
Dept: ADMINISTRATIVE | Facility: OTHER | Age: 57
End: 2022-06-22

## 2022-06-22 NOTE — TELEPHONE ENCOUNTER
06/22/22 2:08 PM     See documentation in the VB [employer] SmartForm       Raisa Santos MA Problem: Mobility Impaired (Adult and Pediatric)  Goal: *Acute Goals and Plan of Care (Insert Text)  Description  LTG:  (1.)Mr. Marium Lyon will move from supine to sit and sit to supine , scoot up and down and roll side to side in bed with SUPERVISION within 7 treatment day(s). (2.)Mr. Marium Lyon will transfer from bed to chair and chair to bed with MINIMAL ASSIST using the least restrictive device within 7 treatment day(s). (3.)Mr. Marium Lyon will ambulate with MINIMAL ASSIST for >or=25 feet with the least restrictive device, appropriate gait pattern and fair dynamic standing balance within 7 treatment day(s). (4.)Mr. Marium Lyon will perform B LE therapeutic exercises x 20 reps with SUPERVISION within 7 days to increase strength for improved safety and independence in transfers and gait.  ________________________________________________________________________________________________     Outcome: Progressing Towards Goal      PHYSICAL THERAPY: Daily Note and AM 11/5/2019  INPATIENT: PT Visit Days : 2  [place] / [place] B / Product Type: Medicare /       NAME/AGE/GENDER: Audrey Marinelli is a 79 y.o. male   PRIMARY DIAGNOSIS: HCAP (healthcare-associated pneumonia) [J18.9] Acute on chronic respiratory failure with hypoxia ([address]   Acute on chronic respiratory failure with hypoxia ([address]         ICD-10: Treatment Diagnosis:    · Generalized Muscle Weakness (M62.81)  · Difficulty in walking, Not elsewhere classified (R26.2)   Precaution/Allergies:  Patient has no known allergies. ASSESSMENT:     Mr. Marium Lyon presents sitting up in the chair and happy to participate. He performed seated exercises below with short rest breaks as needed. He appeared and reported only mild SOB with this activity. LTG #4 met. This section established at most recent assessment   PROBLEM LIST (Impairments causing functional limitations):  1. Decreased Strength  2.  Decreased ADL/Functional Activities  3. Decreased Transfer Abilities  4. Decreased Ambulation Ability/Technique  5. Decreased Balance  6. Decreased Activity Tolerance  7. Decreased Pacing Skills  8. Increased Shortness of Breath  9. Decreased [place] with Home Exercise Program   INTERVENTIONS PLANNED: (Benefits and precautions of physical therapy have been discussed with the patient.)  1. Balance Exercise  2. Bed Mobility  3. Gait Training  4. Home Exercise Program (HEP)  5. Therapeutic Activites  6. Therapeutic Exercise/Strengthening  7. Transfer Training     TREATMENT PLAN: Frequency/Duration: 3 times a week for duration of hospital stay  Rehabilitation Potential For Stated Goals: Good     REHAB RECOMMENDATIONS (at time of discharge pending progress):    Placement: It is my opinion, based on this patient's performance to date, that Mr. Carolina Daugherty may benefit from intensive therapy at a [facility] after discharge due to the functional deficits listed above that are likely to improve with skilled rehabilitation and concerns that he/she may be unsafe to be unsupervised at home due to high fall risk and low tolerance for physical activity . Equipment:    None at this time              HISTORY:   History of Present Injury/Illness (Reason for Referral):  Per chart: Patient is an 80yo M with a history of COPD (2L NC dependant), HTN, VT (on amiodarone), recurrent pleural effusions and pleurodesis who was recently admitted for HCAP 9/1-9/9 and now presents with fatigue, subjective fever, cough for two days. Much less alert and interactive with family, but now mentating more at baseline. Found to have leukocytosis and signs PNA on CXR. No fever, hemodynamically stable. Increased oxygen requirement to 6L.    Past Medical History/Comorbidities:   Mr. Carolina Daugherty  has a past medical history of Abdominal aortic aneurysm ([address] (12/10/2015), Abdominal aortic aneurysm without rupture ([address] Allergic rhinitis (12/10/2015), Asthma, Benign neoplasm, Benign prostatic hyperplasia (12/10/2015), BPH without urinary obstruction, Cardiovascular disease (12/10/2015), Chronic obstructive asthma with exacerbation ([facility] (12/10/2015), COPD (chronic obstructive pulmonary disease) ([facility] (12/10/2015), Cough with hemoptysis, Erectile dysfunction (12/10/2015), Essential hypertension, benign (12/10/2015), Fracture (10/2014), History of colon polyps, Low testosterone (12/10/2015), Lumbago, Memory loss, Overflow incontinence, Raynaud's syndrome (12/10/2015), Rotator cuff tendinitis, Thrombocytopenia ([facility] and Urinary frequency. Mr. Asmita Jerez  has a past surgical history that includes hx hernia repair (1980); hx colonoscopy; hx fracture tx (10/2014); hx cataract removal (6/2016-right); and hx orthopaedic (03/2018). Social History/Living Environment:   Home Environment: [address] [place] [address] Name: [facility]  # Steps to Enter: 0  Rails to Enter: No  One/Two Story Residence: One story  Living Alone: No  Support Systems: Skilled nursing facility, Family member(s), Spouse/Significant Other/Partner  Patient Expects to be Discharged to[de-identified] Skilled nursing facility  Current DME Used/Available at Home: Walker, rolling  Prior Level of Function/Work/Activity:  Pt has been at [facility] following hip surgery and apparently has been receiving PT services. Pt is unsure of what he has been working on in therapy and admits to spending most of his days in bed or in a wheelchair. Unsure of living situation prior to [facility]. Personal Factors:          Sex:  male        Age:  79 y.o.    Number of Personal Factors/Comorbidities that affect the Plan of Care: 1-2: MODERATE COMPLEXITY   EXAMINATION:   Most Recent Physical Functioning:   Gross Assessment:                  Posture:     Balance:    Bed Mobility:     Wheelchair Mobility:     Transfers:  Sit to Stand: Contact guard assistance;Minimum assistance  Stand to Sit: Contact guard assistance  Gait:            Body Structures Involved:  1. Lungs  2. Bones  3. Joints  4. Muscles Body Functions Affected:  1. Respiratory  2. Neuromusculoskeletal  3. Movement Related Activities and Participation Affected:  1. General Tasks and Demands  2. Mobility  3. Self Care   Number of elements that affect the Plan of Care: 4+: HIGH COMPLEXITY   CLINICAL PRESENTATION:   Presentation: Evolving clinical presentation with changing clinical characteristics: MODERATE COMPLEXITY   CLINICAL DECISION MAKIN [facility] Mobility Inpatient Short Form  How much difficulty does the patient currently have. .. Unable A Lot A Little None   1. Turning over in bed (including adjusting bedclothes, sheets and blankets)? ? 1   ? 2   ? 3   ? 4   2. Sitting down on and standing up from a chair with arms ( e.g., wheelchair, bedside commode, etc.)   ? 1   ? 2   ? 3   ? 4   3. Moving from lying on back to sitting on the side of the bed?   ? 1   ? 2   ? 3   ? 4   How much help from another person does the patient currently need. .. Total A Lot A Little None   4. Moving to and from a bed to a chair (including a wheelchair)? ? 1   ? 2   ? 3   ? 4   5. Need to walk in hospital room? ? 1   ? 2   ? 3   ? 4   6. Climbing 3-5 steps with a railing? ? 1   ? 2   ? 3   ? 4   © , Trustees of [address] [place] [address] [address] [address], under license to [employer]. All rights reserved      Score:  Initial: 16 Most Recent: X (Date: -- )    Interpretation of Tool:  Represents activities that are increasingly more difficult (i.e. Bed mobility, Transfers, Gait). Medical Necessity:     · Patient demonstrates good  ·  rehab potential due to higher previous functional level. Reason for Services/Other Comments:  · Patient continues to require present interventions due to patient's inability to function at baseline   · .    Use of outcome tool(s) and clinical judgement create a POC that gives a: Questionable prediction of patient's progress: MODERATE COMPLEXITY TREATMENT:   (In addition to Assessment/Re-Assessment sessions the following treatments were rendered)   Pre-treatment Symptoms/Complaints:  No complaints  Pain: Initial:   Pain Intensity 1: 0  Post Session:  0/10     Therapeutic Exercise: (25 Minutes):  Exercises per grid below to improve mobility and strength. Required minimal visual and verbal cues to promote proper body mechanics. Progressed complexity of movement as indicated. Date:  11/5/19 Date:   Date:     Activity/Exercise Parameters Parameters Parameters   Seated TKE 20x2 B     Seated marching 20x2 B     Seated hip abd 20x2 B     Seated toe taps/heel taps 10x5 B     Sit to stand  5x                       Braces/Orthotics/Lines/Etc:   · O2 Device: Hi flow nasal cannula  Treatment/Session Assessment:    · Response to Treatment:  Tolerated well  · Interdisciplinary Collaboration:   o Physical Therapy Assistant  o Registered Nurse  · After treatment position/precautions:   o Up in chair  o Bed alarm/tab alert on  o Bed/Chair-wheels locked  o Bed in low position  o Call light within reach   · Compliance with Program/Exercises: Compliant all of the time  · Recommendations/Intent for next treatment session: \"Next visit will focus on advancements to more challenging activities and reduction in assistance provided\".   Total Treatment Duration:  PT Patient Time In/Time Out  Time In: 1100  Time Out: 1125    [place], PTA

## 2022-06-24 ENCOUNTER — TELEPHONE (OUTPATIENT)
Dept: FAMILY MEDICINE CLINIC | Facility: CLINIC | Age: 57
End: 2022-06-24

## 2022-06-24 DIAGNOSIS — N39.0 ACUTE UTI: Primary | ICD-10-CM

## 2022-06-24 RX ORDER — CIPROFLOXACIN 500 MG/1
500 TABLET, FILM COATED ORAL EVERY 12 HOURS SCHEDULED
Qty: 10 TABLET | Refills: 0 | Status: SHIPPED | OUTPATIENT
Start: 2022-06-24 | End: 2022-06-29

## 2022-06-28 DIAGNOSIS — M19.90 ARTHRITIS: ICD-10-CM

## 2022-06-28 RX ORDER — CELECOXIB 200 MG/1
CAPSULE ORAL
Qty: 90 CAPSULE | Refills: 2 | Status: SHIPPED | OUTPATIENT
Start: 2022-06-28

## 2022-07-14 ENCOUNTER — OFFICE VISIT (OUTPATIENT)
Dept: FAMILY MEDICINE CLINIC | Facility: CLINIC | Age: 57
End: 2022-07-14
Payer: COMMERCIAL

## 2022-07-14 VITALS
RESPIRATION RATE: 15 BRPM | TEMPERATURE: 97.7 F | HEIGHT: 70 IN | WEIGHT: 285 LBS | OXYGEN SATURATION: 92 % | BODY MASS INDEX: 40.8 KG/M2 | DIASTOLIC BLOOD PRESSURE: 78 MMHG | HEART RATE: 64 BPM | SYSTOLIC BLOOD PRESSURE: 118 MMHG

## 2022-07-14 DIAGNOSIS — E03.8 HYPOTHYROIDISM DUE TO HASHIMOTO'S THYROIDITIS: ICD-10-CM

## 2022-07-14 DIAGNOSIS — I77.819 AORTIC ECTASIA (HCC): ICD-10-CM

## 2022-07-14 DIAGNOSIS — J01.90 ACUTE SINUSITIS, RECURRENCE NOT SPECIFIED, UNSPECIFIED LOCATION: ICD-10-CM

## 2022-07-14 DIAGNOSIS — E06.3 HYPOTHYROIDISM DUE TO HASHIMOTO'S THYROIDITIS: ICD-10-CM

## 2022-07-14 DIAGNOSIS — I25.10 CORONARY ARTERY DISEASE INVOLVING NATIVE CORONARY ARTERY OF NATIVE HEART WITHOUT ANGINA PECTORIS: ICD-10-CM

## 2022-07-14 DIAGNOSIS — F17.210 CIGARETTE SMOKER: ICD-10-CM

## 2022-07-14 DIAGNOSIS — I10 ESSENTIAL HYPERTENSION: ICD-10-CM

## 2022-07-14 DIAGNOSIS — R91.8 LUNG NODULES: ICD-10-CM

## 2022-07-14 DIAGNOSIS — F32.89 OTHER DEPRESSION: Primary | ICD-10-CM

## 2022-07-14 DIAGNOSIS — E11.9 TYPE 2 DIABETES MELLITUS WITHOUT COMPLICATION, UNSPECIFIED WHETHER LONG TERM INSULIN USE (HCC): ICD-10-CM

## 2022-07-14 DIAGNOSIS — E78.49 OTHER HYPERLIPIDEMIA: ICD-10-CM

## 2022-07-14 PROCEDURE — 3074F SYST BP LT 130 MM HG: CPT | Performed by: INTERNAL MEDICINE

## 2022-07-14 PROCEDURE — 99214 OFFICE O/P EST MOD 30 MIN: CPT | Performed by: INTERNAL MEDICINE

## 2022-07-14 PROCEDURE — 3078F DIAST BP <80 MM HG: CPT | Performed by: INTERNAL MEDICINE

## 2022-07-14 PROCEDURE — 4010F ACE/ARB THERAPY RXD/TAKEN: CPT | Performed by: INTERNAL MEDICINE

## 2022-07-14 RX ORDER — LEVOTHYROXINE SODIUM 0.1 MG/1
100 TABLET ORAL DAILY
Qty: 90 TABLET | Refills: 3 | Status: SHIPPED | OUTPATIENT
Start: 2022-07-14

## 2022-07-14 RX ORDER — CETIRIZINE HYDROCHLORIDE 10 MG/1
10 TABLET ORAL DAILY
Qty: 90 TABLET | Refills: 3 | Status: SHIPPED | OUTPATIENT
Start: 2022-07-14

## 2022-07-14 RX ORDER — METOPROLOL SUCCINATE 25 MG/1
12.5 TABLET, EXTENDED RELEASE ORAL DAILY
Qty: 90 TABLET | Refills: 3 | Status: SHIPPED | OUTPATIENT
Start: 2022-07-14 | End: 2022-10-27

## 2022-07-14 RX ORDER — AMLODIPINE BESYLATE 5 MG/1
5 TABLET ORAL DAILY
Qty: 90 TABLET | Refills: 3 | Status: SHIPPED | OUTPATIENT
Start: 2022-07-14

## 2022-07-14 RX ORDER — FENOFIBRATE 145 MG/1
145 TABLET, COATED ORAL
Qty: 90 TABLET | Refills: 3 | Status: SHIPPED | OUTPATIENT
Start: 2022-07-14

## 2022-07-14 RX ORDER — FLUOXETINE HYDROCHLORIDE 20 MG/1
20 CAPSULE ORAL DAILY
Qty: 90 CAPSULE | Refills: 3 | Status: SHIPPED | OUTPATIENT
Start: 2022-07-14

## 2022-07-14 RX ORDER — ATORVASTATIN CALCIUM 80 MG/1
80 TABLET, FILM COATED ORAL
Qty: 90 TABLET | Refills: 3 | Status: SHIPPED | OUTPATIENT
Start: 2022-07-14 | End: 2022-10-27

## 2022-07-14 RX ORDER — LISINOPRIL 10 MG/1
10 TABLET ORAL DAILY
Qty: 90 TABLET | Refills: 3 | Status: SHIPPED | OUTPATIENT
Start: 2022-07-14

## 2022-07-14 NOTE — PROGRESS NOTES
Assessment/Plan:         Diagnoses and all orders for this visit:    Other depression; stable on :  -     FLUoxetine (PROzac) 20 mg capsule; Take 1 capsule (20 mg total) by mouth daily    Type 2 diabetes mellitus without complication, unspecified whether long term insulin use ([facility] ); continue same  RTC in 3 mos w Blood  work  -     Ambulatory Referral to Ophthalmology; Future    Essential hypertension  -     amLODIPine (NORVASC) 5 mg tablet; Take 1 tablet (5 mg total) by mouth daily  -     lisinopril (ZESTRIL) 10 mg tablet; Take 1 tablet (10 mg total) by mouth daily  -     metoprolol succinate (TOPROL-XL) 25 mg 24 hr tablet; Take 0 5 tablets (12 5 mg total) by mouth daily    Other hyperlipidemia  -     atorvastatin (LIPITOR) 80 mg tablet; Take 1 tablet (80 mg total) by mouth daily with dinner  -     fenofibrate (TRICOR) 145 mg tablet; Take 1 tablet (145 mg total) by mouth daily with dinner    Acute sinusitis, recurrence not specified, unspecified location  -     cetirizine (ZyrTEC) 10 mg tablet; Take 1 tablet (10 mg total) by mouth daily In the evening    Hypothyroidism due to Hashimoto's thyroiditis  -     levothyroxine (Synthroid) 100 mcg tablet; Take 1 tablet (100 mcg total) by mouth daily Please stop 88 mcg    Coronary artery disease involving native coronary artery of native heart without angina pectoris  -     metoprolol succinate (TOPROL-XL) 25 mg 24 hr tablet; Take 0 5 tablets (12 5 mg total) by mouth daily    Aortic ectasia (HCC)  -     metoprolol succinate (TOPROL-XL) 25 mg 24 hr tablet; Take 0 5 tablets (12 5 mg total) by mouth daily    Lung nodules    Cigarette smoker        Subjective:      Patient ID: Martha Jurado is a 61 y o  male  61 Y O Man is here for Regular check up, He still smokes, recent blood work and med list reviewed w  Pt,        The following portions of the patient's history were reviewed and updated as appropriate: allergies, current medications, past medical history, past social history, past surgical history and problem list     Review of Systems   Constitutional: Negative for chills, fatigue and fever  HENT: Negative for congestion, facial swelling, sore throat, trouble swallowing and voice change  Eyes: Negative for pain, discharge and visual disturbance  Respiratory: Negative for cough, shortness of breath and wheezing  Cardiovascular: Negative for chest pain, palpitations and leg swelling  Gastrointestinal: Negative for abdominal pain, blood in stool, constipation, diarrhea and nausea  Endocrine: Negative for polydipsia, polyphagia and polyuria  Genitourinary: Negative for difficulty urinating, hematuria and urgency  Musculoskeletal: Negative for arthralgias and myalgias  Skin: Negative for rash  Neurological: Negative for dizziness, tremors, weakness and headaches  Hematological: Negative for adenopathy  Does not bruise/bleed easily  Psychiatric/Behavioral: Negative for dysphoric mood, sleep disturbance and suicidal ideas  Objective:      /78 (BP Location: Left arm, Patient Position: Sitting, Cuff Size: Standard)   Pulse 64   Temp 97 7 °F (36 5 °C) (Tympanic)   Resp 15   Ht 5' 10" (1 778 m)   Wt 129 kg (285 lb)   SpO2 92%   BMI 40 89 kg/m²          Physical Exam  Constitutional:       General: He is not in acute distress  HENT:      Head: Normocephalic  Mouth/Throat:      Pharynx: No oropharyngeal exudate  Eyes:      General: No scleral icterus  Conjunctiva/sclera: Conjunctivae normal       Pupils: Pupils are equal, round, and reactive to light  Neck:      Thyroid: No thyromegaly  Cardiovascular:      Rate and Rhythm: Normal rate and regular rhythm  Heart sounds: Normal heart sounds  No murmur heard  Pulmonary:      Effort: Pulmonary effort is normal  No respiratory distress  Breath sounds: Normal breath sounds  No wheezing or rales     Abdominal:      General: Bowel sounds are normal  There is no distension  Palpations: Abdomen is soft  Tenderness: There is no abdominal tenderness  There is no guarding or rebound  Musculoskeletal:         General: No tenderness  Cervical back: Neck supple  Lymphadenopathy:      Cervical: No cervical adenopathy  Skin:     Coloration: Skin is not pale  Findings: No rash  Neurological:      Mental Status: He is alert and oriented to person, place, and time  Sensory: No sensory deficit  Motor: No weakness

## 2022-08-16 ENCOUNTER — VBI (OUTPATIENT)
Dept: ADMINISTRATIVE | Facility: OTHER | Age: 57
End: 2022-08-16

## 2022-09-01 ENCOUNTER — VBI (OUTPATIENT)
Dept: ADMINISTRATIVE | Facility: OTHER | Age: 57
End: 2022-09-01

## 2022-10-14 ENCOUNTER — RA CDI HCC (OUTPATIENT)
Dept: OTHER | Facility: HOSPITAL | Age: 57
End: 2022-10-14

## 2022-10-14 NOTE — PROGRESS NOTES
[facility] [[email or web address]] 75  coding opportunities       Chart reviewed, no opportunity found: CHART REVIEWED, NO OPPORTUNITY FOUND        Patients Insurance     Medicare Insurance: [employer] [facility] Advantage

## 2022-10-27 ENCOUNTER — OFFICE VISIT (OUTPATIENT)
Dept: FAMILY MEDICINE CLINIC | Facility: CLINIC | Age: 57
End: 2022-10-27
Payer: COMMERCIAL

## 2022-10-27 VITALS
HEART RATE: 76 BPM | DIASTOLIC BLOOD PRESSURE: 80 MMHG | OXYGEN SATURATION: 95 % | RESPIRATION RATE: 14 BRPM | HEIGHT: 70 IN | WEIGHT: 286.8 LBS | TEMPERATURE: 98.6 F | BODY MASS INDEX: 41.06 KG/M2 | SYSTOLIC BLOOD PRESSURE: 118 MMHG

## 2022-10-27 DIAGNOSIS — R91.8 LUNG NODULES: ICD-10-CM

## 2022-10-27 DIAGNOSIS — E78.5 HYPERLIPIDEMIA ASSOCIATED WITH TYPE 2 DIABETES MELLITUS (HCC): ICD-10-CM

## 2022-10-27 DIAGNOSIS — E06.3 HYPOTHYROIDISM DUE TO HASHIMOTO'S THYROIDITIS: ICD-10-CM

## 2022-10-27 DIAGNOSIS — Z00.01 ENCOUNTER FOR GENERAL ADULT MEDICAL EXAMINATION WITH ABNORMAL FINDINGS: Primary | ICD-10-CM

## 2022-10-27 DIAGNOSIS — F17.210 CIGARETTE SMOKER: ICD-10-CM

## 2022-10-27 DIAGNOSIS — Z23 FLU VACCINE NEED: ICD-10-CM

## 2022-10-27 DIAGNOSIS — E11.69 HYPERLIPIDEMIA ASSOCIATED WITH TYPE 2 DIABETES MELLITUS (HCC): ICD-10-CM

## 2022-10-27 DIAGNOSIS — E03.8 HYPOTHYROIDISM DUE TO HASHIMOTO'S THYROIDITIS: ICD-10-CM

## 2022-10-27 DIAGNOSIS — R73.09 ELEVATED HEMOGLOBIN A1C: ICD-10-CM

## 2022-10-27 LAB — SL AMB POCT HEMOGLOBIN AIC: 5.9 (ref ?–6.5)

## 2022-10-27 PROCEDURE — 99214 OFFICE O/P EST MOD 30 MIN: CPT | Performed by: INTERNAL MEDICINE

## 2022-10-27 PROCEDURE — 90682 RIV4 VACC RECOMBINANT DNA IM: CPT

## 2022-10-27 PROCEDURE — G0008 ADMIN INFLUENZA VIRUS VAC: HCPCS

## 2022-10-27 PROCEDURE — 83036 HEMOGLOBIN GLYCOSYLATED A1C: CPT | Performed by: INTERNAL MEDICINE

## 2022-10-27 PROCEDURE — G0439 PPPS, SUBSEQ VISIT: HCPCS | Performed by: INTERNAL MEDICINE

## 2022-10-27 NOTE — PROGRESS NOTES
Name: Yvone Gaucher      : 1965      MRN: [id number]  Encounter Provider: Walter Clarke MD  Encounter Date: 10/27/2022   Encounter department: [address] [place] [address]     1  Encounter for general adult medical examination with abnormal findings    2  Hyperlipidemia associated with type 2 diabetes mellitus ([address] )  -     Lipid panel; Future    3  Hypothyroidism due to Hashimoto's thyroiditis  -     TSH, 3rd generation; Future; Expected date: 10/27/2022  -     T4, free; Future; Expected date: 10/27/2022    4  Lung nodules  -     CT chest wo contrast; Future; Expected date: 10/27/2022    5  Cigarette smoker  -     CT chest wo contrast; Future; Expected date: 10/27/2022    6  Elevated hemoglobin A1c  -     POCT hemoglobin A1c  -     UA (URINE) with reflex to Scope; Future; Expected date: 2022  -     Comprehensive metabolic panel; Future  -     CBC and differential; Future  -     Magnesium; Future    7  Flu vaccine need  -     influenza vaccine, quadrivalent, recombinant, PF, 0 5 mL, for patients 18 yr+ (FLUBLOK)    AWV : done in Detail  Life style mod  Pt was advised to quit smoking  RTC in 3 mos w Blood  work       Subjective      61 Y O man is here for AWV and Regular check up, he still smokes, recent blood work and med list reviewed w Pt in detail, No New symptoms,  Review of Systems   Constitutional: Negative for chills, fatigue and fever  HENT: Negative for congestion, facial swelling, sore throat, trouble swallowing and voice change  Eyes: Negative for pain, discharge and visual disturbance  Respiratory: Negative for cough, shortness of breath and wheezing  Cardiovascular: Negative for chest pain, palpitations and leg swelling  Gastrointestinal: Negative for abdominal pain, blood in stool, constipation, diarrhea and nausea  Endocrine: Negative for polydipsia, polyphagia and polyuria     Genitourinary: Negative for difficulty urinating, hematuria and urgency  Musculoskeletal: Negative for arthralgias and myalgias  Skin: Negative for rash  Neurological: Negative for dizziness, tremors, weakness and headaches  Hematological: Negative for adenopathy  Does not bruise/bleed easily  Psychiatric/Behavioral: Negative for dysphoric mood, sleep disturbance and suicidal ideas  Current Outpatient Medications on File Prior to Visit   Medication Sig   • amLODIPine (NORVASC) 5 mg tablet Take 1 tablet (5 mg total) by mouth daily   • atorvastatin (LIPITOR) 80 mg tablet Take 1 tablet (80 mg total) by mouth daily with dinner   • celecoxib (CeleBREX) 200 mg capsule take 1 capsule by mouth once daily with food or breakfast   • cetirizine (ZyrTEC) 10 mg tablet Take 1 tablet (10 mg total) by mouth daily In the evening   • cholecalciferol (VITAMIN D3) 1,000 units tablet Take 1 tablet (1,000 Units total) by mouth daily   • fenofibrate (TRICOR) 145 mg tablet Take 1 tablet (145 mg total) by mouth daily with dinner   • FLUoxetine (PROzac) 20 mg capsule Take 1 capsule (20 mg total) by mouth daily   • fluticasone (FLONASE) 50 mcg/act nasal spray 2 sprays into each nostril daily   • haloperidol (HALDOL) 5 mg tablet 2 5 mg daily at bedtime    • levothyroxine (Synthroid) 100 mcg tablet Take 1 tablet (100 mcg total) by mouth daily Please stop 88 mcg   • lisinopril (ZESTRIL) 10 mg tablet Take 1 tablet (10 mg total) by mouth daily   • metoprolol succinate (TOPROL-XL) 25 mg 24 hr tablet Take 0 5 tablets (12 5 mg total) by mouth daily   • sodium chloride (OCEAN) 0 65 % nasal spray 1 spray into each nostril 3 (three) times a day       Objective     /80 (BP Location: Left arm, Patient Position: Sitting, Cuff Size: Large)   Pulse 76   Temp 98 6 °F (37 °C)   Resp 14   Ht 5' 10" (1 778 m)   Wt 130 kg (286 lb 12 8 oz)   SpO2 95%   BMI 41 15 kg/m²     Physical Exam  Constitutional:       General: He is not in acute distress  HENT:      Head: Normocephalic  Mouth/Throat:      Pharynx: No oropharyngeal exudate  Eyes:      General: No scleral icterus  Conjunctiva/sclera: Conjunctivae normal       Pupils: Pupils are equal, round, and reactive to light  Neck:      Thyroid: No thyromegaly  Cardiovascular:      Rate and Rhythm: Normal rate and regular rhythm  Heart sounds: Normal heart sounds  No murmur heard  Pulmonary:      Effort: Pulmonary effort is normal  No respiratory distress  Breath sounds: Wheezing present  No rales  Abdominal:      General: Bowel sounds are normal  There is no distension  Palpations: Abdomen is soft  Tenderness: There is no abdominal tenderness  There is no guarding or rebound  Musculoskeletal:         General: No tenderness  Cervical back: Neck supple  Lymphadenopathy:      Cervical: No cervical adenopathy  Skin:     Coloration: Skin is not pale  Findings: No rash  Neurological:      Mental Status: He is alert and oriented to person, place, and time  Sensory: No sensory deficit  Motor: No weakness         Katina Brady MD

## 2022-10-27 NOTE — PATIENT INSTRUCTIONS
Medicare Preventive Visit Patient Instructions  Thank you for completing your Welcome to Medicare Visit or Medicare Annual Wellness Visit today  Your next wellness visit will be due in one year (10/28/2023)  The screening/preventive services that you may require over the next 5-10 years are detailed below  Some tests may not apply to you based off risk factors and/or age  Screening tests ordered at today's visit but not completed yet may show as past due  Also, please note that scanned in results may not display below  Preventive Screenings:  Service Recommendations Previous Testing/Comments   Colorectal Cancer Screening  · Colonoscopy    · Fecal Occult Blood Test (FOBT)/Fecal Immunochemical Test (FIT)  · Fecal DNA/Cologuard Test  · Flexible Sigmoidoscopy Age: 39-70 years old   Colonoscopy: every 10 years (May be performed more frequently if at higher risk)  OR  FOBT/FIT: every 1 year  OR  Cologuard: every 3 years  OR  Sigmoidoscopy: every 5 years  Screening may be recommended earlier than age 39 if at higher risk for colorectal cancer  Also, an individualized decision between you and your healthcare provider will decide whether screening between the ages of 74-80 would be appropriate   Colonoscopy: 10/04/2019  FOBT/FIT: Not on file  Cologuard: 08/06/2020  Sigmoidoscopy: Not on file    Screening Current     Prostate Cancer Screening Individualized decision between patient and health care provider in men between ages of 53-78   Medicare will cover every 12 months beginning on the day after your 50th birthday PSA: 0 3 ng/mL     Screening Current     Hepatitis C Screening Once for adults born between 1945 and 1965  More frequently in patients at high risk for Hepatitis C Hep C Antibody: 02/10/2020    Screening Current   Diabetes Screening 1-2 times per year if you're at risk for diabetes or have pre-diabetes Fasting glucose: 107 mg/dL (5/12/2022)  A1C: 5 8 % (5/12/2022)  Screening Not Indicated  History Diabetes Cholesterol Screening Once every 5 years if you don't have a lipid disorder  May order more often based on risk factors  Lipid panel: 05/12/2022  Screening Not Indicated  History Lipid Disorder      Other Preventive Screenings Covered by Medicare:  1  Abdominal Aortic Aneurysm (AAA) Screening: covered once if your at risk  You're considered to be at risk if you have a family history of AAA or a male between the age of 73-68 who smoking at least 100 cigarettes in your lifetime  2  Lung Cancer Screening: covers low dose CT scan once per year if you meet all of the following conditions: (1) Age 50-69; (2) No signs or symptoms of lung cancer; (3) Current smoker or have quit smoking within the last 15 years; (4) You have a tobacco smoking history of at least 20 pack years (packs per day x number of years you smoked); (5) You get a written order from a healthcare provider  3  Glaucoma Screening: covered annually if you're considered high risk: (1) You have diabetes OR (2) Family history of glaucoma OR (3)  aged 48 and older OR (3)  American aged 72 and older  3  Osteoporosis Screening: covered every 2 years if you meet one of the following conditions: (1) Have a vertebral abnormality; (2) On glucocorticoid therapy for more than 3 months; (3) Have primary hyperparathyroidism; (4) On osteoporosis medications and need to assess response to drug therapy  5  HIV Screening: covered annually if you're between the age of 12-76  Also covered annually if you are younger than 13 and older than 72 with risk factors for HIV infection  For pregnant patients, it is covered up to 3 times per pregnancy      Immunizations:  Immunization Recommendations   Influenza Vaccine Annual influenza vaccination during flu season is recommended for all persons aged >= 6 months who do not have contraindications   Pneumococcal Vaccine   * Pneumococcal conjugate vaccine = PCV13 (Prevnar 13), PCV15 (Vaxneuvance), PCV20 (Prevnar 20)  * Pneumococcal polysaccharide vaccine = PPSV23 (Pneumovax) Adults 2364 years old: 1-3 doses may be recommended based on certain risk factors  Adults 72 years old: 1-2 doses may be recommended based off what pneumonia vaccine you previously received   Hepatitis B Vaccine 3 dose series if at intermediate or high risk (ex: diabetes, end stage renal disease, liver disease)   Tetanus (Td) Vaccine - COST NOT COVERED BY MEDICARE PART B Following completion of primary series, a booster dose should be given every 10 years to maintain immunity against tetanus  Td may also be given as tetanus wound prophylaxis  Tdap Vaccine - COST NOT COVERED BY MEDICARE PART B Recommended at least once for all adults  For pregnant patients, recommended with each pregnancy  Shingles Vaccine (Shingrix) - COST NOT COVERED BY MEDICARE PART B  2 shot series recommended in those aged 48 and above     Health Maintenance Due:      Topic Date Due   • Lung Cancer Screening  11/23/2022   • Colorectal Cancer Screening  10/04/2029   • HIV Screening  Completed   • Hepatitis C Screening  Completed     Immunizations Due:      Topic Date Due   • COVID-19 Vaccine (1) Never done   • Hepatitis A Vaccine (1 of 2 - Risk 2-dose series) Never done   • Hepatitis B Vaccine (1 of 3 - Risk 3-dose series) Never done   • Influenza Vaccine (1) 09/01/2022     Advance Directives   What are advance directives? Advance directives are legal documents that state your wishes and plans for medical care  These plans are made ahead of time in case you lose your ability to make decisions for yourself  Advance directives can apply to any medical decision, such as the treatments you want, and if you want to donate organs  What are the types of advance directives? There are many types of advance directives, and each state has rules about how to use them  You may choose a combination of any of the following:  · Living will:   This is a written record of the treatment you want  You can also choose which treatments you do not want, which to limit, and which to stop at a certain time  This includes surgery, medicine, IV fluid, and tube feedings  · Durable power of  for healthcare [facility]): This is a written record that states who you want to make healthcare choices for you when you are unable to make them for yourself  This person, called a proxy, is usually a family member or a friend  You may choose more than 1 proxy  · Do not resuscitate (DNR) order:  A DNR order is used in case your heart stops beating or you stop breathing  It is a request not to have certain forms of treatment, such as CPR  A DNR order may be included in other types of advance directives  · Medical directive: This covers the care that you want if you are in a coma, near death, or unable to make decisions for yourself  You can list the treatments you want for each condition  Treatment may include pain medicine, surgery, blood transfusions, dialysis, IV or tube feedings, and a ventilator (breathing machine)  · Values history: This document has questions about your views, beliefs, and how you feel and think about life  This information can help others choose the care that you would choose  Why are advance directives important? An advance directive helps you control your care  Although spoken wishes may be used, it is better to have your wishes written down  Spoken wishes can be misunderstood, or not followed  Treatments may be given even if you do not want them  An advance directive may make it easier for your family to make difficult choices about your care  Cigarette Smoking and Your Health   Risks to your health if you smoke:  Nicotine and other chemicals found in tobacco damage every cell in your body  Even if you are a light smoker, you have an increased risk for cancer, heart disease, and lung disease  If you are pregnant or have diabetes, smoking increases your risk for complications  Benefits to your health if you stop smoking:   · You decrease respiratory symptoms such as coughing, wheezing, and shortness of breath  · You reduce your risk for cancers of the lung, mouth, throat, kidney, bladder, pancreas, stomach, and cervix  If you already have cancer, you increase the benefits of chemotherapy  You also reduce your risk for cancer returning or a second cancer from developing  · You reduce your risk for heart disease, blood clots, heart attack, and stroke  · You reduce your risk for lung infections, and diseases such as pneumonia, asthma, chronic bronchitis, and emphysema  · Your circulation improves  More oxygen can be delivered to your body  If you have diabetes, you lower your risk for complications, such as kidney, artery, and eye diseases  You also lower your risk for nerve damage  Nerve damage can lead to amputations, poor vision, and blindness  · You improve your body's ability to heal and to fight infections  For more information and support to stop smoking:   · [place]  Phone: 2- 508 - [phone]  Web Address: [employer]  Weight Management   Why it is important to manage your weight:  Being overweight increases your risk of health conditions such as heart disease, high blood pressure, type 2 diabetes, and certain types of cancer  It can also increase your risk for osteoarthritis, sleep apnea, and other respiratory problems  Aim for a slow, steady weight loss  Even a small amount of weight loss can lower your risk of health problems  How to lose weight safely:  A safe and healthy way to lose weight is to eat fewer calories and get regular exercise  You can lose up about 1 pound a week by decreasing the number of calories you eat by 500 calories each day  Healthy meal plan for weight management:  A healthy meal plan includes a variety of foods, contains fewer calories, and helps you stay healthy   A healthy meal plan includes the following:  · Eat whole-grain foods more often  A healthy meal plan should contain fiber  Fiber is the part of grains, fruits, and vegetables that is not broken down by your body  Whole-grain foods are healthy and provide extra fiber in your diet  Some examples of whole-grain foods are whole-wheat breads and pastas, oatmeal, brown rice, and bulgur  · Eat a variety of vegetables every day  Include dark, leafy greens such as spinach, kale, digna greens, and mustard greens  Eat yellow and orange vegetables such as carrots, sweet potatoes, and winter squash  · Eat a variety of fruits every day  Choose fresh or canned fruit (canned in its own juice or light syrup) instead of juice  Fruit juice has very little or no fiber  · Eat low-fat dairy foods  Drink fat-free (skim) milk or 1% milk  Eat fat-free yogurt and low-fat cottage cheese  Try low-fat cheeses such as mozzarella and other reduced-fat cheeses  · Choose meat and other protein foods that are low in fat  Choose beans or other legumes such as split peas or lentils  Choose fish, skinless poultry (chicken or turkey), or lean cuts of red meat (beef or pork)  Before you cook meat or poultry, cut off any visible fat  · Use less fat and oil  Try baking foods instead of frying them  Add less fat, such as margarine, sour cream, regular salad dressing and mayonnaise to foods  Eat fewer high-fat foods  Some examples of high-fat foods include french fries, doughnuts, ice cream, and cakes  · Eat fewer sweets  Limit foods and drinks that are high in sugar  This includes candy, cookies, regular soda, and sweetened drinks  Exercise:  Exercise at least 30 minutes per day on most days of the week  Some examples of exercise include walking, biking, dancing, and swimming  You can also fit in more physical activity by taking the stairs instead of the elevator or parking farther away from stores  Ask your healthcare provider about the best exercise plan for you        © Copyright [employer] 2018 Information is for End User's use only and may not be sold, redistributed or otherwise used for commercial purposes   All illustrations and images included in CareNotes® are the copyrighted property of [employer]  or [facility] Nahum Vasquez

## 2022-10-31 ENCOUNTER — VBI (OUTPATIENT)
Dept: ADMINISTRATIVE | Facility: OTHER | Age: 57
End: 2022-10-31

## 2022-11-03 ENCOUNTER — OFFICE VISIT (OUTPATIENT)
Dept: CARDIOLOGY CLINIC | Facility: CLINIC | Age: 57
End: 2022-11-03

## 2022-11-03 VITALS
WEIGHT: 293.8 LBS | BODY MASS INDEX: 42.16 KG/M2 | HEART RATE: 80 BPM | SYSTOLIC BLOOD PRESSURE: 120 MMHG | DIASTOLIC BLOOD PRESSURE: 70 MMHG

## 2022-11-03 DIAGNOSIS — E66.01 MORBID OBESITY (HCC): ICD-10-CM

## 2022-11-03 DIAGNOSIS — I25.10 CORONARY ARTERY DISEASE INVOLVING NATIVE CORONARY ARTERY OF NATIVE HEART WITHOUT ANGINA PECTORIS: Primary | ICD-10-CM

## 2022-11-03 DIAGNOSIS — I77.819 AORTIC ECTASIA (HCC): ICD-10-CM

## 2022-11-03 DIAGNOSIS — E78.5 DYSLIPIDEMIA: ICD-10-CM

## 2022-11-03 DIAGNOSIS — R73.03 PRE-DIABETES: ICD-10-CM

## 2022-11-03 DIAGNOSIS — I10 ESSENTIAL HYPERTENSION: ICD-10-CM

## 2022-11-03 RX ORDER — METOPROLOL SUCCINATE 25 MG/1
25 TABLET, EXTENDED RELEASE ORAL DAILY
Qty: 90 TABLET | Refills: 2 | Status: SHIPPED | OUTPATIENT
Start: 2022-11-03 | End: 2022-11-08 | Stop reason: SINTOL

## 2022-11-03 NOTE — PROGRESS NOTES
Cardiology Office Note  MD Devan Jerome MD Isidoro Sorrel, DO, MD Nelly Membreno DO, Leonardo Barthel, DO, [facility] [place]  ----------------------------------------------------------------  [address] [address]    Todd Lucianor 61 y o  male MRN: [id number]  Unit/Bed#:  Encounter: [id number]      History of Present Illness: It was a pleasure to see Todd Lucianor in the office today for follow up CV evaluation  He has a past medical history of hypertension, dyslipidemia, morbid obesity, COPD, TAMARA and tobacco use  He established care with us in January 2022  Admits to his father having had coronary artery disease and family history of atrial fibrillation  He has been having dyspnea on exertion that has been somewhat progressive but also chronic over the course of the past couple months to years  He has smoked for over many years and has tried to quit in the past   Due to his smoking, primary care orders annual CT scans look at his lungs  On recent CT scan, he was found to have severe coronary calcifications  He was ordered an exercise stress test that was found to be negative for myocardial ischemia, but the test occurred in extremely short period of time and was stopped early due to his inability to walk on a treadmill  He denies any chest pain, pressure, tightness or squeezing  Prior to being told by us severe heart artery disease, he was not aware of any significant cardiovascular disease  Due to these findings and his symptoms of shortness of breath, he was sent for echocardiogram and CTA of the coronary arteries  He also experience discomfort in his lower extremities  He was sent for vascular ultrasound of the lower extremities  In January 2022, CTA of the coronary arteries demonstrated no evidence of obstructive disease    In February 2022 lower extremity arterial Dopplers showed no evidence of significant obstructive disease  Since that time, his breathing has been unchanged  He denies any chest pain, pressure tightness or squeezing  Denies lightheadedness, dizziness palpitations  Denies lower extremity swelling, orthopnea or paroxysmal nocturnal dyspnea  Unfortunately, he continues to smoke  Review of Systems:  Review of Systems   Constitutional: Negative for decreased appetite, fever, weight gain and weight loss  HENT: Negative for congestion and sore throat  Eyes: Negative for visual disturbance  Cardiovascular: Positive for dyspnea on exertion  Negative for chest pain, claudication, leg swelling, near-syncope and palpitations  Respiratory: Positive for shortness of breath  Negative for cough  Hematologic/Lymphatic: Negative for bleeding problem  Skin: Negative for rash  Musculoskeletal: Negative for myalgias and neck pain  Gastrointestinal: Negative for abdominal pain and nausea  Neurological: Negative for light-headedness and weakness  Psychiatric/Behavioral: Negative for depression         Past Medical History:   Diagnosis Date   • Anxiety    • Arthritis    • Bipolar disorder ([place] [address] )    • Colon polyp    • COPD (chronic obstructive pulmonary disease) ([place] [address] )    • Depression    • Diabetes mellitus (HCC)     type 2   • Gout    • High triglycerides    • Hypertension    • Hypothyroidism 02/20/2013   • Obesity    • Paranoid schizophrenia ([place] [address] )    • Psychiatric disorder    • Sciatica 12/03/2013   • Seasonal allergies    • Sleep apnea        Past Surgical History:   Procedure Laterality Date   • COLONOSCOPY     • UMBILICAL HERNIA REPAIR     • WISDOM TOOTH EXTRACTION         Social History     Socioeconomic History   • Marital status: Single     Spouse name: None   • Number of children: None   • Years of education: None   • Highest education level: None   Occupational History   • None   Tobacco Use   • Smoking status: Current Every Day Smoker     Packs/day: 2 00     Years: 35 00     Pack years: 70  00     Types: Cigarettes   • Smokeless tobacco: Never Used   Vaping Use   • Vaping Use: Never used   Substance and Sexual Activity   • Alcohol use: No   • Drug use: No   • Sexual activity: Not Currently     Partners: Female   Other Topics Concern   • None   Social History Narrative   • None     Social Determinants of Health     Financial Resource Strain: Low Risk    • Difficulty of Paying Living Expenses: Not very hard   Food Insecurity: Not on file   Transportation Needs: No Transportation Needs   • Lack of Transportation (Medical): No   • Lack of Transportation (Non-Medical):  No   Physical Activity: Not on file   Stress: Not on file   Social Connections: Not on file   Intimate Partner Violence: Not on file   Housing Stability: Not on file       Family History   Problem Relation Age of Onset   • Colon cancer Mother    • Coronary artery disease Father    • Hyperlipidemia Father    • Coronary artery disease Family        Allergies   Allergen Reactions   • No Active Allergies          Current Outpatient Medications:   •  amLODIPine (NORVASC) 5 mg tablet, Take 1 tablet (5 mg total) by mouth daily, Disp: 90 tablet, Rfl: 3  •  atorvastatin (LIPITOR) 80 mg tablet, Take 1 tablet (80 mg total) by mouth daily with dinner, Disp: 90 tablet, Rfl: 3  •  celecoxib (CeleBREX) 200 mg capsule, take 1 capsule by mouth once daily with food or breakfast, Disp: 90 capsule, Rfl: 2  •  cetirizine (ZyrTEC) 10 mg tablet, Take 1 tablet (10 mg total) by mouth daily In the evening, Disp: 90 tablet, Rfl: 3  •  fenofibrate (TRICOR) 145 mg tablet, Take 1 tablet (145 mg total) by mouth daily with dinner, Disp: 90 tablet, Rfl: 3  •  FLUoxetine (PROzac) 20 mg capsule, Take 1 capsule (20 mg total) by mouth daily, Disp: 90 capsule, Rfl: 3  •  fluticasone (FLONASE) 50 mcg/act nasal spray, 2 sprays into each nostril daily, Disp: 1 g, Rfl: 0  •  haloperidol (HALDOL) 5 mg tablet, 2 5 mg daily at bedtime , Disp: , Rfl: 0  •  levothyroxine (Synthroid) 100 mcg tablet, Take 1 tablet (100 mcg total) by mouth daily Please stop 88 mcg, Disp: 90 tablet, Rfl: 3  •  lisinopril (ZESTRIL) 10 mg tablet, Take 1 tablet (10 mg total) by mouth daily, Disp: 90 tablet, Rfl: 3  •  metoprolol succinate (TOPROL-XL) 25 mg 24 hr tablet, Take 1 tablet (25 mg total) by mouth daily, Disp: 90 tablet, Rfl: 2  •  sodium chloride (OCEAN) 0 65 % nasal spray, 1 spray into each nostril 3 (three) times a day, Disp: 30 mL, Rfl: 1  •  cholecalciferol (VITAMIN D3) 1,000 units tablet, Take 1 tablet (1,000 Units total) by mouth daily (Patient not taking: Reported on 11/3/2022), Disp: 90 tablet, Rfl: 3    Vitals:    11/03/22 1036   BP: 120/70   BP Location: Left arm   Patient Position: Sitting   Cuff Size: Large   Pulse: 80   Weight: 133 kg (293 lb 12 8 oz)       PHYSICAL EXAMINATION:  Gen: Awake, Alert, NAD   Head/eyes: AT/NC, pupils equal and round, Anicteric  ENT: mmm  Neck: Supple, No elevated JVP, trachea midline  Resp:  Decreased breath sounds bilaterally  CV: RRR +S1, S2, No m/r/g  Abd: Soft, obese, NT/ND + BS  Ext: no LE edema bilaterally  Neuro: Follows commands, moves all extermities  Psych: Appropriate affect, pleasant mood, pleasant attitude, non-combative  Skin: warm; no rash, erythema or venous stasis changes on exposed skin    --------------------------------------------------------------------------------  TREADMILL STRESS  Results for orders placed during the hospital encounter of 12/08/21    Stress test only, exercise    Interpretation Summary  •  Stress ECG: The patient reached stage 1 0 of the protocol after exercising for 2 min and 52 sec and had a maximal HR of 181 bpm (111 % of MPHR) and 4 6 METS  The patient reported dyspnea and leg pain during the stress test  Onset of symptoms occurred at stage 1 of the protocol  Symptoms began during stress and ended during recovery   Blood pressure demonstrated a hypertensive response for the amount of exercise performed (systolic blood pressure increased by 72 mmHg with only 2 minutes of exercise) and heart rate demonstrated an exaggerated response to stress  •  Stress ECG: No ST deviation is noted  Arrhythmias during recovery: PACs in pairs  The ECG was negative for ischemia  The stress ECG is negative for ischemia  There were no ECG changes to suggest ischemia however the patient did have a significantly exaggerated blood pressure and heart rate response to exercise  Overall poor exercise tolerance/functional capacity  If clinical concern for cardiac ischemia still exists, would recommend repeating stress test with myocardial perfusion imaging such as with nuclear stress  --------------------------------------------------------------------------------  NUCLEAR STRESS TEST: No results found for this or any previous visit  No results found for this or any previous visit       --------------------------------------------------------------------------------  CATH:  No results found for this or any previous visit     --------------------------------------------------------------------------------  ECHO:   No results found for this or any previous visit  No results found for this or any previous visit     --------------------------------------------------------------------------------  HOLTER  No results found for this or any previous visit  No results found for this or any previous visit     --------------------------------------------------------------------------------  CAROTIDS  No results found for this or any previous visit      --------------------------------------------------------------------------------  ECGs:  No results found for this visit on 11/03/22       Lab Results   Component Value Date    WBC 8 12 05/12/2022    HGB 14 9 05/12/2022    HCT 45 4 05/12/2022    MCV 95 05/12/2022     05/12/2022      Lab Results   Component Value Date    SODIUM 134 (L) 05/12/2022    K 4 7 05/12/2022     05/12/2022    CO2 27 05/12/2022    BUN 15 05/12/2022    CREATININE 1 01 05/12/2022    GLUC 106 01/18/2022    CALCIUM 9 5 05/12/2022      Lab Results   Component Value Date    HGBA1C 5 9 10/27/2022      No results found for: CHOL  Lab Results   Component Value Date    HDL 47 05/12/2022    HDL 38 (L) 11/23/2021    HDL 41 03/04/2021     Lab Results   Component Value Date    LDLCALC 49 05/12/2022    LDLCALC 124 11/23/2021    LDLCALC 104 03/04/2021     Lab Results   Component Value Date    TRIG 104 05/12/2022    TRIG 175 (H) 11/23/2021    TRIG 184 (H) 03/04/2021     No results found for: [place], [place]   Lab Results   Component Value Date    INR 0 98 11/01/2021    PROTIME 12 6 11/01/2021        1  Coronary artery disease involving native coronary artery of native heart without angina pectoris  -     metoprolol succinate (TOPROL-XL) 25 mg 24 hr tablet; Take 1 tablet (25 mg total) by mouth daily    2  Essential hypertension  -     metoprolol succinate (TOPROL-XL) 25 mg 24 hr tablet; Take 1 tablet (25 mg total) by mouth daily    3  Dyslipidemia    4  Pre-diabetes    5  Morbid obesity ([address] )    6  Aortic ectasia (HCC)  -     metoprolol succinate (TOPROL-XL) 25 mg 24 hr tablet;  Take 1 tablet (25 mg total) by mouth daily        IMPRESSION:  • Chronic dyspnea on exertion  • CAD   o w/ <50% p-mLAD (w/ some limitations in visualization), <50% LCx w/ diffuse mild-mod stenosis, small RCA <50% stenosis by CTA coronaries, January 2022  o Exercise stress test negative for myocardial ischemia, ET 2:52, 111% MPHR, 4 6 METs, November 2021  • Hypertension  o LVEF 62%, mild LVH, grade 2 diastolic dysfunction, mild biatrial dilatation, AV sclerosis, mild MAC, trace MR/TR w/ PASP 25-30 mmHg, aortic root ectasia 3 8 cm, February 2022   • Dyslipidemia w/  mg/dL,  mg/dL, November 2021  • Pre diabetes  • Morbid obesity  • COPD  • Tobacco use   • Paranoid schizophrenia  • TAMARA  • Lung nodules    PLAN:  It was a pleasure to see Cindy Mackay in the office today for follow-up CV evaluation  Overall, the patient admits the feels unchanged from prior  He has no symptoms concerning for angina and no signs or symptoms heart failure  He examines to be euvolemic in the office today  Blood pressure and heart rate are currently stable  He has been tolerating his current medications without any reported adverse effects  He can perform greater than 4 Mets with some exertional shortness of breath, but does not need to stop due to exertional symptoms  Based on his clinical presentation, the following recommendations:    1  Recommend aggressive risk factor and lifestyle modifications  2  I have informed him that the most important risk factor he currently has is his smoking habit  I have strongly recommended tobacco cessation due to the increased risk of progression of his atherosclerotic disease which was noted on his prior CTA  Patient is aware of this risk  3  Continue amlodipine, metoprolol and lisinopril for antihypertensive control  4  Continue high-intensity statin  Goal LDL is less than 70 mg/dL  Repeat lipid panel in 3-6 months  The patient is not to goal on repeat, can initiate Zetia 10 mg daily  5  Recommend heart healthy diet low in sodium and carbohydrate  6  Encourage 30 minutes a day, 5 days a week of moderate intensity activity to build cardiovascular endurance  7  No additional CV testing at this time  The patient follows up annually for CT of the chest to examine his lungs  If there is any progression of his aortic root ectasia, this will be noted on the CT scans  8  We will follow up with him in 1 year to reassess his progress  As always, please do not hesitate to call with any questions  Portions of the record may have been created with voice recognition software  Occasional wrong word or "sound a like" substitutions may have occurred due to the inherent limitations of voice recognition software   Read the chart carefully and recognize, using context, where substitutions have occurred        Signed: Yuliana Brush DO, [facility] [place] [place]

## 2022-11-04 ENCOUNTER — TELEPHONE (OUTPATIENT)
Dept: CARDIOLOGY CLINIC | Facility: CLINIC | Age: 57
End: 2022-11-04

## 2022-11-04 NOTE — TELEPHONE ENCOUNTER
Pt calling stating was told to increase metoprolol from 0 5 to one daily but states he is not on that medication because in caused terrible nightmares(was taken off because of this)  Ask that the Rx be cancelled  And does he need a different type of Rx? Please advise  Pt was actually here for OV yesterday

## 2022-11-08 NOTE — TELEPHONE ENCOUNTER
Reviewed again with Dr Finn King and patient to remain off the Toprol because on nightmares it causes  Pt called and understood and also cancelled rx in chart

## 2022-12-01 ENCOUNTER — HOSPITAL ENCOUNTER (OUTPATIENT)
Dept: CT IMAGING | Facility: HOSPITAL | Age: 57
End: 2022-12-01

## 2022-12-01 DIAGNOSIS — F17.210 CIGARETTE SMOKER: ICD-10-CM

## 2022-12-01 DIAGNOSIS — R91.8 LUNG NODULES: ICD-10-CM

## 2022-12-08 ENCOUNTER — VBI (OUTPATIENT)
Dept: ADMINISTRATIVE | Facility: OTHER | Age: 57
End: 2022-12-08

## 2022-12-28 ENCOUNTER — VBI (OUTPATIENT)
Dept: ADMINISTRATIVE | Facility: OTHER | Age: 57
End: 2022-12-28

## 2023-01-05 ENCOUNTER — OFFICE VISIT (OUTPATIENT)
Dept: FAMILY MEDICINE CLINIC | Facility: CLINIC | Age: 58
End: 2023-01-05

## 2023-01-05 VITALS
BODY MASS INDEX: 41.23 KG/M2 | SYSTOLIC BLOOD PRESSURE: 118 MMHG | HEIGHT: 70 IN | DIASTOLIC BLOOD PRESSURE: 74 MMHG | RESPIRATION RATE: 16 BRPM | TEMPERATURE: 97.6 F | OXYGEN SATURATION: 97 % | WEIGHT: 288 LBS | HEART RATE: 73 BPM

## 2023-01-05 DIAGNOSIS — E66.01 MORBID OBESITY (HCC): ICD-10-CM

## 2023-01-05 DIAGNOSIS — E78.5 HYPERLIPIDEMIA ASSOCIATED WITH TYPE 2 DIABETES MELLITUS (HCC): ICD-10-CM

## 2023-01-05 DIAGNOSIS — M54.16 LUMBAR RADICULOPATHY: ICD-10-CM

## 2023-01-05 DIAGNOSIS — E11.69 HYPERLIPIDEMIA ASSOCIATED WITH TYPE 2 DIABETES MELLITUS (HCC): ICD-10-CM

## 2023-01-05 DIAGNOSIS — M79.89 LEFT LEG SWELLING: Primary | ICD-10-CM

## 2023-01-05 DIAGNOSIS — I77.819 AORTIC ECTASIA (HCC): ICD-10-CM

## 2023-01-05 NOTE — PROGRESS NOTES
BMI Counseling: Body mass index is 41 32 kg/m²  The BMI is above normal  Nutrition recommendations include reducing portion sizes  Group Topic: BH Process Group     Date: 11/16/2020  Start Time: 1100  End Time: 1200  Facilitators: TACO Bonilla    Focus: Process emotions  Number in attendance: 8      Method: Group  Attendance: Present  Participation: Moderate  Patient Response: Appropriate feedback, Attentive and Good eye contact  Mood: Depressed  Affect: Type: Depressed   Range: Full (normal)   Congruency: Congruent   Stability: Stable  Behavior/Socialization: Appropriate to group, Cooperative and Engaged  Thought Process: Focused  Task Performance: Follows directions  Patient Evaluation: Independent - full participation

## 2023-01-05 NOTE — PROGRESS NOTES
Name: Sydnie Garcia      : 1965      MRN: [id number]  Encounter Provider: Taina Hawkins MD  Encounter Date: 2023   Encounter department: [address] [place] [address]     1  Left leg swelling  -     Ambulatory Referral to Physical Therapy; Future  -     XR spine lumbar minimum 4 views non injury; Future; Expected date: 2023    2  Lumbar radiculopathy  -     VAS lower limb venous duplex study, unilateral/limited; Future; Expected date: 2023    3  Aortic ectasia (HCC)    4  Hyperlipidemia associated with type 2 diabetes mellitus ([facility] [address] )    5  Morbid obesity (HCC)    Moist Heat  Home P T  Pt was advised to quit smoking  RTC in 1-2 weeks        Subjective      61 Y O man is here for Regular check Up, and increased left leg pain and swelling above the knee, for last 1-2 weeks, recent blood work and med list reviewed w  Pt, he still smokes,     Review of Systems   Constitutional: Negative for chills, fatigue and fever  HENT: Negative for congestion, facial swelling, sore throat, trouble swallowing and voice change  Eyes: Negative for pain, discharge and visual disturbance  Respiratory: Negative for cough, shortness of breath and wheezing  Cardiovascular: Positive for leg swelling  Negative for chest pain and palpitations  Gastrointestinal: Negative for abdominal pain, blood in stool, constipation, diarrhea and nausea  Endocrine: Negative for polydipsia, polyphagia and polyuria  Genitourinary: Negative for difficulty urinating, hematuria and urgency  Musculoskeletal: Positive for arthralgias and back pain  Negative for myalgias  Skin: Negative for rash  Neurological: Positive for numbness  Negative for dizziness, tremors, weakness and headaches  Hematological: Negative for adenopathy  Does not bruise/bleed easily  Psychiatric/Behavioral: Negative for dysphoric mood, sleep disturbance and suicidal ideas         Current Outpatient Medications on File Prior to Visit   Medication Sig   • amLODIPine (NORVASC) 5 mg tablet Take 1 tablet (5 mg total) by mouth daily   • atorvastatin (LIPITOR) 80 mg tablet Take 1 tablet (80 mg total) by mouth daily with dinner   • celecoxib (CeleBREX) 200 mg capsule take 1 capsule by mouth once daily with food or breakfast   • cetirizine (ZyrTEC) 10 mg tablet Take 1 tablet (10 mg total) by mouth daily In the evening   • cholecalciferol (VITAMIN D3) 1,000 units tablet Take 1 tablet (1,000 Units total) by mouth daily (Patient not taking: Reported on 11/3/2022)   • fenofibrate (TRICOR) 145 mg tablet Take 1 tablet (145 mg total) by mouth daily with dinner   • FLUoxetine (PROzac) 20 mg capsule Take 1 capsule (20 mg total) by mouth daily   • fluticasone (FLONASE) 50 mcg/act nasal spray 2 sprays into each nostril daily   • haloperidol (HALDOL) 5 mg tablet 2 5 mg daily at bedtime    • levothyroxine (Synthroid) 100 mcg tablet Take 1 tablet (100 mcg total) by mouth daily Please stop 88 mcg   • lisinopril (ZESTRIL) 10 mg tablet Take 1 tablet (10 mg total) by mouth daily   • sodium chloride (OCEAN) 0 65 % nasal spray 1 spray into each nostril 3 (three) times a day       Objective     /74 (BP Location: Left arm, Patient Position: Sitting, Cuff Size: Standard)   Pulse 73   Temp 97 6 °F (36 4 °C) (Tympanic)   Resp 16   Ht 5' 10" (1 778 m)   Wt 131 kg (288 lb)   SpO2 97%   BMI 41 32 kg/m²     Physical Exam  Constitutional:       General: He is not in acute distress  HENT:      Head: Normocephalic  Mouth/Throat:      Pharynx: No oropharyngeal exudate  Eyes:      General: No scleral icterus  Conjunctiva/sclera: Conjunctivae normal       Pupils: Pupils are equal, round, and reactive to light  Neck:      Thyroid: No thyromegaly  Cardiovascular:      Rate and Rhythm: Normal rate and regular rhythm  Heart sounds: Normal heart sounds  No murmur heard    Pulmonary:      Effort: Pulmonary effort is normal  No respiratory distress  Breath sounds: Normal breath sounds  No wheezing or rales  Abdominal:      General: Bowel sounds are normal  There is no distension  Palpations: Abdomen is soft  Tenderness: There is no abdominal tenderness  There is no guarding or rebound  Musculoskeletal:         General: Swelling and tenderness present  Cervical back: Neck supple  Lymphadenopathy:      Cervical: No cervical adenopathy  Skin:     Coloration: Skin is not pale  Findings: No rash  Neurological:      Mental Status: He is alert and oriented to person, place, and time  Sensory: Sensory deficit present  Motor: No weakness         Daina Essex, MD

## 2023-01-06 ENCOUNTER — HOSPITAL ENCOUNTER (OUTPATIENT)
Dept: RADIOLOGY | Facility: HOSPITAL | Age: 58
Discharge: HOME | End: 2023-01-06

## 2023-01-06 ENCOUNTER — HOSPITAL ENCOUNTER (OUTPATIENT)
Dept: NON INVASIVE DIAGNOSTICS | Facility: HOSPITAL | Age: 58
Discharge: HOME | End: 2023-01-06

## 2023-01-06 DIAGNOSIS — M79.89 LEFT LEG SWELLING: ICD-10-CM

## 2023-01-06 DIAGNOSIS — M54.16 LUMBAR RADICULOPATHY: ICD-10-CM

## 2023-01-13 ENCOUNTER — RA CDI HCC (OUTPATIENT)
Dept: OTHER | Facility: HOSPITAL | Age: 58
End: 2023-01-13

## 2023-01-13 NOTE — PROGRESS NOTES
[facility]  coding opportunities       Chart reviewed, no opportunity found: CHART REVIEWED, NO OPPORTUNITY FOUND        Patients Insurance     Medicare Insurance: [facility] Advantage

## 2023-02-06 ENCOUNTER — APPOINTMENT (OUTPATIENT)
Dept: LAB | Age: 58
End: 2023-02-06

## 2023-02-06 DIAGNOSIS — R73.09 ELEVATED HEMOGLOBIN A1C: ICD-10-CM

## 2023-02-06 DIAGNOSIS — E78.5 HYPERLIPIDEMIA ASSOCIATED WITH TYPE 2 DIABETES MELLITUS (HCC): ICD-10-CM

## 2023-02-06 DIAGNOSIS — E11.69 HYPERLIPIDEMIA ASSOCIATED WITH TYPE 2 DIABETES MELLITUS (HCC): ICD-10-CM

## 2023-02-06 DIAGNOSIS — E03.8 HYPOTHYROIDISM DUE TO HASHIMOTO'S THYROIDITIS: ICD-10-CM

## 2023-02-06 DIAGNOSIS — E06.3 HYPOTHYROIDISM DUE TO HASHIMOTO'S THYROIDITIS: ICD-10-CM

## 2023-02-06 LAB
ALBUMIN SERPL BCP-MCNC: 3.8 G/DL (ref 3.5–5)
ALP SERPL-CCNC: 76 U/L (ref 46–116)
ALT SERPL W P-5'-P-CCNC: 37 U/L (ref 12–78)
ANION GAP SERPL CALCULATED.3IONS-SCNC: 5 MMOL/L (ref 4–13)
AST SERPL W P-5'-P-CCNC: 14 U/L (ref 5–45)
BASOPHILS # BLD AUTO: 0.08 THOUSANDS/ÂΜL (ref 0–0.1)
BASOPHILS NFR BLD AUTO: 1 % (ref 0–1)
BILIRUB SERPL-MCNC: 0.64 MG/DL (ref 0.2–1)
BUN SERPL-MCNC: 13 MG/DL (ref 5–25)
CALCIUM SERPL-MCNC: 9.3 MG/DL (ref 8.3–10.1)
CHLORIDE SERPL-SCNC: 106 MMOL/L (ref 96–108)
CHOLEST SERPL-MCNC: 114 MG/DL
CO2 SERPL-SCNC: 27 MMOL/L (ref 21–32)
CREAT SERPL-MCNC: 1.06 MG/DL (ref 0.6–1.3)
EOSINOPHIL # BLD AUTO: 0.23 THOUSAND/ÂΜL (ref 0–0.61)
EOSINOPHIL NFR BLD AUTO: 3 % (ref 0–6)
ERYTHROCYTE [DISTWIDTH] IN BLOOD BY AUTOMATED COUNT: 12 % (ref 11.6–15.1)
GFR SERPL CREATININE-BSD FRML MDRD: 76 ML/MIN/1.73SQ M
GLUCOSE P FAST SERPL-MCNC: 108 MG/DL (ref 65–99)
HCT VFR BLD AUTO: 47 % (ref 36.5–49.3)
HDLC SERPL-MCNC: 50 MG/DL
HGB BLD-MCNC: 15.3 G/DL (ref 12–17)
IMM GRANULOCYTES # BLD AUTO: 0.01 THOUSAND/UL (ref 0–0.2)
IMM GRANULOCYTES NFR BLD AUTO: 0 % (ref 0–2)
LDLC SERPL CALC-MCNC: 47 MG/DL (ref 0–100)
LYMPHOCYTES # BLD AUTO: 1.86 THOUSANDS/ÂΜL (ref 0.6–4.47)
LYMPHOCYTES NFR BLD AUTO: 28 % (ref 14–44)
MAGNESIUM SERPL-MCNC: 2.5 MG/DL (ref 1.6–2.6)
MCH RBC QN AUTO: 30.5 PG (ref 26.8–34.3)
MCHC RBC AUTO-ENTMCNC: 32.6 G/DL (ref 31.4–37.4)
MCV RBC AUTO: 94 FL (ref 82–98)
MONOCYTES # BLD AUTO: 0.86 THOUSAND/ÂΜL (ref 0.17–1.22)
MONOCYTES NFR BLD AUTO: 13 % (ref 4–12)
NEUTROPHILS # BLD AUTO: 3.63 THOUSANDS/ÂΜL (ref 1.85–7.62)
NEUTS SEG NFR BLD AUTO: 55 % (ref 43–75)
NONHDLC SERPL-MCNC: 64 MG/DL
NRBC BLD AUTO-RTO: 0 /100 WBCS
PLATELET # BLD AUTO: 252 THOUSANDS/UL (ref 149–390)
PMV BLD AUTO: 9.7 FL (ref 8.9–12.7)
POTASSIUM SERPL-SCNC: 4.8 MMOL/L (ref 3.5–5.3)
PROT SERPL-MCNC: 7.1 G/DL (ref 6.4–8.4)
RBC # BLD AUTO: 5.02 MILLION/UL (ref 3.88–5.62)
SODIUM SERPL-SCNC: 138 MMOL/L (ref 135–147)
T4 FREE SERPL-MCNC: 0.93 NG/DL (ref 0.76–1.46)
TRIGL SERPL-MCNC: 87 MG/DL
TSH SERPL DL<=0.05 MIU/L-ACNC: 4.62 UIU/ML (ref 0.45–4.5)
WBC # BLD AUTO: 6.67 THOUSAND/UL (ref 4.31–10.16)

## 2023-02-13 NOTE — TELEPHONE ENCOUNTER
02/13/23 1029   Right Leg Edema Point of Measurement   Compression Therapy Compression not ordered   Left Leg Edema Point of Measurement   Compression Therapy Compression not ordered   Wound Foot Left #2 left great toe amp site 12/21/22   Date First Assessed/Time First Assessed: 12/23/22 0858   Present on Hospital Admission: Yes  Location: Foot  Wound Location Orientation: Left  Wound Description: #2 left great toe amp site  Date of First Observation: 12/21/22   Dressing/Treatment Collagen with Ag;Alginate with Ag;ABD pad;Gauze dressing/dressing sponge;Roll gauze;Tape/Soft cloth adhesive tape   Offloading for Diabetic Foot Ulcers Offloading ordered   Discharge Condition: Stable     Pain: 0    Ambulatory Status: Walking with knee scooter    Discharge Destination: Home     Transportation: Car    Accompanied by: Self     Discharge instructions reviewed with Patient and copy or written instructions have been provided. All questions/concerns have been addressed at this time. Patient is asking for Meloxicam 7 5 refill, but this medication is not on his chart  Please advise  Patient wants to know if there is anything else he can take for his joint pain? Please advise

## 2023-02-28 DIAGNOSIS — M79.89 LEFT LEG SWELLING: Primary | ICD-10-CM

## 2023-03-07 ENCOUNTER — OFFICE VISIT (OUTPATIENT)
Dept: FAMILY MEDICINE CLINIC | Facility: CLINIC | Age: 58
End: 2023-03-07

## 2023-03-07 VITALS
DIASTOLIC BLOOD PRESSURE: 76 MMHG | HEART RATE: 74 BPM | OXYGEN SATURATION: 98 % | BODY MASS INDEX: 41.66 KG/M2 | TEMPERATURE: 97.8 F | WEIGHT: 291 LBS | SYSTOLIC BLOOD PRESSURE: 130 MMHG | HEIGHT: 70 IN | RESPIRATION RATE: 14 BRPM

## 2023-03-07 DIAGNOSIS — E11.69 HYPERLIPIDEMIA ASSOCIATED WITH TYPE 2 DIABETES MELLITUS (HCC): Primary | ICD-10-CM

## 2023-03-07 DIAGNOSIS — I10 PRIMARY HYPERTENSION: ICD-10-CM

## 2023-03-07 DIAGNOSIS — I77.819 AORTIC ECTASIA (HCC): ICD-10-CM

## 2023-03-07 DIAGNOSIS — E78.5 HYPERLIPIDEMIA ASSOCIATED WITH TYPE 2 DIABETES MELLITUS (HCC): Primary | ICD-10-CM

## 2023-03-07 DIAGNOSIS — E03.8 HYPOTHYROIDISM DUE TO HASHIMOTO'S THYROIDITIS: ICD-10-CM

## 2023-03-07 DIAGNOSIS — R91.8 LUNG NODULES: ICD-10-CM

## 2023-03-07 DIAGNOSIS — E06.3 HYPOTHYROIDISM DUE TO HASHIMOTO'S THYROIDITIS: ICD-10-CM

## 2023-03-07 DIAGNOSIS — F17.210 CIGARETTE SMOKER: ICD-10-CM

## 2023-03-07 DIAGNOSIS — Z12.11 SCREEN FOR COLON CANCER: ICD-10-CM

## 2023-03-07 DIAGNOSIS — R73.09 ELEVATED HEMOGLOBIN A1C: ICD-10-CM

## 2023-03-07 LAB
SL AMB  POCT GLUCOSE, UA: NORMAL
SL AMB LEUKOCYTE ESTERASE,UA: NORMAL
SL AMB POCT BILIRUBIN,UA: NORMAL
SL AMB POCT BLOOD,UA: NORMAL
SL AMB POCT CLARITY,UA: CLEAR
SL AMB POCT COLOR,UA: YELLOW
SL AMB POCT HEMOGLOBIN AIC: 5.9 (ref ?–6.5)
SL AMB POCT KETONES,UA: NORMAL
SL AMB POCT NITRITE,UA: NORMAL
SL AMB POCT PH,UA: 7.5
SL AMB POCT SPECIFIC GRAVITY,UA: 1
SL AMB POCT URINE PROTEIN: NORMAL
SL AMB POCT UROBILINOGEN: 0.2

## 2023-03-07 RX ORDER — LEVOTHYROXINE SODIUM 0.1 MG/1
100 TABLET ORAL DAILY
Qty: 90 TABLET | Refills: 3 | Status: SHIPPED | OUTPATIENT
Start: 2023-03-07

## 2023-03-07 NOTE — PROGRESS NOTES
BMI Counseling: Body mass index is 41 75 kg/m²  The BMI is above normal  Nutrition recommendations include reducing portion sizes

## 2023-03-07 NOTE — PROGRESS NOTES
Name: Bushra Cox      : 1965      MRN: [id number]  Encounter Provider: Genaro Sam MD  Encounter Date: 3/7/2023   Encounter department: [facility] [address] [place] [address]     1  Type 2 diabetes mellitus without complication, unspecified whether long term insulin use (HCC)  -     Microalbumin / creatinine urine ratio  -     POCT urine dip  -     POCT hemoglobin A1c    2  Lung nodules  -     Vitamin B12; Future  -     Vitamin D 25 hydroxy; Future; Expected date: 2023    3  Hypothyroidism due to Hashimoto's thyroiditis  -     levothyroxine (Synthroid) 100 mcg tablet; Take 1 tablet (100 mcg total) by mouth daily Please stop 88 mcg  -     TSH, 3rd generation; Future; Expected date: 2023  -     T4, free; Future; Expected date: 2023    4  Hyperlipidemia associated with type 2 diabetes mellitus (HCC)  -     UA (URINE) with reflex to Scope; Future; Expected date: 2023  -     Vitamin B12; Future  -     Vitamin D 25 hydroxy; Future; Expected date: 2023    5  Aortic ectasia (HCC)  -     Vitamin B12; Future  -     Vitamin D 25 hydroxy; Future; Expected date: 2023    6  Elevated hemoglobin A1c    7  Cigarette smoker  -     UA (URINE) with reflex to Scope; Future; Expected date: 2023  -     Vitamin B12; Future  -     Vitamin D 25 hydroxy; Future; Expected date: 2023    8  Screen for colon cancer  -     Cologuard    Life style mod  Pt was advised to quit smoking  RTC in 3 mos w Blood  work       Subjective      61 Y O Man is here for regular check up, he still smokes, recent blood work, and Med  List reviewed w pt,  Review of Systems   Constitutional: Negative for chills, fatigue and fever  HENT: Negative for congestion, facial swelling, sore throat, trouble swallowing and voice change  Eyes: Negative for pain, discharge and visual disturbance  Respiratory: Negative for cough, shortness of breath and wheezing  Cardiovascular: Negative for chest pain, palpitations and leg swelling  Gastrointestinal: Negative for abdominal pain, blood in stool, constipation, diarrhea and nausea  Endocrine: Negative for polydipsia, polyphagia and polyuria  Genitourinary: Negative for difficulty urinating, hematuria and urgency  Musculoskeletal: Negative for arthralgias and myalgias  Skin: Negative for rash  Neurological: Negative for dizziness, tremors, weakness and headaches  Hematological: Negative for adenopathy  Does not bruise/bleed easily  Psychiatric/Behavioral: Negative for dysphoric mood, sleep disturbance and suicidal ideas         Current Outpatient Medications on File Prior to Visit   Medication Sig   • amLODIPine (NORVASC) 5 mg tablet Take 1 tablet (5 mg total) by mouth daily   • atorvastatin (LIPITOR) 80 mg tablet Take 1 tablet (80 mg total) by mouth daily with dinner   • celecoxib (CeleBREX) 200 mg capsule take 1 capsule by mouth once daily with food or breakfast   • cetirizine (ZyrTEC) 10 mg tablet Take 1 tablet (10 mg total) by mouth daily In the evening   • cholecalciferol (VITAMIN D3) 1,000 units tablet Take 1 tablet (1,000 Units total) by mouth daily (Patient not taking: Reported on 11/3/2022)   • fenofibrate (TRICOR) 145 mg tablet Take 1 tablet (145 mg total) by mouth daily with dinner   • FLUoxetine (PROzac) 20 mg capsule Take 1 capsule (20 mg total) by mouth daily   • fluticasone (FLONASE) 50 mcg/act nasal spray 2 sprays into each nostril daily   • haloperidol (HALDOL) 5 mg tablet 2 5 mg daily at bedtime    • lisinopril (ZESTRIL) 10 mg tablet Take 1 tablet (10 mg total) by mouth daily   • sodium chloride (OCEAN) 0 65 % nasal spray 1 spray into each nostril 3 (three) times a day   • [DISCONTINUED] levothyroxine (Synthroid) 100 mcg tablet Take 1 tablet (100 mcg total) by mouth daily Please stop 88 mcg       Objective     /76 (BP Location: Left arm, Patient Position: Sitting, Cuff Size: Standard) Pulse 74   Temp 97 8 °F (36 6 °C) (Tympanic)   Resp 14   Ht 5' 10" (1 778 m)   Wt 132 kg (291 lb)   SpO2 98%   BMI 41 75 kg/m²     Physical Exam  Constitutional:       General: He is not in acute distress  HENT:      Head: Normocephalic  Mouth/Throat:      Pharynx: No oropharyngeal exudate  Eyes:      General: No scleral icterus  Conjunctiva/sclera: Conjunctivae normal       Pupils: Pupils are equal, round, and reactive to light  Neck:      Thyroid: No thyromegaly  Cardiovascular:      Rate and Rhythm: Normal rate and regular rhythm  Heart sounds: Normal heart sounds  No murmur heard  Pulmonary:      Effort: Pulmonary effort is normal  No respiratory distress  Breath sounds: Normal breath sounds  No wheezing or rales  Abdominal:      General: Bowel sounds are normal  There is no distension  Palpations: Abdomen is soft  Tenderness: There is no abdominal tenderness  There is no guarding or rebound  Musculoskeletal:         General: No tenderness  Cervical back: Neck supple  Lymphadenopathy:      Cervical: No cervical adenopathy  Skin:     Coloration: Skin is not pale  Findings: No rash  Neurological:      Mental Status: He is alert and oriented to person, place, and time  Sensory: No sensory deficit  Motor: No weakness         Sallyanne Kehr, MD

## 2023-03-21 ENCOUNTER — EVALUATION (OUTPATIENT)
Dept: OCCUPATIONAL THERAPY | Age: 58
End: 2023-03-21

## 2023-03-21 DIAGNOSIS — M79.89 LEFT LEG SWELLING: ICD-10-CM

## 2023-03-22 NOTE — PROGRESS NOTES
OT Evaluation     Today's date: 3/21/2023  Patient name: Amanda Headley  : 1965  MRN: [id number]  Referring provider: Sarah Eli MD  Dx:   Encounter Diagnosis     ICD-10-CM    1  Left leg swelling  M79 [id number] Ambulatory Referral to PT/OT Lymphedema Therapy                     Assessment  Assessment details: Pt is a 61 y/o male who presents to skilled OT lymphedema tx with c/o mild swelling in his LLE which his MD was worried about  Pt reports he doesn't think his leg that very swollen, and has reported a reduction in left leg and knee pain  Pt noted with negative stemmers sign to BLE  Pt has tight socks with elastic band, which made an indent in his skin, but demo no pitty  Therapist inable to determine if there was edema in calf from the tight socks or not  Possible Stage 1 lymphedema  Pt edu on lymphedema prevention techniques such as BLE exercise as pt reports he lives a more sendentary lifestyle, elevation of BLE to reduce and prevent edema, mild compression socks to wear during the day when active  Impairments: abnormal or restricted ROM, activity intolerance, impaired physical strength, lacks appropriate home exercise program and pain with function    Symptom irritability: lowUnderstanding of Dx/Px/POC: good   Prognosis: good    Goals  STGs:  1  Pt will demo no inc in edema  LTGs:  1  Pt will be independent with HEP of lymphedema care  2  Pt will inc FOTO score  3  Pt will independently dina/doff compression socks    Plan  Patient would benefit from: skilled occupational therapy  Planned therapy interventions: compression, massage, strengthening, stretching, therapeutic exercise, home exercise program and functional ROM exercises  Frequency: 1x month  Duration in visits: 4  Treatment plan discussed with: patient        Subjective Evaluation    History of Present Illness  Mechanism of injury: Pt is a 61 Y O male, who went to his PCP 2023 for a regular check up   Pt c/o increased left leg/knee pain and observed wit swelling above the knee and leg, for last 1-2 weeks  MD sent referral for lymphedema care 2* leg swelling L>R, and leg pain  Not a recurrent problem   Quality of life: fair    Pain  Current pain ratin  At best pain ratin  At worst pain ratin  Quality: dull ache  Relieving factors: rest and support  Progression: improved    Patient Goals  Patient goals for therapy: decreased edema and decreased pain          Objective     General Comments:       Ankle/Foot Comments   3/21/23                     RLE        LLE  MTPs         26           26    D[place]      27          27  Lat Mall      27 5        27 1  4cm           26 8         26 5  8cm           28 7         25  12cm         32             33 5  16cm          38 1         41 1  20cm          43             44 6  24cm           47 2          46  28cm           47 5           42  32cm           42              38 5  36cm            39             39  40cm           43               43               Precautions: Universal      Manuals             MLD ABD             MLD LLE                          Compression garment              Neuro Re-Ed             Self MLD                                                                                           Ther Ex             BLE HEP ROM             Nustep                                                                                           Ther Activity                                       Gait Training                                       Modalities

## 2023-03-23 ENCOUNTER — VBI (OUTPATIENT)
Dept: ADMINISTRATIVE | Facility: OTHER | Age: 58
End: 2023-03-23

## 2023-03-25 DIAGNOSIS — M19.90 ARTHRITIS: ICD-10-CM

## 2023-03-27 RX ORDER — CELECOXIB 200 MG/1
CAPSULE ORAL
Qty: 90 CAPSULE | Refills: 2 | Status: SHIPPED | OUTPATIENT
Start: 2023-03-27

## 2023-03-30 DIAGNOSIS — I83.813 VARICOSE VEINS OF BOTH LOWER EXTREMITIES WITH PAIN: Primary | ICD-10-CM

## 2023-05-23 ENCOUNTER — TELEPHONE (OUTPATIENT)
Dept: FAMILY MEDICINE CLINIC | Facility: CLINIC | Age: 58
End: 2023-05-23

## 2023-05-23 NOTE — TELEPHONE ENCOUNTER
Patient's insurance called  He was diagnosed with a lumbar strain today  They sent in Cylobensiprine 10 BID as needed, #14  and he was instructed to  OTC pain cream such as Bengay  This was an information call

## 2023-06-06 ENCOUNTER — RA CDI HCC (OUTPATIENT)
Dept: OTHER | Facility: HOSPITAL | Age: 58
End: 2023-06-06

## 2023-06-06 NOTE — PROGRESS NOTES
[facility]  coding opportunities       Chart reviewed, no opportunity found: CHART REVIEWED, NO OPPORTUNITY FOUND        Patients Insurance     Medicare Insurance: [facility] [place] Advantage

## 2023-07-03 ENCOUNTER — APPOINTMENT (OUTPATIENT)
Dept: LAB | Facility: HOSPITAL | Age: 58
End: 2023-07-03
Payer: COMMERCIAL

## 2023-07-03 DIAGNOSIS — I77.819 AORTIC ECTASIA (HCC): ICD-10-CM

## 2023-07-03 DIAGNOSIS — E03.8 HYPOTHYROIDISM DUE TO HASHIMOTO'S THYROIDITIS: Primary | ICD-10-CM

## 2023-07-03 DIAGNOSIS — E78.5 HYPERLIPIDEMIA ASSOCIATED WITH TYPE 2 DIABETES MELLITUS (HCC): ICD-10-CM

## 2023-07-03 DIAGNOSIS — R91.8 LUNG NODULES: ICD-10-CM

## 2023-07-03 DIAGNOSIS — E11.69 HYPERLIPIDEMIA ASSOCIATED WITH TYPE 2 DIABETES MELLITUS (HCC): ICD-10-CM

## 2023-07-03 DIAGNOSIS — E06.3 HYPOTHYROIDISM DUE TO HASHIMOTO'S THYROIDITIS: ICD-10-CM

## 2023-07-03 DIAGNOSIS — E03.8 HYPOTHYROIDISM DUE TO HASHIMOTO'S THYROIDITIS: ICD-10-CM

## 2023-07-03 DIAGNOSIS — F17.210 CIGARETTE SMOKER: ICD-10-CM

## 2023-07-03 DIAGNOSIS — R79.89 LOW VITAMIN D LEVEL: ICD-10-CM

## 2023-07-03 DIAGNOSIS — E06.3 HYPOTHYROIDISM DUE TO HASHIMOTO'S THYROIDITIS: Primary | ICD-10-CM

## 2023-07-03 LAB
25(OH)D3 SERPL-MCNC: 21 NG/ML (ref 30–100)
T4 FREE SERPL-MCNC: 0.78 NG/DL (ref 0.61–1.12)
TSH SERPL DL<=0.05 MIU/L-ACNC: 6.42 UIU/ML (ref 0.45–4.5)

## 2023-07-03 PROCEDURE — 36415 COLL VENOUS BLD VENIPUNCTURE: CPT

## 2023-07-03 PROCEDURE — 84439 ASSAY OF FREE THYROXINE: CPT

## 2023-07-03 PROCEDURE — 82306 VITAMIN D 25 HYDROXY: CPT

## 2023-07-03 PROCEDURE — 84443 ASSAY THYROID STIM HORMONE: CPT

## 2023-07-03 RX ORDER — LEVOTHYROXINE SODIUM 112 UG/1
112 TABLET ORAL
Qty: 90 TABLET | Refills: 5 | Status: SHIPPED | OUTPATIENT
Start: 2023-07-03

## 2023-07-03 RX ORDER — ERGOCALCIFEROL 1.25 MG/1
50000 CAPSULE ORAL WEEKLY
Qty: 12 CAPSULE | Refills: 2 | Status: SHIPPED | OUTPATIENT
Start: 2023-07-03

## 2023-08-08 ENCOUNTER — OFFICE VISIT (OUTPATIENT)
Dept: FAMILY MEDICINE CLINIC | Facility: CLINIC | Age: 58
End: 2023-08-08
Payer: COMMERCIAL

## 2023-08-08 VITALS
RESPIRATION RATE: 14 BRPM | HEART RATE: 68 BPM | HEIGHT: 70 IN | BODY MASS INDEX: 41.23 KG/M2 | WEIGHT: 288 LBS | DIASTOLIC BLOOD PRESSURE: 76 MMHG | SYSTOLIC BLOOD PRESSURE: 128 MMHG | TEMPERATURE: 98 F | OXYGEN SATURATION: 97 %

## 2023-08-08 DIAGNOSIS — E06.3 HYPOTHYROIDISM DUE TO HASHIMOTO'S THYROIDITIS: ICD-10-CM

## 2023-08-08 DIAGNOSIS — E11.69 HYPERLIPIDEMIA ASSOCIATED WITH TYPE 2 DIABETES MELLITUS (HCC): ICD-10-CM

## 2023-08-08 DIAGNOSIS — E78.5 HYPERLIPIDEMIA ASSOCIATED WITH TYPE 2 DIABETES MELLITUS (HCC): ICD-10-CM

## 2023-08-08 DIAGNOSIS — I77.819 AORTIC ECTASIA (HCC): ICD-10-CM

## 2023-08-08 DIAGNOSIS — J43.9 PULMONARY EMPHYSEMA, UNSPECIFIED EMPHYSEMA TYPE (HCC): ICD-10-CM

## 2023-08-08 DIAGNOSIS — E03.8 HYPOTHYROIDISM DUE TO HASHIMOTO'S THYROIDITIS: ICD-10-CM

## 2023-08-08 DIAGNOSIS — E11.8 TYPE II DIABETES MELLITUS WITH MANIFESTATIONS (HCC): Primary | ICD-10-CM

## 2023-08-08 DIAGNOSIS — R79.89 LOW VITAMIN D LEVEL: ICD-10-CM

## 2023-08-08 DIAGNOSIS — I10 ESSENTIAL HYPERTENSION: ICD-10-CM

## 2023-08-08 PROBLEM — E66.01 MORBID (SEVERE) OBESITY DUE TO EXCESS CALORIES (HCC): Status: RESOLVED | Noted: 2020-07-27 | Resolved: 2023-08-08

## 2023-08-08 LAB — SL AMB POCT HEMOGLOBIN AIC: 5.9 (ref ?–6.5)

## 2023-08-08 PROCEDURE — 99214 OFFICE O/P EST MOD 30 MIN: CPT | Performed by: INTERNAL MEDICINE

## 2023-08-08 PROCEDURE — 83036 HEMOGLOBIN GLYCOSYLATED A1C: CPT | Performed by: INTERNAL MEDICINE

## 2023-08-08 RX ORDER — FENOFIBRATE 145 MG/1
145 TABLET, COATED ORAL
Qty: 90 TABLET | Refills: 3 | Status: SHIPPED | OUTPATIENT
Start: 2023-08-08

## 2023-08-08 RX ORDER — LEVOTHYROXINE SODIUM 112 UG/1
112 TABLET ORAL
Qty: 90 TABLET | Refills: 5 | Status: SHIPPED | OUTPATIENT
Start: 2023-08-08

## 2023-08-08 RX ORDER — ATORVASTATIN CALCIUM 80 MG/1
80 TABLET, FILM COATED ORAL
Qty: 90 TABLET | Refills: 3 | Status: SHIPPED | OUTPATIENT
Start: 2023-08-08 | End: 2023-11-06

## 2023-08-08 RX ORDER — CYCLOBENZAPRINE HCL 10 MG
10 TABLET ORAL 2 TIMES DAILY PRN
COMMUNITY
Start: 2023-05-23

## 2023-08-08 RX ORDER — ERGOCALCIFEROL 1.25 MG/1
50000 CAPSULE ORAL WEEKLY
Qty: 12 CAPSULE | Refills: 2 | Status: SHIPPED | OUTPATIENT
Start: 2023-08-08

## 2023-08-08 RX ORDER — AMLODIPINE BESYLATE 5 MG/1
5 TABLET ORAL DAILY
Qty: 90 TABLET | Refills: 3 | Status: SHIPPED | OUTPATIENT
Start: 2023-08-08

## 2023-08-08 RX ORDER — LISINOPRIL 10 MG/1
10 TABLET ORAL DAILY
Qty: 90 TABLET | Refills: 3 | Status: SHIPPED | OUTPATIENT
Start: 2023-08-08

## 2023-08-08 NOTE — PROGRESS NOTES
BMI Counseling: Body mass index is 41.32 kg/m². The BMI is above normal. Nutrition recommendations include reducing portion sizes.

## 2023-08-08 NOTE — PROGRESS NOTES
Name: Lynn Arevalo      : 1965      MRN: [id number]  Encounter Provider: Kim Andrew MD  Encounter Date: 2023   Encounter department: [address] [place] [address]     1. Type II diabetes mellitus with manifestations (HCC)  -     POCT hemoglobin A1c  -     Comprehensive metabolic panel; Future; Expected date: 2023  -     CBC and differential; Future; Expected date: 2023  -     Lipid Panel with Direct LDL reflex; Future; Expected date: 2023  -     TSH, 3rd generation with Free T4 reflex; Future; Expected date: 2023  -     Microalbumin, Random Urine (W/Creatinine) ([facility] ONLY); Future; Expected date: 2023    2. Pulmonary emphysema, unspecified emphysema type ([address])    3. Aortic ectasia (HCC)    4. Hyperlipidemia associated with type 2 diabetes mellitus ([address])  -     Lipid Panel with Direct LDL reflex; Future; Expected date: 2023    5. Hypothyroidism due to Hashimoto's thyroiditis  -     TSH, 3rd generation with Free T4 reflex; Future; Expected date: 2023  -     levothyroxine (Synthroid) 112 mcg tablet; Take 1 tablet (112 mcg total) by mouth daily in the early morning    6. Essential hypertension  -     lisinopril (ZESTRIL) 10 mg tablet; Take 1 tablet (10 mg total) by mouth daily  -     amLODIPine (NORVASC) 5 mg tablet; Take 1 tablet (5 mg total) by mouth daily    7. BMI 40.0-44.9, adult (HCC)  -     fenofibrate (TRICOR) 145 mg tablet; Take 1 tablet (145 mg total) by mouth daily with dinner  -     atorvastatin (LIPITOR) 80 mg tablet; Take 1 tablet (80 mg total) by mouth daily with dinner    8. Low vitamin D level  -     ergocalciferol (VITAMIN D2) 50,000 units;  Take 1 capsule (50,000 Units total) by mouth once a week    life style mod  Continue same  Pt was advised to quit smoking  RTC in 3 mos w Blood work         Subjective      60 Y O Man is here for Regular check up, he still smokes, recent Blood work and med list reviewed w pt in detail. .. Review of Systems   Constitutional: Negative for chills, fatigue and fever. HENT: Positive for postnasal drip. Negative for congestion, facial swelling, sore throat, trouble swallowing and voice change. Eyes: Negative for pain, discharge and visual disturbance. Respiratory: Negative for cough, shortness of breath and wheezing. Cardiovascular: Negative for chest pain, palpitations and leg swelling. Gastrointestinal: Negative for abdominal pain, blood in stool, constipation, diarrhea and nausea. Endocrine: Negative for polydipsia, polyphagia and polyuria. Genitourinary: Negative for difficulty urinating, hematuria and urgency. Musculoskeletal: Negative for arthralgias and myalgias. Skin: Negative for rash. Neurological: Negative for dizziness, tremors, weakness and headaches. Hematological: Negative for adenopathy. Does not bruise/bleed easily. Psychiatric/Behavioral: Negative for dysphoric mood, sleep disturbance and suicidal ideas.        Current Outpatient Medications on File Prior to Visit   Medication Sig   • celecoxib (CeleBREX) 200 mg capsule take 1 capsule by mouth once daily with food or breakfast   • cetirizine (ZyrTEC) 10 mg tablet Take 1 tablet (10 mg total) by mouth daily In the evening   • cyclobenzaprine (FLEXERIL) 10 mg tablet Take 10 mg by mouth 2 (two) times a day as needed   • FLUoxetine (PROzac) 20 mg capsule Take 1 capsule (20 mg total) by mouth daily   • fluticasone (FLONASE) 50 mcg/act nasal spray 2 sprays into each nostril daily   • haloperidol (HALDOL) 5 mg tablet 2.5 mg daily at bedtime    • sodium chloride (OCEAN) 0.65 % nasal spray 1 spray into each nostril 3 (three) times a day   • [DISCONTINUED] amLODIPine (NORVASC) 5 mg tablet Take 1 tablet (5 mg total) by mouth daily   • [DISCONTINUED] ergocalciferol (VITAMIN D2) 50,000 units Take 1 capsule (50,000 Units total) by mouth once a week   • [DISCONTINUED] fenofibrate (TRICOR) 145 mg tablet Take 1 tablet (145 mg total) by mouth daily with dinner   • [DISCONTINUED] levothyroxine (Synthroid) 112 mcg tablet Take 1 tablet (112 mcg total) by mouth daily in the early morning   • [DISCONTINUED] lisinopril (ZESTRIL) 10 mg tablet Take 1 tablet (10 mg total) by mouth daily   • [DISCONTINUED] atorvastatin (LIPITOR) 80 mg tablet Take 1 tablet (80 mg total) by mouth daily with dinner   • [DISCONTINUED] cholecalciferol (VITAMIN D3) 1,000 units tablet Take 1 tablet (1,000 Units total) by mouth daily (Patient not taking: Reported on 11/3/2022)       Objective     /76 (BP Location: Left arm, Patient Position: Sitting, Cuff Size: Standard)   Pulse 68   Temp 98 °F (36.7 °C) (Tympanic)   Resp 14   Ht 5' 10" (1.778 m)   Wt 131 kg (288 lb)   SpO2 97%   BMI 41.32 kg/m²     Physical Exam  Constitutional:       General: He is not in acute distress. HENT:      Head: Normocephalic. Mouth/Throat:      Pharynx: No oropharyngeal exudate. Eyes:      General: No scleral icterus. Conjunctiva/sclera: Conjunctivae normal.      Pupils: Pupils are equal, round, and reactive to light. Neck:      Thyroid: No thyromegaly. Cardiovascular:      Rate and Rhythm: Normal rate and regular rhythm. Heart sounds: Murmur heard. Pulmonary:      Effort: Pulmonary effort is normal. No respiratory distress. Breath sounds: Wheezing present. No rales. Abdominal:      General: Bowel sounds are normal. There is no distension. Palpations: Abdomen is soft. Tenderness: There is no abdominal tenderness. There is no guarding or rebound. Musculoskeletal:         General: No tenderness. Cervical back: Neck supple. Lymphadenopathy:      Cervical: No cervical adenopathy. Skin:     Coloration: Skin is not pale. Findings: No rash. Neurological:      Mental Status: He is alert and oriented to person, place, and time. Sensory: No sensory deficit. Motor: No weakness.        Josue Hand MD

## 2023-08-16 LAB — COLOGUARD RESULT REPORTABLE: NORMAL

## 2023-08-30 LAB — COLOGUARD RESULT REPORTABLE: NEGATIVE

## 2023-11-03 ENCOUNTER — RA CDI HCC (OUTPATIENT)
Dept: OTHER | Facility: HOSPITAL | Age: 58
End: 2023-11-03

## 2023-11-03 NOTE — PROGRESS NOTES
[facility] [address] [facility] coding opportunities       Chart reviewed, no opportunity found: CHART REVIEWED, NO OPPORTUNITY FOUND        Patients Insurance     Medicare Insurance: [employer] Advantage

## 2023-11-14 ENCOUNTER — OFFICE VISIT (OUTPATIENT)
Dept: FAMILY MEDICINE CLINIC | Facility: CLINIC | Age: 58
End: 2023-11-14
Payer: COMMERCIAL

## 2023-11-14 VITALS
BODY MASS INDEX: 40.11 KG/M2 | HEIGHT: 70 IN | OXYGEN SATURATION: 97 % | HEART RATE: 66 BPM | DIASTOLIC BLOOD PRESSURE: 80 MMHG | SYSTOLIC BLOOD PRESSURE: 124 MMHG | WEIGHT: 280.2 LBS | TEMPERATURE: 97.6 F

## 2023-11-14 DIAGNOSIS — Z23 ENCOUNTER FOR IMMUNIZATION: ICD-10-CM

## 2023-11-14 DIAGNOSIS — Z00.01 ENCOUNTER FOR GENERAL ADULT MEDICAL EXAMINATION WITH ABNORMAL FINDINGS: Primary | ICD-10-CM

## 2023-11-14 DIAGNOSIS — N40.0 ENLARGED PROSTATE: ICD-10-CM

## 2023-11-14 DIAGNOSIS — F17.210 CIGARETTE SMOKER: ICD-10-CM

## 2023-11-14 DIAGNOSIS — J43.8 OTHER EMPHYSEMA (HCC): ICD-10-CM

## 2023-11-14 DIAGNOSIS — I77.819 AORTIC ECTASIA (HCC): ICD-10-CM

## 2023-11-14 DIAGNOSIS — R91.8 LUNG NODULES: ICD-10-CM

## 2023-11-14 DIAGNOSIS — E11.8 TYPE II DIABETES MELLITUS WITH MANIFESTATIONS (HCC): ICD-10-CM

## 2023-11-14 LAB — SL AMB POCT HEMOGLOBIN AIC: 5.6 (ref ?–6.5)

## 2023-11-14 PROCEDURE — 90677 PCV20 VACCINE IM: CPT

## 2023-11-14 PROCEDURE — 83036 HEMOGLOBIN GLYCOSYLATED A1C: CPT | Performed by: INTERNAL MEDICINE

## 2023-11-14 PROCEDURE — G0009 ADMIN PNEUMOCOCCAL VACCINE: HCPCS

## 2023-11-14 PROCEDURE — 90686 IIV4 VACC NO PRSV 0.5 ML IM: CPT

## 2023-11-14 PROCEDURE — G0008 ADMIN INFLUENZA VIRUS VAC: HCPCS

## 2023-11-14 PROCEDURE — G0439 PPPS, SUBSEQ VISIT: HCPCS | Performed by: INTERNAL MEDICINE

## 2023-11-14 PROCEDURE — 99214 OFFICE O/P EST MOD 30 MIN: CPT | Performed by: INTERNAL MEDICINE

## 2023-11-14 NOTE — PROGRESS NOTES
Name: Parish Lee      : 1965      MRN: [id number]  Encounter Provider: Sherry Melo MD  Encounter Date: 2023   Encounter department: [address] [place] [address]     1. Lung nodules  -     CT chest wo contrast; Future; Expected date: 2023    2. Type II diabetes mellitus with manifestations ([address])  Comments:  stable  continue same  Life style mod  RTc in 3 mos w Blood work  Orders:  -     POCT hemoglobin A1c  -     Comprehensive metabolic panel; Future; Expected date: 2024  -     CBC and differential; Future; Expected date: 2024  -     Lipid Panel with Direct LDL reflex; Future; Expected date: 2024  -     TSH, 3rd generation with Free T4 reflex; Future; Expected date: 2024  -     Albumin / creatinine urine ratio; Future    3. Other emphysema ([address])    4. Aortic ectasia (HCC)    5. BMI 40.0-44.9, adult ([address])    6. Encounter for general adult medical examination with abnormal findings  Comments:  done in detail  life style mod  RTC in 3 mos w blood work  Orders:  -     UA (URINE) with reflex to Scope; Future; Expected date: 2023  -     Magnesium; Future  -     PSA Total, Diagnostic; Future    7. Encounter for immunization  -     Pneumococcal Conjugate Vaccine 20-valent (Pcv20)  -     influenza vaccine, quadrivalent, 0.5 mL, preservative-free, for adult and pediatric patients 6 mos+ (AFLURIA, FLUARIX, FLULAVAL, FLUZONE)    8. Enlarged prostate  -     UA (URINE) with reflex to Scope; Future; Expected date: 2023  -     PSA Total, Diagnostic; Future    9. Cigarette smoker  -     CT chest wo contrast; Future; Expected date: 2023    AWV : Done in detail  Life style mod  RTC in  3mos w Blood work    Depression Screening and Follow-up Plan: Patient was screened for depression during today's encounter. They screened negative with a PHQ-2 score of 0.         Subjective      60 Y O man is here for AWV and Regular check Up, He still smokes, recent Blood work and med list reviewed w pt,... Review of Systems   Constitutional:  Negative for chills, fatigue and fever. HENT:  Positive for postnasal drip. Negative for congestion, facial swelling, sore throat, trouble swallowing and voice change. Eyes:  Negative for pain, discharge and visual disturbance. Respiratory:  Negative for cough, shortness of breath and wheezing. Cardiovascular:  Negative for chest pain, palpitations and leg swelling. Gastrointestinal:  Negative for abdominal pain, blood in stool, constipation, diarrhea and nausea. Endocrine: Negative for polydipsia, polyphagia and polyuria. Genitourinary:  Negative for difficulty urinating, hematuria and urgency. Musculoskeletal:  Negative for arthralgias and myalgias. Skin:  Negative for rash. Neurological:  Negative for dizziness, tremors, weakness and headaches. Hematological:  Negative for adenopathy. Does not bruise/bleed easily. Psychiatric/Behavioral:  Negative for dysphoric mood, sleep disturbance and suicidal ideas.         Current Outpatient Medications on File Prior to Visit   Medication Sig    amLODIPine (NORVASC) 5 mg tablet Take 1 tablet (5 mg total) by mouth daily    celecoxib (CeleBREX) 200 mg capsule take 1 capsule by mouth once daily with food or breakfast    cetirizine (ZyrTEC) 10 mg tablet Take 1 tablet (10 mg total) by mouth daily In the evening    cyclobenzaprine (FLEXERIL) 10 mg tablet Take 10 mg by mouth 2 (two) times a day as needed    ergocalciferol (VITAMIN D2) 50,000 units Take 1 capsule (50,000 Units total) by mouth once a week    fenofibrate (TRICOR) 145 mg tablet Take 1 tablet (145 mg total) by mouth daily with dinner    FLUoxetine (PROzac) 20 mg capsule Take 1 capsule (20 mg total) by mouth daily    fluticasone (FLONASE) 50 mcg/act nasal spray 2 sprays into each nostril daily    haloperidol (HALDOL) 5 mg tablet 2.5 mg daily at bedtime     levothyroxine (Synthroid) 112 mcg tablet Take 1 tablet (112 mcg total) by mouth daily in the early morning    lisinopril (ZESTRIL) 10 mg tablet Take 1 tablet (10 mg total) by mouth daily    sodium chloride (OCEAN) 0.65 % nasal spray 1 spray into each nostril 3 (three) times a day    atorvastatin (LIPITOR) 80 mg tablet Take 1 tablet (80 mg total) by mouth daily with dinner       Objective     /80 (BP Location: Left arm, Patient Position: Sitting, Cuff Size: Standard)   Pulse 66   Temp 97.6 °F (36.4 °C) (Tympanic)   Ht 5' 10" (1.778 m)   Wt 127 kg (280 lb 3.2 oz)   SpO2 97%   BMI 40.20 kg/m²     Physical Exam  Constitutional:       General: He is not in acute distress. HENT:      Head: Normocephalic. Mouth/Throat:      Pharynx: No oropharyngeal exudate. Eyes:      General: No scleral icterus. Conjunctiva/sclera: Conjunctivae normal.      Pupils: Pupils are equal, round, and reactive to light. Neck:      Thyroid: No thyromegaly. Cardiovascular:      Rate and Rhythm: Normal rate and regular rhythm. Heart sounds: Murmur heard. Pulmonary:      Effort: Pulmonary effort is normal. No respiratory distress. Breath sounds: Normal breath sounds. No wheezing or rales. Abdominal:      General: Bowel sounds are normal. There is no distension. Palpations: Abdomen is soft. Tenderness: There is no abdominal tenderness. There is no guarding or rebound. Musculoskeletal:         General: No tenderness. Cervical back: Neck supple. Lymphadenopathy:      Cervical: No cervical adenopathy. Skin:     Coloration: Skin is not pale. Findings: No rash. Neurological:      Mental Status: He is alert and oriented to person, place, and time. Sensory: No sensory deficit. Motor: No weakness.        Natalie Cobb MD

## 2023-11-14 NOTE — PROGRESS NOTES
Assessment and Plan:     Problem List Items Addressed This Visit    None      Depression Screening and Follow-up Plan: Patient was screened for depression during today's encounter. They screened negative with a PHQ-2 score of 0. Preventive health issues were discussed with patient, and age appropriate screening tests were ordered as noted in patient's After Visit Summary. Personalized health advice and appropriate referrals for health education or preventive services given if needed, as noted in patient's After Visit Summary.      History of Present Illness:     Patient presents for a Medicare Wellness Visit    HPI   Patient Care Team:  Bright Gill MD as PCP - General (Internal Medicine)  Bright Gill MD as PCP - PCP-[facility] Attributed-Roster     Review of Systems:     Review of Systems     Problem List:     Patient Active Problem List   Diagnosis    Hypertension    Type 2 diabetes mellitus ([address])    Tobacco dependence syndrome    Chronic obstructive pulmonary disease (HCC)    Benign essential hypertension    Body mass index (BMI) 40.0-44.9, adult    Type II diabetes mellitus with manifestations ([address])    Aortic ectasia ([address])    Varicose veins of both lower extremities with pain      Past Medical and Surgical History:     Past Medical History:   Diagnosis Date    Anxiety     Arthritis     Bipolar disorder ([address])     Colon polyp     COPD (chronic obstructive pulmonary disease) ([address])     Depression     Diabetes mellitus ([address])     type 2    Gout     High triglycerides     Hypertension     Hypothyroidism 02/20/2013    Obesity     Paranoid schizophrenia ([address])     Psychiatric disorder     Sciatica 12/03/2013    Seasonal allergies     Sleep apnea      Past Surgical History:   Procedure Laterality Date    COLONOSCOPY      UMBILICAL HERNIA REPAIR      WISDOM TOOTH EXTRACTION        Family History:     Family History   Problem Relation Age of Onset    Colon cancer Mother     Coronary artery disease Father Hyperlipidemia Father     Coronary artery disease Family       Social History:     Social History     Socioeconomic History    Marital status: Single     Spouse name: None    Number of children: None    Years of education: None    Highest education level: None   Occupational History    None   Tobacco Use    Smoking status: Every Day     Packs/day: 0.25     Years: 35.00     Total pack years: 8.75     Types: Cigarettes    Smokeless tobacco: Never   Vaping Use    Vaping Use: Never used   Substance and Sexual Activity    Alcohol use: No    Drug use: No    Sexual activity: Not Currently     Partners: Female   Other Topics Concern    None   Social History Narrative    None     Social Determinants of Health     Financial Resource Strain: Low Risk  (10/27/2022)    Overall Financial Resource Strain (CARDIA)     Difficulty of Paying Living Expenses: Not very hard   Food Insecurity: No Food Insecurity (3/15/2021)    Hunger Vital Sign     Worried About Running Out of Food in the Last Year: Never true     Ran Out of Food in the Last Year: Never true   Transportation Needs: No Transportation Needs (10/27/2022)    PRAPARE - Transportation     Lack of Transportation (Medical): No     Lack of Transportation (Non-Medical):  No   Physical Activity: Insufficiently Active (7/30/2020)    Exercise Vital Sign     Days of Exercise per Week: 4 days     Minutes of Exercise per Session: 30 min   Stress: No Stress Concern Present (7/30/2020)    [facility]     Feeling of Stress : Not at all   Social Connections: Unknown (7/30/2020)    Social Connection and Isolation Panel [NHANES]     Frequency of Communication with Friends and Family: Patient refused     Frequency of Social Gatherings with Friends and Family: Patient refused     Attends [employer] Services: Patient refused     Active Member of Clubs or Organizations: Patient refused     Attends Club or Organization Meetings: Patient refused     Marital Status: Patient refused   Intimate Partner Violence: Not At Risk (7/30/2020)    Humiliation, Afraid, Rape, and Kick questionnaire     Fear of Current or Ex-Partner: No     Emotionally Abused: No     Physically Abused: No     Sexually Abused: No   Housing Stability: Not on file      Medications and Allergies:     Current Outpatient Medications   Medication Sig Dispense Refill    amLODIPine (NORVASC) 5 mg tablet Take 1 tablet (5 mg total) by mouth daily 90 tablet 3    celecoxib (CeleBREX) 200 mg capsule take 1 capsule by mouth once daily with food or breakfast 90 capsule 2    cetirizine (ZyrTEC) 10 mg tablet Take 1 tablet (10 mg total) by mouth daily In the evening 90 tablet 3    cyclobenzaprine (FLEXERIL) 10 mg tablet Take 10 mg by mouth 2 (two) times a day as needed      ergocalciferol (VITAMIN D2) 50,000 units Take 1 capsule (50,000 Units total) by mouth once a week 12 capsule 2    fenofibrate (TRICOR) 145 mg tablet Take 1 tablet (145 mg total) by mouth daily with dinner 90 tablet 3    FLUoxetine (PROzac) 20 mg capsule Take 1 capsule (20 mg total) by mouth daily 90 capsule 3    fluticasone (FLONASE) 50 mcg/act nasal spray 2 sprays into each nostril daily 1 g 0    haloperidol (HALDOL) 5 mg tablet 2.5 mg daily at bedtime   0    levothyroxine (Synthroid) 112 mcg tablet Take 1 tablet (112 mcg total) by mouth daily in the early morning 90 tablet 5    lisinopril (ZESTRIL) 10 mg tablet Take 1 tablet (10 mg total) by mouth daily 90 tablet 3    sodium chloride (OCEAN) 0.65 % nasal spray 1 spray into each nostril 3 (three) times a day 30 mL 1    atorvastatin (LIPITOR) 80 mg tablet Take 1 tablet (80 mg total) by mouth daily with dinner 90 tablet 3     No current facility-administered medications for this visit.      Allergies   Allergen Reactions    No Active Allergies       Immunizations:     Immunization History   Administered Date(s) Administered    Fluzone Split Quad 0.25 mL 10/04/2016, 08/30/2017    INFLUENZA 10/30/2013, 11/12/2015, 10/04/2016, 08/30/2017, 09/01/2017, 12/19/2018, 10/27/2022    Influenza Split 10/01/2013, 09/11/2014    Influenza, recombinant, quadrivalent,injectable, preservative free 12/19/2018, 11/07/2019, 11/05/2020, 11/16/2021, 10/27/2022    Influenza, seasonal, injectable 09/14/2012, 11/12/2015    Pneumococcal Conjugate 13-Valent 08/30/2017    Pneumococcal Polysaccharide PPV23 11/12/2014    Tdap 07/30/2020    Zoster 06/16/2018      Health Maintenance:         Topic Date Due    Colorectal Cancer Screening  10/04/2029    HIV Screening  Completed    Hepatitis C Screening  Completed    Lung Cancer Screening  Discontinued         Topic Date Due    COVID-19 Vaccine (1) Never done    Hepatitis A Vaccine (1 of 2 - Risk 2-dose series) Never done    Influenza Vaccine (1) 09/01/2023      Medicare Screening Tests and Risk Assessments:     Jess Regalado is here for his Subsequent Wellness visit. Last Medicare Wellness visit information reviewed, patient interviewed and updates made to the record today. Health Risk Assessment:   Patient rates overall health as good. Patient feels that their physical health rating is same. Patient is satisfied with their life. Eyesight was rated as same. Hearing was rated as same. Patient feels that their emotional and mental health rating is same. Patients states they are sometimes angry. Patient states they are never, rarely unusually tired/fatigued. Pain experienced in the last 7 days has been none. Patient states that he has experienced no weight loss or gain in last 6 months. Depression Screening:   PHQ-2 Score: 0      Fall Risk Screening: In the past year, patient has experienced: no history of falling in past year      Home Safety:  Patient does not have trouble with stairs inside or outside of their home. Patient has working smoke alarms and has working carbon monoxide detector. Home safety hazards include: none.      Nutrition:   Current diet is Diabetic. Medications:   Patient is currently taking over-the-counter supplements. OTC medications include: see medication list. Patient is able to manage medications. Activities of Daily Living (ADLs)/Instrumental Activities of Daily Living (IADLs):   Walk and transfer into and out of bed and chair?: Yes  Dress and groom yourself?: Yes    Bathe or shower yourself?: Yes    Feed yourself? Yes  Do your laundry/housekeeping?: Yes  Manage your money, pay your bills and track your expenses?: Yes  Make your own meals?: Yes    Do your own shopping?: Yes    Previous Hospitalizations:   Any hospitalizations or ED visits within the last 12 months?: No      Advance Care Planning:   Living will: No    Durable POA for healthcare: No    Advanced directive counseling given: No    Five wishes given: No      PREVENTIVE SCREENINGS      Cardiovascular Screening:    General: Screening Not Indicated, History Lipid Disorder and Risks and Benefits Discussed      Diabetes Screening:     General: Screening Not Indicated, History Diabetes, Risks and Benefits Discussed and Screening Current      Colorectal Cancer Screening:     General: Screening Current      Prostate Cancer Screening:    General: Screening Current and Risks and Benefits Discussed      Osteoporosis Screening:    General: Risks and Benefits Discussed      Abdominal Aortic Aneurysm (AAA) Screening:    Risk factors include: tobacco use        General: Risks and Benefits Discussed      Lung Cancer Screening:     General: Screening Not Indicated and Risks and Benefits Discussed      Hepatitis C Screening:    General: Screening Current and Risks and Benefits Discussed    Other Counseling Topics:   Car/seat belt/driving safety, skin self-exam, sunscreen and calcium and vitamin D intake and regular weightbearing exercise. No results found.      Physical Exam:     Ht 5' 10" (1.778 m)   BMI 41.32 kg/m²     Physical Exam     Trevor Bhardwaj MD

## 2023-11-14 NOTE — PATIENT INSTRUCTIONS
Medicare Preventive Visit Patient Instructions  Thank you for completing your Welcome to Medicare Visit or Medicare Annual Wellness Visit today. Your next wellness visit will be due in one year (11/14/2024). The screening/preventive services that you may require over the next 5-10 years are detailed below. Some tests may not apply to you based off risk factors and/or age. Screening tests ordered at today's visit but not completed yet may show as past due. Also, please note that scanned in results may not display below. Preventive Screenings:  Service Recommendations Previous Testing/Comments   Colorectal Cancer Screening  Colonoscopy    Fecal Occult Blood Test (FOBT)/Fecal Immunochemical Test (FIT)  Fecal DNA/Cologuard Test  Flexible Sigmoidoscopy Age: 43-73 years old   Colonoscopy: every 10 years (May be performed more frequently if at higher risk)  OR  FOBT/FIT: every 1 year  OR  Cologuard: every 3 years  OR  Sigmoidoscopy: every 5 years  Screening may be recommended earlier than age 39 if at higher risk for colorectal cancer. Also, an individualized decision between you and your healthcare provider will decide whether screening between the ages of 77-80 would be appropriate.  Colonoscopy: 08/21/2023  FOBT/FIT: Not on file  Cologuard: 08/21/2023  Sigmoidoscopy: Not on file    Screening Current     Prostate Cancer Screening Individualized decision between patient and health care provider in men between ages of 53-66   Medicare will cover every 12 months beginning on the day after your 50th birthday PSA: 0.3 ng/mL     Screening Current  Risks and Benefits Discussed     Hepatitis C Screening Once for adults born between 1945 and 1965  More frequently in patients at high risk for Hepatitis C Hep C Antibody: 02/10/2020    Screening Current   Diabetes Screening 1-2 times per year if you're at risk for diabetes or have pre-diabetes Fasting glucose: 108 mg/dL (2/6/2023)  A1C: 5.9 (8/8/2023)  Screening Not Indicated  History Diabetes   Cholesterol Screening Once every 5 years if you don't have a lipid disorder. May order more often based on risk factors. Lipid panel: 02/06/2023  Screening Not Indicated  History Lipid Disorder      Other Preventive Screenings Covered by Medicare:  Abdominal Aortic Aneurysm (AAA) Screening: covered once if your at risk. You're considered to be at risk if you have a family history of AAA or a male between the age of 70-76 who smoking at least 100 cigarettes in your lifetime. Lung Cancer Screening: covers low dose CT scan once per year if you meet all of the following conditions: (1) Age 48-67; (2) No signs or symptoms of lung cancer; (3) Current smoker or have quit smoking within the last 15 years; (4) You have a tobacco smoking history of at least 20 pack years (packs per day x number of years you smoked); (5) You get a written order from a healthcare provider. Glaucoma Screening: covered annually if you're considered high risk: (1) You have diabetes OR (2) Family history of glaucoma OR (3)  aged 48 and older OR (3)  American aged 72 and older  Osteoporosis Screening: covered every 2 years if you meet one of the following conditions: (1) Have a vertebral abnormality; (2) On glucocorticoid therapy for more than 3 months; (3) Have primary hyperparathyroidism; (4) On osteoporosis medications and need to assess response to drug therapy. HIV Screening: covered annually if you're between the age of 14-79. Also covered annually if you are younger than 13 and older than 72 with risk factors for HIV infection. For pregnant patients, it is covered up to 3 times per pregnancy.     Immunizations:  Immunization Recommendations   Influenza Vaccine Annual influenza vaccination during flu season is recommended for all persons aged >= 6 months who do not have contraindications   Pneumococcal Vaccine   * Pneumococcal conjugate vaccine = PCV13 (Prevnar 13), PCV15 (Vaxneuvance), PCV20 (Prevnar 20)  * Pneumococcal polysaccharide vaccine = PPSV23 (Pneumovax) Adults 11-42 yo with certain risk factors or if 69+ yo  If never received any pneumonia vaccine: recommend Prevnar 20 (PCV20)  Give PCV20 if previously received 1 dose of PCV13 or PPSV23   Hepatitis B Vaccine 3 dose series if at intermediate or high risk (ex: diabetes, end stage renal disease, liver disease)   Respiratory syncytial virus (RSV) Vaccine - COVERED BY MEDICARE PART D  * RSVPreF3 (Arexvy) CDC recommends that adults 61years of age and older may receive a single dose of RSV vaccine using shared clinical decision-making (SCDM)   Tetanus (Td) Vaccine - COST NOT COVERED BY MEDICARE PART B Following completion of primary series, a booster dose should be given every 10 years to maintain immunity against tetanus. Td may also be given as tetanus wound prophylaxis. Tdap Vaccine - COST NOT COVERED BY MEDICARE PART B Recommended at least once for all adults. For pregnant patients, recommended with each pregnancy. Shingles Vaccine (Shingrix) - COST NOT COVERED BY MEDICARE PART B  2 shot series recommended in those 23 years and older who have or will have weakened immune systems or those 50 years and older     Health Maintenance Due:      Topic Date Due   • Colorectal Cancer Screening  10/04/2029   • HIV Screening  Completed   • Hepatitis C Screening  Completed   • Lung Cancer Screening  Discontinued     Immunizations Due:      Topic Date Due   • COVID-19 Vaccine (1) Never done   • Hepatitis A Vaccine (1 of 2 - Risk 2-dose series) Never done   • Influenza Vaccine (1) 09/01/2023     Advance Directives   What are advance directives? Advance directives are legal documents that state your wishes and plans for medical care. These plans are made ahead of time in case you lose your ability to make decisions for yourself. Advance directives can apply to any medical decision, such as the treatments you want, and if you want to donate organs. What are the types of advance directives? There are many types of advance directives, and each state has rules about how to use them. You may choose a combination of any of the following:  Living will: This is a written record of the treatment you want. You can also choose which treatments you do not want, which to limit, and which to stop at a certain time. This includes surgery, medicine, IV fluid, and tube feedings. Durable power of  for [facility]): This is a written record that states who you want to make healthcare choices for you when you are unable to make them for yourself. This person, called a proxy, is usually a family member or a friend. You may choose more than 1 proxy. Do not resuscitate (DNR) order:  A DNR order is used in case your heart stops beating or you stop breathing. It is a request not to have certain forms of treatment, such as CPR. A DNR order may be included in other types of advance directives. Medical directive: This covers the care that you want if you are in a coma, near death, or unable to make decisions for yourself. You can list the treatments you want for each condition. Treatment may include pain medicine, surgery, blood transfusions, dialysis, IV or tube feedings, and a ventilator (breathing machine). Values history: This document has questions about your views, beliefs, and how you feel and think about life. This information can help others choose the care that you would choose. Why are advance directives important? An advance directive helps you control your care. Although spoken wishes may be used, it is better to have your wishes written down. Spoken wishes can be misunderstood, or not followed. Treatments may be given even if you do not want them. An advance directive may make it easier for your family to make difficult choices about your care.    Cigarette Smoking and Your Health   Risks to your health if you smoke:  Nicotine and other chemicals found in tobacco damage every cell in your body. Even if you are a light smoker, you have an increased risk for cancer, heart disease, and lung disease. If you are pregnant or have diabetes, smoking increases your risk for complications. Benefits to your health if you stop smoking: You decrease respiratory symptoms such as coughing, wheezing, and shortness of breath. You reduce your risk for cancers of the lung, mouth, throat, kidney, bladder, pancreas, stomach, and cervix. If you already have cancer, you increase the benefits of chemotherapy. You also reduce your risk for cancer returning or a second cancer from developing. You reduce your risk for heart disease, blood clots, heart attack, and stroke. You reduce your risk for lung infections, and diseases such as pneumonia, asthma, chronic bronchitis, and emphysema. Your circulation improves. More oxygen can be delivered to your body. If you have diabetes, you lower your risk for complications, such as kidney, artery, and eye diseases. You also lower your risk for nerve damage. Nerve damage can lead to amputations, poor vision, and blindness. You improve your body's ability to heal and to fight infections. For more information and support to stop smoking:   [employer] [place]  Phone: 0- 911 - 444-8278  Web Address: www.[employer]  Weight Management   Why it is important to manage your weight:  Being overweight increases your risk of health conditions such as heart disease, high blood pressure, type 2 diabetes, and certain types of cancer. It can also increase your risk for osteoarthritis, sleep apnea, and other respiratory problems. Aim for a slow, steady weight loss. Even a small amount of weight loss can lower your risk of health problems. How to lose weight safely:  A safe and healthy way to lose weight is to eat fewer calories and get regular exercise.  You can lose up about 1 pound a week by decreasing the number of calories you eat by 500 calories each day. Healthy meal plan for weight management:  A healthy meal plan includes a variety of foods, contains fewer calories, and helps you stay healthy. A healthy meal plan includes the following:  Eat whole-grain foods more often. A healthy meal plan should contain fiber. Fiber is the part of grains, fruits, and vegetables that is not broken down by your body. Whole-grain foods are healthy and provide extra fiber in your diet. Some examples of whole-grain foods are whole-wheat breads and pastas, oatmeal, brown rice, and bulgur. Eat a variety of vegetables every day. Include dark, leafy greens such as spinach, kale, digna greens, and mustard greens. Eat yellow and orange vegetables such as carrots, sweet potatoes, and winter squash. Eat a variety of fruits every day. Choose fresh or canned fruit (canned in its own juice or light syrup) instead of juice. Fruit juice has very little or no fiber. Eat low-fat dairy foods. Drink fat-free (skim) milk or 1% milk. Eat fat-free yogurt and low-fat cottage cheese. Try low-fat cheeses such as mozzarella and other reduced-fat cheeses. Choose meat and other protein foods that are low in fat. Choose beans or other legumes such as split peas or lentils. Choose fish, skinless poultry (chicken or turkey), or lean cuts of red meat (beef or pork). Before you cook meat or poultry, cut off any visible fat. Use less fat and oil. Try baking foods instead of frying them. Add less fat, such as margarine, sour cream, regular salad dressing and mayonnaise to foods. Eat fewer high-fat foods. Some examples of high-fat foods include french fries, doughnuts, ice cream, and cakes. Eat fewer sweets. Limit foods and drinks that are high in sugar. This includes candy, cookies, regular soda, and sweetened drinks. Exercise:  Exercise at least 30 minutes per day on most days of the week. Some examples of exercise include walking, biking, dancing, and swimming.  You can also fit in more physical activity by taking the stairs instead of the elevator or parking farther away from stores. Ask your healthcare provider about the best exercise plan for you. © Copyright [employer] 2018 Information is for End User's use only and may not be sold, redistributed or otherwise used for commercial purposes.  All illustrations and images included in CareNotes® are the copyrighted property of [employer] or [address] [place] [address]

## 2023-11-14 NOTE — PROGRESS NOTES
BMI Counseling: Body mass index is 40.2 kg/m². The BMI is above normal. Nutrition recommendations include reducing portion sizes.

## 2023-11-19 ENCOUNTER — OFFICE VISIT (OUTPATIENT)
Dept: URGENT CARE | Age: 58
End: 2023-11-19
Payer: COMMERCIAL

## 2023-11-19 VITALS
WEIGHT: 286.2 LBS | DIASTOLIC BLOOD PRESSURE: 81 MMHG | OXYGEN SATURATION: 97 % | HEART RATE: 66 BPM | HEIGHT: 69 IN | TEMPERATURE: 96.8 F | SYSTOLIC BLOOD PRESSURE: 137 MMHG | RESPIRATION RATE: 18 BRPM | BODY MASS INDEX: 42.39 KG/M2

## 2023-11-19 DIAGNOSIS — H60.501 ACUTE OTITIS EXTERNA OF RIGHT EAR, UNSPECIFIED TYPE: Primary | ICD-10-CM

## 2023-11-19 PROCEDURE — 99213 OFFICE O/P EST LOW 20 MIN: CPT | Performed by: PHYSICIAN ASSISTANT

## 2023-11-19 NOTE — PROGRESS NOTES
[facility]    NAME: Steve Paredes is a 60 y.o. male  : 1965    MRN: [id number]  DATE: 2023  TIME: 2:58 PM    Assessment and Plan   Acute otitis externa of right ear, unspecified type [H60.501]  1. Acute otitis externa of right ear, unspecified type  neomycin-polymyxin-hydrocortisone (CORTISPORIN) otic solution          Patient Instructions     Patient Instructions   Drops as directed. Follow up with ENT if not improving. Chief Complaint     Chief Complaint   Patient presents with    Earache     Ear is swollen, itchy, and blocked. Pt has had cotton get stuck in ear or suspects ear infection. Pt noticed symptom yesterday morning but denies pain. History of Present Illness   60year old male here with complaint of right ear canal swelling. No pain. States that he uses Qtips and has a hard time getting them in now. Ear canal feels swollen. Review of Systems   Review of Systems   Constitutional:  Negative for chills, fatigue and fever. HENT:  Negative for congestion, ear pain, postnasal drip, sinus pressure and sore throat. Swelling right ear canal   Respiratory:  Negative for cough, shortness of breath and wheezing. Neurological:  Negative for headaches. All other systems reviewed and are negative.       Current Medications     Current Outpatient Medications:     neomycin-polymyxin-hydrocortisone (CORTISPORIN) otic solution, Administer 4 drops to the right ear every 8 (eight) hours for 7 days, Disp: 5 mL, Rfl: 0    amLODIPine (NORVASC) 5 mg tablet, Take 1 tablet (5 mg total) by mouth daily, Disp: 90 tablet, Rfl: 3    atorvastatin (LIPITOR) 80 mg tablet, Take 1 tablet (80 mg total) by mouth daily with dinner, Disp: 90 tablet, Rfl: 3    celecoxib (CeleBREX) 200 mg capsule, take 1 capsule by mouth once daily with food or breakfast, Disp: 90 capsule, Rfl: 2    cetirizine (ZyrTEC) 10 mg tablet, Take 1 tablet (10 mg total) by mouth daily In the evening, Disp: 90 tablet, Rfl: 3    cyclobenzaprine (FLEXERIL) 10 mg tablet, Take 10 mg by mouth 2 (two) times a day as needed, Disp: , Rfl:     ergocalciferol (VITAMIN D2) 50,000 units, Take 1 capsule (50,000 Units total) by mouth once a week, Disp: 12 capsule, Rfl: 2    fenofibrate (TRICOR) 145 mg tablet, Take 1 tablet (145 mg total) by mouth daily with dinner, Disp: 90 tablet, Rfl: 3    FLUoxetine (PROzac) 20 mg capsule, Take 1 capsule (20 mg total) by mouth daily, Disp: 90 capsule, Rfl: 3    fluticasone (FLONASE) 50 mcg/act nasal spray, 2 sprays into each nostril daily, Disp: 1 g, Rfl: 0    haloperidol (HALDOL) 5 mg tablet, 2.5 mg daily at bedtime , Disp: , Rfl: 0    levothyroxine (Synthroid) 112 mcg tablet, Take 1 tablet (112 mcg total) by mouth daily in the early morning, Disp: 90 tablet, Rfl: 5    lisinopril (ZESTRIL) 10 mg tablet, Take 1 tablet (10 mg total) by mouth daily, Disp: 90 tablet, Rfl: 3    sodium chloride (OCEAN) 0.65 % nasal spray, 1 spray into each nostril 3 (three) times a day, Disp: 30 mL, Rfl: 1    Current Allergies     Allergies as of 11/19/2023 - Reviewed 11/19/2023   Allergen Reaction Noted    No active allergies  06/11/2018          The following portions of the patient's history were reviewed and updated as appropriate: allergies, current medications, past family history, past medical history, past social history, past surgical history and problem list.   Past Medical History:   Diagnosis Date    Anxiety     Arthritis     Bipolar disorder (HCC)     Colon polyp     COPD (chronic obstructive pulmonary disease) ([facility])     Depression     Diabetes mellitus ([facility])     type 2    Gout     High triglycerides     Hypertension     Hypothyroidism 02/20/2013    Obesity     Paranoid schizophrenia ([facility])     Psychiatric disorder     Sciatica 12/03/2013    Seasonal allergies     Sleep apnea      Past Surgical History:   Procedure Laterality Date    COLONOSCOPY      UMBILICAL HERNIA REPAIR      WISDOM TOOTH EXTRACTION       Family History   Problem Relation Age of Onset    Colon cancer Mother     Coronary artery disease Father     Hyperlipidemia Father     Coronary artery disease Family      Social History     Socioeconomic History    Marital status: Single     Spouse name: Not on file    Number of children: Not on file    Years of education: Not on file    Highest education level: Not on file   Occupational History    Not on file   Tobacco Use    Smoking status: Every Day     Packs/day: 0.25     Years: 35.00     Total pack years: 8.75     Types: Cigarettes, E-Cigarettes    Smokeless tobacco: Never   Vaping Use    Vaping Use: Never used   Substance and Sexual Activity    Alcohol use: No    Drug use: No    Sexual activity: Not Currently     Partners: Female   Other Topics Concern    Not on file   Social History Narrative    Not on file     Social Determinants of Health     Financial Resource Strain: Low Risk  (11/14/2023)    Overall Financial Resource Strain (CARDIA)     Difficulty of Paying Living Expenses: Not hard at all   Food Insecurity: No Food Insecurity (3/15/2021)    Hunger Vital Sign     Worried About Running Out of Food in the Last Year: Never true     [address] in the Last Year: Never true   Transportation Needs: No Transportation Needs (11/14/2023)    PRAPARE - Transportation     Lack of Transportation (Medical): No     Lack of Transportation (Non-Medical):  No   Physical Activity: Insufficiently Active (7/30/2020)    Exercise Vital Sign     Days of Exercise per Week: 4 days     Minutes of Exercise per Session: 30 min   Stress: No Stress Concern Present (7/30/2020)    [facility]     Feeling of Stress : Not at all   Social Connections: Unknown (7/30/2020)    Social Connection and Isolation Panel [NHANES]     Frequency of Communication with Friends and Family: Patient refused     Frequency of Social Gatherings with Friends and Family: Patient refused     Attends [employer] Services: Patient refused     Active Member of Clubs or Organizations: Patient refused     Attends Club or Organization Meetings: Patient refused     Marital Status: Patient refused   Intimate Partner Violence: Not At Risk (7/30/2020)    Humiliation, Afraid, Rape, and Kick questionnaire     Fear of Current or Ex-Partner: No     Emotionally Abused: No     Physically Abused: No     Sexually Abused: No   Housing Stability: Not on file     Medications have been verified. Objective   /81   Pulse 66   Temp (!) 96.8 °F (36 °C)   Resp 18   Ht 5' 9" (1.753 m)   Wt 130 kg (286 lb 3.2 oz)   SpO2 97%   BMI 42.26 kg/m²      Physical Exam   Physical Exam  Vitals reviewed. Constitutional:       General: He is not in acute distress. Appearance: He is well-developed. HENT:      Head: Normocephalic and atraumatic. Right Ear: Tympanic membrane normal. Swelling present. No tenderness. Left Ear: Tympanic membrane normal.      Nose: Nose normal. No mucosal edema. Cardiovascular:      Rate and Rhythm: Normal rate and regular rhythm. Heart sounds: Normal heart sounds. Pulmonary:      Effort: Pulmonary effort is normal. No respiratory distress. Breath sounds: Normal breath sounds.

## 2023-11-20 ENCOUNTER — TELEPHONE (OUTPATIENT)
Dept: FAMILY MEDICINE CLINIC | Facility: CLINIC | Age: 58
End: 2023-11-20

## 2023-11-20 DIAGNOSIS — H61.20 IMPACTED CERUMEN, UNSPECIFIED LATERALITY: Primary | ICD-10-CM

## 2023-11-20 NOTE — TELEPHONE ENCOUNTER
Patient's ear is clogged, went to urgent care and they didn't see anything, gave him ear drop. It is still clogged. Please advise.

## 2023-12-09 DIAGNOSIS — M19.90 ARTHRITIS: ICD-10-CM

## 2023-12-11 RX ORDER — CELECOXIB 200 MG/1
CAPSULE ORAL
Qty: 90 CAPSULE | Refills: 2 | Status: SHIPPED | OUTPATIENT
Start: 2023-12-11

## 2024-01-03 ENCOUNTER — VBI (OUTPATIENT)
Dept: ADMINISTRATIVE | Facility: OTHER | Age: 59
End: 2024-01-03

## 2024-01-12 ENCOUNTER — TELEPHONE (OUTPATIENT)
Age: 59
End: 2024-01-12

## 2024-01-12 NOTE — TELEPHONE ENCOUNTER
Patient called asking we relay msg to Dr. Maravilla:  They removed him from the [facility] after 1 month, the reason being they feel he is able to be independent enough and doesn't need it.  He wanted to thank him and the office for helping him get it in the first place.  Also wanted to note that he will get his CT scan done as soon as cold weather breaks a bit.

## 2024-02-12 ENCOUNTER — RA CDI HCC (OUTPATIENT)
Dept: OTHER | Facility: HOSPITAL | Age: 59
End: 2024-02-12

## 2024-03-05 ENCOUNTER — APPOINTMENT (OUTPATIENT)
Dept: LAB | Age: 59
End: 2024-03-05
Payer: COMMERCIAL

## 2024-03-05 DIAGNOSIS — E11.8 TYPE II DIABETES MELLITUS WITH MANIFESTATIONS (HCC): ICD-10-CM

## 2024-03-05 DIAGNOSIS — E78.5 HYPERLIPIDEMIA ASSOCIATED WITH TYPE 2 DIABETES MELLITUS: ICD-10-CM

## 2024-03-05 DIAGNOSIS — E11.69 HYPERLIPIDEMIA ASSOCIATED WITH TYPE 2 DIABETES MELLITUS: ICD-10-CM

## 2024-03-05 DIAGNOSIS — E06.3 HYPOTHYROIDISM DUE TO HASHIMOTO'S THYROIDITIS: ICD-10-CM

## 2024-03-05 DIAGNOSIS — Z00.01 ENCOUNTER FOR GENERAL ADULT MEDICAL EXAMINATION WITH ABNORMAL FINDINGS: ICD-10-CM

## 2024-03-05 DIAGNOSIS — I77.819 AORTIC ECTASIA (HCC): ICD-10-CM

## 2024-03-05 DIAGNOSIS — N40.0 ENLARGED PROSTATE: ICD-10-CM

## 2024-03-05 DIAGNOSIS — E03.8 HYPOTHYROIDISM DUE TO HASHIMOTO'S THYROIDITIS: ICD-10-CM

## 2024-03-05 DIAGNOSIS — R91.8 LUNG NODULES: ICD-10-CM

## 2024-03-05 DIAGNOSIS — F17.210 CIGARETTE SMOKER: ICD-10-CM

## 2024-03-05 LAB
ALBUMIN SERPL BCP-MCNC: 4.1 G/DL (ref 3.5–5)
ALP SERPL-CCNC: 65 U/L (ref 34–104)
ALT SERPL W P-5'-P-CCNC: 13 U/L (ref 7–52)
ANION GAP SERPL CALCULATED.3IONS-SCNC: 6 MMOL/L
AST SERPL W P-5'-P-CCNC: 13 U/L (ref 13–39)
BASOPHILS # BLD AUTO: 0.1 THOUSANDS/ÂΜL (ref 0–0.1)
BASOPHILS NFR BLD AUTO: 2 % (ref 0–1)
BILIRUB SERPL-MCNC: 0.5 MG/DL (ref 0.2–1)
BUN SERPL-MCNC: 16 MG/DL (ref 5–25)
CALCIUM SERPL-MCNC: 8.8 MG/DL (ref 8.4–10.2)
CHLORIDE SERPL-SCNC: 103 MMOL/L (ref 96–108)
CHOLEST SERPL-MCNC: 106 MG/DL
CO2 SERPL-SCNC: 29 MMOL/L (ref 21–32)
CREAT SERPL-MCNC: 1.02 MG/DL (ref 0.6–1.3)
CREAT UR-MCNC: 56.8 MG/DL
EOSINOPHIL # BLD AUTO: 0.33 THOUSAND/ÂΜL (ref 0–0.61)
EOSINOPHIL NFR BLD AUTO: 5 % (ref 0–6)
ERYTHROCYTE [DISTWIDTH] IN BLOOD BY AUTOMATED COUNT: 11.8 % (ref 11.6–15.1)
GFR SERPL CREATININE-BSD FRML MDRD: 80 ML/MIN/1.73SQ M
GLUCOSE P FAST SERPL-MCNC: 110 MG/DL (ref 65–99)
HCT VFR BLD AUTO: 44.2 % (ref 36.5–49.3)
HDLC SERPL-MCNC: 38 MG/DL
HGB BLD-MCNC: 14.6 G/DL (ref 12–17)
IMM GRANULOCYTES # BLD AUTO: 0.02 THOUSAND/UL (ref 0–0.2)
IMM GRANULOCYTES NFR BLD AUTO: 0 % (ref 0–2)
LDLC SERPL CALC-MCNC: 50 MG/DL (ref 0–100)
LYMPHOCYTES # BLD AUTO: 1.27 THOUSANDS/ÂΜL (ref 0.6–4.47)
LYMPHOCYTES NFR BLD AUTO: 18 % (ref 14–44)
MAGNESIUM SERPL-MCNC: 2 MG/DL (ref 1.9–2.7)
MCH RBC QN AUTO: 31.6 PG (ref 26.8–34.3)
MCHC RBC AUTO-ENTMCNC: 33 G/DL (ref 31.4–37.4)
MCV RBC AUTO: 96 FL (ref 82–98)
MICROALBUMIN UR-MCNC: <7 MG/L
MICROALBUMIN/CREAT 24H UR: <12 MG/G CREATININE (ref 0–30)
MONOCYTES # BLD AUTO: 1.32 THOUSAND/ÂΜL (ref 0.17–1.22)
MONOCYTES NFR BLD AUTO: 19 % (ref 4–12)
NEUTROPHILS # BLD AUTO: 3.85 THOUSANDS/ÂΜL (ref 1.85–7.62)
NEUTS SEG NFR BLD AUTO: 56 % (ref 43–75)
NRBC BLD AUTO-RTO: 0 /100 WBCS
PLATELET # BLD AUTO: 226 THOUSANDS/UL (ref 149–390)
PMV BLD AUTO: 10.2 FL (ref 8.9–12.7)
POTASSIUM SERPL-SCNC: 4.4 MMOL/L (ref 3.5–5.3)
PROT SERPL-MCNC: 6.8 G/DL (ref 6.4–8.4)
PSA SERPL-MCNC: 0.3 NG/ML (ref 0–4)
RBC # BLD AUTO: 4.62 MILLION/UL (ref 3.88–5.62)
SODIUM SERPL-SCNC: 138 MMOL/L (ref 135–147)
T4 FREE SERPL-MCNC: 0.61 NG/DL (ref 0.61–1.12)
TRIGL SERPL-MCNC: 88 MG/DL
TSH SERPL DL<=0.05 MIU/L-ACNC: 5.42 UIU/ML (ref 0.45–4.5)
VIT B12 SERPL-MCNC: 379 PG/ML (ref 180–914)
WBC # BLD AUTO: 6.89 THOUSAND/UL (ref 4.31–10.16)

## 2024-03-05 PROCEDURE — 80061 LIPID PANEL: CPT

## 2024-03-05 PROCEDURE — 82607 VITAMIN B-12: CPT

## 2024-03-05 PROCEDURE — 85025 COMPLETE CBC W/AUTO DIFF WBC: CPT

## 2024-03-05 PROCEDURE — 84153 ASSAY OF PSA TOTAL: CPT

## 2024-03-05 PROCEDURE — 80053 COMPREHEN METABOLIC PANEL: CPT

## 2024-03-05 PROCEDURE — 84439 ASSAY OF FREE THYROXINE: CPT

## 2024-03-05 PROCEDURE — 83735 ASSAY OF MAGNESIUM: CPT

## 2024-03-05 PROCEDURE — 36415 COLL VENOUS BLD VENIPUNCTURE: CPT

## 2024-03-05 PROCEDURE — 84443 ASSAY THYROID STIM HORMONE: CPT

## 2024-03-06 ENCOUNTER — TELEPHONE (OUTPATIENT)
Dept: FAMILY MEDICINE CLINIC | Facility: CLINIC | Age: 59
End: 2024-03-06

## 2024-03-06 NOTE — TELEPHONE ENCOUNTER
----- Message from Josue Maravilla MD sent at 3/5/2024  4:38 PM EST -----  Pt needs appt ASAP  ----- Message -----  From: Lab, Background User  Sent: 3/5/2024  12:41 PM EST  To: Josue Maravilla MD

## 2024-03-11 ENCOUNTER — HOSPITAL ENCOUNTER (OUTPATIENT)
Dept: CT IMAGING | Facility: HOSPITAL | Age: 59
Discharge: HOME | End: 2024-03-11
Payer: COMMERCIAL

## 2024-03-11 DIAGNOSIS — R91.8 LUNG NODULES: ICD-10-CM

## 2024-03-11 DIAGNOSIS — F17.210 CIGARETTE SMOKER: ICD-10-CM

## 2024-03-11 PROCEDURE — G1004 CDSM NDSC: HCPCS

## 2024-03-11 PROCEDURE — 71250 CT THORAX DX C-: CPT

## 2024-03-14 ENCOUNTER — OFFICE VISIT (OUTPATIENT)
Dept: FAMILY MEDICINE CLINIC | Facility: CLINIC | Age: 59
End: 2024-03-14
Payer: COMMERCIAL

## 2024-03-14 ENCOUNTER — TELEPHONE (OUTPATIENT)
Dept: FAMILY MEDICINE CLINIC | Facility: CLINIC | Age: 59
End: 2024-03-14

## 2024-03-14 VITALS
TEMPERATURE: 97.9 F | HEIGHT: 69 IN | RESPIRATION RATE: 14 BRPM | DIASTOLIC BLOOD PRESSURE: 68 MMHG | BODY MASS INDEX: 42.06 KG/M2 | HEART RATE: 68 BPM | SYSTOLIC BLOOD PRESSURE: 122 MMHG | OXYGEN SATURATION: 99 % | WEIGHT: 284 LBS

## 2024-03-14 DIAGNOSIS — Z23 ENCOUNTER FOR IMMUNIZATION: ICD-10-CM

## 2024-03-14 DIAGNOSIS — E06.3 HYPOTHYROIDISM DUE TO HASHIMOTO'S THYROIDITIS: ICD-10-CM

## 2024-03-14 DIAGNOSIS — E11.69 HYPERLIPIDEMIA ASSOCIATED WITH TYPE 2 DIABETES MELLITUS: ICD-10-CM

## 2024-03-14 DIAGNOSIS — R91.8 LUNG NODULES: Primary | ICD-10-CM

## 2024-03-14 DIAGNOSIS — I77.819 AORTIC ECTASIA (HCC): ICD-10-CM

## 2024-03-14 DIAGNOSIS — E78.5 HYPERLIPIDEMIA ASSOCIATED WITH TYPE 2 DIABETES MELLITUS: ICD-10-CM

## 2024-03-14 DIAGNOSIS — F17.210 CIGARETTE SMOKER: ICD-10-CM

## 2024-03-14 DIAGNOSIS — E11.8 TYPE II DIABETES MELLITUS WITH MANIFESTATIONS (HCC): ICD-10-CM

## 2024-03-14 DIAGNOSIS — E03.8 HYPOTHYROIDISM DUE TO HASHIMOTO'S THYROIDITIS: ICD-10-CM

## 2024-03-14 DIAGNOSIS — J43.8 OTHER EMPHYSEMA (HCC): ICD-10-CM

## 2024-03-14 PROCEDURE — 99214 OFFICE O/P EST MOD 30 MIN: CPT | Performed by: INTERNAL MEDICINE

## 2024-03-14 PROCEDURE — 90678 RSV VACC PREF BIVALENT IM: CPT

## 2024-03-14 PROCEDURE — 90471 IMMUNIZATION ADMIN: CPT

## 2024-03-14 RX ORDER — LEVOTHYROXINE SODIUM 112 UG/1
112 TABLET ORAL
Qty: 90 TABLET | Refills: 5 | Status: SHIPPED | OUTPATIENT
Start: 2024-03-14

## 2024-03-14 NOTE — ASSESSMENT & PLAN NOTE
Lab Results   Component Value Date    HGBA1C 5.6 11/14/2023   Mild   Life style Mod  RTC in 3 mos w Blood work

## 2024-03-14 NOTE — TELEPHONE ENCOUNTER
----- Message from Josue Maravilla MD sent at 3/14/2024  8:58 AM EDT -----  Stable lung nodules. ...     Yearly ct chest  ----- Message -----  From: Interface, Radiology Results In  Sent: 3/14/2024   8:54 AM EDT  To: Josue Maravilla MD

## 2024-03-14 NOTE — PROGRESS NOTES
Diabetic Foot Exam    Patient's shoes and socks removed.    Right Foot/Ankle   Right Foot Inspection  Skin Exam: skin normal and skin intact. No dry skin, no warmth, no callus, no erythema, no maceration, no abnormal color, no pre-ulcer, no ulcer and no callus.     Toe Exam: ROM and strength within normal limits.     Sensory   Vibration: intact  Proprioception: intact  Monofilament testing: diminished    Vascular  Capillary refills: < 3 seconds  The right DP pulse is 2+. The right PT pulse is 1+.     Left Foot/Ankle  Left Foot Inspection  Skin Exam: skin normal and skin intact. No dry skin, no warmth, no erythema, no maceration, normal color, no pre-ulcer, no ulcer and no callus.     Toe Exam: ROM and strength within normal limits.     Sensory   Vibration: intact  Proprioception: intact  Monofilament testing: diminished    Vascular  Capillary refills: < 3 seconds  The left DP pulse is 2+. The left PT pulse is 1+.     Assign Risk Category  No deformity present  Loss of protective sensation  No weak pulses  Risk: 1

## 2024-03-14 NOTE — PROGRESS NOTES
Name: Chas Soliz      : 1965      MRN: [id number]  Encounter Provider: Josue Maravilla MD  Encounter Date: 3/14/2024   Encounter department: [facility]    Assessment & Plan     1. Lung nodules  Comments:  stable  pt was advised to quit smoking  Yearly ct chest    2. Cigarette smoker    3. Hypothyroidism due to Hashimoto's thyroiditis  -     levothyroxine (Synthroid) 112 mcg tablet; Take 1 tablet (112 mcg total) by mouth daily in the early morning    4. BMI 40.0-44.9, adult (HCC)  Comments:  Life style mod  Assessment & Plan:  Life style mod  RTC in 3 mos       5. Type II diabetes mellitus with manifestations (HCC)  Comments:  Life style mod  RTC in 3 mos w Blood work  Assessment & Plan:    Lab Results   Component Value Date    HGBA1C 5.6 2023   Mild   Life style Mod  RTC in 3 mos w Blood work      6. Other emphysema (HCC)  Comments:  pt was advised to quit smoking  RTC in 3 mos w Blood work  Assessment & Plan:  Stable  Continue same  Stop smoking  RTC in 3 mos       7. Aortic ectasia (HCC)  Comments:  stable  continue same  RTC in 3 mos  Assessment & Plan:  Stable  Continue same  RTC in 3mos       8. Encounter for immunization  -     Respiratory Syncytial Virus (RSV) vaccine (recombinant) (Abrysvo)    9. Hyperlipidemia associated with type 2 diabetes mellitus     Life style mod  RTC in 3 mos w Blood work       Subjective      59 Y O Man is here for Regular check up, he still smokes, he feels OK, recent Blood work and med list reviewed,...      Review of Systems   Constitutional:  Negative for chills, fatigue and fever.   HENT:  Negative for congestion, facial swelling, sore throat, trouble swallowing and voice change.    Eyes:  Negative for pain, discharge and visual disturbance.   Respiratory:  Negative for cough, shortness of breath and wheezing.    Cardiovascular:  Negative for chest pain, palpitations and leg swelling.   Gastrointestinal:  Negative for  abdominal pain, blood in stool, constipation, diarrhea and nausea.   Endocrine: Negative for polydipsia, polyphagia and polyuria.   Genitourinary:  Negative for difficulty urinating, hematuria and urgency.   Musculoskeletal:  Negative for arthralgias and myalgias.   Skin:  Negative for rash.   Neurological:  Negative for dizziness, tremors, weakness and headaches.   Hematological:  Negative for adenopathy. Does not bruise/bleed easily.   Psychiatric/Behavioral:  Negative for dysphoric mood, sleep disturbance and suicidal ideas.        Current Outpatient Medications on File Prior to Visit   Medication Sig    amLODIPine (NORVASC) 5 mg tablet Take 1 tablet (5 mg total) by mouth daily    celecoxib (CeleBREX) 200 mg capsule take 1 capsule by mouth once daily with food or breakfast    cetirizine (ZyrTEC) 10 mg tablet Take 1 tablet (10 mg total) by mouth daily In the evening    cyclobenzaprine (FLEXERIL) 10 mg tablet Take 10 mg by mouth 2 (two) times a day as needed    ergocalciferol (VITAMIN D2) 50,000 units Take 1 capsule (50,000 Units total) by mouth once a week    fenofibrate (TRICOR) 145 mg tablet Take 1 tablet (145 mg total) by mouth daily with dinner    FLUoxetine (PROzac) 20 mg capsule Take 1 capsule (20 mg total) by mouth daily    fluticasone (FLONASE) 50 mcg/act nasal spray 2 sprays into each nostril daily    haloperidol (HALDOL) 5 mg tablet 2.5 mg daily at bedtime     lisinopril (ZESTRIL) 10 mg tablet Take 1 tablet (10 mg total) by mouth daily    sodium chloride (OCEAN) 0.65 % nasal spray 1 spray into each nostril 3 (three) times a day    [DISCONTINUED] carbamide peroxide (DEBROX) 6.5 % otic solution Use 5 Drops in affected ear Two or Three times per day for 5 days    [DISCONTINUED] levothyroxine (Synthroid) 112 mcg tablet Take 1 tablet (112 mcg total) by mouth daily in the early morning    atorvastatin (LIPITOR) 80 mg tablet Take 1 tablet (80 mg total) by mouth daily with dinner     "neomycin-polymyxin-hydrocortisone (CORTISPORIN) otic solution Administer 4 drops to the right ear every 8 (eight) hours for 7 days       Objective     /68 (BP Location: Left arm, Patient Position: Sitting, Cuff Size: Standard)   Pulse 68   Temp 97.9 °F (36.6 °C) (Tympanic)   Resp 14   Ht 5' 9\" (1.753 m)   Wt 129 kg (284 lb)   SpO2 99%   BMI 41.94 kg/m²     Physical Exam  Constitutional:       General: He is not in acute distress.  HENT:      Head: Normocephalic.      Mouth/Throat:      Pharynx: No oropharyngeal exudate.   Eyes:      General: No scleral icterus.     Conjunctiva/sclera: Conjunctivae normal.      Pupils: Pupils are equal, round, and reactive to light.   Neck:      Thyroid: No thyromegaly.   Cardiovascular:      Rate and Rhythm: Normal rate and regular rhythm.      Heart sounds: Normal heart sounds. No murmur heard.  Pulmonary:      Effort: Pulmonary effort is normal. No respiratory distress.      Breath sounds: Normal breath sounds. No wheezing or rales.   Abdominal:      General: Bowel sounds are normal. There is no distension.      Palpations: Abdomen is soft.      Tenderness: There is no abdominal tenderness. There is no guarding or rebound.   Musculoskeletal:         General: No tenderness.      Cervical back: Neck supple.   Lymphadenopathy:      Cervical: No cervical adenopathy.   Skin:     Coloration: Skin is not pale.      Findings: No rash.   Neurological:      Mental Status: He is alert and oriented to person, place, and time.      Sensory: No sensory deficit.      Motor: No weakness.       Josue Maravilla MD    "

## 2024-06-19 ENCOUNTER — OFFICE VISIT (OUTPATIENT)
Dept: FAMILY MEDICINE CLINIC | Facility: CLINIC | Age: 59
End: 2024-06-19
Payer: COMMERCIAL

## 2024-06-19 VITALS
RESPIRATION RATE: 14 BRPM | TEMPERATURE: 98.2 F | OXYGEN SATURATION: 98 % | BODY MASS INDEX: 41.98 KG/M2 | WEIGHT: 283.4 LBS | DIASTOLIC BLOOD PRESSURE: 84 MMHG | HEIGHT: 69 IN | SYSTOLIC BLOOD PRESSURE: 126 MMHG | HEART RATE: 82 BPM

## 2024-06-19 DIAGNOSIS — J43.8 OTHER EMPHYSEMA (HCC): ICD-10-CM

## 2024-06-19 DIAGNOSIS — I77.819 AORTIC ECTASIA (HCC): ICD-10-CM

## 2024-06-19 DIAGNOSIS — I10 ESSENTIAL HYPERTENSION: ICD-10-CM

## 2024-06-19 DIAGNOSIS — E03.8 HYPOTHYROIDISM DUE TO HASHIMOTO'S THYROIDITIS: ICD-10-CM

## 2024-06-19 DIAGNOSIS — E11.8 TYPE II DIABETES MELLITUS WITH MANIFESTATIONS (HCC): Primary | ICD-10-CM

## 2024-06-19 DIAGNOSIS — M19.90 ARTHRITIS: ICD-10-CM

## 2024-06-19 DIAGNOSIS — E55.9 VITAMIN D DEFICIENCY: ICD-10-CM

## 2024-06-19 DIAGNOSIS — J30.89 SEASONAL ALLERGIC RHINITIS DUE TO OTHER ALLERGIC TRIGGER: ICD-10-CM

## 2024-06-19 DIAGNOSIS — E06.3 HYPOTHYROIDISM DUE TO HASHIMOTO'S THYROIDITIS: ICD-10-CM

## 2024-06-19 DIAGNOSIS — E11.69 HYPERLIPIDEMIA ASSOCIATED WITH TYPE 2 DIABETES MELLITUS  (HCC): ICD-10-CM

## 2024-06-19 DIAGNOSIS — E78.5 HYPERLIPIDEMIA ASSOCIATED WITH TYPE 2 DIABETES MELLITUS  (HCC): ICD-10-CM

## 2024-06-19 LAB — SL AMB POCT HEMOGLOBIN AIC: 5.8 (ref ?–6.5)

## 2024-06-19 PROCEDURE — 83036 HEMOGLOBIN GLYCOSYLATED A1C: CPT | Performed by: INTERNAL MEDICINE

## 2024-06-19 PROCEDURE — 99214 OFFICE O/P EST MOD 30 MIN: CPT | Performed by: INTERNAL MEDICINE

## 2024-06-19 RX ORDER — FEXOFENADINE HCL 180 MG/1
180 TABLET ORAL DAILY
Qty: 90 TABLET | Refills: 2 | Status: SHIPPED | OUTPATIENT
Start: 2024-06-19

## 2024-06-19 RX ORDER — FENOFIBRATE 145 MG/1
145 TABLET, COATED ORAL
Qty: 90 TABLET | Refills: 3 | Status: SHIPPED | OUTPATIENT
Start: 2024-06-19

## 2024-06-19 RX ORDER — CETIRIZINE HYDROCHLORIDE 10 MG/1
10 TABLET ORAL DAILY
Qty: 90 TABLET | Refills: 3 | Status: SHIPPED | OUTPATIENT
Start: 2024-06-19

## 2024-06-19 RX ORDER — CELECOXIB 200 MG/1
200 CAPSULE ORAL
Qty: 90 CAPSULE | Refills: 2 | Status: SHIPPED | OUTPATIENT
Start: 2024-06-19

## 2024-06-19 RX ORDER — LEVOTHYROXINE SODIUM 112 UG/1
112 TABLET ORAL
Qty: 90 TABLET | Refills: 5 | Status: SHIPPED | OUTPATIENT
Start: 2024-06-19

## 2024-06-19 RX ORDER — LISINOPRIL 10 MG/1
10 TABLET ORAL DAILY
Qty: 90 TABLET | Refills: 3 | Status: SHIPPED | OUTPATIENT
Start: 2024-06-19

## 2024-06-19 RX ORDER — ERGOCALCIFEROL 1.25 MG/1
50000 CAPSULE ORAL WEEKLY
Qty: 12 CAPSULE | Refills: 2 | Status: SHIPPED | OUTPATIENT
Start: 2024-06-19

## 2024-06-19 RX ORDER — ATORVASTATIN CALCIUM 80 MG/1
80 TABLET, FILM COATED ORAL
Qty: 90 TABLET | Refills: 3 | Status: SHIPPED | OUTPATIENT
Start: 2024-06-19 | End: 2024-09-17

## 2024-06-19 RX ORDER — AMLODIPINE BESYLATE 5 MG/1
5 TABLET ORAL DAILY
Qty: 90 TABLET | Refills: 3 | Status: SHIPPED | OUTPATIENT
Start: 2024-06-19

## 2024-06-19 RX ORDER — METHYLPREDNISOLONE SODIUM SUCCINATE 125 MG/2ML
125 INJECTION, POWDER, LYOPHILIZED, FOR SOLUTION INTRAMUSCULAR; INTRAVENOUS ONCE
Status: COMPLETED | OUTPATIENT
Start: 2024-06-19 | End: 2024-06-19

## 2024-06-19 RX ADMIN — METHYLPREDNISOLONE SODIUM SUCCINATE 125 MG: 125 INJECTION, POWDER, LYOPHILIZED, FOR SOLUTION INTRAMUSCULAR; INTRAVENOUS at 11:44

## 2024-06-19 NOTE — PROGRESS NOTES
Ambulatory Visit  Name: Chas Soliz      : 1965      MRN: [id number]  Encounter Provider: Josue Maravilla MD  Encounter Date: 2024   Encounter department: [facility]    Assessment & Plan   1. Type II diabetes mellitus with manifestations (HCC)  Comments:  stable  life style Mod  continue same  Orders:  -     Ambulatory Referral to Ophthalmology; Future  -     POCT hemoglobin A1c  -     Comprehensive metabolic panel; Future; Expected date: 2024  -     CBC and differential; Future; Expected date: 2024  -     Lipid Panel with Direct LDL reflex; Future; Expected date: 2024  -     TSH, 3rd generation with Free T4 reflex; Future; Expected date: 2024  -     UA (URINE) with reflex to Scope; Future  2. BMI 40.0-44.9, adult (HCC)  Comments:  Life style mod  RTC in 3mos w Blood work  Orders:  -     atorvastatin (LIPITOR) 80 mg tablet; Take 1 tablet (80 mg total) by mouth daily with dinner  -     fenofibrate (TRICOR) 145 mg tablet; Take 1 tablet (145 mg total) by mouth daily with dinner  3. Arthritis  -     celecoxib (CeleBREX) 200 mg capsule; Take 1 capsule (200 mg total) by mouth daily with breakfast  4. Essential hypertension  -     amLODIPine (NORVASC) 5 mg tablet; Take 1 tablet (5 mg total) by mouth daily  -     lisinopril (ZESTRIL) 10 mg tablet; Take 1 tablet (10 mg total) by mouth daily  5. Hypothyroidism due to Hashimoto's thyroiditis  -     TSH, 3rd generation with Free T4 reflex; Future; Expected date: 2024  -     levothyroxine (Synthroid) 112 mcg tablet; Take 1 tablet (112 mcg total) by mouth daily in the early morning  6. Vitamin D deficiency  -     ergocalciferol (VITAMIN D2) 50,000 units; Take 1 capsule (50,000 Units total) by mouth once a week  7. Other emphysema (HCC)  Comments:  pt was advised to quit smoking  use Inhalers  Yearly Ct Lungs  8. Hyperlipidemia associated with type 2 diabetes mellitus   ([facility])  Comments:  stable  continue same  RTC in  3mos w Blood work  Orders:  -     Lipid Panel with Direct LDL reflex; Future; Expected date: 06/19/2024  -     UA (URINE) with reflex to Scope; Future  -     Magnesium; Future  9. Seasonal allergic rhinitis due to other allergic trigger  -     methylPREDNISolone sodium succinate (Solu-MEDROL) injection 125 mg  -     fexofenadine (ALLEGRA) 180 MG tablet; Take 1 tablet (180 mg total) by mouth daily In the morning  -     cetirizine (ZyrTEC) 10 mg tablet; Take 1 tablet (10 mg total) by mouth daily In the evening  10. Aortic ectasia ([facility])  Comments:  stable  continue same  yearly Ct Chest  Life style Mod  Stop smoking  RTC in 3 mos w Blood work       History of Present Illness     59 Y O man is here for Regular check Up, he still smokes, recent Blood work and med list reviewed,...        Review of Systems   Constitutional:  Negative for chills, fatigue and fever.   HENT:  Negative for congestion, facial swelling, sore throat, trouble swallowing and voice change.    Eyes:  Negative for pain, discharge and visual disturbance.   Respiratory:  Negative for cough, shortness of breath and wheezing.    Cardiovascular:  Negative for chest pain, palpitations and leg swelling.   Gastrointestinal:  Negative for abdominal pain, blood in stool, constipation, diarrhea and nausea.   Endocrine: Negative for polydipsia, polyphagia and polyuria.   Genitourinary:  Negative for difficulty urinating, hematuria and urgency.   Musculoskeletal:  Negative for arthralgias and myalgias.   Skin:  Negative for rash.   Neurological:  Negative for dizziness, tremors, weakness and headaches.   Hematological:  Negative for adenopathy. Does not bruise/bleed easily.   Psychiatric/Behavioral:  Negative for dysphoric mood, sleep disturbance and suicidal ideas.        Objective     /84 (BP Location: Left arm, Patient Position: Sitting, Cuff Size: Standard)   Pulse 82   Temp 98.2 °F (36.8 °C) (Tympanic)  "  Resp 14   Ht 5' 9\" (1.753 m)   Wt 129 kg (283 lb 6.4 oz)   SpO2 98%   BMI 41.85 kg/m²     Physical Exam  Vitals and nursing note reviewed.   Constitutional:       General: He is not in acute distress.     Appearance: He is well-developed.   HENT:      Head: Normocephalic and atraumatic.      Right Ear: External ear normal.      Left Ear: External ear normal.   Eyes:      Extraocular Movements: EOM normal.      Conjunctiva/sclera: Conjunctivae normal.      Pupils: Pupils are equal, round, and reactive to light.   Neck:      Thyroid: No thyromegaly.      Trachea: No tracheal deviation.   Cardiovascular:      Rate and Rhythm: Normal rate and regular rhythm.      Heart sounds: Murmur heard.      No friction rub.   Pulmonary:      Effort: Pulmonary effort is normal. No respiratory distress.      Breath sounds: Normal breath sounds. No wheezing.   Abdominal:      General: Bowel sounds are normal. There is no distension.      Palpations: Abdomen is soft.      Tenderness: There is no abdominal tenderness.   Musculoskeletal:         General: No swelling, deformity or edema. Normal range of motion.      Cervical back: Neck supple.   Skin:     General: Skin is warm and dry.      Capillary Refill: Capillary refill takes less than 2 seconds.      Findings: No erythema or rash.   Neurological:      Mental Status: He is alert and oriented to person, place, and time.      Cranial Nerves: No cranial nerve deficit.      Coordination: Coordination normal.      Deep Tendon Reflexes: Reflexes normal.   Psychiatric:         Mood and Affect: Mood and affect and mood normal.         Behavior: Behavior normal.       Administrative Statements     "

## 2024-06-24 DIAGNOSIS — M19.90 ARTHRITIS: ICD-10-CM

## 2024-06-24 RX ORDER — CELECOXIB 200 MG/1
200 CAPSULE ORAL
Qty: 90 CAPSULE | Refills: 2 | Status: CANCELLED | OUTPATIENT
Start: 2024-06-24

## 2024-06-24 NOTE — TELEPHONE ENCOUNTER
Patient called requesting refill for celecoxib (CeleBREX) 200 mg capsule. Patient made aware medication was refilled on 06/19/2024 for 90 with 2 refills to [facility]. Patient instructed to contact the pharmacy to obtain refills of medication. Patient verbalized understanding.

## 2024-09-23 ENCOUNTER — APPOINTMENT (OUTPATIENT)
Dept: LAB | Age: 59
End: 2024-09-23
Payer: COMMERCIAL

## 2024-09-23 DIAGNOSIS — E06.3 HYPOTHYROIDISM DUE TO HASHIMOTO'S THYROIDITIS: ICD-10-CM

## 2024-09-23 DIAGNOSIS — E11.69 HYPERLIPIDEMIA ASSOCIATED WITH TYPE 2 DIABETES MELLITUS  (HCC): ICD-10-CM

## 2024-09-23 DIAGNOSIS — E78.5 HYPERLIPIDEMIA ASSOCIATED WITH TYPE 2 DIABETES MELLITUS  (HCC): ICD-10-CM

## 2024-09-23 DIAGNOSIS — E11.8 TYPE II DIABETES MELLITUS WITH MANIFESTATIONS (HCC): ICD-10-CM

## 2024-09-23 DIAGNOSIS — E03.8 HYPOTHYROIDISM DUE TO HASHIMOTO'S THYROIDITIS: ICD-10-CM

## 2024-09-23 LAB
ALBUMIN SERPL BCG-MCNC: 4.1 G/DL (ref 3.5–5)
ALP SERPL-CCNC: 40 U/L (ref 34–104)
ALT SERPL W P-5'-P-CCNC: 9 U/L (ref 7–52)
ANION GAP SERPL CALCULATED.3IONS-SCNC: 8 MMOL/L (ref 4–13)
AST SERPL W P-5'-P-CCNC: 12 U/L (ref 13–39)
BASOPHILS # BLD AUTO: 0.04 THOUSANDS/ΜL (ref 0–0.1)
BASOPHILS NFR BLD AUTO: 1 % (ref 0–1)
BILIRUB SERPL-MCNC: 0.34 MG/DL (ref 0.2–1)
BILIRUB UR QL STRIP: NEGATIVE
BUN SERPL-MCNC: 16 MG/DL (ref 5–25)
CALCIUM SERPL-MCNC: 9.3 MG/DL (ref 8.4–10.2)
CHLORIDE SERPL-SCNC: 106 MMOL/L (ref 96–108)
CHOLEST SERPL-MCNC: 136 MG/DL
CLARITY UR: CLEAR
CO2 SERPL-SCNC: 26 MMOL/L (ref 21–32)
COLOR UR: COLORLESS
CREAT SERPL-MCNC: 1.19 MG/DL (ref 0.6–1.3)
EOSINOPHIL # BLD AUTO: 0.25 THOUSAND/ΜL (ref 0–0.61)
EOSINOPHIL NFR BLD AUTO: 4 % (ref 0–6)
ERYTHROCYTE [DISTWIDTH] IN BLOOD BY AUTOMATED COUNT: 12 % (ref 11.6–15.1)
GFR SERPL CREATININE-BSD FRML MDRD: 65 ML/MIN/1.73SQ M
GLUCOSE P FAST SERPL-MCNC: 103 MG/DL (ref 65–99)
GLUCOSE UR STRIP-MCNC: NEGATIVE MG/DL
HCT VFR BLD AUTO: 39.4 % (ref 36.5–49.3)
HDLC SERPL-MCNC: 43 MG/DL
HGB BLD-MCNC: 13 G/DL (ref 12–17)
HGB UR QL STRIP.AUTO: NEGATIVE
IMM GRANULOCYTES # BLD AUTO: 0.03 THOUSAND/UL (ref 0–0.2)
IMM GRANULOCYTES NFR BLD AUTO: 0 % (ref 0–2)
KETONES UR STRIP-MCNC: NEGATIVE MG/DL
LDLC SERPL CALC-MCNC: 79 MG/DL (ref 0–100)
LEUKOCYTE ESTERASE UR QL STRIP: NEGATIVE
LYMPHOCYTES # BLD AUTO: 2.03 THOUSANDS/ΜL (ref 0.6–4.47)
LYMPHOCYTES NFR BLD AUTO: 30 % (ref 14–44)
MAGNESIUM SERPL-MCNC: 2.1 MG/DL (ref 1.9–2.7)
MCH RBC QN AUTO: 31.7 PG (ref 26.8–34.3)
MCHC RBC AUTO-ENTMCNC: 33 G/DL (ref 31.4–37.4)
MCV RBC AUTO: 96 FL (ref 82–98)
MONOCYTES # BLD AUTO: 0.72 THOUSAND/ΜL (ref 0.17–1.22)
MONOCYTES NFR BLD AUTO: 11 % (ref 4–12)
NEUTROPHILS # BLD AUTO: 3.67 THOUSANDS/ΜL (ref 1.85–7.62)
NEUTS SEG NFR BLD AUTO: 54 % (ref 43–75)
NITRITE UR QL STRIP: NEGATIVE
NRBC BLD AUTO-RTO: 0 /100 WBCS
PH UR STRIP.AUTO: 6.5 [PH]
PLATELET # BLD AUTO: 283 THOUSANDS/UL (ref 149–390)
PMV BLD AUTO: 10.7 FL (ref 8.9–12.7)
POTASSIUM SERPL-SCNC: 4.2 MMOL/L (ref 3.5–5.3)
PROT SERPL-MCNC: 6.7 G/DL (ref 6.4–8.4)
PROT UR STRIP-MCNC: NEGATIVE MG/DL
RBC # BLD AUTO: 4.1 MILLION/UL (ref 3.88–5.62)
SODIUM SERPL-SCNC: 140 MMOL/L (ref 135–147)
SP GR UR STRIP.AUTO: 1.01 (ref 1–1.03)
T4 FREE SERPL-MCNC: 0.72 NG/DL (ref 0.61–1.12)
TRIGL SERPL-MCNC: 69 MG/DL
TSH SERPL DL<=0.05 MIU/L-ACNC: 7.84 UIU/ML (ref 0.45–4.5)
UROBILINOGEN UR STRIP-ACNC: <2 MG/DL
WBC # BLD AUTO: 6.74 THOUSAND/UL (ref 4.31–10.16)

## 2024-09-23 PROCEDURE — 83735 ASSAY OF MAGNESIUM: CPT

## 2024-09-23 PROCEDURE — 85025 COMPLETE CBC W/AUTO DIFF WBC: CPT

## 2024-09-23 PROCEDURE — 84443 ASSAY THYROID STIM HORMONE: CPT

## 2024-09-23 PROCEDURE — 80053 COMPREHEN METABOLIC PANEL: CPT

## 2024-09-23 PROCEDURE — 81003 URINALYSIS AUTO W/O SCOPE: CPT

## 2024-09-23 PROCEDURE — 84439 ASSAY OF FREE THYROXINE: CPT

## 2024-09-23 PROCEDURE — 36415 COLL VENOUS BLD VENIPUNCTURE: CPT

## 2024-09-23 PROCEDURE — 80061 LIPID PANEL: CPT

## 2024-09-26 ENCOUNTER — TELEPHONE (OUTPATIENT)
Dept: FAMILY MEDICINE CLINIC | Facility: CLINIC | Age: 59
End: 2024-09-26

## 2024-09-26 DIAGNOSIS — E03.8 HYPOTHYROIDISM DUE TO HASHIMOTO'S THYROIDITIS: Primary | ICD-10-CM

## 2024-09-26 DIAGNOSIS — E06.3 HYPOTHYROIDISM DUE TO HASHIMOTO'S THYROIDITIS: Primary | ICD-10-CM

## 2024-09-26 RX ORDER — LEVOTHYROXINE SODIUM 125 UG/1
125 TABLET ORAL DAILY
Qty: 30 TABLET | Refills: 3 | Status: SHIPPED | OUTPATIENT
Start: 2024-09-26

## 2024-09-26 NOTE — TELEPHONE ENCOUNTER
----- Message from Josue Maravilla MD sent at 9/26/2024  8:02 AM EDT -----  Increase Synthroid to 125 MCG daily early Morning  ----- Message -----  From: Lab, Background User  Sent: 9/23/2024  12:54 PM EDT  To: Josue Maravilla MD

## 2024-09-26 NOTE — TELEPHONE ENCOUNTER
Called and spoke with pt he is aware of medication increase and advised him it was sent into [facility] on [address]

## 2024-09-30 ENCOUNTER — OFFICE VISIT (OUTPATIENT)
Dept: PODIATRY | Facility: CLINIC | Age: 59
End: 2024-09-30
Payer: COMMERCIAL

## 2024-09-30 DIAGNOSIS — E11.9 TYPE 2 DIABETES MELLITUS WITHOUT COMPLICATION, WITHOUT LONG-TERM CURRENT USE OF INSULIN (HCC): Primary | ICD-10-CM

## 2024-09-30 DIAGNOSIS — L60.3 NAIL DYSTROPHY: ICD-10-CM

## 2024-09-30 PROCEDURE — 99203 OFFICE O/P NEW LOW 30 MIN: CPT | Performed by: PODIATRIST

## 2024-09-30 NOTE — PROGRESS NOTES
Ambulatory Visit  Name: Chas Soliz      : 1965      MRN: [id number]  Encounter Provider: Luis Carolina DPM  Encounter Date: 2024   Encounter department: [facility] PODIATRY [place]    Assessment & Plan  Type 2 diabetes mellitus without complication, without long-term current use of insulin (HCC)    Lab Results   Component Value Date    HGBA1C 5.8 2024            Nail dystrophy         Diagnosis and options discussed with patient  Patient agreeable to the plan as stated below    -DM foot risk is low. Recommend annual DM foot exam  -Discussed DM risk to lower extremities, proper shoe gear, and daily monitoring of feet.   -Discussed weight loss and suitable exercise regiment.   -Reviewed most recent PCP visit on 2024  -Educated on A1C and the risks of poorly controlled Diabetes. Reviewed recent A1C:  Lab Results   Component Value Date    HGBA1C 5.8 2024    HGBA1C 5.8 (H) 2022    HGBA1C 5.7 (H) 2017   .     History of Present Illness     Chas Soliz is a 59 y.o. male who presents for Diabetic foot exam. He is diet controlled diabetic. His nails are hard to trim.       Review of Systems  As stated in HPI, otherwise normal    Medical History Reviewed by provider this encounter:  Tobacco  Allergies  Meds  Problems  Med Hx  Surg Hx  Fam Hx            Objective     There were no vitals taken for this visit.    Physical Exam  Vitals reviewed.   Cardiovascular:      Pulses: Normal pulses. no weak pulses.           Dorsalis pedis pulses are 2+ on the right side and 2+ on the left side.        Posterior tibial pulses are 2+ on the right side and 2+ on the left side.   Pulmonary:      Effort: No respiratory distress.   Musculoskeletal:         General: No deformity.      Right lower leg: Edema present.      Left lower leg: Edema present.      Right foot: Normal range of motion.      Left foot: Normal range of motion.   Feet:      Right foot:      Protective  Sensation: 10 sites tested.  10 sites sensed.      Skin integrity: Skin integrity normal. No ulcer or callus.      Toenail Condition: Right toenails are abnormally thick.      Left foot:      Protective Sensation: 10 sites tested.  10 sites sensed.      Skin integrity: Skin integrity normal. No ulcer or callus.      Toenail Condition: Left toenails are abnormally thick.   Skin:     Capillary Refill: Capillary refill takes less than 2 seconds.      Findings: No bruising.   Neurological:      Mental Status: He is alert and oriented to person, place, and time.      Sensory: No sensory deficit.     Diabetic Foot Exam    Patient's shoes and socks removed.    Right Foot/Ankle   Right Foot Inspection  Skin Exam: skin intact. No callus, no ulcer and no callus.     Toe Exam: ROM and strength within normal limits. No tenderness and  no right toe deformity    Sensory   Vibration: intact  Monofilament testing: intact    Vascular  Capillary refills: < 3 seconds  The right DP pulse is 2+. The right PT pulse is 2+.     Left Foot/Ankle  Left Foot Inspection  Skin Exam: skin intact. No ulcer and no callus.     Toe Exam: ROM and strength within normal limits. No tenderness and no left toe deformity.     Sensory   Vibration: intact  Monofilament testing: intact    Vascular  Capillary refills: < 3 seconds  The left DP pulse is 2+. The left PT pulse is 2+.     Assign Risk Category  No deformity present  No loss of protective sensation  No weak pulses  Risk: 0

## 2024-10-16 ENCOUNTER — TELEPHONE (OUTPATIENT)
Age: 59
End: 2024-10-16

## 2024-10-16 NOTE — TELEPHONE ENCOUNTER
Patient called stating that he is suffering from sinus congestion, runny nose and post-nasal drip from seasonal allergies.  He is asking if Dr Maravilla could please send a prescription for a nasal spray to [address].    Please follow up with patient to advise.

## 2024-10-17 DIAGNOSIS — J06.9 ACUTE URI: Primary | ICD-10-CM

## 2024-10-17 RX ORDER — BENZONATATE 100 MG/1
100 CAPSULE ORAL
Qty: 30 CAPSULE | Refills: 0 | Status: SHIPPED | OUTPATIENT
Start: 2024-10-17

## 2024-10-17 RX ORDER — DOXYCYCLINE 100 MG/1
100 CAPSULE ORAL EVERY 12 HOURS SCHEDULED
Qty: 20 CAPSULE | Refills: 0 | Status: SHIPPED | OUTPATIENT
Start: 2024-10-17 | End: 2024-10-27

## 2024-10-24 ENCOUNTER — OFFICE VISIT (OUTPATIENT)
Dept: FAMILY MEDICINE CLINIC | Facility: CLINIC | Age: 59
End: 2024-10-24
Payer: COMMERCIAL

## 2024-10-24 VITALS
SYSTOLIC BLOOD PRESSURE: 124 MMHG | TEMPERATURE: 97.6 F | HEIGHT: 69 IN | DIASTOLIC BLOOD PRESSURE: 82 MMHG | BODY MASS INDEX: 41.85 KG/M2 | OXYGEN SATURATION: 97 % | RESPIRATION RATE: 16 BRPM | HEART RATE: 80 BPM

## 2024-10-24 DIAGNOSIS — J43.8 OTHER EMPHYSEMA (HCC): ICD-10-CM

## 2024-10-24 DIAGNOSIS — Z23 ENCOUNTER FOR IMMUNIZATION: ICD-10-CM

## 2024-10-24 DIAGNOSIS — N40.0 ENLARGED PROSTATE: ICD-10-CM

## 2024-10-24 DIAGNOSIS — M81.8 IDIOPATHIC OSTEOPOROSIS: ICD-10-CM

## 2024-10-24 DIAGNOSIS — E06.3 HYPOTHYROIDISM DUE TO HASHIMOTO'S THYROIDITIS: Primary | ICD-10-CM

## 2024-10-24 DIAGNOSIS — E78.5 HYPERLIPIDEMIA ASSOCIATED WITH TYPE 2 DIABETES MELLITUS  (HCC): ICD-10-CM

## 2024-10-24 DIAGNOSIS — E11.8 TYPE II DIABETES MELLITUS WITH MANIFESTATIONS (HCC): ICD-10-CM

## 2024-10-24 DIAGNOSIS — E11.69 HYPERLIPIDEMIA ASSOCIATED WITH TYPE 2 DIABETES MELLITUS  (HCC): ICD-10-CM

## 2024-10-24 LAB — SL AMB POCT HEMOGLOBIN AIC: 5.8 (ref ?–6.5)

## 2024-10-24 PROCEDURE — 83036 HEMOGLOBIN GLYCOSYLATED A1C: CPT | Performed by: INTERNAL MEDICINE

## 2024-10-24 PROCEDURE — G0008 ADMIN INFLUENZA VIRUS VAC: HCPCS | Performed by: INTERNAL MEDICINE

## 2024-10-24 PROCEDURE — 90673 RIV3 VACCINE NO PRESERV IM: CPT | Performed by: INTERNAL MEDICINE

## 2024-10-24 PROCEDURE — 99214 OFFICE O/P EST MOD 30 MIN: CPT | Performed by: INTERNAL MEDICINE

## 2024-10-24 RX ORDER — LEVOTHYROXINE SODIUM 125 UG/1
125 TABLET ORAL DAILY
Qty: 30 TABLET | Refills: 3 | Status: SHIPPED | OUTPATIENT
Start: 2024-10-24

## 2024-10-24 NOTE — PATIENT INSTRUCTIONS

## 2024-10-24 NOTE — PROGRESS NOTES
Ambulatory Visit  Name: Chas Soliz      : 1965      MRN: [id number]  Encounter Provider: Josue Maravilla MD  Encounter Date: 10/24/2024   Encounter department: [facility] C[facility]    Assessment & Plan  Hypothyroidism due to Hashimoto's thyroiditis    Orders:    TSH, 3rd generation with Free T4 reflex; Future    levothyroxine (Euthyrox) 125 mcg tablet; Take 1 tablet (125 mcg total) by mouth in the morning On Empty Stomach ,    Type II diabetes mellitus with manifestations (HCC)    Lab Results   Component Value Date    HGBA1C 5.8 10/24/2024       Orders:    POCT hemoglobin A1c    Comprehensive metabolic panel; Future    CBC and differential; Future    Lipid Panel with Direct LDL reflex; Future    TSH, 3rd generation with Free T4 reflex; Future    DXA bone density spine hip and pelvis; Future    PSA Total, Diagnostic; Future    UA (URINE) with reflex to Scope; Future    Magnesium; Future    Hyperlipidemia associated with type 2 diabetes mellitus  (HCC)    Lab Results   Component Value Date    HGBA1C 5.8 10/24/2024       Orders:    Lipid Panel with Direct LDL reflex; Future    Encounter for immunization    Orders:    influenza vaccine, recombinant, PF, 0.5 mL IM (Flublok)    Idiopathic osteoporosis    Orders:    DXA bone density spine hip and pelvis; Future    Enlarged prostate    Orders:    PSA Total, Diagnostic; Future    UA (URINE) with reflex to Scope; Future    Magnesium; Future    Other emphysema (HCC)  Pt was advised to quit smoking  Use Inhalers    Life style mod  Rtc in 3 mos w blood work          History of Present Illness     59 Y O Man is here for Regular check Up, he feels a Lot better, he still smokes,...          Review of Systems   Constitutional:  Negative for chills, fatigue and fever.   HENT:  Negative for congestion, facial swelling, sore throat, trouble swallowing and voice change.    Eyes:  Negative for pain, discharge and visual disturbance.   Respiratory:  " Negative for cough, shortness of breath and wheezing.    Cardiovascular:  Negative for chest pain, palpitations and leg swelling.   Gastrointestinal:  Negative for abdominal pain, blood in stool, constipation, diarrhea and nausea.   Endocrine: Negative for polydipsia, polyphagia and polyuria.   Genitourinary:  Negative for difficulty urinating, hematuria and urgency.   Musculoskeletal:  Negative for arthralgias and myalgias.   Skin:  Negative for rash.   Neurological:  Negative for dizziness, tremors, weakness and headaches.   Hematological:  Negative for adenopathy. Does not bruise/bleed easily.   Psychiatric/Behavioral:  Negative for dysphoric mood, sleep disturbance and suicidal ideas.            Objective     /82 (BP Location: Left arm, Patient Position: Sitting, Cuff Size: Standard)   Pulse 80   Temp 97.6 °F (36.4 °C) (Tympanic)   Resp 16   Ht 5' 9\" (1.753 m)   SpO2 97%   BMI 41.85 kg/m²     Physical Exam  Constitutional:       General: He is not in acute distress.     Appearance: He is well-developed.   HENT:      Head: Normocephalic.      Right Ear: External ear normal.      Left Ear: External ear normal.   Eyes:      Pupils: Pupils are equal, round, and reactive to light.   Neck:      Thyroid: No thyromegaly.      Trachea: No tracheal deviation.   Cardiovascular:      Rate and Rhythm: Normal rate and regular rhythm.      Heart sounds: Normal heart sounds. No murmur heard.     No friction rub.   Pulmonary:      Effort: Pulmonary effort is normal. No respiratory distress.      Breath sounds: Normal breath sounds. No wheezing.   Abdominal:      General: Bowel sounds are normal. There is no distension.      Palpations: Abdomen is soft.   Musculoskeletal:         General: No deformity. Normal range of motion.      Cervical back: Neck supple.   Skin:     General: Skin is warm and dry.      Findings: No erythema or rash.   Neurological:      Mental Status: He is alert and oriented to person, place, and " time.      Cranial Nerves: No cranial nerve deficit.      Coordination: Coordination normal.      Deep Tendon Reflexes: Reflexes normal.   Psychiatric:         Behavior: Behavior normal.

## 2024-10-24 NOTE — ASSESSMENT & PLAN NOTE
Lab Results   Component Value Date    HGBA1C 5.8 10/24/2024       Orders:    Lipid Panel with Direct LDL reflex; Future

## 2024-10-24 NOTE — ASSESSMENT & PLAN NOTE
Lab Results   Component Value Date    HGBA1C 5.8 10/24/2024       Orders:    POCT hemoglobin A1c    Comprehensive metabolic panel; Future    CBC and differential; Future    Lipid Panel with Direct LDL reflex; Future    TSH, 3rd generation with Free T4 reflex; Future    DXA bone density spine hip and pelvis; Future    PSA Total, Diagnostic; Future    UA (URINE) with reflex to Scope; Future    Magnesium; Future

## 2024-11-03 ENCOUNTER — HOSPITAL ENCOUNTER (INPATIENT)
Facility: HOSPITAL | Age: 59
LOS: 1 days | Discharge: HOME | DRG: 683 | End: 2024-11-05
Attending: EMERGENCY MEDICINE | Admitting: INTERNAL MEDICINE
Payer: COMMERCIAL

## 2024-11-03 ENCOUNTER — APPOINTMENT (EMERGENCY)
Dept: RADIOLOGY | Facility: HOSPITAL | Age: 59
DRG: 683 | End: 2024-11-03
Payer: COMMERCIAL

## 2024-11-03 DIAGNOSIS — R79.89 POSITIVE D DIMER: ICD-10-CM

## 2024-11-03 DIAGNOSIS — N40.0 BPH (BENIGN PROSTATIC HYPERPLASIA): ICD-10-CM

## 2024-11-03 DIAGNOSIS — N17.9 AKI (ACUTE KIDNEY INJURY) (HCC): Primary | ICD-10-CM

## 2024-11-03 DIAGNOSIS — M79.652 PAIN OF LEFT THIGH: ICD-10-CM

## 2024-11-03 LAB
ALBUMIN SERPL BCG-MCNC: 4 G/DL (ref 3.5–5)
ALP SERPL-CCNC: 33 U/L (ref 34–104)
ALT SERPL W P-5'-P-CCNC: 10 U/L (ref 7–52)
ANION GAP SERPL CALCULATED.3IONS-SCNC: 7 MMOL/L (ref 4–13)
AST SERPL W P-5'-P-CCNC: 15 U/L (ref 13–39)
BASOPHILS # BLD AUTO: 0.07 THOUSANDS/ΜL (ref 0–0.1)
BASOPHILS NFR BLD AUTO: 1 % (ref 0–1)
BILIRUB SERPL-MCNC: 0.41 MG/DL (ref 0.2–1)
BUN SERPL-MCNC: 29 MG/DL (ref 5–25)
CALCIUM SERPL-MCNC: 9.2 MG/DL (ref 8.4–10.2)
CHLORIDE SERPL-SCNC: 100 MMOL/L (ref 96–108)
CO2 SERPL-SCNC: 27 MMOL/L (ref 21–32)
CREAT SERPL-MCNC: 1.74 MG/DL (ref 0.6–1.3)
D DIMER PPP FEU-MCNC: 0.94 UG/ML FEU
EOSINOPHIL # BLD AUTO: 0.38 THOUSAND/ΜL (ref 0–0.61)
EOSINOPHIL NFR BLD AUTO: 5 % (ref 0–6)
ERYTHROCYTE [DISTWIDTH] IN BLOOD BY AUTOMATED COUNT: 11.4 % (ref 11.6–15.1)
GFR SERPL CREATININE-BSD FRML MDRD: 41 ML/MIN/1.73SQ M
GLUCOSE SERPL-MCNC: 91 MG/DL (ref 65–140)
HCT VFR BLD AUTO: 35 % (ref 36.5–49.3)
HGB BLD-MCNC: 11.7 G/DL (ref 12–17)
IMM GRANULOCYTES # BLD AUTO: 0.03 THOUSAND/UL (ref 0–0.2)
IMM GRANULOCYTES NFR BLD AUTO: 0 % (ref 0–2)
LYMPHOCYTES # BLD AUTO: 2.28 THOUSANDS/ΜL (ref 0.6–4.47)
LYMPHOCYTES NFR BLD AUTO: 27 % (ref 14–44)
MCH RBC QN AUTO: 31.5 PG (ref 26.8–34.3)
MCHC RBC AUTO-ENTMCNC: 33.4 G/DL (ref 31.4–37.4)
MCV RBC AUTO: 94 FL (ref 82–98)
MONOCYTES # BLD AUTO: 0.99 THOUSAND/ΜL (ref 0.17–1.22)
MONOCYTES NFR BLD AUTO: 12 % (ref 4–12)
NEUTROPHILS # BLD AUTO: 4.68 THOUSANDS/ΜL (ref 1.85–7.62)
NEUTS SEG NFR BLD AUTO: 55 % (ref 43–75)
NRBC BLD AUTO-RTO: 0 /100 WBCS
PLATELET # BLD AUTO: 242 THOUSANDS/UL (ref 149–390)
PMV BLD AUTO: 9.7 FL (ref 8.9–12.7)
POTASSIUM SERPL-SCNC: 4 MMOL/L (ref 3.5–5.3)
PROT SERPL-MCNC: 6.6 G/DL (ref 6.4–8.4)
RBC # BLD AUTO: 3.71 MILLION/UL (ref 3.88–5.62)
SODIUM SERPL-SCNC: 134 MMOL/L (ref 135–147)
WBC # BLD AUTO: 8.43 THOUSAND/UL (ref 4.31–10.16)

## 2024-11-03 PROCEDURE — 99284 EMERGENCY DEPT VISIT MOD MDM: CPT

## 2024-11-03 PROCEDURE — 85379 FIBRIN DEGRADATION QUANT: CPT | Performed by: PHYSICIAN ASSISTANT

## 2024-11-03 PROCEDURE — 73502 X-RAY EXAM HIP UNI 2-3 VIEWS: CPT

## 2024-11-03 PROCEDURE — 36415 COLL VENOUS BLD VENIPUNCTURE: CPT | Performed by: PHYSICIAN ASSISTANT

## 2024-11-03 PROCEDURE — 85025 COMPLETE CBC W/AUTO DIFF WBC: CPT | Performed by: PHYSICIAN ASSISTANT

## 2024-11-03 PROCEDURE — 80053 COMPREHEN METABOLIC PANEL: CPT | Performed by: PHYSICIAN ASSISTANT

## 2024-11-03 PROCEDURE — 96374 THER/PROPH/DIAG INJ IV PUSH: CPT

## 2024-11-03 RX ORDER — ACETAMINOPHEN 325 MG/1
650 TABLET ORAL ONCE
Status: COMPLETED | OUTPATIENT
Start: 2024-11-03 | End: 2024-11-03

## 2024-11-03 RX ORDER — KETOROLAC TROMETHAMINE 30 MG/ML
15 INJECTION, SOLUTION INTRAMUSCULAR; INTRAVENOUS ONCE
Status: COMPLETED | OUTPATIENT
Start: 2024-11-03 | End: 2024-11-03

## 2024-11-03 RX ADMIN — KETOROLAC TROMETHAMINE 15 MG: 30 INJECTION, SOLUTION INTRAMUSCULAR; INTRAVENOUS at 22:57

## 2024-11-03 RX ADMIN — ACETAMINOPHEN 650 MG: 325 TABLET ORAL at 22:56

## 2024-11-04 ENCOUNTER — APPOINTMENT (OUTPATIENT)
Dept: NON INVASIVE DIAGNOSTICS | Facility: HOSPITAL | Age: 59
DRG: 683 | End: 2024-11-04
Payer: COMMERCIAL

## 2024-11-04 PROBLEM — E66.01 MORBID OBESITY WITH BMI OF 40.0-44.9, ADULT (HCC): Status: ACTIVE | Noted: 2020-02-20

## 2024-11-04 PROBLEM — N17.9 ACUTE KIDNEY INJURY (HCC): Status: ACTIVE | Noted: 2024-11-04

## 2024-11-04 PROBLEM — E03.8 OTHER SPECIFIED HYPOTHYROIDISM: Status: ACTIVE | Noted: 2024-11-04

## 2024-11-04 PROBLEM — M79.652 LEFT THIGH PAIN: Status: ACTIVE | Noted: 2024-11-04

## 2024-11-04 LAB
ANION GAP SERPL CALCULATED.3IONS-SCNC: 7 MMOL/L (ref 4–13)
BILIRUB UR QL STRIP: NEGATIVE
BUN SERPL-MCNC: 27 MG/DL (ref 5–25)
CALCIUM SERPL-MCNC: 9 MG/DL (ref 8.4–10.2)
CHLORIDE SERPL-SCNC: 102 MMOL/L (ref 96–108)
CLARITY UR: CLEAR
CO2 SERPL-SCNC: 27 MMOL/L (ref 21–32)
COLOR UR: NORMAL
CREAT SERPL-MCNC: 1.75 MG/DL (ref 0.6–1.3)
CREAT UR-MCNC: 30.1 MG/DL
ERYTHROCYTE [DISTWIDTH] IN BLOOD BY AUTOMATED COUNT: 11.5 % (ref 11.6–15.1)
GFR SERPL CREATININE-BSD FRML MDRD: 41 ML/MIN/1.73SQ M
GLUCOSE SERPL-MCNC: 86 MG/DL (ref 65–140)
GLUCOSE UR STRIP-MCNC: NEGATIVE MG/DL
HCT VFR BLD AUTO: 35.1 % (ref 36.5–49.3)
HGB BLD-MCNC: 11.9 G/DL (ref 12–17)
HGB UR QL STRIP.AUTO: NEGATIVE
KETONES UR STRIP-MCNC: NEGATIVE MG/DL
LEUKOCYTE ESTERASE UR QL STRIP: NEGATIVE
MAGNESIUM SERPL-MCNC: 2 MG/DL (ref 1.9–2.7)
MCH RBC QN AUTO: 31.9 PG (ref 26.8–34.3)
MCHC RBC AUTO-ENTMCNC: 33.9 G/DL (ref 31.4–37.4)
MCV RBC AUTO: 94 FL (ref 82–98)
NITRITE UR QL STRIP: NEGATIVE
PH UR STRIP.AUTO: 7 [PH]
PHOSPHATE SERPL-MCNC: 4.8 MG/DL (ref 2.3–4.1)
PLATELET # BLD AUTO: 228 THOUSANDS/UL (ref 149–390)
PMV BLD AUTO: 10.2 FL (ref 8.9–12.7)
POTASSIUM SERPL-SCNC: 4.2 MMOL/L (ref 3.5–5.3)
PROT UR STRIP-MCNC: NEGATIVE MG/DL
RBC # BLD AUTO: 3.73 MILLION/UL (ref 3.88–5.62)
SODIUM 24H UR-SCNC: 42 MOL/L
SODIUM SERPL-SCNC: 136 MMOL/L (ref 135–147)
SP GR UR STRIP.AUTO: 1.01 (ref 1–1.04)
UROBILINOGEN UA: NEGATIVE MG/DL
UUN 24H UR-MCNC: 250 MG/DL
WBC # BLD AUTO: 7.77 THOUSAND/UL (ref 4.31–10.16)

## 2024-11-04 PROCEDURE — 84300 ASSAY OF URINE SODIUM: CPT | Performed by: INTERNAL MEDICINE

## 2024-11-04 PROCEDURE — 93971 EXTREMITY STUDY: CPT | Performed by: SURGERY

## 2024-11-04 PROCEDURE — 99285 EMERGENCY DEPT VISIT HI MDM: CPT | Performed by: PHYSICIAN ASSISTANT

## 2024-11-04 PROCEDURE — 85027 COMPLETE CBC AUTOMATED: CPT

## 2024-11-04 PROCEDURE — 82570 ASSAY OF URINE CREATININE: CPT | Performed by: INTERNAL MEDICINE

## 2024-11-04 PROCEDURE — 81003 URINALYSIS AUTO W/O SCOPE: CPT | Performed by: INTERNAL MEDICINE

## 2024-11-04 PROCEDURE — 80048 BASIC METABOLIC PNL TOTAL CA: CPT

## 2024-11-04 PROCEDURE — 99222 1ST HOSP IP/OBS MODERATE 55: CPT | Performed by: INTERNAL MEDICINE

## 2024-11-04 PROCEDURE — 84540 ASSAY OF URINE/UREA-N: CPT | Performed by: INTERNAL MEDICINE

## 2024-11-04 PROCEDURE — 84100 ASSAY OF PHOSPHORUS: CPT

## 2024-11-04 PROCEDURE — 83735 ASSAY OF MAGNESIUM: CPT

## 2024-11-04 PROCEDURE — 93971 EXTREMITY STUDY: CPT

## 2024-11-04 RX ORDER — ATORVASTATIN CALCIUM 80 MG/1
80 TABLET, FILM COATED ORAL
Status: DISCONTINUED | OUTPATIENT
Start: 2024-11-04 | End: 2024-11-05 | Stop reason: HOSPADM

## 2024-11-04 RX ORDER — NICOTINE 21 MG/24HR
1 PATCH, TRANSDERMAL 24 HOURS TRANSDERMAL DAILY
Status: DISCONTINUED | OUTPATIENT
Start: 2024-11-04 | End: 2024-11-05 | Stop reason: HOSPADM

## 2024-11-04 RX ORDER — SODIUM CHLORIDE, SODIUM GLUCONATE, SODIUM ACETATE, POTASSIUM CHLORIDE, MAGNESIUM CHLORIDE, SODIUM PHOSPHATE, DIBASIC, AND POTASSIUM PHOSPHATE .53; .5; .37; .037; .03; .012; .00082 G/100ML; G/100ML; G/100ML; G/100ML; G/100ML; G/100ML; G/100ML
100 INJECTION, SOLUTION INTRAVENOUS CONTINUOUS
Status: DISCONTINUED | OUTPATIENT
Start: 2024-11-04 | End: 2024-11-05 | Stop reason: HOSPADM

## 2024-11-04 RX ORDER — ONDANSETRON 2 MG/ML
4 INJECTION INTRAMUSCULAR; INTRAVENOUS EVERY 6 HOURS PRN
Status: DISCONTINUED | OUTPATIENT
Start: 2024-11-04 | End: 2024-11-05 | Stop reason: HOSPADM

## 2024-11-04 RX ORDER — HEPARIN SODIUM 5000 [USP'U]/ML
5000 INJECTION, SOLUTION INTRAVENOUS; SUBCUTANEOUS EVERY 8 HOURS SCHEDULED
Status: DISCONTINUED | OUTPATIENT
Start: 2024-11-04 | End: 2024-11-05 | Stop reason: HOSPADM

## 2024-11-04 RX ORDER — HEPARIN SODIUM 5000 [USP'U]/ML
5000 INJECTION, SOLUTION INTRAVENOUS; SUBCUTANEOUS EVERY 8 HOURS SCHEDULED
Status: DISCONTINUED | OUTPATIENT
Start: 2024-11-04 | End: 2024-11-04

## 2024-11-04 RX ORDER — LEVOTHYROXINE SODIUM 125 UG/1
125 TABLET ORAL
Status: DISCONTINUED | OUTPATIENT
Start: 2024-11-04 | End: 2024-11-05 | Stop reason: HOSPADM

## 2024-11-04 RX ORDER — FENOFIBRATE 145 MG/1
145 TABLET, COATED ORAL
Status: DISCONTINUED | OUTPATIENT
Start: 2024-11-04 | End: 2024-11-05 | Stop reason: HOSPADM

## 2024-11-04 RX ORDER — AMLODIPINE BESYLATE 5 MG/1
5 TABLET ORAL DAILY
Status: DISCONTINUED | OUTPATIENT
Start: 2024-11-04 | End: 2024-11-05 | Stop reason: HOSPADM

## 2024-11-04 RX ORDER — TAMSULOSIN HYDROCHLORIDE 0.4 MG/1
0.4 CAPSULE ORAL
Status: DISCONTINUED | OUTPATIENT
Start: 2024-11-04 | End: 2024-11-05 | Stop reason: HOSPADM

## 2024-11-04 RX ORDER — ACETAMINOPHEN 325 MG/1
650 TABLET ORAL EVERY 6 HOURS PRN
Status: DISCONTINUED | OUTPATIENT
Start: 2024-11-04 | End: 2024-11-05 | Stop reason: HOSPADM

## 2024-11-04 RX ORDER — NICOTINE 21 MG/24HR
21 PATCH, TRANSDERMAL 24 HOURS TRANSDERMAL ONCE
Status: COMPLETED | OUTPATIENT
Start: 2024-11-04 | End: 2024-11-05

## 2024-11-04 RX ORDER — ENOXAPARIN SODIUM 150 MG/ML
1 INJECTION SUBCUTANEOUS EVERY 12 HOURS SCHEDULED
Status: DISCONTINUED | OUTPATIENT
Start: 2024-11-04 | End: 2024-11-04

## 2024-11-04 RX ORDER — HALOPERIDOL 5 MG/1
2.5 TABLET ORAL
Status: DISCONTINUED | OUTPATIENT
Start: 2024-11-04 | End: 2024-11-05 | Stop reason: HOSPADM

## 2024-11-04 RX ADMIN — SODIUM CHLORIDE, SODIUM GLUCONATE, SODIUM ACETATE, POTASSIUM CHLORIDE, MAGNESIUM CHLORIDE, SODIUM PHOSPHATE, DIBASIC, AND POTASSIUM PHOSPHATE 100 ML/HR: .53; .5; .37; .037; .03; .012; .00082 INJECTION, SOLUTION INTRAVENOUS at 01:09

## 2024-11-04 RX ADMIN — TAMSULOSIN HYDROCHLORIDE 0.4 MG: 0.4 CAPSULE ORAL at 11:23

## 2024-11-04 RX ADMIN — SODIUM CHLORIDE, SODIUM GLUCONATE, SODIUM ACETATE, POTASSIUM CHLORIDE, MAGNESIUM CHLORIDE, SODIUM PHOSPHATE, DIBASIC, AND POTASSIUM PHOSPHATE 100 ML/HR: .53; .5; .37; .037; .03; .012; .00082 INJECTION, SOLUTION INTRAVENOUS at 21:11

## 2024-11-04 RX ADMIN — ACETAMINOPHEN 650 MG: 325 TABLET ORAL at 05:43

## 2024-11-04 RX ADMIN — ACETAMINOPHEN 650 MG: 325 TABLET ORAL at 14:04

## 2024-11-04 RX ADMIN — NICOTINE 21 MG: 21 PATCH, EXTENDED RELEASE TRANSDERMAL at 00:10

## 2024-11-04 RX ADMIN — HALOPERIDOL 2.5 MG: 5 TABLET ORAL at 21:10

## 2024-11-04 RX ADMIN — HEPARIN SODIUM 5000 UNITS: 5000 INJECTION INTRAVENOUS; SUBCUTANEOUS at 13:08

## 2024-11-04 RX ADMIN — HEPARIN SODIUM 5000 UNITS: 5000 INJECTION INTRAVENOUS; SUBCUTANEOUS at 21:10

## 2024-11-04 RX ADMIN — ACETAMINOPHEN 650 MG: 325 TABLET ORAL at 21:10

## 2024-11-04 RX ADMIN — SODIUM CHLORIDE, SODIUM GLUCONATE, SODIUM ACETATE, POTASSIUM CHLORIDE, MAGNESIUM CHLORIDE, SODIUM PHOSPHATE, DIBASIC, AND POTASSIUM PHOSPHATE 100 ML/HR: .53; .5; .37; .037; .03; .012; .00082 INJECTION, SOLUTION INTRAVENOUS at 11:08

## 2024-11-04 RX ADMIN — FLUOXETINE HYDROCHLORIDE 20 MG: 20 CAPSULE ORAL at 08:42

## 2024-11-04 RX ADMIN — NICOTINE 1 PATCH: 21 PATCH, EXTENDED RELEASE TRANSDERMAL at 08:21

## 2024-11-04 RX ADMIN — FLUOXETINE HYDROCHLORIDE 20 MG: 20 CAPSULE ORAL at 01:29

## 2024-11-04 RX ADMIN — ATORVASTATIN CALCIUM 80 MG: 80 TABLET, FILM COATED ORAL at 15:50

## 2024-11-04 RX ADMIN — HEPARIN SODIUM 5000 UNITS: 5000 INJECTION INTRAVENOUS; SUBCUTANEOUS at 01:29

## 2024-11-04 RX ADMIN — HALOPERIDOL 2.5 MG: 5 TABLET ORAL at 01:29

## 2024-11-04 RX ADMIN — FENOFIBRATE 145 MG: 145 TABLET, FILM COATED ORAL at 15:50

## 2024-11-04 RX ADMIN — LEVOTHYROXINE SODIUM 125 MCG: 125 TABLET ORAL at 05:43

## 2024-11-04 RX ADMIN — AMLODIPINE BESYLATE 5 MG: 5 TABLET ORAL at 08:21

## 2024-11-04 NOTE — ED PROVIDER NOTES
Time reflects when diagnosis was documented in both MDM as applicable and the Disposition within this note       Time User Action Codes Description Comment    11/4/2024 12:14 AM Shayy Garcia [N17.9] RICARDO (acute kidney injury) (HCC)     11/4/2024 12:14 AM Shayy Garcia [R79.89] Positive D dimer     11/4/2024 12:14 AM Shayy Garcia [M79.652] Pain of left thigh           ED Disposition       ED Disposition   Admit    Condition   Stable    Date/Time   Mon Nov 4, 2024 12:14 AM    Comment   Case was discussed with ADRIANA  and the patient's admission status was agreed to be Admission Status: observation status to the service of Dr. Hays   .               Assessment & Plan       Medical Decision Making  Patient presenting complaining of left posterior thigh discomfort and heaviness.  Differential includes meralgia paresthetica, intra-articular pathology of the hip, shingles or DVT.  No obvious osseous abnormality was seen on imaging.  No overlying skin changes consistent where shingles was seen on exam.  Patient had elevated D-dimer.  Venous duplex unable to be completed at this time.  Incidentally through laboratory evaluation patient was found to have an RICARDO.  This was discussed with ADRIANA who agrees with plan to admit the patient for RICARDO and complete venous duplex in the morning.    Amount and/or Complexity of Data Reviewed  Labs: ordered.  Radiology: ordered and independent interpretation performed.    Risk  OTC drugs.  Prescription drug management.  Decision regarding hospitalization.             Medications   nicotine (NICODERM CQ) 21 mg/24 hr TD 24 hr patch 21 mg (21 mg Transdermal Medication Applied 11/4/24 0010)   nicotine (NICODERM CQ) 21 mg/24 hr TD 24 hr patch 1 patch (has no administration in time range)   heparin (porcine) subcutaneous injection 5,000 Units (has no administration in time range)   multi-electrolyte (PLASMALYTE-A/ISOLYTE-S PH 7.4) IV solution (has no administration in  time range)   acetaminophen (TYLENOL) tablet 650 mg (has no administration in time range)   ondansetron (ZOFRAN) injection 4 mg (has no administration in time range)   ketorolac (TORADOL) injection 15 mg (15 mg Intravenous Given 11/3/24 2257)   acetaminophen (TYLENOL) tablet 650 mg (650 mg Oral Given 11/3/24 2256)       ED Risk Strat Scores                           SBIRT 22yo+      Flowsheet Row Most Recent Value   Initial Alcohol Screen: US AUDIT-C     1. How often do you have a drink containing alcohol? 0 Filed at: 11/03/2024 2254   2. How many drinks containing alcohol do you have on a typical day you are drinking?  0 Filed at: 11/03/2024 2254   3a. Male UNDER 65: How often do you have five or more drinks on one occasion? 0 Filed at: 11/03/2024 2254   3b. FEMALE Any Age, or MALE 65+: How often do you have 4 or more drinks on one occassion? 0 Filed at: 11/03/2024 2254   Audit-C Score 0 Filed at: 11/03/2024 2254   SAMARIA: How many times in the past year have you...    Used an illegal drug or used a prescription medication for non-medical reasons? Never Filed at: 11/03/2024 2254                            History of Present Illness       Chief Complaint   Patient presents with    Leg Pain     Pt reports L upper thigh pain for a few days that worsens when he walks and will throb once in a while.  Deshawn any hx of blood clots.       Past Medical History:   Diagnosis Date    Anxiety     Arthritis     Bipolar disorder (HCC)     Colon polyp     COPD (chronic obstructive pulmonary disease) (HCC)     Depression     Diabetes mellitus (HCC)     type 2    Gout     High triglycerides     Hypertension     Hypothyroidism 02/20/2013    Obesity     Paranoid schizophrenia (HCC)     Psychiatric disorder     Sciatica 12/03/2013    Seasonal allergies     Sleep apnea       Past Surgical History:   Procedure Laterality Date    COLONOSCOPY      UMBILICAL HERNIA REPAIR      WISDOM TOOTH EXTRACTION        Family History   Problem Relation  Age of Onset    Colon cancer Mother     Coronary artery disease Father     Hyperlipidemia Father     Coronary artery disease Family       Social History     Tobacco Use    Smoking status: Every Day     Current packs/day: 0.25     Average packs/day: 0.3 packs/day for 35.0 years (8.8 ttl pk-yrs)     Types: Cigarettes, E-Cigarettes    Smokeless tobacco: Never   Vaping Use    Vaping status: Never Used   Substance Use Topics    Alcohol use: Not Currently    Drug use: No      E-Cigarette/Vaping    E-Cigarette Use Never User       E-Cigarette/Vaping Substances    Nicotine No     THC No     CBD No     Flavoring No     Other No     Unknown No       I have reviewed and agree with the history as documented.     59-year-old male with history of diabetes presents complaining of left posterior thigh pain for the past few days.  Denies trauma or injury.  States it hurts while walking and in certain positions.  Patient states that he is concerned he may have a blood clot.  Denies history of coagulopathy or recent travel.  Denies chest pain or shortness of breath.  Has not had any medications for pain relief prior to arrival. Denies any other complaints       History provided by:  Patient   used: No        Review of Systems   Constitutional: Negative.  Negative for chills and fatigue.   HENT:  Negative for ear pain and sore throat.    Eyes:  Negative for photophobia and redness.   Respiratory:  Negative for apnea, cough and shortness of breath.    Cardiovascular:  Negative for chest pain.   Gastrointestinal:  Negative for abdominal pain, nausea and vomiting.   Genitourinary:  Negative for dysuria.   Musculoskeletal:  Negative for arthralgias, neck pain and neck stiffness.        L thigh pain    Skin:  Negative for rash.   Neurological:  Negative for dizziness, tremors, syncope and weakness.   Psychiatric/Behavioral:  Negative for suicidal ideas.            Objective       ED Triage Vitals   Temperature Pulse  Blood Pressure Respirations SpO2 Patient Position - Orthostatic VS   11/03/24 2219 11/03/24 2219 11/03/24 2219 11/03/24 2219 11/03/24 2219 11/03/24 2219   97.8 °F (36.6 °C) 69 149/89 18 95 % Sitting      Temp Source Heart Rate Source BP Location FiO2 (%) Pain Score    11/03/24 2219 11/03/24 2219 11/03/24 2219 -- 11/04/24 0050    Oral Monitor Left arm  5      Vitals      Date and Time Temp Pulse SpO2 Resp BP Pain Score FACES Pain Rating User   11/04/24 0050 -- -- -- -- -- 5 -- MB   11/04/24 0049 97.6 °F (36.4 °C) 61 95 % 18 166/91 -- -- SA   11/03/24 2219 97.8 °F (36.6 °C) 69 95 % 18 149/89 -- -- CJ            Physical Exam  Constitutional:       General: He is not in acute distress.     Appearance: He is well-developed. He is not diaphoretic.   Eyes:      Pupils: Pupils are equal, round, and reactive to light.   Cardiovascular:      Rate and Rhythm: Normal rate and regular rhythm.   Pulmonary:      Effort: Pulmonary effort is normal. No respiratory distress.      Breath sounds: Normal breath sounds.   Abdominal:      General: Bowel sounds are normal. There is no distension.      Palpations: Abdomen is soft.   Musculoskeletal:         General: Normal range of motion.      Cervical back: Normal range of motion and neck supple.      Comments: No obvious deformity or abnormality in the left leg.  Negative Homans.  Dorsalis pedis pulse 2+.  Cap refill less than 2 seconds.  No focal tenderness    Skin:     General: Skin is warm and dry.   Neurological:      Mental Status: He is alert and oriented to person, place, and time.         Results Reviewed       Procedure Component Value Units Date/Time    D-dimer, quantitative [[id number]]  (Abnormal) Collected: 11/03/24 2253    Lab Status: Final result Specimen: Blood from Arm, Right Updated: 11/03/24 2325     D-Dimer, Quant 0.94 ug/ml FEU     Narrative:      In the evaluation for possible pulmonary embolism, in the appropriate (Well's Score of 4 or less) patient, the age  adjusted d-dimer cutoff for this patient can be calculated as:    Age x 0.01 (in ug/mL) for Age-adjusted D-dimer exclusion threshold for a patient over 50 years.    Comprehensive metabolic panel [[id number]]  (Abnormal) Collected: 11/03/24 2253    Lab Status: Final result Specimen: Blood from Arm, Right Updated: 11/03/24 2318     Sodium 134 mmol/L      Potassium 4.0 mmol/L      Chloride 100 mmol/L      CO2 27 mmol/L      ANION GAP 7 mmol/L      BUN 29 mg/dL      Creatinine 1.74 mg/dL      Glucose 91 mg/dL      Calcium 9.2 mg/dL      AST 15 U/L      ALT 10 U/L      Alkaline Phosphatase 33 U/L      Total Protein 6.6 g/dL      Albumin 4.0 g/dL      Total Bilirubin 0.41 mg/dL      eGFR 41 ml/min/1.73sq m     Narrative:      National Kid[employer] Disease Foundation guidelines for Chronic Kidney Disease (CKD):     Stage 1 with normal or high GFR (GFR > 90 mL/min/1.73 square meters)    Stage 2 Mild CKD (GFR = 60-89 mL/min/1.73 square meters)    Stage 3A Moderate CKD (GFR = 45-59 mL/min/1.73 square meters)    Stage 3B Moderate CKD (GFR = 30-44 mL/min/1.73 square meters)    Stage 4 Severe CKD (GFR = 15-29 mL/min/1.73 square meters)    Stage 5 End Stage CKD (GFR <15 mL/min/1.73 square meters)  Note: GFR calculation is accurate only with a steady state creatinine    CBC and differential [[id number]]  (Abnormal) Collected: 11/03/24 2253    Lab Status: Final result Specimen: Blood from Arm, Right Updated: 11/03/24 2303     WBC 8.43 Thousand/uL      RBC 3.71 Million/uL      Hemoglobin 11.7 g/dL      Hematocrit 35.0 %      MCV 94 fL      MCH 31.5 pg      MCHC 33.4 g/dL      RDW 11.4 %      MPV 9.7 fL      Platelets 242 Thousands/uL      nRBC 0 /100 WBCs      Segmented % 55 %      Immature Grans % 0 %      Lymphocytes % 27 %      Monocytes % 12 %      Eosinophils Relative 5 %      Basophils Relative 1 %      Absolute Neutrophils 4.68 Thousands/µL      Absolute Immature Grans 0.03 Thousand/uL      Absolute Lymphocytes 2.28 Thousands/µL       Absolute Monocytes 0.99 Thousand/µL      Eosinophils Absolute 0.38 Thousand/µL      Basophils Absolute 0.07 Thousands/µL             XR hip/pelv 2-3 vws left if performed   ED Interpretation by Shayy Garcia PA-C (2325)   NO obvious osseous abnormality       VAS upper limb venous duplex scan, complete, bilateral    (Results Pending)       Procedures    ED Medication and Procedure Management   Prior to Admission Medications   Prescriptions Last Dose Informant Patient Reported? Taking?   FLUoxetine (PROzac) 20 mg capsule 11/3/2024  No Yes   Sig: Take 1 capsule (20 mg total) by mouth daily   amLODIPine (NORVASC) 5 mg tablet 11/3/2024  No Yes   Sig: Take 1 tablet (5 mg total) by mouth daily   atorvastatin (LIPITOR) 80 mg tablet 11/3/2024  No Yes   Sig: Take 1 tablet (80 mg total) by mouth daily with dinner   celecoxib (CeleBREX) 200 mg capsule 11/3/2024  No Yes   Sig: Take 1 capsule (200 mg total) by mouth daily with breakfast   cetirizine (ZyrTEC) 10 mg tablet More than a month  No No   Sig: Take 1 tablet (10 mg total) by mouth daily In the evening   ergocalciferol (VITAMIN D2) 50,000 units Past Week  No Yes   Sig: Take 1 capsule (50,000 Units total) by mouth once a week   fenofibrate (TRICOR) 145 mg tablet 11/3/2024  No Yes   Sig: Take 1 tablet (145 mg total) by mouth daily with dinner   fexofenadine (ALLEGRA) 180 MG tablet More than a month  No No   Sig: Take 1 tablet (180 mg total) by mouth daily In the morning   fluticasone (FLONASE) 50 mcg/act nasal spray Past Month Self No Yes   Si sprays into each nostril daily   haloperidol (HALDOL) 5 mg tablet 11/3/2024 Self Yes Yes   Si.5 mg daily at bedtime    levothyroxine (Euthyrox) 125 mcg tablet 2024  No Yes   Sig: Take 1 tablet (125 mcg total) by mouth in the morning On Empty Stomach ,   lisinopril (ZESTRIL) 10 mg tablet Past Week  No Yes   Sig: Take 1 tablet (10 mg total) by mouth daily   sodium chloride (OCEAN) 0.65 % nasal spray Not Taking  Self No No   Si spray into each nostril 3 (three) times a day   Patient not taking: Reported on 2024      Facility-Administered Medications: None     Current Discharge Medication List        CONTINUE these medications which have NOT CHANGED    Details   amLODIPine (NORVASC) 5 mg tablet Take 1 tablet (5 mg total) by mouth daily  Qty: 90 tablet, Refills: 3    Associated Diagnoses: Essential hypertension      atorvastatin (LIPITOR) 80 mg tablet Take 1 tablet (80 mg total) by mouth daily with dinner  Qty: 90 tablet, Refills: 3    Associated Diagnoses: BMI 40.0-44.9, adult (HCC)      celecoxib (CeleBREX) 200 mg capsule Take 1 capsule (200 mg total) by mouth daily with breakfast  Qty: 90 capsule, Refills: 2    Associated Diagnoses: Arthritis      ergocalciferol (VITAMIN D2) 50,000 units Take 1 capsule (50,000 Units total) by mouth once a week  Qty: 12 capsule, Refills: 2    Associated Diagnoses: Vitamin D deficiency      fenofibrate (TRICOR) 145 mg tablet Take 1 tablet (145 mg total) by mouth daily with dinner  Qty: 90 tablet, Refills: 3    Associated Diagnoses: BMI 40.0-44.9, adult (HCC)      FLUoxetine (PROzac) 20 mg capsule Take 1 capsule (20 mg total) by mouth daily  Qty: 90 capsule, Refills: 3    Associated Diagnoses: Other depression      fluticasone (FLONASE) 50 mcg/act nasal spray 2 sprays into each nostril daily  Qty: 1 g, Refills: 0    Associated Diagnoses: Right acute serous otitis media, recurrence not specified      haloperidol (HALDOL) 5 mg tablet 2.5 mg daily at bedtime   Refills: 0      levothyroxine (Euthyrox) 125 mcg tablet Take 1 tablet (125 mcg total) by mouth in the morning On Empty Stomach ,  Qty: 30 tablet, Refills: 3    Associated Diagnoses: Hypothyroidism due to Hashimoto's thyroiditis      lisinopril (ZESTRIL) 10 mg tablet Take 1 tablet (10 mg total) by mouth daily  Qty: 90 tablet, Refills: 3    Associated Diagnoses: Essential hypertension      cetirizine (ZyrTEC) 10 mg tablet Take 1  tablet (10 mg total) by mouth daily In the evening  Qty: 90 tablet, Refills: 3    Associated Diagnoses: Seasonal allergic rhinitis due to other allergic trigger      fexofenadine (ALLEGRA) 180 MG tablet Take 1 tablet (180 mg total) by mouth daily In the morning  Qty: 90 tablet, Refills: 2    Associated Diagnoses: Seasonal allergic rhinitis due to other allergic trigger      sodium chloride (OCEAN) 0.65 % nasal spray 1 spray into each nostril 3 (three) times a day  Qty: 30 mL, Refills: 1    Associated Diagnoses: Chronic sinusitis, unspecified location           No discharge procedures on file.  ED SEPSIS DOCUMENTATION   Time reflects when diagnosis was documented in both MDM as applicable and the Disposition within this note       Time User Action Codes Description Comment    11/4/2024 12:14 AM Shayy Garcia [N17.9] RICARDO (acute kidney injury) (HCC)     11/4/2024 12:14 AM Shayy Garcia [R79.89] Positive D dimer     11/4/2024 12:14 AM Shayy Garcia [M79.652] Pain of left thigh                  Shayy Garcia PA-C  11/04/24 0155

## 2024-11-04 NOTE — ASSESSMENT & PLAN NOTE
Blood pressure stable  Continue amlodipine at home dose, hold lisinopril due to RICARDO  Routine vital signs

## 2024-11-04 NOTE — PLAN OF CARE
Problem: PAIN - ADULT  Goal: Verbalizes/displays adequate comfort level or baseline comfort level  Description: Interventions:  - Encourage patient to monitor pain and request assistance  - Assess pain using appropriate pain scale  - Administer analgesics based on type and severity of pain and evaluate response  - Implement non-pharmacological measures as appropriate and evaluate response  - Consider cultural and social influences on pain and pain management  - Notify physician/advanced practitioner if interventions unsuccessful or patient reports new pain  Outcome: Progressing     Problem: INFECTION - ADULT  Goal: Absence or prevention of progression during hospitalization  Description: INTERVENTIONS:  - Assess and monitor for signs and symptoms of infection  - Monitor lab/diagnostic results  - Monitor all insertion sites, i.e. indwelling lines, tubes, and drains  - Monitor endotracheal if appropriate and nasal secretions for changes in amount and color  - [place] appropriate cooling/warming therapies per order  - Administer medications as ordered  - Instruct and encourage patient and family to use good hand hygiene technique  - Identify and instruct in appropriate isolation precautions for identified infection/condition  Outcome: Progressing

## 2024-11-04 NOTE — CASE MANAGEMENT
Case Management Assessment & Discharge Planning Note    Patient name Chas CASTLE Stasiw  Location 7T [facility] 713/7T [facility] 713-01 MRN [id number]  : 1965 Date 2024       Current Admission Date: 11/3/2024  Current Admission Diagnosis:Acute kidney injury (HCC)   Patient Active Problem List    Diagnosis Date Noted Date Diagnosed    Other specified hypothyroidism 2024     Acute kidney injury (HCC) 2024     Left thigh pain 2024     Hyperlipidemia 2024     Varicose veins of both lower extremities with pain 2023     Aortic ectasia (HCC) 2023     Type II diabetes mellitus with manifestations (HCC) 03/15/2021     Morbid obesity with BMI of 40.0-44.9, adult (HCC) 2020     Benign essential hypertension 10/30/2013     Hypertension 2013     Tobacco use disorder 2013     Chronic obstructive pulmonary disease (HCC) 2013       LOS (days): 0  Geometric Mean LOS (GMLOS) (days):   Days to GMLOS:     OBJECTIVE:        Current admission status: Observation    Preferred Pharmacy:   [facility]/pharmacy #[id number] - [place], [place] - [facility] [place] [address]  [address] [place] [address]  Phone: [phone] Fax: [phone]    Primary Care Provider: Josue Maravilla MD    Primary Insurance: [facility]  Secondary Insurance: [facility]    ASSESSMENT:  Active Health Care Proxies    There are no active Health Care Proxies on file.         Readmission Root Cause  30 Day Readmission: No    Patient Information  Admitted from:: Home  Mental Status: Alert  During Assessment patient was accompanied by: Not accompanied during assessment  Assessment information provided by:: Patient  Primary Caregiver: Self  Support Systems: Friend, Family members, Self  County of Residence: [place]  What city do you live in?: [place]  Home entry access options. Select all that apply.: No steps to enter home  Type of Current Residence: Apartment  Floor Level: 1  Upon entering  residence, is there a bedroom on the main floor (no further steps)?: Yes  Upon entering residence, is there a bathroom on the main floor (no further steps)?: Yes  Living Arrangements: Lives Alone  Is patient a ?: No    Activities of Daily Living Prior to Admission  Functional Status: Independent  Completes ADLs independently?: Yes  Ambulates independently?: Yes  Does patient use assisted devices?: No  Does patient currently own DME?: No  Does patient have a history of Outpatient Therapy (PT/OT)?: No  Does the patient have a history of Short-Term Rehab?: No  Does patient have a history of HHC?: No  Does patient currently have HHC?: No    Current Home Health Care  Type of Current Home Care Services: Home health aide    Patient Information Continued  Income Source: Pension/senior care  Does patient have prescription coverage?: No  Does patient receive dialysis treatments?: No  Does patient have a history of substance abuse?: No  Does patient have a history of Mental Health Diagnosis?: Yes (Bipolar)  Is patient receiving treatment for mental health?: Yes  Has patient received inpatient treatment related to mental health in the last 2 years?: No      Means of Transportation  Means of Transport to Appts:: Drives Self    DISCHARGE DETAILS:    Discharge planning discussed with:: Patient  Freedom of Choice: Yes     CM contacted family/caregiver?: No- see comments (AAOx3)      Would you like to participate in our [facility] service program?  : No - Declined    Treatment Team Recommendation: Home       Additional Comments: Met with patient at bedside.  Patient has a HHA thru waiver for 1 hr a week.  CM department following thru discharge

## 2024-11-04 NOTE — ASSESSMENT & PLAN NOTE
Patient is a morbidly obese 59-year-old male past medical history of depression, hypertension, hyperlipidemia, and hypothyroidism who presents to the hospital with left thigh pain.  In the emergency department he was found to have an RICARDO, and will be admitted to the hospital for further management.  Creatinine 1.74, baseline around 1.0  Provide intravenous Isolyte overnight  Hold nephrotoxins  Trend renal function

## 2024-11-04 NOTE — ASSESSMENT & PLAN NOTE
Patient complaining of left thigh pain that feels slightly crampy and throbbing, he is concerned about a blood clot  Legs appear equal in size with no noticeable redness or swelling  Venous duplex of the left lower extremity ordered

## 2024-11-04 NOTE — ASSESSMENT & PLAN NOTE
Patient's BMI is 43.2  He is significantly insulin resistant and is prediabetic  Patient would benefit greatly from very low carbohydrate diet (less than 50 g daily) and intermittent fasting  I told him to start eating less carbohydrates

## 2024-11-04 NOTE — UTILIZATION REVIEW
Initial Clinical Review    Patient started care in ED on 11/3 and has already crossed 1 midnight. Observation 11/4 0014 changed to inpatient 11/4 1548. Pt requiring continued stay for management of RICARDO.     Admission: Date/Time/Statement:   Admission Orders (From admission, onward)       Ordered        11/04/24 1548  INPATIENT ADMISSION  Once            11/04/24 0014  Place in Observation  Once                          Orders Placed This Encounter   Procedures    INPATIENT ADMISSION     Standing Status:   Standing     Number of Occurrences:   1     Order Specific Question:   Level of Care     Answer:   Med Surg [16]     Order Specific Question:   Bed Type     Answer:   Clinmelinan [4]     Order Specific Question:   Estimated length of stay     Answer:   More than 2 Midnights     Order Specific Question:   Certification     Answer:   I certify that inpatient services are medically necessary for this patient for a duration of greater than two midnights. See H&P and MD Progress Notes for additional information about the patient's course of treatment.     ED Arrival Information       Expected   -    Arrival   11/3/2024 22:10    Acuity   Less Urgent              Means of arrival   Walk-In    Escorted by   Self    Service   Hospitalist    Admission type   Emergency              Arrival complaint   Left eye pain             Chief Complaint   Patient presents with    Leg Pain     Pt reports L upper thigh pain for a few days that worsens when he walks and will throb once in a while.  Deshawn any hx of blood clots.       Initial Presentation: 59 y.o. male with PMH of depression, hypertension, hyperlipidemia, obesity, and hypothyroidism who presents to the hospital with left thigh pain. In the emergency department he was found to have RICARDO with creatinine of 1.74 (baseline 1.0): IV fluids, trend renal function, LLE Venous duplex, continue home amlodipine and hold lisinopril, nicotine patch, continue atorvastatin and Synthroid.      11/4 Observation changed to inpatient.     Internal medicine: Check urine electrolytes. Continue DVT prophylaxis. Leg elevation. Continue IV fluid for now. Restart Flomax.    ED Treatment-Medication Administration from 11/03/2024 2210 to 11/04/2024 0044         Date/Time Order Dose Route Action     11/03/2024 2257 ketorolac (TORADOL) injection 15 mg 15 mg Intravenous Given     11/03/2024 2256 acetaminophen (TYLENOL) tablet 650 mg 650 mg Oral Given     11/04/2024 0010 nicotine (NICODERM CQ) 21 mg/24 hr TD 24 hr patch 21 mg 21 mg Transdermal Medication Applied            Scheduled Medications:  amLODIPine, 5 mg, Oral, Daily  atorvastatin, 80 mg, Oral, Daily With Dinner  fenofibrate, 145 mg, Oral, Daily With Dinner  FLUoxetine, 20 mg, Oral, Daily  haloperidol, 2.5 mg, Oral, HS  heparin (porcine), 5,000 Units, Subcutaneous, Q8H MARK  levothyroxine, 125 mcg, Oral, Early Morning  nicotine, 1 patch, Transdermal, Daily  nicotine, 21 mg, Transdermal, Once  tamsulosin, 0.4 mg, Oral, Daily With Dinner      Continuous IV Infusions:  multi-electrolyte, 100 mL/hr, Intravenous, Continuous      PRN Meds:  acetaminophen, 650 mg, Oral, Q6H PRN  ondansetron, 4 mg, Intravenous, Q6H PRN      ED Triage Vitals   Temperature Pulse Respirations Blood Pressure SpO2 Pain Score   11/03/24 2219 11/03/24 2219 11/03/24 2219 11/03/24 2219 11/03/24 2219 11/04/24 0050   97.8 °F (36.6 °C) 69 18 149/89 95 % 5     Weight (last 2 days)       Date/Time Weight    11/04/24 1128 128 (281.97)    11/04/24 0915 128 (281.97)    11/04/24 0049 129 (285.5)    11/03/24 2219 133 (292.8)            Vital Signs (last 3 days)       Date/Time Temp Pulse Resp BP MAP (mmHg) SpO2 O2 Device Patient Position - Orthostatic VS [place] Coma Scale Score Pain    11/04/24 1511 97.9 °F (36.6 °C) 67 18 154/82 112 92 % None (Room air) Lying -- --    11/04/24 1404 -- -- -- -- -- -- -- -- -- 6    11/04/24 0901 -- -- -- -- -- -- -- -- 15 No Pain    11/04/24 0712 98.1 °F (36.7 °C) 63  18 166/94 116 94 % None (Room air) Lying -- --    11/04/24 0543 -- -- -- -- -- -- -- -- -- 7    11/04/24 0050 -- -- -- -- -- -- -- -- 15 5    11/04/24 0049 97.6 °F (36.4 °C) 61 18 166/91 108 95 % None (Room air) Lying -- --    11/03/24 2232 -- -- -- -- -- -- -- -- 15 --    11/03/24 2219 97.8 °F (36.6 °C) 69 18 149/89 -- 95 % None (Room air) Sitting -- --              Pertinent Labs/Diagnostic Test Results:   Radiology:  VAS VENOUS DUPLEX -LOWER LIMB UNILATERAL   Final Interpretation by Jaden Mcfadden MD (11/04 1239)      XR hip/pelv 2-3 vws left if performed   ED Interpretation by Shayy Garcia PA-C (11/03 2325)   NO obvious osseous abnormality       Final Interpretation by Delvis Mina MD (11/04 0455)      No acute osseous abnormality.         Computerized Assisted Algorithm (CAA) may have been used to analyze all applicable images.            Workstation performed: [id number]           Cardiology:  No orders to display     GI:  No orders to display           Results from last 7 days   Lab Units 11/04/24  0548 11/03/24  2253   WBC Thousand/uL 7.77 8.43   HEMOGLOBIN g/dL 11.9* 11.7*   HEMATOCRIT % 35.1* 35.0*   PLATELETS Thousands/uL 228 242   TOTAL NEUT ABS Thousands/µL  --  4.68         Results from last 7 days   Lab Units 11/04/24  0548 11/03/24  2253   SODIUM mmol/L 136 134*   POTASSIUM mmol/L 4.2 4.0   CHLORIDE mmol/L 102 100   CO2 mmol/L 27 27   ANION GAP mmol/L 7 7   BUN mg/dL 27* 29*   CREATININE mg/dL 1.75* 1.74*   EGFR ml/min/1.73sq m 41 41   CALCIUM mg/dL 9.0 9.2   MAGNESIUM mg/dL 2.0  --    PHOSPHORUS mg/dL 4.8*  --      Results from last 7 days   Lab Units 11/03/24  2253   AST U/L 15   ALT U/L 10   ALK PHOS U/L 33*   TOTAL PROTEIN g/dL 6.6   ALBUMIN g/dL 4.0   TOTAL BILIRUBIN mg/dL 0.41         Results from last 7 days   Lab Units 11/04/24  0548 11/03/24  2253   GLUCOSE RANDOM mg/dL 86 91           Results from last 7 days   Lab Units 11/03/24  2253   D-DIMER QUANTITATIVE ug/ml  FEU 0.94*         Past Medical History:   Diagnosis Date    Anxiety     Arthritis     Bipolar disorder (HCC)     Colon polyp     COPD (chronic obstructive pulmonary disease) (HCC)     Depression     Diabetes mellitus (HCC)     type 2    Gout     High triglycerides     Hypertension     Hypothyroidism 02/20/2013    Obesity     Paranoid schizophrenia (HCC)     Psychiatric disorder     Sciatica 12/03/2013    Seasonal allergies     Sleep apnea      Present on Admission:   Hypertension   Hyperlipidemia   Tobacco use disorder      Admitting Diagnosis: Leg pain [M79.606]  Positive D dimer [R79.89]  RICARDO (acute kidney injury) (HCC) [N17.9]  Pain of left thigh [M79.652]  Age/Sex: 59 y.o. male    Network Utilization Review Department  ATTENTION: Please call with any questions or concerns to [phone] and carefully listen to the prompts so that you are directed to the right person. All voicemails are confidential.   For Discharge needs, contact Care Management DC Support Team at [phone] opt. 2  Send all requests for admission clinical reviews, approved or denied determinations and any other requests to dedicated fax number below belonging to the [facility] where the patient is receiving treatment. List of dedicated fax numbers for the Facilities:  FACILITY NAME UR FAX NUMBER   ADMISSION DENIALS (Administrative/Medical Necessity) [phone]   DISCHARGE SUPPORT TEAM (NETWORK) [phone]   PARENT CHILD HEALTH (Maternity/NICU/Pediatrics) [phone]   [facility] [phone]   [facility] [phone]   [facility] [phone]   [facility] [phone]   Critical access hospital [phone]   [facility] [phone]   [facility] [phone]   [facility] [phone]   [facility]  [phone]   [facility] [phone]   [facility] [phone]   [facility] [phone]

## 2024-11-04 NOTE — PLAN OF CARE
Problem: PAIN - ADULT  Goal: Verbalizes/displays adequate comfort level or baseline comfort level  Description: Interventions:  - Encourage patient to monitor pain and request assistance  - Assess pain using appropriate pain scale  - Administer analgesics based on type and severity of pain and evaluate response  - Implement non-pharmacological measures as appropriate and evaluate response  - Consider cultural and social influences on pain and pain management  - Notify physician/advanced practitioner if interventions unsuccessful or patient reports new pain  Outcome: Progressing     Problem: INFECTION - ADULT  Goal: Absence or prevention of progression during hospitalization  Description: INTERVENTIONS:  - Assess and monitor for signs and symptoms of infection  - Monitor lab/diagnostic results  - Monitor all insertion sites, i.e. indwelling lines, tubes, and drains  - Monitor endotracheal if appropriate and nasal secretions for changes in amount and color  - [place] appropriate cooling/warming therapies per order  - Administer medications as ordered  - Instruct and encourage patient and family to use good hand hygiene technique  - Identify and instruct in appropriate isolation precautions for identified infection/condition  Outcome: Progressing  Goal: Absence of fever/infection during neutropenic period  Description: INTERVENTIONS:  - Monitor WBC    Outcome: Progressing     Problem: SAFETY ADULT  Goal: Patient will remain free of falls  Description: INTERVENTIONS:  - Educate patient/family on patient safety including physical limitations  - Instruct patient to call for assistance with activity   - Consult OT/PT to assist with strengthening/mobility   - Keep Call bell within reach  - Keep bed low and locked with side rails adjusted as appropriate  - Keep care items and personal belongings within reach  - Initiate and maintain comfort rounds  - Make Fall Risk Sign visible to staff  - Offer Toileting every 2 Hours,  in advance of need  - Initiate/Maintain bed alarm  - Obtain necessary fall risk management equipment: bed alarm  - Apply yellow socks and bracelet for high fall risk patients  - Consider moving patient to room near nurses station  Outcome: Progressing     Problem: DISCHARGE PLANNING  Goal: Discharge to home or other facility with appropriate resources  Description: INTERVENTIONS:  - Identify barriers to discharge w/patient and caregiver  - Arrange for needed discharge resources and transportation as appropriate  - Identify discharge learning needs (meds, wound care, etc.)  - Arrange for interpretive services to assist at discharge as needed  - Refer to Case Management Department for coordinating discharge planning if the patient needs post-hospital services based on physician/advanced practitioner order or complex needs related to functional status, cognitive ability, or social support system  Outcome: Progressing     Problem: Knowledge Deficit  Goal: Patient/family/caregiver demonstrates understanding of disease process, treatment plan, medications, and discharge instructions  Description: Complete learning assessment and assess knowledge base.  Interventions:  - Provide teaching at level of understanding  - Provide teaching via preferred learning methods  Outcome: Progressing

## 2024-11-04 NOTE — ASSESSMENT & PLAN NOTE
Dialysis note    Per MD Mcgee pt added to HD Schedule   Nicotine patch ordered  Patient smokes 2 packs a day for many years

## 2024-11-04 NOTE — H&P
H&P - Hospitalist   Name: Chas Soliz 59 y.o. male I MRN: [id number]  Unit/Bed#: ED 18 I Date of Admission: 11/3/2024   Date of Service: 11/4/2024 I Hospital Day: 0     Assessment & Plan  Acute kidney injury (HCC)  Patient is a morbidly obese 59-year-old male past medical history of depression, hypertension, hyperlipidemia, and hypothyroidism who presents to the hospital with left thigh pain.  In the emergency department he was found to have an RICARDO, and will be admitted to the hospital for further management.  Creatinine 1.74, baseline around 1.0  Provide intravenous Isolyte overnight  Hold nephrotoxins  Trend renal function  Left thigh pain  Patient complaining of left thigh pain that feels slightly crampy and throbbing, he is concerned about a blood clot  Legs appear equal in size with no noticeable redness or swelling  Venous duplex of the left lower extremity ordered  Morbid obesity with BMI of 40.0-44.9, adult (HCC)  Patient's BMI is 43.2  He is significantly insulin resistant and is prediabetic  Patient would benefit greatly from very low carbohydrate diet (less than 50 g daily) and intermittent fasting  I told him to start eating less carbohydrates  Hypertension  Blood pressure stable  Continue amlodipine at home dose, hold lisinopril due to RICARDO  Routine vital signs  Tobacco use disorder  Nicotine patch ordered  Patient smokes 2 packs a day for many years  Hyperlipidemia  Continue high intensity statin, atorvastatin 80 mg daily  Other specified hypothyroidism  Continue Synthroid at home dose      VTE Pharmacologic Prophylaxis: VTE Score: 4 High Risk (Score >/= 5) - Pharmacological DVT Prophylaxis Ordered: heparin. Sequential Compression Devices Ordered.  Code Status: Level 1 - Full Code   Discussion with patient    Anticipated Length of Stay: Patient will be admitted on an observation basis with an anticipated length of stay of less than 2 midnights secondary to treatment for RICARDO and.Evaluation for  possible left lower extremity DVT    History of Present Illness   Chief Complaint: Left thigh pain    Chas Soliz is a morbidly obese 59-year-old male past medical history of depression, hypertension, hyperlipidemia, and hypothyroidism who presents to the hospital with left thigh pain.  In the emergency department he was found to have an RICARDO, and will be admitted to the hospital for further management.   Review of Systems   Constitutional:  Negative for chills and fever.   HENT:  Negative for ear pain and sore throat.    Eyes:  Negative for pain and visual disturbance.   Respiratory:  Negative for cough and shortness of breath.    Cardiovascular:  Negative for chest pain and palpitations.   Gastrointestinal:  Negative for abdominal pain and vomiting.   Genitourinary:  Negative for dysuria and hematuria.   Musculoskeletal:  Positive for myalgias. Negative for arthralgias and back pain.        Left thigh pain, throbbing/cramping   Skin:  Negative for color change and rash.   Neurological:  Negative for seizures and syncope.   All other systems reviewed and are negative.      Historical Information   Past Medical History:   Diagnosis Date    Anxiety     Arthritis     Bipolar disorder (HCC)     Colon polyp     COPD (chronic obstructive pulmonary disease) (HCC)     Depression     Diabetes mellitus (HCC)     type 2    Gout     High triglycerides     Hypertension     Hypothyroidism 02/20/2013    Obesity     Paranoid schizophrenia (HCC)     Psychiatric disorder     Sciatica 12/03/2013    Seasonal allergies     Sleep apnea      Past Surgical History:   Procedure Laterality Date    COLONOSCOPY      UMBILICAL HERNIA REPAIR      WISDOM TOOTH EXTRACTION       Social History     Tobacco Use    Smoking status: Every Day     Current packs/day: 0.25     Average packs/day: 0.3 packs/day for 35.0 years (8.8 ttl pk-yrs)     Types: Cigarettes, E-Cigarettes    Smokeless tobacco: Never   Vaping Use    Vaping status: Never Used    Substance and Sexual Activity    Alcohol use: No    Drug use: No    Sexual activity: Not Currently     Partners: Female     E-Cigarette/Vaping    E-Cigarette Use Never User      E-Cigarette/Vaping Substances    Nicotine No     THC No     CBD No     Flavoring No     Other No     Unknown No      Family History   Problem Relation Age of Onset    Colon cancer Mother     Coronary artery disease Father     Hyperlipidemia Father     Coronary artery disease Family      Social History:  Marital Status: Single   Occupation: Patient has been on disability for 20 years because he had a nervous breakdown at one point and was unable to work, the medication they put him on made him too drowsy to work and he has not worked since then  Patient Pre-hospital Living Situation: Home, Alone  Patient Pre-hospital Level of Mobility: walks  Patient Pre-hospital Diet Restrictions: None at all    Meds/Allergies   I have reviewed home medications with patient personally.  Prior to Admission medications    Medication Sig Start Date End Date Taking? Authorizing Provider   amLODIPine (NORVASC) 5 mg tablet Take 1 tablet (5 mg total) by mouth daily 6/19/24   Josue Maravilla MD   atorvastatin (LIPITOR) 80 mg tablet Take 1 tablet (80 mg total) by mouth daily with dinner 6/19/24 9/17/24  Josue Maravilla MD   celecoxib (CeleBREX) 200 mg capsule Take 1 capsule (200 mg total) by mouth daily with breakfast 6/19/24   Josue Maravilla MD   cetirizine (ZyrTEC) 10 mg tablet Take 1 tablet (10 mg total) by mouth daily In the evening 6/19/24   Josue Maravilla MD   ergocalciferol (VITAMIN D2) 50,000 units Take 1 capsule (50,000 Units total) by mouth once a week 6/19/24   Josue Maravilla MD   fenofibrate (TRICOR) 145 mg tablet Take 1 tablet (145 mg total) by mouth daily with dinner 6/19/24   Josue Maravilla MD   fexofenadine (ALLEGRA) 180 MG tablet Take 1 tablet (180 mg total) by mouth daily In the morning 6/19/24   Josue Maravilla MD   FLUoxetine (PROzac) 20 mg capsule Take 1  capsule (20 mg total) by mouth daily 7/14/22   Josue Maravilla MD   fluticasone (FLONASE) 50 mcg/act nasal spray 2 sprays into each nostril daily 12/31/21   Britt Gracia PA-C   haloperidol (HALDOL) 5 mg tablet 2.5 mg daily at bedtime  5/6/19   Historical Provider, MD   levothyroxine (Euthyrox) 125 mcg tablet Take 1 tablet (125 mcg total) by mouth in the morning On Empty Stomach , 10/24/24   Josue Maravilla MD   lisinopril (ZESTRIL) 10 mg tablet Take 1 tablet (10 mg total) by mouth daily 6/19/24   Josue Maravilla MD   sodium chloride (OCEAN) 0.65 % nasal spray 1 spray into each nostril 3 (three) times a day 1/17/22   Josue Maravilla MD   benzonatate (TESSALON PERLES) 100 mg capsule Take 1 capsule (100 mg total) by mouth 3 (three) times a day with meals 10/17/24 11/4/24  Josue Maravilla MD     Allergies   Allergen Reactions    No Active Allergies        Objective :  Temp:  [97.8 °F (36.6 °C)] 97.8 °F (36.6 °C)  HR:  [69] 69  BP: (149)/(89) 149/89  Resp:  [18] 18  SpO2:  [95 %] 95 %  O2 Device: None (Room air)    Physical Exam  Vitals and nursing note reviewed.   Constitutional:       General: He is not in acute distress.     Appearance: He is well-developed. He is morbidly obese.      Comments: Unkempt   HENT:      Head: Normocephalic and atraumatic.   Eyes:      Conjunctiva/sclera: Conjunctivae normal.   Cardiovascular:      Rate and Rhythm: Normal rate and regular rhythm.      Heart sounds: No murmur heard.  Pulmonary:      Effort: Pulmonary effort is normal. No respiratory distress.      Breath sounds: Normal breath sounds.   Abdominal:      Palpations: Abdomen is soft.      Tenderness: There is no abdominal tenderness.   Musculoskeletal:         General: No swelling.      Cervical back: Neck supple.      Left lower leg: No edema.   Skin:     General: Skin is warm and dry.      Capillary Refill: Capillary refill takes less than 2 seconds.   Neurological:      Mental Status: He is alert.   Psychiatric:         Mood and  Affect: Mood normal.         Behavior: Behavior is cooperative.          Lines/Drains:            Lab Results: I have reviewed the following results:  Results from last 7 days   Lab Units 11/03/24  2253   WBC Thousand/uL 8.43   HEMOGLOBIN g/dL 11.7*   HEMATOCRIT % 35.0*   PLATELETS Thousands/uL 242   SEGS PCT % 55   LYMPHO PCT % 27   MONO PCT % 12   EOS PCT % 5     Results from last 7 days   Lab Units 11/03/24  2253   SODIUM mmol/L 134*   POTASSIUM mmol/L 4.0   CHLORIDE mmol/L 100   CO2 mmol/L 27   BUN mg/dL 29*   CREATININE mg/dL 1.74*   ANION GAP mmol/L 7   CALCIUM mg/dL 9.2   ALBUMIN g/dL 4.0   TOTAL BILIRUBIN mg/dL 0.41   ALK PHOS U/L 33*   ALT U/L 10   AST U/L 15   GLUCOSE RANDOM mg/dL 91             Lab Results   Component Value Date    HGBA1C 5.8 10/24/2024    HGBA1C 5.8 06/19/2024    HGBA1C 5.6 11/14/2023           Imaging Results Review: I personally reviewed the following image studies in PACS and associated radiology reports: xray(s). My interpretation of the radiology images/reports is: X-ray of left hip shows no obvious fractures.  Other Study Results Review: No additional pertinent studies reviewed.    ** Please Note: This note has been constructed using a voice recognition system. **

## 2024-11-05 VITALS
DIASTOLIC BLOOD PRESSURE: 87 MMHG | HEART RATE: 65 BPM | TEMPERATURE: 97.5 F | SYSTOLIC BLOOD PRESSURE: 155 MMHG | WEIGHT: 274.69 LBS | OXYGEN SATURATION: 96 % | BODY MASS INDEX: 40.69 KG/M2 | HEIGHT: 69 IN | RESPIRATION RATE: 18 BRPM

## 2024-11-05 LAB
ANION GAP SERPL CALCULATED.3IONS-SCNC: 9 MMOL/L (ref 4–13)
BUN SERPL-MCNC: 19 MG/DL (ref 5–25)
CALCIUM SERPL-MCNC: 9.4 MG/DL (ref 8.4–10.2)
CHLORIDE SERPL-SCNC: 103 MMOL/L (ref 96–108)
CO2 SERPL-SCNC: 26 MMOL/L (ref 21–32)
CREAT SERPL-MCNC: 1.58 MG/DL (ref 0.6–1.3)
GFR SERPL CREATININE-BSD FRML MDRD: 46 ML/MIN/1.73SQ M
GLUCOSE SERPL-MCNC: 115 MG/DL (ref 65–140)
POTASSIUM SERPL-SCNC: 4.1 MMOL/L (ref 3.5–5.3)
SODIUM SERPL-SCNC: 138 MMOL/L (ref 135–147)

## 2024-11-05 PROCEDURE — 80048 BASIC METABOLIC PNL TOTAL CA: CPT | Performed by: INTERNAL MEDICINE

## 2024-11-05 PROCEDURE — 99239 HOSP IP/OBS DSCHRG MGMT >30: CPT | Performed by: INTERNAL MEDICINE

## 2024-11-05 RX ORDER — TAMSULOSIN HYDROCHLORIDE 0.4 MG/1
0.4 CAPSULE ORAL
Qty: 30 CAPSULE | Refills: 0 | Status: SHIPPED | OUTPATIENT
Start: 2024-11-05 | End: 2024-12-05

## 2024-11-05 RX ADMIN — FLUOXETINE HYDROCHLORIDE 20 MG: 20 CAPSULE ORAL at 08:36

## 2024-11-05 RX ADMIN — LEVOTHYROXINE SODIUM 125 MCG: 125 TABLET ORAL at 05:21

## 2024-11-05 RX ADMIN — HEPARIN SODIUM 5000 UNITS: 5000 INJECTION INTRAVENOUS; SUBCUTANEOUS at 05:21

## 2024-11-05 RX ADMIN — ACETAMINOPHEN 650 MG: 325 TABLET ORAL at 05:26

## 2024-11-05 RX ADMIN — AMLODIPINE BESYLATE 5 MG: 5 TABLET ORAL at 08:36

## 2024-11-05 RX ADMIN — NICOTINE 1 PATCH: 21 PATCH, EXTENDED RELEASE TRANSDERMAL at 08:36

## 2024-11-05 NOTE — ASSESSMENT & PLAN NOTE
Patient is a morbidly obese 59-year-old male past medical history of depression, hypertension, hyperlipidemia, and hypothyroidism who presents to the hospital with left thigh pain.  In the emergency department he was found to have an RICARDO, and will be admitted to the hospital for further management.  Creatinine 1.74, baseline around 1.0  Renal function improving  Hold nephrotoxic medication  Resume Flomax, as BPH may be contributing  Repeat BMP in 1 week with PCP  FE Na: 1.8%  FE Urea: 53.83%  UA is bland with no proteinuria, or hematuria, urine studies not consistent with prerenal azotemia

## 2024-11-05 NOTE — DISCHARGE SUMMARY
Discharge Summary - Hospitalist   Name: Chas Soliz 59 y.o. male I MRN: [id number]  Unit/Bed#: 7T [facility] 713-01 I Date of Admission: 11/3/2024   Date of Service: 11/5/2024 I Hospital Day: 1       Admitting Provider:  Martin Dale DO  Discharge Provider:  Martin Dale DO  Admission Date: 11/3/2024       Discharge Date: 11/05/24   LOS: 1  Primary Care Physician at Discharge: Josue Maravilla [phone]    HOSPITAL COURSE:  Chas Soliz is a 59 y.o. male who presented with left thigh pain with concern for possible DVT.  Workup in the emergency room was notable for elevated D-dimer, as well as elevated creatinine concerning for acute kidney injury.  The patient subsequently was admitted to the general medical floor he was started on IV fluid.  Urine studies were consistent with ATN versus postobstructive pathology.  He was discontinued on his lisinopril and his renal function improved.    Patient was restarted back on his Flomax and had appropriate urinary output.  Venous Doppler performed showed no DVT and renal function was improving.    The patient was subsequently discharged home with planned outpatient follow-up with PCP.    At the time of discharge the patient was tolerating oral diet they were without acute complaint and they were medically cleared for discharge.  All questions were answered the patient's satisfaction and they were in agreement with the discharge plan.    The patient was initially admitted under inpatient status but was discharged home due to rapid unexpected improvement.     DISCHARGE DIAGNOSES  * Acute kidney injury (HCC)  Assessment & Plan  Patient is a morbidly obese 59-year-old male past medical history of depression, hypertension, hyperlipidemia, and hypothyroidism who presents to the hospital with left thigh pain.  In the emergency department he was found to have an RICARDO, and will be admitted to the hospital for further management.  Creatinine 1.74, baseline around  1.0  Renal function improving  Hold nephrotoxic medication  Resume Flomax, as BPH may be contributing  Repeat BMP in 1 week with PCP  FE Na: 1.8%  FE Urea: 53.83%  UA is bland with no proteinuria, or hematuria, urine studies not consistent with prerenal azotemia    Left thigh pain  Assessment & Plan  Patient complaining of left thigh pain that feels slightly crampy and throbbing, he is concerned about a blood clot  Legs appear equal in size with no noticeable redness or swelling  Venous duplex of the left lower without any evidence of venous thromboembolism    Other specified hypothyroidism  Assessment & Plan  Continue Synthroid at home dose    Hyperlipidemia  Assessment & Plan  Continue high intensity statin, atorvastatin 80 mg daily    Morbid obesity with BMI of 40.0-44.9, adult (HCC)  Assessment & Plan  Patient's BMI is 43.2  He is significantly insulin resistant and is prediabetic  Patient would benefit greatly from very low carbohydrate diet (less than 50 g daily) and intermittent fasting  Consider GLP-1 as an outpatient    Tobacco use disorder  Assessment & Plan  Nicotine patch ordered  Patient smokes 2 packs a day for many years    Hypertension  Assessment & Plan  Blood pressure stable  Continue amlodipine at home dose, hold lisinopril due to RICARDO  Routine vital signs      CONSULTING PROVIDERS   None    PROCEDURES PERFORMED  * No surgery found *    RADIOLOGY RESULTS  XR hip/pelv 2-3 vws left if performed    Result Date: 11/4/2024  Impression: No acute osseous abnormality. Computerized Assisted Algorithm (CAA) may have been used to analyze all applicable images. Workstation performed: [id number]       LABS  Results from last 7 days   Lab Units 11/04/24  0548 11/03/24  2253   WBC Thousand/uL 7.77 8.43   HEMOGLOBIN g/dL 11.9* 11.7*   HEMATOCRIT % 35.1* 35.0*   MCV fL 94 94   PLATELETS Thousands/uL 228 242     Results from last 7 days   Lab Units 11/05/24  0925 11/04/24  0548 11/03/24  2253   SODIUM mmol/L 138 136  "134*   POTASSIUM mmol/L 4.1 4.2 4.0   CHLORIDE mmol/L 103 102 100   CO2 mmol/L 26 27 27   BUN mg/dL 19 27* 29*   CREATININE mg/dL 1.58* 1.75* 1.74*   CALCIUM mg/dL 9.4 9.0 9.2   ALBUMIN g/dL  --   --  4.0   TOTAL BILIRUBIN mg/dL  --   --  0.41   ALK PHOS U/L  --   --  33*   ALT U/L  --   --  10   AST U/L  --   --  15   EGFR ml/min/1.73sq m 46 41 41   GLUCOSE RANDOM mg/dL 115 86 91              Results from last 7 days   Lab Units 11/03/24  2253   D-DIMER QUANTITATIVE ug/ml FEU 0.94*                           Cultures:   Results from last 7 days   Lab Units 11/04/24  1124   COLOR UA  Straw   CLARITY UA  Clear   SPEC GRAV UA  1.010   PH UA  7.0   LEUKOCYTES UA  Negative   NITRITE UA  Negative   GLUCOSE UA mg/dl Negative   KETONES UA mg/dl Negative   BILIRUBIN UA  Negative   BLOOD UA  Negative                       PHYSICAL EXAM:  Vitals:   Blood Pressure: 155/87 (11/05/24 0737)  Pulse: 65 (11/05/24 0737)  Temperature: 97.5 °F (36.4 °C) (11/05/24 0737)  Temp Source: Temporal (11/05/24 0737)  Respirations: 18 (11/05/24 0737)  Height: 5' 9\" (175.3 cm) (11/04/24 0049)  Weight - Scale: 125 kg (274 lb 11.1 oz) (11/05/24 0600)  SpO2: 96 % (11/05/24 0737)      General: well appearing, no acute distress  HEENT: atraumatic, PERRLA, moist mucosa, normal pharynx, normal tonsils and adenoids, normal tongue, no fluid in sinuses  Neck: Trachea midline, no carotid bruit, no masses  Respiratory: normal chest wall expansion, CTA B  Cardiovascular: RRR, no m/r/g, Normal S1 and S2  Abdomen: Soft, non-tender, non-distended, normal bowel sounds in all quadrants, no hepatosplenomegaly, no tympany  Rectal: deferred  Musculoskeletal: Moves all  Integumentary: warm, dry, and pink, with no visible rash, purpura, or petechia  Heme/Lymph: no lymphadenopathy, no bruises  Neurological: Cranial Nerves II-XII grossly intact  Psychiatric: cooperative with normal mood, affect, and cognition       Discharge Disposition: Home/Self Care    AM-PAC Basic " Mobility:  Basic Mobility Inpatient Raw Score: 24    JH-HLM Achieved: 8: Walk 250 feet ot more  JH-HLM Goal: 8: Walk 250 feet or more    HLM Goal listed above. Continue with ongoing physical therapy and encourage appropriate mobility to improve upon HLM goals.      Test Results Pending at Discharge:           Medications   Summary of Medication Adjustments made as a result of this hospitalization: See discharge summary and AVS for medication changes  Medication Dosing Tapers - Please refer to Discharge Medication List for details on any medication dosing tapers (if applicable to patient).  Discharge Medication List: See after visit summary for reconciled discharge medications.     Diet restrictions:         Diet Orders   (From admission, onward)                 Start     Ordered    11/04/24 0018  Diet Bari/CHO Controlled; Consistent Carbohydrate Diet Level 1 (4 carb servings/60 grams CHO/meal)  Diet effective now        References:    Adult Nutrition Support Algorithm    RD Therapeutic Diet Order Protocol   Question Answer Comment   Diet Type Bari/CHO Controlled    Bari/CHO Controlled Consistent Carbohydrate Diet Level 1 (4 carb servings/60 grams CHO/meal)    RD to adjust diet per protocol? Yes        11/04/24 0021                  Activity restrictions: No strenuous activity  Discharge Condition: good    Outpatient Follow-Up and Discharge Instructions  See after visit summary section titled Discharge Instructions for information provided to patient and family.      Code Status: Level 1 - Full Code  Discharge Statement   I spent 86 minutes discharging the patient. This time was spent on the day of discharge. Greater than 50% of total time was spent with the patient and / or family counseling and / or coordination of care.    ** Please Note: This note was completed in part utilizing [employer] Software.  Grammatical errors, random word insertions, spelling mistakes, and incomplete sentences may be an  occasional consequence of this system secondary to software limitations, ambient noise, and hardware issues.  If you have any questions or concerns about the content, text, or information contained within the body of this dictation, please contact the provider for clarification.**

## 2024-11-05 NOTE — UTILIZATION REVIEW
Initial Clinical Review - Continued    SEE INITIAL REVIEW AT BOTTOM    Date: 11/05/24                          Current Patient Class: Inpatient  Current Level of Care: Med/Surg    HPI:59 y.o. male initially arrived to ED on 11/3/2024 admitted as OBS, changed to IP on 11/4 for Acute kidney injury (HCC).      Assessment/Plan:   Date: 11/05/24  Day 3: Has surpassed a 2nd midnight with active treatments and services. Venous doppler without evidence of DVT. Crt 1.58 today. Plan: continue flomax, continue current meds, Trend labs, replete electrolytes as needed. IVF @ 100 mL/hr. DW, I&O, monitor for urinary retention.         Medications:   Scheduled Medications:  amLODIPine, 5 mg, Oral, Daily  atorvastatin, 80 mg, Oral, Daily With Dinner  fenofibrate, 145 mg, Oral, Daily With Dinner  FLUoxetine, 20 mg, Oral, Daily  haloperidol, 2.5 mg, Oral, HS  heparin (porcine), 5,000 Units, Subcutaneous, Q8H MARK  levothyroxine, 125 mcg, Oral, Early Morning  nicotine, 1 patch, Transdermal, Daily  tamsulosin, 0.4 mg, Oral, Daily With Dinner    Continuous IV Infusions:  multi-electrolyte, 100 mL/hr, Intravenous, Continuous    PRN Meds:  acetaminophen, 650 mg, Oral, Q6H PRN; 11/4 x1, 11/5 x1  ondansetron, 4 mg, Intravenous, Q6H PRN        Vital Signs:   Vital Signs (last 3 days)       Date/Time Temp Pulse Resp BP MAP (mmHg) SpO2 O2 Device Patient Position - Orthostatic VS [place] Coma Scale Score Pain    11/05/24 0800 -- -- -- -- -- -- -- -- 15 No Pain    11/05/24 0737 97.5 °F (36.4 °C) 65 18 155/87 100 96 % None (Room air) Sitting -- --    11/05/24 0526 -- -- -- -- -- -- -- -- -- 7    11/04/24 2301 97.6 °F (36.4 °C) 65 18 145/78 100 97 % None (Room air) Lying -- --    11/04/24 2110 -- -- -- -- -- -- -- -- -- 6    11/04/24 1940 -- -- -- -- -- -- -- -- 15 4    11/04/24 1511 97.9 °F (36.6 °C) 67 18 154/82 112 92 % None (Room air) Lying -- --    11/04/24 1404 -- -- -- -- -- -- -- -- -- 6    11/04/24 0901 -- -- -- -- -- -- -- -- 15 No Pain     11/04/24 0712 98.1 °F (36.7 °C) 63 18 166/94 116 94 % None (Room air) Lying -- --    11/04/24 0543 -- -- -- -- -- -- -- -- -- 7    11/04/24 0050 -- -- -- -- -- -- -- -- 15 5    11/04/24 0049 97.6 °F (36.4 °C) 61 18 166/91 108 95 % None (Room air) Lying -- --    11/03/24 2232 -- -- -- -- -- -- -- -- 15 --    11/03/24 2219 97.8 °F (36.6 °C) 69 18 149/89 -- 95 % None (Room air) Sitting -- --            Pertinent Labs/Diagnostic Results:   Radiology:  VAS VENOUS DUPLEX -LOWER LIMB UNILATERAL   Final Interpretation by Jaden Mcfadden MD (11/04 1239)     RIGHT LOWER LIMB LIMITED:  Evaluation shows no evidence of thrombus in the common femoral vein.  Doppler evaluation shows a normal response to augmentation maneuvers.     LEFT LOWER LIMB:  No evidence of acute or chronic deep vein thrombosis  No evidence of superficial thrombophlebitis noted.  Doppler evaluation shows a normal response to augmentation maneuvers.  Popliteal, posterior tibial and anterior tibial arterial Doppler waveforms are triphasic.     XR hip/pelv 2-3 vws left if performed   ED Interpretation by Shayy Garcia PA-C (11/03 2325)   NO obvious osseous abnormality       Final Interpretation by Delvis Mina MD (11/04 0455)      No acute osseous abnormality.         Computerized Assisted Algorithm (CAA) may have been used to analyze all applicable images.            Workstation performed: [id number]                 Results from last 7 days   Lab Units 11/04/24  0548 11/03/24  2253   WBC Thousand/uL 7.77 8.43   HEMOGLOBIN g/dL 11.9* 11.7*   HEMATOCRIT % 35.1* 35.0*   PLATELETS Thousands/uL 228 242   TOTAL NEUT ABS Thousands/µL  --  4.68         Results from last 7 days   Lab Units 11/05/24  0925 11/04/24  0548 11/03/24  2253   SODIUM mmol/L 138 136 134*   POTASSIUM mmol/L 4.1 4.2 4.0   CHLORIDE mmol/L 103 102 100   CO2 mmol/L 26 27 27   ANION GAP mmol/L 9 7 7   BUN mg/dL 19 27* 29*   CREATININE mg/dL 1.58* 1.75* 1.74*   EGFR  ml/min/1.73sq m 46 41 41   CALCIUM mg/dL 9.4 9.0 9.2   MAGNESIUM mg/dL  --  2.0  --    PHOSPHORUS mg/dL  --  4.8*  --      Results from last 7 days   Lab Units 11/03/24  2253   AST U/L 15   ALT U/L 10   ALK PHOS U/L 33*   TOTAL PROTEIN g/dL 6.6   ALBUMIN g/dL 4.0   TOTAL BILIRUBIN mg/dL 0.41         Results from last 7 days   Lab Units 11/05/24  0925 11/04/24  0548 11/03/24  2253   GLUCOSE RANDOM mg/dL 115 86 91          Results from last 7 days   Lab Units 11/03/24  2253   D-DIMER QUANTITATIVE ug/ml FEU 0.94*      Results from last 7 days   Lab Units 11/04/24  1124 11/04/24  1104   CLARITY UA  Clear  --    COLOR UA  Straw  --    SPEC GRAV UA  1.010  --    PH UA  7.0  --    GLUCOSE UA mg/dl Negative  --    KETONES UA mg/dl Negative  --    BLOOD UA  Negative  --    PROTEIN UA mg/dl Negative  --    NITRITE UA  Negative  --    BILIRUBIN UA  Negative  --    UROBILINOGEN UA mg/dL Negative  --    LEUKOCYTES UA  Negative  --    SODIUM UR   --  42   CREATININE UR mg/dL  --  30.1                   Network Utilization Review Department  ATTENTION: Please call with any questions or concerns to [phone] and carefully listen to the prompts so that you are directed to the right person. All voicemails are confidential.   For Discharge needs, contact Care Management DC Support Team at [phone] opt. 2  Send all requests for admission clinical reviews, approved or denied determinations and any other requests to dedicated fax number below belonging to the campus where the patient is receiving treatment. List of dedicated fax numbers for the Facilities:  FACILITY NAME UR FAX NUMBER   ADMISSION DENIALS (Administrative/Medical Necessity) [phone]   DISCHARGE SUPPORT TEAM (NETWORK) [phone]   PARENT CHILD HEALTH (Maternity/NICU/Pediatrics) [phone]   [facility] [phone]   [facility] [phone]   [facility] [phone]   [facility]  [facility] [phone]   [facility] [phone]   [facility] [phone]   [facility] [phone]   [facility] [phone]   [facility] [phone]   [facility] [phone]   [facility] [phone]   [facility] [phone]

## 2024-11-05 NOTE — NURSING NOTE
Discharge instructions given to pt. Pt verbalizes the understanding of discharge instructions. All belongings given to pt. Pt in no distress and no concerns at this time.

## 2024-11-05 NOTE — UTILIZATION REVIEW
NOTIFICATION OF INPATIENT MEDICAL ADMISSION   AUTHORIZATION REQUEST   SERVICING FACILITY:   [address] [address]  Tax ID: [id number]  NPI: [id number] ATTENDING PROVIDER:  Attending Name and NPI#: Martin Dale Do [[id number]]  Address: [address] [place] [address]  Phone: [phone]     ADMISSION INFORMATION:  Place of Service: Inpatient [facility]  Place of Service Code: 21  Inpatient Admission Date/Time: 11/4/24  3:49 PM  Discharge Date/Time: No discharge date for patient encounter.  Admitting Diagnosis Code/Description:  Leg pain [M79.606]  Positive D dimer [R79.89]  RICARDO (acute kidney injury) (HCC) [N17.9]  Pain of left thigh [M79.652]     UTILIZATION REVIEW CONTACT:  Corrie Pascal, Utilization   Network Utilization Review Department  Phone: [phone]  Fax [phone]  Email: [email or web address]  Contact for approvals/pending authorizations, clinical reviews, and discharge.     PHYSICIAN ADVISORY SERVICES:  Medical Necessity Denial & [facility] Review  Phone: [phone]  Fax: [phone]  Email: [email or web address]     DISCHARGE SUPPORT TEAM:  For Patients Discharge Needs & Updates  Phone: [phone] opt. 2 Fax: [phone]  Email: [email or web address]

## 2024-11-05 NOTE — ASSESSMENT & PLAN NOTE
Patient's BMI is 43.2  He is significantly insulin resistant and is prediabetic  Patient would benefit greatly from very low carbohydrate diet (less than 50 g daily) and intermittent fasting  Consider GLP-1 as an outpatient

## 2024-11-05 NOTE — DISCHARGE INSTR - AVS FIRST PAGE
Dear Chas Soliz,     It was our pleasure to care for you here at [facility].  It is our hope that we were always able to exceed the expected standards for your care during your stay.  You were hospitalized due to left leg swelling likely secondary to musculoskeletal etiology and elevated creatinine.  You were cared for on the seventh floor by Martin Dale DO with the [facility] who covers for your primary care physician (PCP), Josue Maravilla MD, while you were hospitalized.  If you have any questions or concerns related to this hospitalization, you may contact us at .  For follow up as well as any medication refills, we recommend that you follow up with your primary care physician.  A registered nurse will reach out to you by phone within a few days after your discharge to answer any additional questions that you may have after going home.  However, at this time we provide for you here, the most important instructions / recommendations at discharge:     Notable Medication Adjustments -   Discontinue lisinopril until evaluated by your PCP  Discontinue Celebrex, you can take Tylenol for pain  Restart Flomax  Testing Required after Discharge -   Blood work in 1 week with PCP.  This has been ordered  Important follow up information -   PCP follow-up in 1 week  Other Instructions -   None  Please review this entire after visit summary as additional general instructions including medication list, appointments, activity, diet, any pertinent wound care, and other additional recommendations from your care team that may be provided for you.      Sincerely,     Martin Dale DO and Nurse Chrissie HAMMOND

## 2024-11-05 NOTE — PLAN OF CARE
Problem: PAIN - ADULT  Goal: Verbalizes/displays adequate comfort level or baseline comfort level  Description: Interventions:  - Encourage patient to monitor pain and request assistance  - Assess pain using appropriate pain scale  - Administer analgesics based on type and severity of pain and evaluate response  - Implement non-pharmacological measures as appropriate and evaluate response  - Consider cultural and social influences on pain and pain management  - Notify physician/advanced practitioner if interventions unsuccessful or patient reports new pain  Outcome: Progressing     Problem: INFECTION - ADULT  Goal: Absence or prevention of progression during hospitalization  Description: INTERVENTIONS:  - Assess and monitor for signs and symptoms of infection  - Monitor lab/diagnostic results  - Monitor all insertion sites, i.e. indwelling lines, tubes, and drains  - Monitor endotracheal if appropriate and nasal secretions for changes in amount and color  - Goessel appropriate cooling/warming therapies per order  - Administer medications as ordered  - Instruct and encourage patient and family to use good hand hygiene technique  - Identify and instruct in appropriate isolation precautions for identified infection/condition  Outcome: Progressing  Goal: Absence of fever/infection during neutropenic period  Description: INTERVENTIONS:  - Monitor WBC    Outcome: Progressing     Problem: SAFETY ADULT  Goal: Patient will remain free of falls  Description: INTERVENTIONS:  - Educate patient/family on patient safety including physical limitations  - Instruct patient to call for assistance with activity   - Consult OT/PT to assist with strengthening/mobility   - Keep Call bell within reach  - Keep bed low and locked with side rails adjusted as appropriate  - Keep care items and personal belongings within reach  - Initiate and maintain comfort rounds  - Make Fall Risk Sign visible to staff  - Apply yellow socks and bracelet  for high fall risk patients  - Consider moving patient to room near nurses station  Outcome: Progressing     Problem: DISCHARGE PLANNING  Goal: Discharge to home or other facility with appropriate resources  Description: INTERVENTIONS:  - Identify barriers to discharge w/patient and caregiver  - Arrange for needed discharge resources and transportation as appropriate  - Identify discharge learning needs (meds, wound care, etc.)  - Arrange for interpretive services to assist at discharge as needed  - Refer to Case Management Department for coordinating discharge planning if the patient needs post-hospital services based on physician/advanced practitioner order or complex needs related to functional status, cognitive ability, or social support system  Outcome: Progressing     Problem: Knowledge Deficit  Goal: Patient/family/caregiver demonstrates understanding of disease process, treatment plan, medications, and discharge instructions  Description: Complete learning assessment and assess knowledge base.  Interventions:  - Provide teaching at level of understanding  - Provide teaching via preferred learning methods  Outcome: Progressing     Problem: GENITOURINARY - ADULT  Goal: Maintains or returns to baseline urinary function  Description: INTERVENTIONS:  - Assess urinary function  - Encourage oral fluids to ensure adequate hydration if ordered  - Administer IV fluids as ordered to ensure adequate hydration  - Administer ordered medications as needed  - Offer frequent toileting  - Follow urinary retention protocol if ordered  Outcome: Progressing

## 2024-11-05 NOTE — ASSESSMENT & PLAN NOTE
Patient complaining of left thigh pain that feels slightly crampy and throbbing, he is concerned about a blood clot  Legs appear equal in size with no noticeable redness or swelling  Venous duplex of the left lower without any evidence of venous thromboembolism

## 2024-11-06 ENCOUNTER — TELEPHONE (OUTPATIENT)
Dept: FAMILY MEDICINE CLINIC | Facility: CLINIC | Age: 59
End: 2024-11-06

## 2024-11-06 ENCOUNTER — TRANSITIONAL CARE MANAGEMENT (OUTPATIENT)
Dept: FAMILY MEDICINE CLINIC | Facility: CLINIC | Age: 59
End: 2024-11-06

## 2024-11-06 NOTE — TELEPHONE ENCOUNTER
Pt called stating he was discharged from the hospital. Pt stated he is having severe left leg pain and is unable to come in for appt. Pt would like medication sent to pharmacy for pain so he will be able to come for follow up. Please advise.     Send medications to [address]

## 2024-11-06 NOTE — UTILIZATION REVIEW
NOTIFICATION OF ADMISSION DISCHARGE   This is a Notification of Discharge from [facility]. Please be advised that this patient has been discharge from our facility. Below you will find the admission and discharge date and time including the patient’s disposition.   UTILIZATION REVIEW CONTACT:  Corrie Pascal MA  Utilization   Network Utilization Review Department  Phone: [phone] x carefully listen to the prompts. All voicemails are confidential.  Email: [email or web address]     ADMISSION INFORMATION  PRESENTATION DATE: 11/3/2024 10:17 PM  OBERVATION ADMISSION DATE: 11/04/2024 0014  INPATIENT ADMISSION DATE: 11/4/24  3:49 PM   DISCHARGE DATE: 11/5/2024 12:00 PM   DISPOSITION:Home/Self Care    Network Utilization Review Department  ATTENTION: Please call with any questions or concerns to [phone] and carefully listen to the prompts so that you are directed to the right person. All voicemails are confidential.   For Discharge needs, contact Care Management DC Support Team at [phone] opt. 2  Send all requests for admission clinical reviews, approved or denied determinations and any other requests to dedicated fax number below belonging to the campus where the patient is receiving treatment. List of dedicated fax numbers for the Facilities:  FACILITY NAME UR FAX NUMBER   ADMISSION DENIALS (Administrative/Medical Necessity) [phone]   DISCHARGE SUPPORT TEAM ([facility]) [phone]   PARENT CHILD HEALTH (Maternity/NICU/Pediatrics) [phone]   [facility] [phone]   [facility] [phone]   [facility] [phone]   [facility] [phone]   [facility] [phone]   [facility] [phone]   [facility] [phone]   [facility]  [place] [phone]   [facility] [phone]   [facility] [phone]   [facility] [phone]   [facility] [phone]

## 2024-11-07 ENCOUNTER — OFFICE VISIT (OUTPATIENT)
Dept: FAMILY MEDICINE CLINIC | Facility: CLINIC | Age: 59
End: 2024-11-07
Payer: COMMERCIAL

## 2024-11-07 VITALS
TEMPERATURE: 98.2 F | OXYGEN SATURATION: 98 % | SYSTOLIC BLOOD PRESSURE: 130 MMHG | HEART RATE: 97 BPM | DIASTOLIC BLOOD PRESSURE: 82 MMHG | WEIGHT: 284 LBS | HEIGHT: 69 IN | BODY MASS INDEX: 42.06 KG/M2 | RESPIRATION RATE: 14 BRPM

## 2024-11-07 DIAGNOSIS — M54.16 LUMBAR RADICULOPATHY: Primary | ICD-10-CM

## 2024-11-07 DIAGNOSIS — J43.8 OTHER EMPHYSEMA (HCC): ICD-10-CM

## 2024-11-07 DIAGNOSIS — E11.8 TYPE II DIABETES MELLITUS WITH MANIFESTATIONS (HCC): ICD-10-CM

## 2024-11-07 DIAGNOSIS — E78.5 HYPERLIPIDEMIA ASSOCIATED WITH TYPE 2 DIABETES MELLITUS  (HCC): ICD-10-CM

## 2024-11-07 DIAGNOSIS — N17.9 ACUTE RENAL FAILURE, UNSPECIFIED ACUTE RENAL FAILURE TYPE (HCC): ICD-10-CM

## 2024-11-07 DIAGNOSIS — E06.3 HYPOTHYROIDISM DUE TO HASHIMOTO'S THYROIDITIS: ICD-10-CM

## 2024-11-07 DIAGNOSIS — I77.819 AORTIC ECTASIA (HCC): ICD-10-CM

## 2024-11-07 DIAGNOSIS — E11.69 HYPERLIPIDEMIA ASSOCIATED WITH TYPE 2 DIABETES MELLITUS  (HCC): ICD-10-CM

## 2024-11-07 DIAGNOSIS — N17.9 ACUTE KIDNEY INJURY (HCC): ICD-10-CM

## 2024-11-07 PROBLEM — E66.01 MORBID OBESITY WITH BMI OF 40.0-44.9, ADULT (HCC): Status: RESOLVED | Noted: 2020-02-20 | Resolved: 2024-11-07

## 2024-11-07 PROCEDURE — 99496 TRANSJ CARE MGMT HIGH F2F 7D: CPT | Performed by: INTERNAL MEDICINE

## 2024-11-07 RX ORDER — GABAPENTIN 100 MG/1
100 CAPSULE ORAL 2 TIMES DAILY
Qty: 60 CAPSULE | Refills: 1 | Status: SHIPPED | OUTPATIENT
Start: 2024-11-07

## 2024-11-07 RX ORDER — DIFLUNISAL 500 MG/1
500 TABLET, FILM COATED ORAL 2 TIMES DAILY WITH MEALS
Qty: 60 TABLET | Refills: 1 | Status: SHIPPED | OUTPATIENT
Start: 2024-11-07

## 2024-11-07 NOTE — ASSESSMENT & PLAN NOTE
Lab Results   Component Value Date    HGBA1C 5.8 10/24/2024   Stable  Continue same  RTC in 2-3 mos w Blood work    Orders:    Comprehensive metabolic panel; Future    CBC and differential; Future    Lipid Panel with Direct LDL reflex; Future    TSH, 3rd generation with Free T4 reflex; Future

## 2024-11-07 NOTE — PROGRESS NOTES
TCM Call       Date and time call was made  11/6/2024  9:32 AM    Patient was hospitialized at  [facility]    Date of Admission  11/03/24    Date of discharge  11/05/24    Disposition  Home    Were the patients medications reviewed and updated  Yes          TCM Call       Scheduled for follow up?  Yes    I have advised the patient to call PCP with any new or worsening symptoms  Ignacia De Luna MA         Done in Detail...

## 2024-11-07 NOTE — PROGRESS NOTES
Transition of Care Visit  Name: Chas Soliz      : 1965      MRN: [id number]  Encounter Provider: Josue Maravilla MD  Encounter Date: 2024   Encounter department: [place] [facility]    Assessment & Plan  Hyperlipidemia associated with type 2 diabetes mellitus  (HCC)    Lab Results   Component Value Date    HGBA1C 5.8 10/24/2024            Type II diabetes mellitus with manifestations (HCC)    Lab Results   Component Value Date    HGBA1C 5.8 10/24/2024   Stable  Continue same  RTC in 2-3 mos w Blood work    Orders:    Comprehensive metabolic panel; Future    CBC and differential; Future    Lipid Panel with Direct LDL reflex; Future    TSH, 3rd generation with Free T4 reflex; Future    Hypothyroidism due to Hashimoto's thyroiditis         Acute renal failure, unspecified acute renal failure type (HCC)    Orders:    Comprehensive metabolic panel; Future    CBC and differential; Future    Lipid Panel with Direct LDL reflex; Future    TSH, 3rd generation with Free T4 reflex; Future    Lumbar radiculopathy    Orders:    gabapentin (Neurontin) 100 mg capsule; Take 1 capsule (100 mg total) by mouth 2 (two) times a day    XR spine lumbar minimum 4 views non injury; Future    diflunisal (DOLOBID) 500 mg tablet; Take 1 tablet (500 mg total) by mouth 2 (two) times a day with meals    Aortic ectasia (HCC)  Stable  Continue same  RTC in 3 mos w Blood work         Other emphysema (HCC)  Stable  Advised to quit smoking  Use Inhalers         Acute kidney injury (HCC)  Improving Nicely  Increase po water              History of Present Illness     Transitional Care Management Review:   Chas Soliz is a 59 y.o. male here for TCM follow up.     During the TCM phone call patient stated:  TCM Call       Date and time call was made  2024  9:32 AM    Patient was hospitialized at  [facility]    Date of Admission  24    Date of discharge  24    Disposition   "Home    Were the patients medications reviewed and updated  Yes          TCM Call       Scheduled for follow up?  Yes    I have advised the patient to call PCP with any new or worsening symptoms  Ignacia De Luna MA        Clinically it is ; L4 radiculopathy  RTC in 3-4 weeks w Blood work    59 Y O man is here for Post Hospital /TCM Visit, he feels better, still with Mild to Mod left leg pain, recent blood work and med list reviewed,...      Review of Systems   Constitutional:  Negative for chills, fatigue and fever.   HENT:  Negative for congestion, facial swelling, sore throat, trouble swallowing and voice change.    Eyes:  Negative for pain, discharge and visual disturbance.   Respiratory:  Negative for cough, shortness of breath and wheezing.    Cardiovascular:  Negative for chest pain, palpitations and leg swelling.   Gastrointestinal:  Negative for abdominal pain, blood in stool, constipation, diarrhea and nausea.   Endocrine: Negative for polydipsia, polyphagia and polyuria.   Genitourinary:  Negative for difficulty urinating, hematuria and urgency.   Musculoskeletal:  Positive for back pain. Negative for arthralgias and myalgias.   Skin:  Negative for rash.   Neurological:  Positive for numbness. Negative for dizziness, tremors, weakness and headaches.   Hematological:  Negative for adenopathy. Does not bruise/bleed easily.   Psychiatric/Behavioral:  Negative for dysphoric mood, sleep disturbance and suicidal ideas.      Objective     /82 (BP Location: Left arm, Patient Position: Sitting, Cuff Size: Standard)   Pulse 97   Temp 98.2 °F (36.8 °C) (Tympanic)   Resp 14   Ht 5' 9\" (1.753 m)   Wt 129 kg (284 lb)   SpO2 98%   BMI 41.94 kg/m²     Physical Exam  Constitutional:       General: He is not in acute distress.     Appearance: He is well-developed.   HENT:      Head: Normocephalic.      Right Ear: External ear normal.      Left Ear: External ear normal.   Eyes:      Pupils: Pupils are equal, " round, and reactive to light.   Neck:      Thyroid: No thyromegaly.      Trachea: No tracheal deviation.   Cardiovascular:      Rate and Rhythm: Normal rate and regular rhythm.      Heart sounds: Normal heart sounds. No murmur heard.     No friction rub.   Pulmonary:      Effort: Pulmonary effort is normal. No respiratory distress.      Breath sounds: Normal breath sounds. No wheezing.   Abdominal:      General: Bowel sounds are normal. There is no distension.      Palpations: Abdomen is soft.   Musculoskeletal:         General: Tenderness present. No deformity. Normal range of motion.      Cervical back: Neck supple.   Skin:     General: Skin is warm and dry.      Findings: No erythema or rash.   Neurological:      Mental Status: He is alert and oriented to person, place, and time.      Cranial Nerves: No cranial nerve deficit.      Sensory: Sensory deficit present.      Coordination: Coordination normal.      Deep Tendon Reflexes: Reflexes normal.      Comments: Left L4 Radiculopathy   Psychiatric:         Behavior: Behavior normal.       Medications have been reviewed by provider in current encounter

## 2024-12-05 ENCOUNTER — TELEPHONE (OUTPATIENT)
Age: 59
End: 2024-12-05

## 2024-12-05 NOTE — TELEPHONE ENCOUNTER
Pt called and would like to know if PCP participates in the  On license of [facility] HIGH VALUE PROVIDER INCENTIVE PROGRAM?    He said he was informed to call the provider for the verification. Pt said would get an extra 30$ added to his total.    Please advise and follow up with pt. He said it is ok to leave a vm when responding to his question.

## 2024-12-06 ENCOUNTER — TELEPHONE (OUTPATIENT)
Age: 59
End: 2024-12-06

## 2024-12-06 NOTE — TELEPHONE ENCOUNTER
Patient called in and wanted to let Dr. Maravilla know that his left leg is much better. Patient also wanted to know if Dr. Maravilla is a part of [facility] high value program.    Please call patient back and advise. [phone].    Thank you

## 2024-12-10 ENCOUNTER — OFFICE VISIT (OUTPATIENT)
Dept: FAMILY MEDICINE CLINIC | Facility: CLINIC | Age: 59
End: 2024-12-10
Payer: COMMERCIAL

## 2024-12-10 VITALS
SYSTOLIC BLOOD PRESSURE: 128 MMHG | RESPIRATION RATE: 14 BRPM | HEART RATE: 76 BPM | HEIGHT: 69 IN | TEMPERATURE: 98.2 F | BODY MASS INDEX: 41.92 KG/M2 | DIASTOLIC BLOOD PRESSURE: 78 MMHG | WEIGHT: 283 LBS | OXYGEN SATURATION: 99 %

## 2024-12-10 DIAGNOSIS — I77.819 AORTIC ECTASIA (HCC): ICD-10-CM

## 2024-12-10 DIAGNOSIS — J43.8 OTHER EMPHYSEMA (HCC): ICD-10-CM

## 2024-12-10 DIAGNOSIS — I10 ESSENTIAL HYPERTENSION: ICD-10-CM

## 2024-12-10 DIAGNOSIS — E06.3 HYPOTHYROIDISM DUE TO HASHIMOTO'S THYROIDITIS: ICD-10-CM

## 2024-12-10 DIAGNOSIS — Z00.01 ENCOUNTER FOR GENERAL ADULT MEDICAL EXAMINATION WITH ABNORMAL FINDINGS: Primary | ICD-10-CM

## 2024-12-10 DIAGNOSIS — N40.0 ENLARGED PROSTATE: ICD-10-CM

## 2024-12-10 DIAGNOSIS — E78.5 HYPERLIPIDEMIA ASSOCIATED WITH TYPE 2 DIABETES MELLITUS  (HCC): ICD-10-CM

## 2024-12-10 DIAGNOSIS — F32.89 OTHER DEPRESSION: ICD-10-CM

## 2024-12-10 DIAGNOSIS — E11.69 HYPERLIPIDEMIA ASSOCIATED WITH TYPE 2 DIABETES MELLITUS  (HCC): ICD-10-CM

## 2024-12-10 DIAGNOSIS — E11.8 TYPE II DIABETES MELLITUS WITH MANIFESTATIONS (HCC): ICD-10-CM

## 2024-12-10 DIAGNOSIS — F17.210 CIGARETTE SMOKER: ICD-10-CM

## 2024-12-10 PROBLEM — N17.9 ACUTE KIDNEY INJURY (HCC): Status: RESOLVED | Noted: 2024-11-04 | Resolved: 2024-12-10

## 2024-12-10 PROCEDURE — 99214 OFFICE O/P EST MOD 30 MIN: CPT | Performed by: INTERNAL MEDICINE

## 2024-12-10 PROCEDURE — G0439 PPPS, SUBSEQ VISIT: HCPCS | Performed by: INTERNAL MEDICINE

## 2024-12-10 RX ORDER — AMLODIPINE BESYLATE 5 MG/1
5 TABLET ORAL DAILY
Qty: 90 TABLET | Refills: 3 | Status: SHIPPED | OUTPATIENT
Start: 2024-12-10

## 2024-12-10 RX ORDER — FENOFIBRATE 145 MG/1
145 TABLET, COATED ORAL
Qty: 90 TABLET | Refills: 3 | Status: SHIPPED | OUTPATIENT
Start: 2024-12-10

## 2024-12-10 RX ORDER — LEVOTHYROXINE SODIUM 125 UG/1
125 TABLET ORAL DAILY
Qty: 90 TABLET | Refills: 3 | Status: SHIPPED | OUTPATIENT
Start: 2024-12-10

## 2024-12-10 RX ORDER — HALOPERIDOL 5 MG/1
2.5 TABLET ORAL
Qty: 90 TABLET | Refills: 3 | Status: SHIPPED | OUTPATIENT
Start: 2024-12-10

## 2024-12-10 RX ORDER — ATORVASTATIN CALCIUM 80 MG/1
80 TABLET, FILM COATED ORAL
Qty: 90 TABLET | Refills: 3 | Status: SHIPPED | OUTPATIENT
Start: 2024-12-10 | End: 2025-03-10

## 2024-12-10 RX ORDER — TERAZOSIN 2 MG/1
2 CAPSULE ORAL
Qty: 90 CAPSULE | Refills: 3 | Status: SHIPPED | OUTPATIENT
Start: 2024-12-10

## 2024-12-10 NOTE — ASSESSMENT & PLAN NOTE
Lab Results   Component Value Date    HGBA1C 5.8 10/24/2024   Stable  Life style mod  RTC in 3 mos w Blood work    Orders:    haloperidol (HALDOL) 5 mg tablet; Take 0.5 tablets (2.5 mg total) by mouth daily at bedtime    terazosin (HYTRIN) 2 mg capsule; Take 1 capsule (2 mg total) by mouth daily at bedtime Please STOP Flomax...

## 2024-12-10 NOTE — TELEPHONE ENCOUNTER
Patient called to make sure appointment was not cancelled. Stated he had a missed call earlier. Advised no one from office called him today; looks like it was an appointment reminder call. Patient is on way to appointment.

## 2024-12-10 NOTE — PROGRESS NOTES
Name: Chas Soliz      : 1965      MRN: [id number]  Encounter Provider: Josue Maravilla MD  Encounter Date: 12/10/2024   Encounter department: [facility] CARE [facility]  :  Assessment & Plan  Hyperlipidemia associated with type 2 diabetes mellitus  (HCC)    Lab Results   Component Value Date    HGBA1C 5.8 10/24/2024       Orders:    haloperidol (HALDOL) 5 mg tablet; Take 0.5 tablets (2.5 mg total) by mouth daily at bedtime    terazosin (HYTRIN) 2 mg capsule; Take 1 capsule (2 mg total) by mouth daily at bedtime Please STOP Flomax...    Type II diabetes mellitus with manifestations (HCC)    Lab Results   Component Value Date    HGBA1C 5.8 10/24/2024   Stable  Life style mod  RTC in 3 mos w Blood work    Orders:    haloperidol (HALDOL) 5 mg tablet; Take 0.5 tablets (2.5 mg total) by mouth daily at bedtime    terazosin (HYTRIN) 2 mg capsule; Take 1 capsule (2 mg total) by mouth daily at bedtime Please STOP Flomax...    Hypothyroidism due to Hashimoto's thyroiditis    Orders:    haloperidol (HALDOL) 5 mg tablet; Take 0.5 tablets (2.5 mg total) by mouth daily at bedtime    levothyroxine (Euthyrox) 125 mcg tablet; Take 1 tablet (125 mcg total) by mouth in the morning On Empty Stomach ,    terazosin (HYTRIN) 2 mg capsule; Take 1 capsule (2 mg total) by mouth daily at bedtime Please STOP Flomax...    Encounter for general adult medical examination with abnormal findings  Done in detail  Life style mod  RTC in  3mos w blood work  Orders:    haloperidol (HALDOL) 5 mg tablet; Take 0.5 tablets (2.5 mg total) by mouth daily at bedtime    terazosin (HYTRIN) 2 mg capsule; Take 1 capsule (2 mg total) by mouth daily at bedtime Please STOP Flomax...    Essential hypertension    Orders:    amLODIPine (NORVASC) 5 mg tablet; Take 1 tablet (5 mg total) by mouth daily    haloperidol (HALDOL) 5 mg tablet; Take 0.5 tablets (2.5 mg total) by mouth daily at bedtime    terazosin (HYTRIN) 2 mg capsule; Take 1  capsule (2 mg total) by mouth daily at bedtime Please STOP Flomax...    BMI 40.0-44.9, adult (HCC)    Orders:    atorvastatin (LIPITOR) 80 mg tablet; Take 1 tablet (80 mg total) by mouth daily with dinner    fenofibrate (TRICOR) 145 mg tablet; Take 1 tablet (145 mg total) by mouth daily with dinner    Other depression  Stable on :    Orders:    FLUoxetine (PROzac) 20 mg capsule; Take 1 capsule (20 mg total) by mouth daily    Enlarged prostate  FU w urology  Try; Terazosin 2 mg  Orders:    haloperidol (HALDOL) 5 mg tablet; Take 0.5 tablets (2.5 mg total) by mouth daily at bedtime    terazosin (HYTRIN) 2 mg capsule; Take 1 capsule (2 mg total) by mouth daily at bedtime Please STOP Flomax...    Cigarette smoker  Advised to quit smoking  Renew;  Orders:    haloperidol (HALDOL) 5 mg tablet; Take 0.5 tablets (2.5 mg total) by mouth daily at bedtime    terazosin (HYTRIN) 2 mg capsule; Take 1 capsule (2 mg total) by mouth daily at bedtime Please STOP Flomax...    Aortic ectasia (HCC)  Stable  Continue same  FU w cardiology         Other emphysema (HCC)  Advised to quit smoking  Use Inhalers                History of Present Illness     59 Y O Man is here for AWV and Regular Check up, he still smokes, recent Blood work and med list reviewed,...      Review of Systems   Constitutional:  Negative for chills, fatigue and fever.   HENT:  Negative for congestion, facial swelling, sore throat, trouble swallowing and voice change.    Eyes:  Negative for pain, discharge and visual disturbance.   Respiratory:  Negative for cough, shortness of breath and wheezing.    Cardiovascular:  Negative for chest pain, palpitations and leg swelling.   Gastrointestinal:  Negative for abdominal pain, blood in stool, constipation, diarrhea and nausea.   Endocrine: Negative for polydipsia, polyphagia and polyuria.   Genitourinary:  Negative for difficulty urinating, hematuria and urgency.   Musculoskeletal:  Negative for arthralgias and myalgias.  "  Skin:  Negative for rash.   Neurological:  Negative for dizziness, tremors, weakness and headaches.   Hematological:  Negative for adenopathy. Does not bruise/bleed easily.   Psychiatric/Behavioral:  Negative for dysphoric mood, sleep disturbance and suicidal ideas.        Objective   /78 (BP Location: Left arm, Patient Position: Sitting, Cuff Size: Standard)   Pulse 76   Temp 98.2 °F (36.8 °C) (Tympanic)   Resp 14   Ht 5' 9\" (1.753 m)   Wt 128 kg (283 lb)   SpO2 99%   BMI 41.79 kg/m²      Physical Exam  Vitals and nursing note reviewed.   Constitutional:       General: He is not in acute distress.     Appearance: He is well-developed.   HENT:      Head: Normocephalic and atraumatic.      Right Ear: External ear normal.      Left Ear: External ear normal.   Eyes:      Conjunctiva/sclera: Conjunctivae normal.      Pupils: Pupils are equal, round, and reactive to light.   Neck:      Thyroid: No thyromegaly.      Trachea: No tracheal deviation.   Cardiovascular:      Rate and Rhythm: Normal rate and regular rhythm.      Heart sounds: Murmur heard.      No friction rub.   Pulmonary:      Effort: Pulmonary effort is normal. No respiratory distress.      Breath sounds: Normal breath sounds. No wheezing.   Abdominal:      General: Bowel sounds are normal. There is no distension.      Palpations: Abdomen is soft.      Tenderness: There is no abdominal tenderness.   Musculoskeletal:         General: No swelling or deformity. Normal range of motion.      Cervical back: Neck supple.   Skin:     General: Skin is warm and dry.      Capillary Refill: Capillary refill takes less than 2 seconds.      Findings: No erythema or rash.   Neurological:      Mental Status: He is alert and oriented to person, place, and time.      Cranial Nerves: No cranial nerve deficit.      Coordination: Coordination normal.      Deep Tendon Reflexes: Reflexes normal.   Psychiatric:         Mood and Affect: Mood normal.         Behavior: " Behavior normal.

## 2024-12-10 NOTE — PATIENT INSTRUCTIONS
Medicare Preventive Visit Patient Instructions  Thank you for completing your Welcome to Medicare Visit or Medicare Annual Wellness Visit today. Your next wellness visit will be due in one year (12/11/2025).  The screening/preventive services that you may require over the next 5-10 years are detailed below. Some tests may not apply to you based off risk factors and/or age. Screening tests ordered at today's visit but not completed yet may show as past due. Also, please note that scanned in results may not display below.  Preventive Screenings:  Service Recommendations Previous Testing/Comments   Colorectal Cancer Screening  Colonoscopy    Fecal Occult Blood Test (FOBT)/Fecal Immunochemical Test (FIT)  Fecal DNA/Cologuard Test  Flexible Sigmoidoscopy Age: 45-75 years old   Colonoscopy: every 10 years (May be performed more frequently if at higher risk)  OR  FOBT/FIT: every 1 year  OR  Cologuard: every 3 years  OR  Sigmoidoscopy: every 5 years  Screening may be recommended earlier than age 45 if at higher risk for colorectal cancer. Also, an individualized decision between you and your healthcare provider will decide whether screening between the ages of 76-85 would be appropriate. Colonoscopy: 10/04/2019  FOBT/FIT: Not on file  Cologuard: 08/21/2023  Sigmoidoscopy: Not on file    Screening Current     Prostate Cancer Screening Individualized decision between patient and health care provider in men between ages of 55-69   Medicare will cover every 12 months beginning on the day after your 50th birthday PSA: 0.30 ng/mL     Screening Current     Hepatitis C Screening Once for adults born between 1945 and 1965  More frequently in patients at high risk for Hepatitis C Hep C Antibody: 02/10/2020    Screening Current   Diabetes Screening 1-2 times per year if you're at risk for diabetes or have pre-diabetes Fasting glucose: 103 mg/dL (9/23/2024)  A1C: 5.8 (10/24/2024)  Screening Not Indicated  History Diabetes   Cholesterol  Screening Once every 5 years if you don't have a lipid disorder. May order more often based on risk factors. Lipid panel: 09/23/2024  Screening Not Indicated  History Lipid Disorder      Other Preventive Screenings Covered by Medicare:  Abdominal Aortic Aneurysm (AAA) Screening: covered once if your at risk. You're considered to be at risk if you have a family history of AAA or a male between the age of 65-75 who smoking at least 100 cigarettes in your lifetime.  Lung Cancer Screening: covers low dose CT scan once per year if you meet all of the following conditions: (1) Age 55-77; (2) No signs or symptoms of lung cancer; (3) Current smoker or have quit smoking within the last 15 years; (4) You have a tobacco smoking history of at least 20 pack years (packs per day x number of years you smoked); (5) You get a written order from a healthcare provider.  Glaucoma Screening: covered annually if you're considered high risk: (1) You have diabetes OR (2) Family history of glaucoma OR (3)  aged 50 and older OR (4)  American aged 65 and older  Osteoporosis Screening: covered every 2 years if you meet one of the following conditions: (1) Have a vertebral abnormality; (2) On glucocorticoid therapy for more than 3 months; (3) Have primary hyperparathyroidism; (4) On osteoporosis medications and need to assess response to drug therapy.  HIV Screening: covered annually if you're between the age of 15-65. Also covered annually if you are younger than 15 and older than 65 with risk factors for HIV infection. For pregnant patients, it is covered up to 3 times per pregnancy.    Immunizations:  Immunization Recommendations   Influenza Vaccine Annual influenza vaccination during flu season is recommended for all persons aged >= 6 months who do not have contraindications   Pneumococcal Vaccine   * Pneumococcal conjugate vaccine = PCV13 (Prevnar 13), PCV15 (Vaxneuvance), PCV20 (Prevnar 20)  * Pneumococcal  polysaccharide vaccine = PPSV23 (Pneumovax) Adults 19-65 yo with certain risk factors or if 65+ yo  If never received any pneumonia vaccine: recommend Prevnar 20 (PCV20)  Give PCV20 if previously received 1 dose of PCV13 or PPSV23   Hepatitis B Vaccine 3 dose series if at intermediate or high risk (ex: diabetes, end stage renal disease, liver disease)   Respiratory syncytial virus (RSV) Vaccine - COVERED BY MEDICARE PART D  * RSVPreF3 (Arexvy) CDC recommends that adults 60 years of age and older may receive a single dose of RSV vaccine using shared clinical decision-making (SCDM)   Tetanus (Td) Vaccine - COST NOT COVERED BY MEDICARE PART B Following completion of primary series, a booster dose should be given every 10 years to maintain immunity against tetanus. Td may also be given as tetanus wound prophylaxis.   Tdap Vaccine - COST NOT COVERED BY MEDICARE PART B Recommended at least once for all adults. For pregnant patients, recommended with each pregnancy.   Shingles Vaccine (Shingrix) - COST NOT COVERED BY MEDICARE PART B  2 shot series recommended in those 19 years and older who have or will have weakened immune systems or those 50 years and older     Health Maintenance Due:      Topic Date Due   • Colorectal Cancer Screening  10/04/2029   • HIV Screening  Completed   • Hepatitis C Screening  Completed   • Lung Cancer Screening  Discontinued     Immunizations Due:      Topic Date Due   • Hepatitis A Vaccine (1 of 2 - Risk 2-dose series) Never done   • COVID-19 Vaccine (2 - 2024-25 season) 09/01/2024     Advance Directives   What are advance directives?  Advance directives are legal documents that state your wishes and plans for medical care. These plans are made ahead of time in case you lose your ability to make decisions for yourself. Advance directives can apply to any medical decision, such as the treatments you want, and if you want to donate organs.   What are the types of advance directives?  There are  many types of advance directives, and each state has rules about how to use them. You may choose a combination of any of the following:  Living will:  This is a written record of the treatment you want. You can also choose which treatments you do not want, which to limit, and which to stop at a certain time. This includes surgery, medicine, IV fluid, and tube feedings.   Durable power of  for healthcare (DPAHC):  This is a written record that states who you want to make healthcare choices for you when you are unable to make them for yourself. This person, called a proxy, is usually a family member or a friend. You may choose more than 1 proxy.  Do not resuscitate (DNR) order:  A DNR order is used in case your heart stops beating or you stop breathing. It is a request not to have certain forms of treatment, such as CPR. A DNR order may be included in other types of advance directives.  Medical directive:  This covers the care that you want if you are in a coma, near death, or unable to make decisions for yourself. You can list the treatments you want for each condition. Treatment may include pain medicine, surgery, blood transfusions, dialysis, IV or tube feedings, and a ventilator (breathing machine).  Values history:  This document has questions about your views, beliefs, and how you feel and think about life. This information can help others choose the care that you would choose.  Why are advance directives important?  An advance directive helps you control your care. Although spoken wishes may be used, it is better to have your wishes written down. Spoken wishes can be misunderstood, or not followed. Treatments may be given even if you do not want them. An advance directive may make it easier for your family to make difficult choices about your care.   Cigarette Smoking and Your Health   Risks to your health if you smoke:  Nicotine and other chemicals found in tobacco damage every cell in your body. Even  if you are a light smoker, you have an increased risk for cancer, heart disease, and lung disease. If you are pregnant or have diabetes, smoking increases your risk for complications.   Benefits to your health if you stop smoking:   You decrease respiratory symptoms such as coughing, wheezing, and shortness of breath.   You reduce your risk for cancers of the lung, mouth, throat, kidney, bladder, pancreas, stomach, and cervix. If you already have cancer, you increase the benefits of chemotherapy. You also reduce your risk for cancer returning or a second cancer from developing.   You reduce your risk for heart disease, blood clots, heart attack, and stroke.   You reduce your risk for lung infections, and diseases such as pneumonia, asthma, chronic bronchitis, and emphysema.  Your circulation improves. More oxygen can be delivered to your body. If you have diabetes, you lower your risk for complications, such as kidney, artery, and eye diseases. You also lower your risk for nerve damage. Nerve damage can lead to amputations, poor vision, and blindness.  You improve your body's ability to heal and to fight infections.  For more information and support to stop smoking:   [employer]  Phone: 5- 771 - 386-9114  Web Address: www.[employer]  Weight Management   Why it is important to manage your weight:  Being overweight increases your risk of health conditions such as heart disease, high blood pressure, type 2 diabetes, and certain types of cancer. It can also increase your risk for osteoarthritis, sleep apnea, and other respiratory problems. Aim for a slow, steady weight loss. Even a small amount of weight loss can lower your risk of health problems.  How to lose weight safely:  A safe and healthy way to lose weight is to eat fewer calories and get regular exercise. You can lose up about 1 pound a week by decreasing the number of calories you eat by 500 calories each day.   Healthy meal plan for weight management:   A healthy meal plan includes a variety of foods, contains fewer calories, and helps you stay healthy. A healthy meal plan includes the following:  Eat whole-grain foods more often.  A healthy meal plan should contain fiber. Fiber is the part of grains, fruits, and vegetables that is not broken down by your body. Whole-grain foods are healthy and provide extra fiber in your diet. Some examples of whole-grain foods are whole-wheat breads and pastas, oatmeal, brown rice, and bulgur.  Eat a variety of vegetables every day.  Include dark, leafy greens such as spinach, kale, digna greens, and mustard greens. Eat yellow and orange vegetables such as carrots, sweet potatoes, and winter squash.   Eat a variety of fruits every day.  Choose fresh or canned fruit (canned in its own juice or light syrup) instead of juice. Fruit juice has very little or no fiber.  Eat low-fat dairy foods.  Drink fat-free (skim) milk or 1% milk. Eat fat-free yogurt and low-fat cottage cheese. Try low-fat cheeses such as mozzarella and other reduced-fat cheeses.  Choose meat and other protein foods that are low in fat.  Choose beans or other legumes such as split peas or lentils. Choose fish, skinless poultry (chicken or turkey), or lean cuts of red meat (beef or pork). Before you cook meat or poultry, cut off any visible fat.   Use less fat and oil.  Try baking foods instead of frying them. Add less fat, such as margarine, sour cream, regular salad dressing and mayonnaise to foods. Eat fewer high-fat foods. Some examples of high-fat foods include french fries, doughnuts, ice cream, and cakes.  Eat fewer sweets.  Limit foods and drinks that are high in sugar. This includes candy, cookies, regular soda, and sweetened drinks.  Exercise:  Exercise at least 30 minutes per day on most days of the week. Some examples of exercise include walking, biking, dancing, and swimming. You can also fit in more physical activity by taking the stairs instead of  the elevator or parking farther away from stores. Ask your healthcare provider about the best exercise plan for you.      © Copyright [employer] 2018 Information is for End User's use only and may not be sold, redistributed or otherwise used for commercial purposes. All illustrations and images included in CareNotes® are the copyrighted property of [facility] [employer] or [employer]

## 2024-12-10 NOTE — PROGRESS NOTES
Name: Chas Soliz      : 1965      MRN: [id number]  Encounter Provider: Josue Maravilla MD  Encounter Date: 12/10/2024   Encounter department: [place] [facility]    Assessment & Plan       Preventive health issues were discussed with patient, and age appropriate screening tests were ordered as noted in patient's After Visit Summary. Personalized health advice and appropriate referrals for health education or preventive services given if needed, as noted in patient's After Visit Summary.    History of Present Illness     HPI   Patient Care Team:  Josue Maravilla MD as PCP - General (Internal Medicine)  Josue Maravilla MD as PCP - PCP-[facility] Attributed-Roster    Review of Systems  Medical History Reviewed by provider this encounter:       Annual Wellness Visit Questionnaire   Chas is here for his Subsequent Wellness visit. Last Medicare Wellness visit information reviewed, patient interviewed and updates made to the record today.      Health Risk Assessment:   Patient rates overall health as good. Patient feels that their physical health rating is same. Patient is satisfied with their life. Eyesight was rated as same. Hearing was rated as same. Patient feels that their emotional and mental health rating is same. Patients states they are sometimes angry. Patient states they are never, rarely unusually tired/fatigued. Pain experienced in the last 7 days has been none. Patient states that he has experienced no weight loss or gain in last 6 months.     Depression Screening:   PHQ-2 Score: 0      Fall Risk Screening:   In the past year, patient has experienced: no history of falling in past year      Home Safety:  Patient does not have trouble with stairs inside or outside of their home. Patient has working smoke alarms and has working carbon monoxide detector. Home safety hazards include: none.     Nutrition:   Current diet is Diabetic.     Medications:   Patient is  currently taking over-the-counter supplements. OTC medications include: see medication list. Patient is able to manage medications.     Activities of Daily Living (ADLs)/Instrumental Activities of Daily Living (IADLs):   Walk and transfer into and out of bed and chair?: Yes  Dress and groom yourself?: Yes    Bathe or shower yourself?: Yes    Feed yourself? Yes  Do your laundry/housekeeping?: Yes  Manage your money, pay your bills and track your expenses?: Yes  Make your own meals?: Yes    Do your own shopping?: Yes    Previous Hospitalizations:   Any hospitalizations or ED visits within the last 12 months?: No      Advance Care Planning:   Living will: No    Durable POA for healthcare: No    Advanced directive counseling given: No    Five wishes given: No      PREVENTIVE SCREENINGS      Cardiovascular Screening:    General: Screening Not Indicated, History Lipid Disorder and Risks and Benefits Discussed      Diabetes Screening:     General: Screening Not Indicated, History Diabetes, Risks and Benefits Discussed and Screening Current      Colorectal Cancer Screening:     General: Screening Current      Prostate Cancer Screening:    General: Screening Current and Risks and Benefits Discussed      Osteoporosis Screening:    General: Screening Not Indicated, History Osteoporosis and Risks and Benefits Discussed      Abdominal Aortic Aneurysm (AAA) Screening:    Risk factors include: tobacco use        General: Risks and Benefits Discussed      Lung Cancer Screening:     General: Screening Not Indicated and Risks and Benefits Discussed      Hepatitis C Screening:    General: Screening Current and Risks and Benefits Discussed    Other Counseling Topics:   Car/seat belt/driving safety, skin self-exam, sunscreen and calcium and vitamin D intake and regular weightbearing exercise.     Social Drivers of Health     Financial Resource Strain: Low Risk  (11/14/2023)    Overall Financial Resource Strain (CARDIA)     Difficulty of  "Paying Living Expenses: Not hard at all   Food Insecurity: No Food Insecurity (2024)    Nursing - Inadequate Food Risk Classification     Ran Out of Food in the Last Year: 1   Transportation Needs: No Transportation Needs (2024)    Nursing - Transportation Risk Classification     Lack of Transportation: 2   Housing Stability: Unknown (2024)    Nursing: Inadequate Housing Risk Classification     Unable to Pay for Housing in the Last Year: 2     Has Housin   Utilities: Not At Risk (2024)    Nursing - Utilities Risk Classification     Threatened with loss of utilities: 2     No results found.    Objective   Resp 14   Ht 5' 9\" (1.753 m)   BMI 41.94 kg/m²     Physical Exam    "

## 2024-12-27 NOTE — TELEPHONE ENCOUNTER
Pt wanted to provide Dr. Maravilla with the provider line to see if he wanted to be a part of this program. Pt said it saves him $30 on his premium.    Provider line: [phone]

## 2025-01-15 ENCOUNTER — TELEPHONE (OUTPATIENT)
Dept: FAMILY MEDICINE CLINIC | Facility: CLINIC | Age: 60
End: 2025-01-15

## 2025-01-15 ENCOUNTER — TELEPHONE (OUTPATIENT)
Age: 60
End: 2025-01-15

## 2025-01-15 NOTE — TELEPHONE ENCOUNTER
Pt called in stating loss of appetite and having some stomach upset with diarrhea for few days now. He is taking lot of fluids. Would like to speak to Dr. Maravilla or clinical staff/ warm transferred the call to practice clinical was answered by Rafael she took the call to assist. Thanks

## 2025-01-24 ENCOUNTER — APPOINTMENT (OUTPATIENT)
Dept: LAB | Age: 60
End: 2025-01-24
Payer: COMMERCIAL

## 2025-01-24 DIAGNOSIS — D50.9 IRON DEFICIENCY ANEMIA, UNSPECIFIED IRON DEFICIENCY ANEMIA TYPE: Primary | ICD-10-CM

## 2025-01-24 DIAGNOSIS — N17.9 ACUTE RENAL FAILURE, UNSPECIFIED ACUTE RENAL FAILURE TYPE (HCC): ICD-10-CM

## 2025-01-24 DIAGNOSIS — E11.8 TYPE II DIABETES MELLITUS WITH MANIFESTATIONS (HCC): ICD-10-CM

## 2025-01-24 DIAGNOSIS — N40.0 ENLARGED PROSTATE: ICD-10-CM

## 2025-01-24 DIAGNOSIS — E06.3 HYPOTHYROIDISM DUE TO HASHIMOTO'S THYROIDITIS: ICD-10-CM

## 2025-01-24 LAB
ALBUMIN SERPL BCG-MCNC: 3.9 G/DL (ref 3.5–5)
ALP SERPL-CCNC: 36 U/L (ref 34–104)
ALT SERPL W P-5'-P-CCNC: 10 U/L (ref 7–52)
ANION GAP SERPL CALCULATED.3IONS-SCNC: 12 MMOL/L (ref 4–13)
AST SERPL W P-5'-P-CCNC: 12 U/L (ref 13–39)
BACTERIA UR QL AUTO: NORMAL /HPF
BASOPHILS # BLD AUTO: 0.05 THOUSANDS/ΜL (ref 0–0.1)
BASOPHILS NFR BLD AUTO: 1 % (ref 0–1)
BILIRUB SERPL-MCNC: 0.38 MG/DL (ref 0.2–1)
BILIRUB UR QL STRIP: NEGATIVE
BUN SERPL-MCNC: 76 MG/DL (ref 5–25)
CALCIUM SERPL-MCNC: 9.3 MG/DL (ref 8.4–10.2)
CHLORIDE SERPL-SCNC: 106 MMOL/L (ref 96–108)
CHOLEST SERPL-MCNC: 109 MG/DL (ref ?–200)
CLARITY UR: CLEAR
CO2 SERPL-SCNC: 21 MMOL/L (ref 21–32)
COLOR UR: COLORLESS
CREAT SERPL-MCNC: 5.55 MG/DL (ref 0.6–1.3)
EOSINOPHIL # BLD AUTO: 0.22 THOUSAND/ΜL (ref 0–0.61)
EOSINOPHIL NFR BLD AUTO: 3 % (ref 0–6)
ERYTHROCYTE [DISTWIDTH] IN BLOOD BY AUTOMATED COUNT: 12.5 % (ref 11.6–15.1)
GFR SERPL CREATININE-BSD FRML MDRD: 10 ML/MIN/1.73SQ M
GLUCOSE P FAST SERPL-MCNC: 85 MG/DL (ref 65–99)
GLUCOSE UR STRIP-MCNC: NEGATIVE MG/DL
HCT VFR BLD AUTO: 26.6 % (ref 36.5–49.3)
HDLC SERPL-MCNC: 42 MG/DL
HGB BLD-MCNC: 8.6 G/DL (ref 12–17)
HGB UR QL STRIP.AUTO: NEGATIVE
IMM GRANULOCYTES # BLD AUTO: 0.04 THOUSAND/UL (ref 0–0.2)
IMM GRANULOCYTES NFR BLD AUTO: 1 % (ref 0–2)
KETONES UR STRIP-MCNC: NEGATIVE MG/DL
LDLC SERPL CALC-MCNC: 49 MG/DL (ref 0–100)
LEUKOCYTE ESTERASE UR QL STRIP: NEGATIVE
LYMPHOCYTES # BLD AUTO: 1.02 THOUSANDS/ΜL (ref 0.6–4.47)
LYMPHOCYTES NFR BLD AUTO: 15 % (ref 14–44)
MAGNESIUM SERPL-MCNC: 2.7 MG/DL (ref 1.9–2.7)
MCH RBC QN AUTO: 30.5 PG (ref 26.8–34.3)
MCHC RBC AUTO-ENTMCNC: 32.3 G/DL (ref 31.4–37.4)
MCV RBC AUTO: 94 FL (ref 82–98)
MONOCYTES # BLD AUTO: 0.87 THOUSAND/ΜL (ref 0.17–1.22)
MONOCYTES NFR BLD AUTO: 13 % (ref 4–12)
NEUTROPHILS # BLD AUTO: 4.71 THOUSANDS/ΜL (ref 1.85–7.62)
NEUTS SEG NFR BLD AUTO: 67 % (ref 43–75)
NITRITE UR QL STRIP: NEGATIVE
NON-SQ EPI CELLS URNS QL MICRO: NORMAL /HPF
NRBC BLD AUTO-RTO: 0 /100 WBCS
PH UR STRIP.AUTO: 6 [PH]
PLATELET # BLD AUTO: 281 THOUSANDS/UL (ref 149–390)
PMV BLD AUTO: 10.4 FL (ref 8.9–12.7)
POTASSIUM SERPL-SCNC: 4.9 MMOL/L (ref 3.5–5.3)
PROT SERPL-MCNC: 7.3 G/DL (ref 6.4–8.4)
PROT UR STRIP-MCNC: ABNORMAL MG/DL
PSA SERPL-MCNC: 0.25 NG/ML (ref 0–4)
RBC # BLD AUTO: 2.82 MILLION/UL (ref 3.88–5.62)
RBC #/AREA URNS AUTO: NORMAL /HPF
SODIUM SERPL-SCNC: 139 MMOL/L (ref 135–147)
SP GR UR STRIP.AUTO: 1.01 (ref 1–1.03)
T4 FREE SERPL-MCNC: 0.64 NG/DL (ref 0.61–1.12)
TRIGL SERPL-MCNC: 90 MG/DL (ref ?–150)
TSH SERPL DL<=0.05 MIU/L-ACNC: 15.15 UIU/ML (ref 0.45–4.5)
UROBILINOGEN UR STRIP-ACNC: <2 MG/DL
WBC # BLD AUTO: 6.91 THOUSAND/UL (ref 4.31–10.16)
WBC #/AREA URNS AUTO: NORMAL /HPF

## 2025-01-24 PROCEDURE — 80061 LIPID PANEL: CPT

## 2025-01-24 PROCEDURE — 85025 COMPLETE CBC W/AUTO DIFF WBC: CPT

## 2025-01-24 PROCEDURE — 81001 URINALYSIS AUTO W/SCOPE: CPT

## 2025-01-24 PROCEDURE — 80053 COMPREHEN METABOLIC PANEL: CPT

## 2025-01-24 PROCEDURE — 36415 COLL VENOUS BLD VENIPUNCTURE: CPT

## 2025-01-24 PROCEDURE — 84153 ASSAY OF PSA TOTAL: CPT

## 2025-01-24 PROCEDURE — 83735 ASSAY OF MAGNESIUM: CPT

## 2025-01-24 PROCEDURE — 84439 ASSAY OF FREE THYROXINE: CPT

## 2025-01-24 PROCEDURE — 84443 ASSAY THYROID STIM HORMONE: CPT

## 2025-01-24 RX ORDER — LEVOTHYROXINE SODIUM 150 UG/1
150 TABLET ORAL
Qty: 100 TABLET | Refills: 3 | Status: SHIPPED | OUTPATIENT
Start: 2025-01-24

## 2025-01-27 ENCOUNTER — TELEPHONE (OUTPATIENT)
Age: 60
End: 2025-01-27

## 2025-01-27 ENCOUNTER — RESULTS FOLLOW-UP (OUTPATIENT)
Dept: FAMILY MEDICINE CLINIC | Facility: CLINIC | Age: 60
End: 2025-01-27

## 2025-01-27 NOTE — TELEPHONE ENCOUNTER
----- Message from Josue Maravilla MD sent at 1/24/2025  5:11 PM EST -----  Increase synthroid to 150 mcg, hematology and Gi consults, ASAP  ----- Message -----  From: Lab, Background User  Sent: 1/24/2025   4:48 PM EST  To: Josue Maravilla MD

## 2025-01-27 NOTE — TELEPHONE ENCOUNTER
Last visit 12/10/2024  Next appt 01/30/2025    Patient is calling for his lab results and to know why he is being referred to hematology and oncology. Please call patient at [phone]. Please advise.

## 2025-01-27 NOTE — TELEPHONE ENCOUNTER
Spoke with the patient.  Informed him of results.  He stated he will make Hematology and GI appointments.  He also stated that he does not take his Synthroid daily all the time.  He would like to speak to Dr. Maravilla at his appointment on Thursday before increasing the dose.

## 2025-01-30 ENCOUNTER — OFFICE VISIT (OUTPATIENT)
Dept: FAMILY MEDICINE CLINIC | Facility: CLINIC | Age: 60
End: 2025-01-30
Payer: COMMERCIAL

## 2025-01-30 VITALS
TEMPERATURE: 98.4 F | HEIGHT: 69 IN | SYSTOLIC BLOOD PRESSURE: 132 MMHG | RESPIRATION RATE: 14 BRPM | WEIGHT: 284 LBS | DIASTOLIC BLOOD PRESSURE: 84 MMHG | OXYGEN SATURATION: 97 % | BODY MASS INDEX: 42.06 KG/M2 | HEART RATE: 74 BPM

## 2025-01-30 DIAGNOSIS — E78.5 HYPERLIPIDEMIA ASSOCIATED WITH TYPE 2 DIABETES MELLITUS  (HCC): ICD-10-CM

## 2025-01-30 DIAGNOSIS — E06.3 HYPOTHYROIDISM DUE TO HASHIMOTO'S THYROIDITIS: ICD-10-CM

## 2025-01-30 DIAGNOSIS — E11.69 HYPERLIPIDEMIA ASSOCIATED WITH TYPE 2 DIABETES MELLITUS  (HCC): ICD-10-CM

## 2025-01-30 DIAGNOSIS — R19.03 RIGHT LOWER QUADRANT ABDOMINAL MASS: ICD-10-CM

## 2025-01-30 DIAGNOSIS — D64.9 ANEMIA, UNSPECIFIED TYPE: ICD-10-CM

## 2025-01-30 DIAGNOSIS — I10 ESSENTIAL HYPERTENSION: ICD-10-CM

## 2025-01-30 DIAGNOSIS — F17.210 CIGARETTE SMOKER: ICD-10-CM

## 2025-01-30 DIAGNOSIS — N40.0 ENLARGED PROSTATE: ICD-10-CM

## 2025-01-30 DIAGNOSIS — D50.9 IRON DEFICIENCY ANEMIA, UNSPECIFIED IRON DEFICIENCY ANEMIA TYPE: ICD-10-CM

## 2025-01-30 DIAGNOSIS — E11.8 TYPE II DIABETES MELLITUS WITH MANIFESTATIONS (HCC): ICD-10-CM

## 2025-01-30 DIAGNOSIS — N13.9 OBSTRUCTIVE UROPATHY: Primary | ICD-10-CM

## 2025-01-30 PROBLEM — I77.819 AORTIC ECTASIA (HCC): Status: RESOLVED | Noted: 2023-01-05 | Resolved: 2025-01-30

## 2025-01-30 LAB — SL AMB POCT HEMOGLOBIN AIC: 5.6 (ref ?–6.5)

## 2025-01-30 PROCEDURE — 93000 ELECTROCARDIOGRAM COMPLETE: CPT | Performed by: INTERNAL MEDICINE

## 2025-01-30 PROCEDURE — G2211 COMPLEX E/M VISIT ADD ON: HCPCS | Performed by: INTERNAL MEDICINE

## 2025-01-30 PROCEDURE — 96372 THER/PROPH/DIAG INJ SC/IM: CPT | Performed by: INTERNAL MEDICINE

## 2025-01-30 PROCEDURE — 82570 ASSAY OF URINE CREATININE: CPT | Performed by: INTERNAL MEDICINE

## 2025-01-30 PROCEDURE — 82043 UR ALBUMIN QUANTITATIVE: CPT | Performed by: INTERNAL MEDICINE

## 2025-01-30 PROCEDURE — 99214 OFFICE O/P EST MOD 30 MIN: CPT | Performed by: INTERNAL MEDICINE

## 2025-01-30 PROCEDURE — 83036 HEMOGLOBIN GLYCOSYLATED A1C: CPT | Performed by: INTERNAL MEDICINE

## 2025-01-30 RX ORDER — SILODOSIN 8 MG/1
8 CAPSULE ORAL
Qty: 90 CAPSULE | Refills: 3 | Status: SHIPPED | OUTPATIENT
Start: 2025-01-30

## 2025-01-30 RX ORDER — CYANOCOBALAMIN 1000 UG/ML
1000 INJECTION, SOLUTION INTRAMUSCULAR; SUBCUTANEOUS
Status: SHIPPED | OUTPATIENT
Start: 2025-01-30

## 2025-01-30 RX ORDER — ATORVASTATIN CALCIUM 80 MG/1
80 TABLET, FILM COATED ORAL
Qty: 90 TABLET | Refills: 3 | Status: SHIPPED | OUTPATIENT
Start: 2025-01-30 | End: 2025-04-30

## 2025-01-30 RX ORDER — TERAZOSIN 2 MG/1
2 CAPSULE ORAL
Qty: 90 CAPSULE | Refills: 3 | Status: SHIPPED | OUTPATIENT
Start: 2025-01-30

## 2025-01-30 RX ORDER — LEVOTHYROXINE SODIUM 150 UG/1
150 TABLET ORAL
Qty: 100 TABLET | Refills: 3 | Status: SHIPPED | OUTPATIENT
Start: 2025-01-30

## 2025-01-30 RX ORDER — FENOFIBRATE 145 MG/1
145 TABLET, COATED ORAL
Qty: 90 TABLET | Refills: 3 | Status: SHIPPED | OUTPATIENT
Start: 2025-01-30

## 2025-01-30 RX ORDER — AMLODIPINE BESYLATE 5 MG/1
5 TABLET ORAL DAILY
Qty: 90 TABLET | Refills: 3 | Status: SHIPPED | OUTPATIENT
Start: 2025-01-30

## 2025-01-30 RX ADMIN — CYANOCOBALAMIN 1000 MCG: 1000 INJECTION, SOLUTION INTRAMUSCULAR; SUBCUTANEOUS at 15:52

## 2025-01-30 NOTE — PROGRESS NOTES
Name: Chas Soliz      : 1965      MRN: [id number]  Encounter Provider: Josue Maravilla MD  Encounter Date: 2025   Encounter department: [facility] CARE [facility]  :  Assessment & Plan  Type II diabetes mellitus with manifestations (HCC)    Lab Results   Component Value Date    HGBA1C 5.6 2025       Orders:    POCT hemoglobin A1c    Albumin / creatinine urine ratio; Future    Ambulatory Referral to Ophthalmology; Future    POCT ECG    Basic metabolic panel; Future    Ambulatory Referral to Hematology / Oncology; Future    Ambulatory referral to Gastroenterology; Future    terazosin (HYTRIN) 2 mg capsule; Take 1 capsule (2 mg total) by mouth daily at bedtime    cyanocobalamin injection 1,000 mcg    Silodosin 8 MG CAPS; Take 1 capsule by mouth daily at bedtime    Essential hypertension    Orders:    amLODIPine (NORVASC) 5 mg tablet; Take 1 tablet (5 mg total) by mouth daily    terazosin (HYTRIN) 2 mg capsule; Take 1 capsule (2 mg total) by mouth daily at bedtime    BMI 40.0-44.9, adult (HCC)    Orders:    atorvastatin (LIPITOR) 80 mg tablet; Take 1 tablet (80 mg total) by mouth daily with dinner    fenofibrate (TRICOR) 145 mg tablet; Take 1 tablet (145 mg total) by mouth daily with dinner    Hypothyroidism due to Hashimoto's thyroiditis    Orders:    levothyroxine 150 mcg tablet; Take 1 tablet (150 mcg total) by mouth daily in the early morning    terazosin (HYTRIN) 2 mg capsule; Take 1 capsule (2 mg total) by mouth daily at bedtime    TSH, 3rd generation; Future    T4, free; Future    Iron deficiency anemia, unspecified iron deficiency anemia type    Orders:    levothyroxine 150 mcg tablet; Take 1 tablet (150 mcg total) by mouth daily in the early morning    Hyperlipidemia associated with type 2 diabetes mellitus  (HCC)    Lab Results   Component Value Date    HGBA1C 5.6 2025       Orders:    terazosin (HYTRIN) 2 mg capsule; Take 1 capsule (2 mg total) by mouth  daily at bedtime    Enlarged prostate    Orders:    POCT ECG    Basic metabolic panel; Future    Ambulatory Referral to Hematology / Oncology; Future    Ambulatory referral to Gastroenterology; Future    terazosin (HYTRIN) 2 mg capsule; Take 1 capsule (2 mg total) by mouth daily at bedtime    cyanocobalamin injection 1,000 mcg    Silodosin 8 MG CAPS; Take 1 capsule by mouth daily at bedtime    Cigarette smoker    Orders:    POCT ECG    Basic metabolic panel; Future    Ambulatory Referral to Hematology / Oncology; Future    Ambulatory referral to Gastroenterology; Future    terazosin (HYTRIN) 2 mg capsule; Take 1 capsule (2 mg total) by mouth daily at bedtime    cyanocobalamin injection 1,000 mcg    Silodosin 8 MG CAPS; Take 1 capsule by mouth daily at bedtime    US abdomen complete; Future    Obstructive uropathy    Orders:    POCT ECG    Basic metabolic panel; Future    Ambulatory Referral to Hematology / Oncology; Future    Ambulatory referral to Gastroenterology; Future    cyanocobalamin injection 1,000 mcg    Silodosin 8 MG CAPS; Take 1 capsule by mouth daily at bedtime    US abdomen complete; Future    Anemia, unspecified type    Orders:    POCT ECG    Basic metabolic panel; Future    Ambulatory Referral to Hematology / Oncology; Future    Ambulatory referral to Gastroenterology; Future    cyanocobalamin injection 1,000 mcg    Silodosin 8 MG CAPS; Take 1 capsule by mouth daily at bedtime    US abdomen complete; Future    Right lower quadrant abdominal mass    Orders:    US abdomen complete; Future    [facility] to [facility]  ??Should R/O Malignancy ??  STAT; Blood work and US Abdomen,  GI Consult, ASAP  Hematology consult, ASAP  RTC in 3-4 weeks....       History of Present Illness   59 Y O Man is here for Regular check Up, he has few symptoms, he still smokes, recent Blood work and med list reviewed,...      Review of Systems   Constitutional:  Positive for fatigue. Negative for chills and fever.   HENT:   "Positive for postnasal drip. Negative for congestion, facial swelling, sore throat, trouble swallowing and voice change.    Eyes:  Negative for pain, discharge and visual disturbance.   Respiratory:  Negative for cough, shortness of breath and wheezing.    Cardiovascular:  Negative for chest pain, palpitations and leg swelling.   Gastrointestinal:  Positive for abdominal pain. Negative for blood in stool, constipation, diarrhea and nausea.   Endocrine: Negative for polydipsia, polyphagia and polyuria.   Genitourinary:  Negative for difficulty urinating, hematuria and urgency.   Musculoskeletal:  Negative for arthralgias and myalgias.   Skin:  Negative for rash.   Neurological:  Negative for dizziness, tremors, weakness and headaches.   Hematological:  Negative for adenopathy. Does not bruise/bleed easily.   Psychiatric/Behavioral:  Negative for dysphoric mood, sleep disturbance and suicidal ideas.        Objective   /84 (BP Location: Left arm, Patient Position: Sitting, Cuff Size: Standard)   Pulse 74   Temp 98.4 °F (36.9 °C) (Tympanic Core)   Resp 14   Ht 5' 9\" (1.753 m)   Wt 129 kg (284 lb)   SpO2 97%   BMI 41.94 kg/m²      Physical Exam  Constitutional:       General: He is not in acute distress.     Appearance: He is well-developed.   HENT:      Head: Normocephalic.      Right Ear: External ear normal.      Left Ear: External ear normal.   Eyes:      Pupils: Pupils are equal, round, and reactive to light.   Neck:      Thyroid: No thyromegaly.      Trachea: No tracheal deviation.   Cardiovascular:      Rate and Rhythm: Normal rate and regular rhythm.      Heart sounds: Murmur heard.      No friction rub.   Pulmonary:      Effort: Pulmonary effort is normal. No respiratory distress.      Breath sounds: Normal breath sounds. No wheezing.   Abdominal:      General: Bowel sounds are normal. There is no distension.      Palpations: Abdomen is soft. There is mass.      Comments: ??Solid mass Right side " abdomen ,???   Musculoskeletal:         General: Tenderness present. No deformity. Normal range of motion.      Cervical back: Neck supple.   Skin:     General: Skin is warm and dry.      Findings: No erythema or rash.   Neurological:      Mental Status: He is alert and oriented to person, place, and time.      Cranial Nerves: No cranial nerve deficit.      Coordination: Coordination normal.      Deep Tendon Reflexes: Reflexes normal.   Psychiatric:         Behavior: Behavior normal.

## 2025-01-30 NOTE — ASSESSMENT & PLAN NOTE
Lab Results   Component Value Date    HGBA1C 5.6 01/30/2025       Orders:    POCT hemoglobin A1c    Albumin / creatinine urine ratio; Future    Ambulatory Referral to Ophthalmology; Future    POCT ECG    Basic metabolic panel; Future    Ambulatory Referral to Hematology / Oncology; Future    Ambulatory referral to Gastroenterology; Future    terazosin (HYTRIN) 2 mg capsule; Take 1 capsule (2 mg total) by mouth daily at bedtime    cyanocobalamin injection 1,000 mcg    Silodosin 8 MG CAPS; Take 1 capsule by mouth daily at bedtime

## 2025-01-31 ENCOUNTER — HOSPITAL ENCOUNTER (OUTPATIENT)
Dept: ULTRASOUND IMAGING | Facility: HOSPITAL | Age: 60
End: 2025-01-31
Payer: COMMERCIAL

## 2025-01-31 ENCOUNTER — TELEPHONE (OUTPATIENT)
Age: 60
End: 2025-01-31

## 2025-01-31 DIAGNOSIS — N13.9 OBSTRUCTIVE UROPATHY: ICD-10-CM

## 2025-01-31 DIAGNOSIS — R19.03 RIGHT LOWER QUADRANT ABDOMINAL MASS: ICD-10-CM

## 2025-01-31 DIAGNOSIS — D64.9 ANEMIA, UNSPECIFIED TYPE: ICD-10-CM

## 2025-01-31 DIAGNOSIS — F17.210 CIGARETTE SMOKER: ICD-10-CM

## 2025-01-31 LAB
CREAT UR-MCNC: 41.5 MG/DL
MICROALBUMIN UR-MCNC: 36.3 MG/L
MICROALBUMIN/CREAT 24H UR: 87 MG/G CREATININE (ref 0–30)

## 2025-01-31 PROCEDURE — 76705 ECHO EXAM OF ABDOMEN: CPT

## 2025-01-31 NOTE — TELEPHONE ENCOUNTER
Pt called regarding US results. States he received notification on mychart report was ready. Advised pt once reviewed by provider he will be contacted with the result.

## 2025-01-31 NOTE — TELEPHONE ENCOUNTER
Pt called, is kindly requesting a return from the clinical team re: US abdominal wall     Pt did schedule appointments as requested however, he has questions.   The results are visible in my chart but he does not understand.    Is kindly requesting a return call so he has a better understanding of what's going on.

## 2025-02-03 ENCOUNTER — RESULTS FOLLOW-UP (OUTPATIENT)
Dept: FAMILY MEDICINE CLINIC | Facility: CLINIC | Age: 60
End: 2025-02-03

## 2025-02-03 DIAGNOSIS — N13.9 OBSTRUCTIVE UROPATHY: ICD-10-CM

## 2025-02-03 DIAGNOSIS — R19.03 RIGHT LOWER QUADRANT ABDOMINAL MASS: Primary | ICD-10-CM

## 2025-02-03 NOTE — TELEPHONE ENCOUNTER
----- Message from Josue Maravilla MD sent at 2/3/2025  8:01 AM EST -----  Surgery Consult  ----- Message -----  From: Interface, Radiology Results In  Sent: 1/31/2025  10:56 AM EST  To: Josue Maravilla MD

## 2025-02-03 NOTE — TELEPHONE ENCOUNTER
Spoke with the patient.  Informed him of US results and the reason he needs to see Surgeon and GI.  He verbalized understanding and has no further questions.

## 2025-02-03 NOTE — TELEPHONE ENCOUNTER
Patient called upset that no one has called him regarding results. Warm transfer to [facility].

## 2025-02-10 ENCOUNTER — TELEPHONE (OUTPATIENT)
Age: 60
End: 2025-02-10

## 2025-02-10 NOTE — TELEPHONE ENCOUNTER
Patient called in stating he will wait until the day of appt to see if he would need to reschedule appt due to weather.

## 2025-02-12 ENCOUNTER — TELEPHONE (OUTPATIENT)
Age: 60
End: 2025-02-12

## 2025-02-12 NOTE — TELEPHONE ENCOUNTER
Pt called today and said he had to cancel his GI appt because of the weather and he is having bad back pain. They scheduled him for late April. Pt states if there is something you can give him for anemia. Please advise [phone] thank you.

## 2025-02-12 NOTE — TELEPHONE ENCOUNTER
I contacted patient and explained we could not order anything since he is not established with us. I did review that he could be seen sooner than April (pcp referral notes ASAP for appt). I did schedule with Dr. Vargas since opening 2/13/25.     Patient could not keep original appointment scheduled today due to back pain and limited mobility along with weather (snow).  Patient did reach out to his primary care and I asked that he inform office that he is scheduled earlier for GI visit.

## 2025-02-12 NOTE — TELEPHONE ENCOUNTER
Patients GI provider:  Dr. Jaiyeola    Number to return call: [phone]    Reason for call: Pt called and had to reschedule his appt due to back pain and he doesn't like to drive in the snow. He is asking if he could get an RX for his anemia until he can get in. His appt is 04/15/25. I informed him that since he has never been seen, we may not be able to send an RX. Please reach out to pt with any advice is possible.    Scheduled procedure/appointment date if applicable: Apt/procedure 04/15/25

## 2025-02-13 ENCOUNTER — APPOINTMENT (EMERGENCY)
Dept: CT IMAGING | Facility: HOSPITAL | Age: 60
End: 2025-02-13
Payer: COMMERCIAL

## 2025-02-13 ENCOUNTER — CONSULT (OUTPATIENT)
Dept: GASTROENTEROLOGY | Facility: MEDICAL CENTER | Age: 60
End: 2025-02-13
Payer: COMMERCIAL

## 2025-02-13 ENCOUNTER — HOSPITAL ENCOUNTER (INPATIENT)
Facility: HOSPITAL | Age: 60
LOS: 6 days | Discharge: HOME HEALTH | End: 2025-02-19
Attending: EMERGENCY MEDICINE | Admitting: INTERNAL MEDICINE
Payer: COMMERCIAL

## 2025-02-13 ENCOUNTER — APPOINTMENT (EMERGENCY)
Dept: RADIOLOGY | Facility: HOSPITAL | Age: 60
End: 2025-02-13
Payer: COMMERCIAL

## 2025-02-13 VITALS
SYSTOLIC BLOOD PRESSURE: 151 MMHG | HEART RATE: 92 BPM | TEMPERATURE: 98 F | BODY MASS INDEX: 43.21 KG/M2 | DIASTOLIC BLOOD PRESSURE: 73 MMHG | WEIGHT: 292.6 LBS

## 2025-02-13 DIAGNOSIS — R10.9 ABDOMINAL PAIN, UNSPECIFIED ABDOMINAL LOCATION: ICD-10-CM

## 2025-02-13 DIAGNOSIS — R10.84 GENERALIZED ABDOMINAL PAIN: ICD-10-CM

## 2025-02-13 DIAGNOSIS — R06.02 SOB (SHORTNESS OF BREATH): ICD-10-CM

## 2025-02-13 DIAGNOSIS — I48.91 ATRIAL FIBRILLATION, UNSPECIFIED TYPE (HCC): ICD-10-CM

## 2025-02-13 DIAGNOSIS — N17.9 AKI (ACUTE KIDNEY INJURY) (HCC): ICD-10-CM

## 2025-02-13 DIAGNOSIS — M79.89 LEG SWELLING: ICD-10-CM

## 2025-02-13 DIAGNOSIS — N17.9 ACUTE KIDNEY INJURY (HCC): Primary | ICD-10-CM

## 2025-02-13 DIAGNOSIS — E87.1 HYPONATREMIA: ICD-10-CM

## 2025-02-13 DIAGNOSIS — R19.00 PALPABLE ABDOMINAL MASS: Primary | ICD-10-CM

## 2025-02-13 DIAGNOSIS — D50.9 IRON DEFICIENCY ANEMIA, UNSPECIFIED IRON DEFICIENCY ANEMIA TYPE: ICD-10-CM

## 2025-02-13 DIAGNOSIS — R33.9 URINARY RETENTION: ICD-10-CM

## 2025-02-13 PROBLEM — D64.9 ANEMIA: Status: ACTIVE | Noted: 2025-02-13

## 2025-02-13 LAB
2HR DELTA HS TROPONIN: 1 NG/L
4HR DELTA HS TROPONIN: 3 NG/L
ALBUMIN SERPL BCG-MCNC: 4.2 G/DL (ref 3.5–5)
ALP SERPL-CCNC: 64 U/L (ref 34–104)
ALT SERPL W P-5'-P-CCNC: 14 U/L (ref 7–52)
ANION GAP SERPL CALCULATED.3IONS-SCNC: 10 MMOL/L (ref 4–13)
ANION GAP SERPL CALCULATED.3IONS-SCNC: 10 MMOL/L (ref 4–13)
AST SERPL W P-5'-P-CCNC: 11 U/L (ref 13–39)
ATRIAL RATE: 70 BPM
ATRIAL RATE: 75 BPM
BASOPHILS # BLD AUTO: 0.06 THOUSANDS/ΜL (ref 0–0.1)
BASOPHILS NFR BLD AUTO: 1 % (ref 0–1)
BILIRUB SERPL-MCNC: 0.41 MG/DL (ref 0.2–1)
BNP SERPL-MCNC: 401 PG/ML (ref 0–100)
BUN SERPL-MCNC: 69 MG/DL (ref 5–25)
BUN SERPL-MCNC: 71 MG/DL (ref 5–25)
CALCIUM SERPL-MCNC: 8.5 MG/DL (ref 8.4–10.2)
CALCIUM SERPL-MCNC: 8.5 MG/DL (ref 8.4–10.2)
CARDIAC TROPONIN I PNL SERPL HS: 16 NG/L (ref ?–50)
CARDIAC TROPONIN I PNL SERPL HS: 17 NG/L (ref ?–50)
CARDIAC TROPONIN I PNL SERPL HS: 19 NG/L (ref ?–50)
CHLORIDE SERPL-SCNC: 100 MMOL/L (ref 96–108)
CHLORIDE SERPL-SCNC: 97 MMOL/L (ref 96–108)
CO2 SERPL-SCNC: 17 MMOL/L (ref 21–32)
CO2 SERPL-SCNC: 18 MMOL/L (ref 21–32)
CREAT SERPL-MCNC: 5.39 MG/DL (ref 0.6–1.3)
CREAT SERPL-MCNC: 5.57 MG/DL (ref 0.6–1.3)
EOSINOPHIL # BLD AUTO: 0.21 THOUSAND/ΜL (ref 0–0.61)
EOSINOPHIL NFR BLD AUTO: 3 % (ref 0–6)
ERYTHROCYTE [DISTWIDTH] IN BLOOD BY AUTOMATED COUNT: 12.7 % (ref 11.6–15.1)
GFR SERPL CREATININE-BSD FRML MDRD: 10 ML/MIN/1.73SQ M
GFR SERPL CREATININE-BSD FRML MDRD: 10 ML/MIN/1.73SQ M
GLUCOSE SERPL-MCNC: 90 MG/DL (ref 65–140)
GLUCOSE SERPL-MCNC: 94 MG/DL (ref 65–140)
HCT VFR BLD AUTO: 23.9 % (ref 36.5–49.3)
HGB BLD-MCNC: 7.8 G/DL (ref 12–17)
IMM GRANULOCYTES # BLD AUTO: 0.03 THOUSAND/UL (ref 0–0.2)
IMM GRANULOCYTES NFR BLD AUTO: 0 % (ref 0–2)
LIPASE SERPL-CCNC: 80 U/L (ref 11–82)
LYMPHOCYTES # BLD AUTO: 0.93 THOUSANDS/ΜL (ref 0.6–4.47)
LYMPHOCYTES NFR BLD AUTO: 11 % (ref 14–44)
MCH RBC QN AUTO: 30.1 PG (ref 26.8–34.3)
MCHC RBC AUTO-ENTMCNC: 32.6 G/DL (ref 31.4–37.4)
MCV RBC AUTO: 92 FL (ref 82–98)
MONOCYTES # BLD AUTO: 0.98 THOUSAND/ΜL (ref 0.17–1.22)
MONOCYTES NFR BLD AUTO: 12 % (ref 4–12)
NEUTROPHILS # BLD AUTO: 6.29 THOUSANDS/ΜL (ref 1.85–7.62)
NEUTS SEG NFR BLD AUTO: 73 % (ref 43–75)
NRBC BLD AUTO-RTO: 0 /100 WBCS
P AXIS: 42 DEGREES
P AXIS: 48 DEGREES
PLATELET # BLD AUTO: 229 THOUSANDS/UL (ref 149–390)
PMV BLD AUTO: 9.3 FL (ref 8.9–12.7)
POTASSIUM SERPL-SCNC: 5.4 MMOL/L (ref 3.5–5.3)
POTASSIUM SERPL-SCNC: 5.5 MMOL/L (ref 3.5–5.3)
PR INTERVAL: 192 MS
PR INTERVAL: 208 MS
PROT SERPL-MCNC: 6.7 G/DL (ref 6.4–8.4)
QRS AXIS: 19 DEGREES
QRS AXIS: 21 DEGREES
QRSD INTERVAL: 86 MS
QRSD INTERVAL: 92 MS
QT INTERVAL: 362 MS
QT INTERVAL: 374 MS
QTC INTERVAL: 403 MS
QTC INTERVAL: 404 MS
RBC # BLD AUTO: 2.59 MILLION/UL (ref 3.88–5.62)
SODIUM SERPL-SCNC: 125 MMOL/L (ref 135–147)
SODIUM SERPL-SCNC: 127 MMOL/L (ref 135–147)
T WAVE AXIS: 67 DEGREES
T WAVE AXIS: 74 DEGREES
TSH SERPL DL<=0.05 MIU/L-ACNC: 3.84 UIU/ML (ref 0.45–4.5)
VENTRICULAR RATE: 70 BPM
VENTRICULAR RATE: 75 BPM
WBC # BLD AUTO: 8.5 THOUSAND/UL (ref 4.31–10.16)

## 2025-02-13 PROCEDURE — 84484 ASSAY OF TROPONIN QUANT: CPT | Performed by: INTERNAL MEDICINE

## 2025-02-13 PROCEDURE — 99223 1ST HOSP IP/OBS HIGH 75: CPT | Performed by: INTERNAL MEDICINE

## 2025-02-13 PROCEDURE — 71045 X-RAY EXAM CHEST 1 VIEW: CPT

## 2025-02-13 PROCEDURE — 99285 EMERGENCY DEPT VISIT HI MDM: CPT | Performed by: EMERGENCY MEDICINE

## 2025-02-13 PROCEDURE — 84443 ASSAY THYROID STIM HORMONE: CPT | Performed by: EMERGENCY MEDICINE

## 2025-02-13 PROCEDURE — 84484 ASSAY OF TROPONIN QUANT: CPT | Performed by: EMERGENCY MEDICINE

## 2025-02-13 PROCEDURE — 74176 CT ABD & PELVIS W/O CONTRAST: CPT

## 2025-02-13 PROCEDURE — 83690 ASSAY OF LIPASE: CPT | Performed by: EMERGENCY MEDICINE

## 2025-02-13 PROCEDURE — 80053 COMPREHEN METABOLIC PANEL: CPT | Performed by: EMERGENCY MEDICINE

## 2025-02-13 PROCEDURE — 99205 OFFICE O/P NEW HI 60 MIN: CPT | Performed by: INTERNAL MEDICINE

## 2025-02-13 PROCEDURE — 36415 COLL VENOUS BLD VENIPUNCTURE: CPT | Performed by: EMERGENCY MEDICINE

## 2025-02-13 PROCEDURE — 85025 COMPLETE CBC W/AUTO DIFF WBC: CPT | Performed by: EMERGENCY MEDICINE

## 2025-02-13 PROCEDURE — 93005 ELECTROCARDIOGRAM TRACING: CPT

## 2025-02-13 PROCEDURE — 99285 EMERGENCY DEPT VISIT HI MDM: CPT

## 2025-02-13 PROCEDURE — 93010 ELECTROCARDIOGRAM REPORT: CPT | Performed by: INTERNAL MEDICINE

## 2025-02-13 PROCEDURE — 93010 ELECTROCARDIOGRAM REPORT: CPT

## 2025-02-13 PROCEDURE — 83880 ASSAY OF NATRIURETIC PEPTIDE: CPT | Performed by: EMERGENCY MEDICINE

## 2025-02-13 PROCEDURE — 80048 BASIC METABOLIC PNL TOTAL CA: CPT | Performed by: INTERNAL MEDICINE

## 2025-02-13 RX ORDER — ACETAMINOPHEN 325 MG/1
650 TABLET ORAL EVERY 6 HOURS PRN
Status: DISCONTINUED | OUTPATIENT
Start: 2025-02-13 | End: 2025-02-19 | Stop reason: HOSPADM

## 2025-02-13 RX ORDER — OXYCODONE HYDROCHLORIDE 5 MG/1
5 TABLET ORAL EVERY 6 HOURS PRN
Refills: 0 | Status: DISCONTINUED | OUTPATIENT
Start: 2025-02-13 | End: 2025-02-13

## 2025-02-13 RX ORDER — LEVOTHYROXINE SODIUM 75 UG/1
150 TABLET ORAL
Status: DISCONTINUED | OUTPATIENT
Start: 2025-02-14 | End: 2025-02-19 | Stop reason: HOSPADM

## 2025-02-13 RX ORDER — AMLODIPINE BESYLATE 5 MG/1
5 TABLET ORAL DAILY
Status: DISCONTINUED | OUTPATIENT
Start: 2025-02-14 | End: 2025-02-15

## 2025-02-13 RX ORDER — LABETALOL HYDROCHLORIDE 5 MG/ML
10 INJECTION, SOLUTION INTRAVENOUS EVERY 4 HOURS PRN
Status: DISCONTINUED | OUTPATIENT
Start: 2025-02-13 | End: 2025-02-15

## 2025-02-13 RX ORDER — GABAPENTIN 100 MG/1
100 CAPSULE ORAL 2 TIMES DAILY
COMMUNITY
End: 2025-02-20 | Stop reason: ALTCHOICE

## 2025-02-13 RX ORDER — MAGNESIUM HYDROXIDE/ALUMINUM HYDROXICE/SIMETHICONE 120; 1200; 1200 MG/30ML; MG/30ML; MG/30ML
30 SUSPENSION ORAL EVERY 6 HOURS PRN
Status: DISCONTINUED | OUTPATIENT
Start: 2025-02-13 | End: 2025-02-19 | Stop reason: HOSPADM

## 2025-02-13 RX ORDER — TAMSULOSIN HYDROCHLORIDE 0.4 MG/1
0.8 CAPSULE ORAL
Status: DISCONTINUED | OUTPATIENT
Start: 2025-02-13 | End: 2025-02-19 | Stop reason: HOSPADM

## 2025-02-13 RX ORDER — OXYCODONE HYDROCHLORIDE 5 MG/1
5 TABLET ORAL EVERY 6 HOURS PRN
Refills: 0 | Status: DISCONTINUED | OUTPATIENT
Start: 2025-02-13 | End: 2025-02-14

## 2025-02-13 RX ORDER — HEPARIN SODIUM 5000 [USP'U]/ML
5000 INJECTION, SOLUTION INTRAVENOUS; SUBCUTANEOUS EVERY 8 HOURS SCHEDULED
Status: DISCONTINUED | OUTPATIENT
Start: 2025-02-13 | End: 2025-02-15

## 2025-02-13 RX ORDER — NICOTINE 21 MG/24HR
1 PATCH, TRANSDERMAL 24 HOURS TRANSDERMAL DAILY
Status: DISCONTINUED | OUTPATIENT
Start: 2025-02-14 | End: 2025-02-19 | Stop reason: HOSPADM

## 2025-02-13 RX ORDER — ATORVASTATIN CALCIUM 80 MG/1
80 TABLET, FILM COATED ORAL
Status: DISCONTINUED | OUTPATIENT
Start: 2025-02-13 | End: 2025-02-19 | Stop reason: HOSPADM

## 2025-02-13 RX ORDER — ONDANSETRON 2 MG/ML
4 INJECTION INTRAMUSCULAR; INTRAVENOUS EVERY 6 HOURS PRN
Status: DISCONTINUED | OUTPATIENT
Start: 2025-02-13 | End: 2025-02-19 | Stop reason: HOSPADM

## 2025-02-13 RX ADMIN — SODIUM BICARBONATE 75 ML/HR: 84 INJECTION, SOLUTION INTRAVENOUS at 21:08

## 2025-02-13 RX ADMIN — ATORVASTATIN CALCIUM 80 MG: 80 TABLET, FILM COATED ORAL at 19:11

## 2025-02-13 RX ADMIN — HEPARIN SODIUM 5000 UNITS: 5000 INJECTION, SOLUTION INTRAVENOUS; SUBCUTANEOUS at 21:14

## 2025-02-13 RX ADMIN — TAMSULOSIN HYDROCHLORIDE 0.8 MG: 0.4 CAPSULE ORAL at 19:11

## 2025-02-13 RX ADMIN — HALOPERIDOL 2.5 MG: 1 TABLET ORAL at 21:48

## 2025-02-13 NOTE — ASSESSMENT & PLAN NOTE
Hypertensive urgency on admission suspect secondary to discomfort  Continue amlodipine  Labetalol as needed

## 2025-02-13 NOTE — ASSESSMENT & PLAN NOTE
"Lab Results   Component Value Date    HGBA1C 5.6 01/30/2025       No results for input(s): \"POCGLU\" in the last 72 hours.    Blood Sugar Average: Last 72 hrs:    Diet controlled recent A1c 5.6  Monitor fasting blood sugar  "

## 2025-02-13 NOTE — ASSESSMENT & PLAN NOTE
Secondary to obstructive uropathy  Mcclellan catheter to be inserted in the emergency department  Start bicarb infusion  Trend BMP  Urology consultation

## 2025-02-13 NOTE — ED PROVIDER NOTES
Time reflects when diagnosis was documented in both MDM as applicable and the Disposition within this note       Time User Action Codes Description Comment    2/13/2025  6:03 PM Julieta Combs Add [N17.9] Acute kidney injury (HCC)     2/13/2025  6:03 PM Julieta Combs Add [R33.9] Urinary retention     2/14/2025  4:36 AM CaliJany gates Add [E87.1] Hyponatremia           ED Disposition       ED Disposition   Admit    Condition   Stable    Date/Time   Thu Feb 13, 2025  6:03 PM    Comment   Case was discussed with ADRIANA and the patient's admission status was agreed to be inpt./med surg to the service of Dr. Garcia .               Assessment & Plan       Medical Decision Making  I went over the results with the patient.  A Mcclellan catheter was placed for urinary retention.  Over 4 L of urine or eventually drained from the bladder.  Patient felt better.  He was given IV fluids for his elevated creatinine and admitted to the hospitalist for further workup/management    Amount and/or Complexity of Data Reviewed  Labs: ordered.  Radiology: ordered.    Risk  Decision regarding hospitalization.             Medications   levothyroxine tablet 150 mcg (has no administration in time range)   amLODIPine (NORVASC) tablet 5 mg (has no administration in time range)   atorvastatin (LIPITOR) tablet 80 mg (80 mg Oral Given 2/13/25 1911)   FLUoxetine (PROzac) capsule 20 mg (has no administration in time range)   haloperidol (HALDOL) tablet 2.5 mg (has no administration in time range)   tamsulosin (FLOMAX) capsule 0.8 mg (0.8 mg Oral Given 2/13/25 1911)   acetaminophen (TYLENOL) tablet 650 mg (has no administration in time range)   ondansetron (ZOFRAN) injection 4 mg (has no administration in time range)   aluminum-magnesium hydroxide-simethicone (MAALOX) oral suspension 30 mL (has no administration in time range)   nicotine (NICODERM CQ) 21 mg/24 hr TD 24 hr patch 1 patch (has no administration in time range)    heparin (porcine) subcutaneous injection 5,000 Units (has no administration in time range)   labetalol (NORMODYNE) injection 10 mg (has no administration in time range)       ED Risk Strat Scores                          SBIRT 20yo+      Flowsheet Row Most Recent Value   Initial Alcohol Screen: US AUDIT-C     1. How often do you have a drink containing alcohol? 0 Filed at: 02/13/2025 1605   2. How many drinks containing alcohol do you have on a typical day you are drinking?  0 Filed at: 02/13/2025 1605   3a. Male UNDER 65: How often do you have five or more drinks on one occasion? 0 Filed at: 02/13/2025 1605   Audit-C Score 0 Filed at: 02/13/2025 1605   SAMARIA: How many times in the past year have you...    Used an illegal drug or used a prescription medication for non-medical reasons? Never Filed at: 02/13/2025 1605                            History of Present Illness       Chief Complaint   Patient presents with    Abnormal Lab     Pt was sent in by gastro. States that they told him his kidney function was elevated. Also reports abdominal pain and sob    Shortness of Breath    Abdominal Pain       Past Medical History:   Diagnosis Date    Anxiety     Arthritis     Bipolar disorder (HCC)     Colon polyp     COPD (chronic obstructive pulmonary disease) (HCC)     Depression     Diabetes mellitus (HCC)     type 2    Gout     High triglycerides     Hypertension     Hypothyroidism 02/20/2013    Obesity     Paranoid schizophrenia (HCC)     Psychiatric disorder     Sciatica 12/03/2013    Seasonal allergies     Sleep apnea       Past Surgical History:   Procedure Laterality Date    COLONOSCOPY      UMBILICAL HERNIA REPAIR      WISDOM TOOTH EXTRACTION        Family History   Problem Relation Age of Onset    Colon cancer Mother     Coronary artery disease Father     Hyperlipidemia Father     Coronary artery disease Family       Social History     Tobacco Use    Smoking status: Every Day     Current packs/day: 0.25      Average packs/day: 0.3 packs/day for 35.0 years (8.8 ttl pk-yrs)     Types: Cigarettes, E-Cigarettes    Smokeless tobacco: Never   Vaping Use    Vaping status: Never Used   Substance Use Topics    Alcohol use: Not Currently    Drug use: No      E-Cigarette/Vaping    E-Cigarette Use Never User       E-Cigarette/Vaping Substances    Nicotine No     THC No     CBD No     Flavoring No     Other No     Unknown No       I have reviewed and agree with the history as documented.     59-year-old male was told by his GI doctor to come in for abnormal labs.  On 1-24 his creatinine was 5.55 ( last known creatinine was on 11/5/23 and was 1.58).  The GI doctor saw this today and told him to come in for evaluation.  It was also noted that the patient had abdominal pain and a questionable mass.  No nausea/vomiting/diarrhea.  He had a normal bowel movement today.  He is urinating okay.  He states he is eating and drinking okay but his appetite is a little less than normal.  No fevers.  No chest pain.  At times he feels short of breath        Review of Systems   Constitutional:  Positive for appetite change and fatigue. Negative for fever.   HENT:  Negative for rhinorrhea and sore throat.    Eyes:  Negative for pain.   Respiratory:  Positive for shortness of breath. Negative for cough and wheezing.    Cardiovascular:  Negative for chest pain and leg swelling.   Gastrointestinal:  Positive for abdominal pain. Negative for diarrhea, nausea and vomiting.   Genitourinary:  Negative for dysuria and flank pain.   Musculoskeletal:  Negative for back pain and neck pain.   Skin:  Negative for rash.   Neurological:  Negative for syncope and headaches.   Psychiatric/Behavioral:          Mood normal           Objective       ED Triage Vitals   Temperature Pulse Blood Pressure Respirations SpO2 Patient Position - Orthostatic VS   02/13/25 1554 02/13/25 1554 02/13/25 1554 02/13/25 1554 02/13/25 1554 02/13/25 1554   98.1 °F (36.7 °C) 100 (!) 207/94  22 97 % Sitting      Temp Source Heart Rate Source BP Location FiO2 (%) Pain Score    02/13/25 2035 02/13/25 1554 02/13/25 1554 -- 02/13/25 2016    Oral Monitor Right arm  No Pain      Vitals      Date and Time Temp Pulse SpO2 Resp BP Pain Score FACES Pain Rating User   02/14/25 1519 -- -- -- 20 -- -- -- IC   02/14/25 1519 98.7 °F (37.1 °C) 74 91 % -- 137/69 -- -- DII   02/14/25 0934 -- -- -- -- -- No Pain -- AM   02/14/25 0736 98.8 °F (37.1 °C) 74 95 % 20 134/66 -- -- DII   02/13/25 2136 -- -- -- 18 -- -- -- TS   02/13/25 2136 98.6 °F (37 °C) 77 92 % -- 134/65 -- -- DII   02/13/25 2039 -- -- -- -- -- No Pain -- AB   02/13/25 2035 -- -- -- 18 -- -- -- TS   02/13/25 2035 98.5 °F (36.9 °C) 84 92 % -- 179/85 -- -- DII   02/13/25 2016 -- 92 98 % 16 180/90 No Pain -- KS   02/13/25 1554 98.1 °F (36.7 °C) 100 97 % 22 207/94 -- -- LP            Physical Exam  Vitals and nursing note reviewed.   Constitutional:       Appearance: He is well-developed.   HENT:      Head: Normocephalic and atraumatic.      Right Ear: External ear normal.      Left Ear: External ear normal.   Eyes:      General: No scleral icterus.     Extraocular Movements: Extraocular movements intact.   Cardiovascular:      Rate and Rhythm: Normal rate and regular rhythm.   Pulmonary:      Effort: Pulmonary effort is normal. No respiratory distress.      Breath sounds: Normal breath sounds.   Abdominal:      Palpations: Abdomen is soft.      Comments: Abd. Is distended, firm area mid abd. With tenderness, Present, no r/g,    Musculoskeletal:         General: No deformity or signs of injury. Normal range of motion.      Cervical back: Normal range of motion and neck supple.   Skin:     General: Skin is warm and dry.      Coloration: Skin is not jaundiced or pale.   Neurological:      General: No focal deficit present.      Mental Status: He is alert and oriented to person, place, and time.   Psychiatric:         Mood and Affect: Mood normal.         Behavior:  Behavior normal.         Results Reviewed       Procedure Component Value Units Date/Time    CBC [[id number]]  (Abnormal) Resulted: 02/14/25 0411    Lab Status: Final result Specimen: Blood Updated: 02/14/25 0411     WBC 7.68 Thousand/uL      RBC 2.54 Million/uL      Hemoglobin 7.7 g/dL      Hematocrit 23.7 %      MCV 93 fL      MCH 30.3 pg      MCHC 32.5 g/dL      RDW 12.7 %      Platelets 220 Thousands/uL      MPV 9.3 fL     Basic metabolic panel [[id number]]  (Abnormal) Collected: 02/14/25 0347    Lab Status: Final result Specimen: Blood from Arm, Right Updated: 02/14/25 0409     Sodium 134 mmol/L      Potassium 4.7 mmol/L      Chloride 106 mmol/L      CO2 19 mmol/L      ANION GAP 9 mmol/L      BUN 69 mg/dL      Creatinine 5.61 mg/dL      Glucose 142 mg/dL      Calcium 8.4 mg/dL      eGFR 10 ml/min/1.73sq m     Narrative:      National [employer] guidelines for Chronic Kidney Disease (CKD):     Stage 1 with normal or high GFR (GFR > 90 mL/min/1.73 square meters)    Stage 2 Mild CKD (GFR = 60-89 mL/min/1.73 square meters)    Stage 3A Moderate CKD (GFR = 45-59 mL/min/1.73 square meters)    Stage 3B Moderate CKD (GFR = 30-44 mL/min/1.73 square meters)    Stage 4 Severe CKD (GFR = 15-29 mL/min/1.73 square meters)    Stage 5 End Stage CKD (GFR <15 mL/min/1.73 square meters)  Note: GFR calculation is accurate only with a steady state creatinine    Basic metabolic panel [[id number]]  (Abnormal) Collected: 02/13/25 2337    Lab Status: Final result Specimen: Blood from Arm, Left Updated: 02/14/25 0000     Sodium 128 mmol/L      Potassium 5.3 mmol/L      Chloride 101 mmol/L      CO2 15 mmol/L      ANION GAP 12 mmol/L      BUN 72 mg/dL      Creatinine 5.61 mg/dL      Glucose 75 mg/dL      Calcium 8.6 mg/dL      eGFR 10 ml/min/1.73sq m     Narrative:      National [employer] guidelines for Chronic Kidney Disease (CKD):     Stage 1 with normal or high GFR (GFR > 90 mL/min/1.73 square meters)     Stage 2 Mild CKD (GFR = 60-89 mL/min/1.73 square meters)    Stage 3A Moderate CKD (GFR = 45-59 mL/min/1.73 square meters)    Stage 3B Moderate CKD (GFR = 30-44 mL/min/1.73 square meters)    Stage 4 Severe CKD (GFR = 15-29 mL/min/1.73 square meters)    Stage 5 End Stage CKD (GFR <15 mL/min/1.73 square meters)  Note: GFR calculation is accurate only with a steady state creatinine    HS Troponin I 4hr [[id number]]  (Normal) Collected: 02/13/25 2142    Lab Status: Final result Specimen: Blood from Arm, Right Updated: 02/13/25 2225     hs TnI 4hr 19 ng/L      Delta 4hr hsTnI 3 ng/L     TSH, 3rd generation with Free T4 reflex [[id number]]  (Normal) Collected: 02/13/25 1604    Lab Status: Final result Specimen: Blood from Arm, Right Updated: 02/13/25 2005     TSH 3RD GENERATON 3.842 uIU/mL     B-Type Natriuretic Peptide(BNP) [[id number]]  (Abnormal) Collected: 02/13/25 1604    Lab Status: Final result Specimen: Blood from Arm, Right Updated: 02/13/25 1956      pg/mL     Basic metabolic panel [[id number]]  (Abnormal) Collected: 02/13/25 1858    Lab Status: Final result Specimen: Blood from Arm, Right Updated: 02/13/25 1945     Sodium 127 mmol/L      Potassium 5.4 mmol/L      Chloride 100 mmol/L      CO2 17 mmol/L      ANION GAP 10 mmol/L      BUN 71 mg/dL      Creatinine 5.57 mg/dL      Glucose 90 mg/dL      Calcium 8.5 mg/dL      eGFR 10 ml/min/1.73sq m     Narrative:      National [employer] Disease Foundation guidelines for Chronic Kidney Disease (CKD):     Stage 1 with normal or high GFR (GFR > 90 mL/min/1.73 square meters)    Stage 2 Mild CKD (GFR = 60-89 mL/min/1.73 square meters)    Stage 3A Moderate CKD (GFR = 45-59 mL/min/1.73 square meters)    Stage 3B Moderate CKD (GFR = 30-44 mL/min/1.73 square meters)    Stage 4 Severe CKD (GFR = 15-29 mL/min/1.73 square meters)    Stage 5 End Stage CKD (GFR <15 mL/min/1.73 square meters)  Note: GFR calculation is accurate only with a steady state creatinine    HS Troponin I 2hr  [[id number]]  (Normal) Collected: 02/13/25 1858    Lab Status: Final result Specimen: Blood from Arm, Right Updated: 02/13/25 1939     hs TnI 2hr 17 ng/L      Delta 2hr hsTnI 1 ng/L     HS Troponin 0hr (reflex protocol) [[id number]]  (Normal) Collected: 02/13/25 1604    Lab Status: Final result Specimen: Blood from Arm, Right Updated: 02/13/25 1705     hs TnI 0hr 16 ng/L     Comprehensive metabolic panel [[id number]]  (Abnormal) Collected: 02/13/25 1604    Lab Status: Final result Specimen: Blood from Arm, Right Updated: 02/13/25 1657     Sodium 125 mmol/L      Potassium 5.5 mmol/L      Chloride 97 mmol/L      CO2 18 mmol/L      ANION GAP 10 mmol/L      BUN 69 mg/dL      Creatinine 5.39 mg/dL      Glucose 94 mg/dL      Calcium 8.5 mg/dL      AST 11 U/L      ALT 14 U/L      Alkaline Phosphatase 64 U/L      Total Protein 6.7 g/dL      Albumin 4.2 g/dL      Total Bilirubin 0.41 mg/dL      eGFR 10 ml/min/1.73sq m     Narrative:      National [employer] Disease Foundation guidelines for Chronic Kidney Disease (CKD):     Stage 1 with normal or high GFR (GFR > 90 mL/min/1.73 square meters)    Stage 2 Mild CKD (GFR = 60-89 mL/min/1.73 square meters)    Stage 3A Moderate CKD (GFR = 45-59 mL/min/1.73 square meters)    Stage 3B Moderate CKD (GFR = 30-44 mL/min/1.73 square meters)    Stage 4 Severe CKD (GFR = 15-29 mL/min/1.73 square meters)    Stage 5 End Stage CKD (GFR <15 mL/min/1.73 square meters)  Note: GFR calculation is accurate only with a steady state creatinine    Lipase [[id number]]  (Normal) Collected: 02/13/25 1604    Lab Status: Final result Specimen: Blood from Arm, Right Updated: 02/13/25 1657     Lipase 80 u/L     CBC and differential [[id number]]  (Abnormal) Collected: 02/13/25 1604    Lab Status: Final result Specimen: Blood from Arm, Right Updated: 02/13/25 1644     WBC 8.50 Thousand/uL      RBC 2.59 Million/uL      Hemoglobin 7.8 g/dL      Hematocrit 23.9 %      MCV 92 fL      MCH 30.1 pg      MCHC 32.6 g/dL       RDW 12.7 %      MPV 9.3 fL      Platelets 229 Thousands/uL      nRBC 0 /100 WBCs      Segmented % 73 %      Immature Grans % 0 %      Lymphocytes % 11 %      Monocytes % 12 %      Eosinophils Relative 3 %      Basophils Relative 1 %      Absolute Neutrophils 6.29 Thousands/µL      Absolute Immature Grans 0.03 Thousand/uL      Absolute Lymphocytes 0.93 Thousands/µL      Absolute Monocytes 0.98 Thousand/µL      Eosinophils Absolute 0.21 Thousand/µL      Basophils Absolute 0.06 Thousands/µL             CT abdomen pelvis wo contrast   Final Interpretation by Roberto Cuello MD (02/14 0812)   Addendum (preliminary) 1 of 1 by Roberto Cuello MD (02/14 0812)   ADDENDUM:      Upon further review there is funneling of the prostatic urethra.    Calcifications are noted along the posterior wall of the urethra within    the prostate on series 602, image 111, however do not clearly appear to be    obstructing. Underlying stricture is    possible. Patient reportedly has a Mcclellan in place and is adequately    draining. Findings were discussed with the clinical care team.      Final      Moderate bilateral hydroureteronephrosis as well as marked urinary bladder distention. No obstructing calculi or definite central obstructing lesion identified on the current exam. Exact etiology is unclear however the possibility of pelvic floor laxity    could be considered.      Workstation performed: [id number]         XR chest 1 view portable   Final Interpretation by Jose Mendez MD (02/14 0645)      No radiographic evidence of acute intrathoracic process.            Workstation performed: [id number]             Procedures    ED Medication and Procedure Management   Prior to Admission Medications   Prescriptions Last Dose Informant Patient Reported? Taking?   FLUoxetine (PROzac) 20 mg capsule 2/12/2025 Bedtime  No Yes   Sig: Take 1 capsule (20 mg total) by mouth daily   Silodosin 8 MG CAPS 2/12/2025 Bedtime  No Yes   Sig: Take 1 capsule by  mouth daily at bedtime   VITAMIN D, CHOLECALCIFEROL, PO Past Month Self Yes Yes   Sig: Take by mouth Unsure of dose.   amLODIPine (NORVASC) 5 mg tablet 2025 Bedtime  No Yes   Sig: Take 1 tablet (5 mg total) by mouth daily   atorvastatin (LIPITOR) 80 mg tablet 2025 Bedtime  No Yes   Sig: Take 1 tablet (80 mg total) by mouth daily with dinner   fenofibrate (TRICOR) 145 mg tablet 2025 Bedtime  No Yes   Sig: Take 1 tablet (145 mg total) by mouth daily with dinner   fluticasone (FLONASE) 50 mcg/act nasal spray More than a month Self No No   Si sprays into each nostril daily   Patient taking differently: 2 sprays into each nostril if needed   gabapentin (NEURONTIN) 100 mg capsule 2025 Pharmacy (Specify) Yes Yes   Sig: Take 100 mg by mouth 2 (two) times a day   haloperidol (HALDOL) 5 mg tablet 2025 Bedtime  No Yes   Sig: Take 0.5 tablets (2.5 mg total) by mouth daily at bedtime   levothyroxine 150 mcg tablet 2025  No Yes   Sig: Take 1 tablet (150 mcg total) by mouth daily in the early morning      Facility-Administered Medications Last Administration Doses Remaining   cyanocobalamin injection 1,000 mcg 2025  3:52 PM         Current Discharge Medication List        CONTINUE these medications which have NOT CHANGED    Details   amLODIPine (NORVASC) 5 mg tablet Take 1 tablet (5 mg total) by mouth daily  Qty: 90 tablet, Refills: 3    Associated Diagnoses: Essential hypertension      atorvastatin (LIPITOR) 80 mg tablet Take 1 tablet (80 mg total) by mouth daily with dinner  Qty: 90 tablet, Refills: 3    Associated Diagnoses: BMI 40.0-44.9, adult (HCC)      fenofibrate (TRICOR) 145 mg tablet Take 1 tablet (145 mg total) by mouth daily with dinner  Qty: 90 tablet, Refills: 3    Associated Diagnoses: BMI 40.0-44.9, adult (HCC)      FLUoxetine (PROzac) 20 mg capsule Take 1 capsule (20 mg total) by mouth daily  Qty: 90 capsule, Refills: 3    Associated Diagnoses: Other depression       gabapentin (NEURONTIN) 100 mg capsule Take 100 mg by mouth 2 (two) times a day      haloperidol (HALDOL) 5 mg tablet Take 0.5 tablets (2.5 mg total) by mouth daily at bedtime  Qty: 90 tablet, Refills: 3    Associated Diagnoses: Hyperlipidemia associated with type 2 diabetes mellitus  (HCC); Type II diabetes mellitus with manifestations (HCC); Hypothyroidism due to Hashimoto's thyroiditis; Encounter for general adult medical examination with abnormal findings; Essential hypertension; Enlarged prostate; Cigarette smoker      levothyroxine 150 mcg tablet Take 1 tablet (150 mcg total) by mouth daily in the early morning  Qty: 100 tablet, Refills: 3    Associated Diagnoses: Hypothyroidism due to Hashimoto's thyroiditis; Iron deficiency anemia, unspecified iron deficiency anemia type      Silodosin 8 MG CAPS Take 1 capsule by mouth daily at bedtime  Qty: 90 capsule, Refills: 3    Associated Diagnoses: Type II diabetes mellitus with manifestations (HCC); Enlarged prostate; Cigarette smoker; Obstructive uropathy; Anemia, unspecified type      VITAMIN D, CHOLECALCIFEROL, PO Take by mouth Unsure of dose.      fluticasone (FLONASE) 50 mcg/act nasal spray 2 sprays into each nostril daily  Qty: 1 g, Refills: 0    Associated Diagnoses: Right acute serous otitis media, recurrence not specified           No discharge procedures on file.  ED SEPSIS DOCUMENTATION   Time reflects when diagnosis was documented in both MDM as applicable and the Disposition within this note       Time User Action Codes Description Comment    2/13/2025  6:03 PM Julieta Combs [N17.9] Acute kidney injury (HCC)     2/13/2025  6:03 PM Julieta Combs [R33.9] Urinary retention     2/14/2025  4:36 AM Jany Leiva Add [E87.1] Hyponatremia                  Julieta Butcher MD  02/14/25 5998

## 2025-02-13 NOTE — H&P
"H&P - Hospitalist   Name: Chas Soliz 59 y.o. male I MRN: [id number]  Unit/Bed#: ED-18 I Date of Admission: 2/13/2025   Date of Service: 2/13/2025 I Hospital Day: 0     Assessment & Plan  RICARDO (acute kidney injury) (HCC)  Secondary to obstructive uropathy  Mcclellan catheter to be inserted in the emergency department  Start bicarb infusion  Trend BMP  Urology consultation  Hypertension  Hypertensive urgency on admission suspect secondary to discomfort  Continue amlodipine  Labetalol as needed  Tobacco use disorder  Nicotine patch  Type II diabetes mellitus with manifestations (HCC)  Lab Results   Component Value Date    HGBA1C 5.6 01/30/2025       No results for input(s): \"POCGLU\" in the last 72 hours.    Blood Sugar Average: Last 72 hrs:    Diet controlled recent A1c 5.6  Monitor fasting blood sugar  Hyperlipidemia  Continue statin  Other specified hypothyroidism  Continue levothyroxine  Abdominal pain  Suspect secondary to urinary retention  Management in primary problem  Urinary retention  Suspect secondary to BPH  Mcclellan catheter to be placed  Urology consultation  Hyponatremia  Sodium 125 on admission  Suspect secondary to low solute intake as patient reports minimal appetite he has been drinking lots of water  Trend BMP every 4 hours with goal increase no greater than 8 in the next 24 hours    Anemia  Normocytic anemia  May be related to profound RICARDO  Patient denies any bleeding or melena  Check iron panel  Trend hemoglobin      VTE Prophylaxis: Heparin  Code Status: Level 1 full code    Anticipated Length of Stay:  Patient will be admitted on an Inpatient basis with an anticipated length of stay of greater than 2 midnights.   Justification for Hospital Stay: Management of severe RICARDO, hyponatremia requiring intervention as well as IV medications    Total Time for Visit, including Counseling / Coordination of Care: I have spent a total time of 76 minutes on 02/13/25 in caring for this patient including " Diagnostic results, Risks and benefits of tx options, Instructions for management, Patient and family education, Impressions, Counseling / Coordination of care, Documenting in the medical record, Reviewing / ordering tests, medicine, procedures  , and Communicating with other healthcare professionals .    Chief Complaint:   Abdominal pain, RICARDO    History of Present Illness:    Chas Soliz is a 59 y.o. male With past medical history of depression, hypertension, hyperlipidemia, hypothyroidism who presents to the hospital recommendation from GI office for abdominal pain and RICARDO.  Patient reports he has had difficulty voiding for a few months.  It was noted last time he was admitted in November.  Patient was at the GI office who reviewed outpatient labs and recommended ED evaluation.  He reports being in his normal state of health.  He smokes about a pack of cigarettes daily.    Review of Systems:    Review of Systems   Constitutional:  Negative for chills and fever.   HENT:  Negative for trouble swallowing.    Eyes:  Negative for photophobia and visual disturbance.   Respiratory:  Negative for shortness of breath and wheezing.    Cardiovascular:  Negative for chest pain and palpitations.   Gastrointestinal:  Positive for abdominal pain. Negative for constipation, diarrhea, nausea and vomiting.   Genitourinary:  Positive for difficulty urinating. Negative for dysuria.   Musculoskeletal:  Negative for arthralgias and myalgias.   Skin:  Negative for rash and wound.   Neurological:  Negative for dizziness, light-headedness and headaches.       Past Medical and Surgical History:     Past Medical History:   Diagnosis Date    Anxiety     Arthritis     Bipolar disorder (HCC)     Colon polyp     COPD (chronic obstructive pulmonary disease) ([facility])     Depression     Diabetes mellitus (HCC)     type 2    Gout     High triglycerides     Hypertension     Hypothyroidism 02/20/2013    Obesity     Paranoid schizophrenia ([facility])      Psychiatric disorder     Sciatica 12/03/2013    Seasonal allergies     Sleep apnea        Past Surgical History:   Procedure Laterality Date    COLONOSCOPY      UMBILICAL HERNIA REPAIR      WISDOM TOOTH EXTRACTION         Meds/Allergies:    Prior to Admission medications    Medication Sig Start Date End Date Taking? Authorizing Provider   amLODIPine (NORVASC) 5 mg tablet Take 1 tablet (5 mg total) by mouth daily 1/30/25  Yes Josue Maravilla MD   atorvastatin (LIPITOR) 80 mg tablet Take 1 tablet (80 mg total) by mouth daily with dinner 1/30/25 4/30/25 Yes Josue Maravilla MD   fenofibrate (TRICOR) 145 mg tablet Take 1 tablet (145 mg total) by mouth daily with dinner 1/30/25  Yes Josue Maravilla MD   FLUoxetine (PROzac) 20 mg capsule Take 1 capsule (20 mg total) by mouth daily 12/10/24  Yes Josue Maravilla MD   haloperidol (HALDOL) 5 mg tablet Take 0.5 tablets (2.5 mg total) by mouth daily at bedtime 12/10/24  Yes Josue Maravilla MD   levothyroxine 150 mcg tablet Take 1 tablet (150 mcg total) by mouth daily in the early morning 1/30/25  Yes Josue Maravilla MD   Silodosin 8 MG CAPS Take 1 capsule by mouth daily at bedtime 1/30/25  Yes Josue Maravilla MD   VITAMIN D, CHOLECALCIFEROL, PO Take by mouth Unsure of dose.   Yes Historical Provider, MD   fluticasone (FLONASE) 50 mcg/act nasal spray 2 sprays into each nostril daily  Patient taking differently: 2 sprays into each nostril if needed 12/31/21   Britt Garcia PA-C   gabapentin (Neurontin) 100 mg capsule Take 1 capsule (100 mg total) by mouth 2 (two) times a day  Patient not taking: Reported on 2/13/2025 11/7/24   Josue Maravilla MD   terazosin (HYTRIN) 2 mg capsule Take 1 capsule (2 mg total) by mouth daily at bedtime  Patient not taking: Reported on 2/13/2025 1/30/25   Josue Maravilla MD       Allergies:   Allergies   Allergen Reactions    No Active Allergies        Social History:     Marital Status: Single   Substance Use History:   Social History     Substance and Sexual Activity    Alcohol Use Not Currently     Social History     Tobacco Use   Smoking Status Every Day    Current packs/day: 0.25    Average packs/day: 0.3 packs/day for 35.0 years (8.8 ttl pk-yrs)    Types: Cigarettes, E-Cigarettes   Smokeless Tobacco Never     Social History     Substance and Sexual Activity   Drug Use No       Family History:    Pertinent family history reviewed    Physical Exam:     Vitals:   Blood Pressure: (!) 207/94 (02/13/25 1554)  Pulse: 100 (02/13/25 1554)  Temperature: 98.1 °F (36.7 °C) (02/13/25 1554)  Respirations: 22 (02/13/25 1554)  Weight - Scale: 132 kg (291 lb 3.6 oz) (02/13/25 1553)  SpO2: 97 % (02/13/25 1554)    Physical Exam  Vitals reviewed.   Constitutional:       General: He is not in acute distress.     Appearance: He is well-developed. He is not ill-appearing, toxic-appearing or diaphoretic.   HENT:      Head: Normocephalic and atraumatic.      Mouth/Throat:      Mouth: Mucous membranes are moist.   Eyes:      General: No scleral icterus.     Extraocular Movements: Extraocular movements intact.   Cardiovascular:      Rate and Rhythm: Normal rate and regular rhythm.      Heart sounds: Normal heart sounds.   Pulmonary:      Effort: Pulmonary effort is normal. No respiratory distress.      Breath sounds: Normal breath sounds. No wheezing or rales.   Abdominal:      General: There is distension.      Palpations: Abdomen is soft.      Tenderness: There is no abdominal tenderness. There is no guarding or rebound.   Musculoskeletal:         General: No swelling, tenderness or deformity.   Skin:     General: Skin is warm and dry.   Neurological:      General: No focal deficit present.      Mental Status: He is alert. Mental status is at baseline.   Psychiatric:         Mood and Affect: Mood normal.         Behavior: Behavior normal.         Thought Content: Thought content normal.         Judgment: Judgment normal.          Additional Data:     Lab Results: I have reviewed pertinent results      Results from last 7 days   Lab Units 02/13/25  1604   WBC Thousand/uL 8.50   HEMOGLOBIN g/dL 7.8*   HEMATOCRIT % 23.9*   PLATELETS Thousands/uL 229   SEGS PCT % 73   LYMPHO PCT % 11*   MONO PCT % 12   EOS PCT % 3     Results from last 7 days   Lab Units 02/13/25  1604   SODIUM mmol/L 125*   POTASSIUM mmol/L 5.5*   CHLORIDE mmol/L 97   CO2 mmol/L 18*   BUN mg/dL 69*   CREATININE mg/dL 5.39*   ANION GAP mmol/L 10   CALCIUM mg/dL 8.5   ALBUMIN g/dL 4.2   TOTAL BILIRUBIN mg/dL 0.41   ALK PHOS U/L 64   ALT U/L 14   AST U/L 11*   GLUCOSE RANDOM mg/dL 94                       Imaging: I have reviewed pertinent imaging     CT abdomen pelvis wo contrast   Final Result by Roberto Cuello MD (02/13 1745)      Moderate bilateral hydroureteronephrosis as well as marked urinary bladder distention. No obstructing calculi or definite central obstructing lesion identified on the current exam. Exact etiology is unclear however the possibility of pelvic floor laxity    could be considered.      Workstation performed: [id number]         XR chest 1 view portable    (Results Pending)       EKG, Pathology, and Other Studies Reviewed on Admission:   EKG: Normal sinus rhythm    Allscripts / Epic Records Reviewed    ** Please Note: This note has been constructed using a voice recognition system. **

## 2025-02-13 NOTE — PROGRESS NOTES
"Name: Chas Soliz      : 1965      MRN: [id number]  Encounter Provider: Joe Vargas MD  Encounter Date: 2025   Encounter department: [facility] [place]  :  Assessment & Plan  Palpable abdominal mass  Acutely worsening right sided abdominal pain which started about a couple of weeks ago. He had US abdominal wall on 2025 which showed \"poorly marginated ill-defined hypoechoic nonvascular area at the site of the suspected mass measuring 2.5 x 0.5 x 2.1 cm.\" His physical exam today notable for protruding abdominal mass spanning from RUQ to RLQ which appears to be larger than 20 cm in length. Ddx includes hematoma, abscess, enlarged bladder, malignancy, hernia, less likely aneurysm.   Most recent blood work BUN 76 and Cr 5.55 on 2025. Patient also reports acute on chronic sob and physical exam notable for bibasilar poor air movement and 1-2+ pitting edema b/l LE. Given his acutely worsening abdominal pain where he has this abnormal physical finding, RICARDO and SOB, I recommend ED visit for immediate evaluation (ie vitals, blood work, urine studies, EKG, US kidneys/bladder, noncon CT if truly RICARDO etc). I highly recommended going to the ED immediately but the patient reported that he needed to go back home and turn off his electric heater prior to going to the ED. He understood the risk of not going to the ED immediately and decided to go to [facility]  hospital within 1 hour.   Orders:    Transfer to other facility    Abdominal pain, unspecified abdominal location  As above  Orders:    Transfer to other facility    RICARDO (acute kidney injury) (HCC)  As above  Orders:    Transfer to other facility    SOB (shortness of breath)  As above  Orders:    Transfer to other facility    Leg swelling  As above  Orders:    Transfer to other facility    Iron deficiency anemia, unspecified iron deficiency anemia type  Need to address the abdominal mass, RICARDO and SOB first. " "Afterwards, we will discuss workup for his NOAH. I will bring the patient back to our office after his ED/hospital visit.   Colonoscopy 10/2019 ([facility] 1/2/2): moderately difficult colonoscopy due to patient's discomfort/physique and poor preparation, coughing and retching during the procedure; repeat colonoscopy in 3 years  Orders:    Ambulatory referral to Gastroenterology        RTC after ED/hospital visit      History of Present Illness   HPI  Chas Soliz is a 59 y.o. male PMH T2DM, HTN, obesity, hypothyroidism, NOAH, current smoker who presents NOAH and abdominal mass.   History obtained from: patient    Patient reports right-sided flank pain for a couple of weeks but very bad yesterday. Reports that he was told that \"his bladder is not releasing all the urine.\"  Poor appetite for a few weeks.  Chronic cough but worsening for a few weeks. Reports clear mucus production.   Took Tylenol and one Advil yesterday.  Patient not aware of RICARDO. Reports he was told that he was dehydrated about 2 months ago when he was hospitalized. Reports making urine and the amount of urine did not decrease but has been urinating very frequently also overnight.   No unintentional weight loss. No fevers or chills.   No BRBPR. No constipation or diarrhea.    Mother with colon cancer at the age of 60s or 70s.   Reports 1-2 ppd.       Review of Systems  Current Outpatient Medications on File Prior to Visit   Medication Sig Dispense Refill    amLODIPine (NORVASC) 5 mg tablet Take 1 tablet (5 mg total) by mouth daily 90 tablet 3    atorvastatin (LIPITOR) 80 mg tablet Take 1 tablet (80 mg total) by mouth daily with dinner 90 tablet 3    fenofibrate (TRICOR) 145 mg tablet Take 1 tablet (145 mg total) by mouth daily with dinner 90 tablet 3    FLUoxetine (PROzac) 20 mg capsule Take 1 capsule (20 mg total) by mouth daily 90 capsule 3    fluticasone (FLONASE) 50 mcg/act nasal spray 2 sprays into each nostril daily 1 g 0    gabapentin (Neurontin) " 100 mg capsule Take 1 capsule (100 mg total) by mouth 2 (two) times a day 60 capsule 1    haloperidol (HALDOL) 5 mg tablet Take 0.5 tablets (2.5 mg total) by mouth daily at bedtime 90 tablet 3    levothyroxine 150 mcg tablet Take 1 tablet (150 mcg total) by mouth daily in the early morning 100 tablet 3    Silodosin 8 MG CAPS Take 1 capsule by mouth daily at bedtime 90 capsule 3    terazosin (HYTRIN) 2 mg capsule Take 1 capsule (2 mg total) by mouth daily at bedtime 90 capsule 3     Current Facility-Administered Medications on File Prior to Visit   Medication Dose Route Frequency Provider Last Rate Last Admin    cyanocobalamin injection 1,000 mcg  1,000 mcg Intramuscular Q30 Days    1,000 mcg at 01/30/25 1552         Objective   /73   Pulse 92   Temp 98 °F (36.7 °C)   Wt 133 kg (292 lb 9.6 oz)   BMI 43.21 kg/m²      Physical Exam  Vitals reviewed.   Constitutional:       General: He is not in acute distress.     Appearance: Normal appearance.   HENT:      Head: Normocephalic and atraumatic.   Eyes:      General: No scleral icterus.     Extraocular Movements: Extraocular movements intact.      Comments: Mild conjunctival pallor   Cardiovascular:      Rate and Rhythm: Normal rate and regular rhythm.   Pulmonary:      Effort: No respiratory distress.      Comments: Bibasilar poor air movement posteriorly  Abdominal:      General: There is distension.      Palpations: Abdomen is soft. There is mass (protruding mass in the right sided abdomen spanning from RUQ to RLQ at least 20 cm in length).      Tenderness: There is abdominal tenderness (mild TTP on the right side). There is no guarding or rebound.   Musculoskeletal:         General: Swelling (1-2+ pitting edema b/l) present.      Cervical back: Normal range of motion.   Skin:     General: Skin is warm and dry.   Neurological:      General: No focal deficit present.      Mental Status: He is alert.

## 2025-02-13 NOTE — ASSESSMENT & PLAN NOTE
Normocytic anemia  May be related to profound RICARDO  Patient denies any bleeding or melena  Check iron panel  Trend hemoglobin

## 2025-02-14 ENCOUNTER — TELEPHONE (OUTPATIENT)
Dept: OTHER | Facility: HOSPITAL | Age: 60
End: 2025-02-14

## 2025-02-14 PROBLEM — R10.9 ABDOMINAL PAIN: Status: RESOLVED | Noted: 2025-02-13 | Resolved: 2025-02-14

## 2025-02-14 PROBLEM — R35.89 POLYURIA: Status: ACTIVE | Noted: 2025-02-14

## 2025-02-14 LAB
ANION GAP SERPL CALCULATED.3IONS-SCNC: 12 MMOL/L (ref 4–13)
ANION GAP SERPL CALCULATED.3IONS-SCNC: 7 MMOL/L (ref 4–13)
ANION GAP SERPL CALCULATED.3IONS-SCNC: 8 MMOL/L (ref 4–13)
ANION GAP SERPL CALCULATED.3IONS-SCNC: 8 MMOL/L (ref 4–13)
ANION GAP SERPL CALCULATED.3IONS-SCNC: 9 MMOL/L (ref 4–13)
ANION GAP SERPL CALCULATED.3IONS-SCNC: 9 MMOL/L (ref 4–13)
BUN SERPL-MCNC: 64 MG/DL (ref 5–25)
BUN SERPL-MCNC: 65 MG/DL (ref 5–25)
BUN SERPL-MCNC: 66 MG/DL (ref 5–25)
BUN SERPL-MCNC: 66 MG/DL (ref 5–25)
BUN SERPL-MCNC: 69 MG/DL (ref 5–25)
BUN SERPL-MCNC: 72 MG/DL (ref 5–25)
CALCIUM SERPL-MCNC: 8.4 MG/DL (ref 8.4–10.2)
CALCIUM SERPL-MCNC: 8.5 MG/DL (ref 8.4–10.2)
CALCIUM SERPL-MCNC: 8.6 MG/DL (ref 8.4–10.2)
CALCIUM SERPL-MCNC: 8.6 MG/DL (ref 8.4–10.2)
CALCIUM SERPL-MCNC: 8.7 MG/DL (ref 8.4–10.2)
CALCIUM SERPL-MCNC: 8.7 MG/DL (ref 8.4–10.2)
CHLORIDE SERPL-SCNC: 101 MMOL/L (ref 96–108)
CHLORIDE SERPL-SCNC: 104 MMOL/L (ref 96–108)
CHLORIDE SERPL-SCNC: 105 MMOL/L (ref 96–108)
CHLORIDE SERPL-SCNC: 105 MMOL/L (ref 96–108)
CHLORIDE SERPL-SCNC: 106 MMOL/L (ref 96–108)
CHLORIDE SERPL-SCNC: 106 MMOL/L (ref 96–108)
CO2 SERPL-SCNC: 15 MMOL/L (ref 21–32)
CO2 SERPL-SCNC: 19 MMOL/L (ref 21–32)
CO2 SERPL-SCNC: 21 MMOL/L (ref 21–32)
CREAT SERPL-MCNC: 5.14 MG/DL (ref 0.6–1.3)
CREAT SERPL-MCNC: 5.2 MG/DL (ref 0.6–1.3)
CREAT SERPL-MCNC: 5.34 MG/DL (ref 0.6–1.3)
CREAT SERPL-MCNC: 5.55 MG/DL (ref 0.6–1.3)
CREAT SERPL-MCNC: 5.61 MG/DL (ref 0.6–1.3)
CREAT SERPL-MCNC: 5.61 MG/DL (ref 0.6–1.3)
ERYTHROCYTE [DISTWIDTH] IN BLOOD BY AUTOMATED COUNT: 12.7 % (ref 11.6–15.1)
FERRITIN SERPL-MCNC: 243 NG/ML (ref 24–336)
GFR SERPL CREATININE-BSD FRML MDRD: 10 ML/MIN/1.73SQ M
GFR SERPL CREATININE-BSD FRML MDRD: 11 ML/MIN/1.73SQ M
GFR SERPL CREATININE-BSD FRML MDRD: 11 ML/MIN/1.73SQ M
GLUCOSE SERPL-MCNC: 111 MG/DL (ref 65–140)
GLUCOSE SERPL-MCNC: 114 MG/DL (ref 65–140)
GLUCOSE SERPL-MCNC: 131 MG/DL (ref 65–140)
GLUCOSE SERPL-MCNC: 142 MG/DL (ref 65–140)
GLUCOSE SERPL-MCNC: 147 MG/DL (ref 65–140)
GLUCOSE SERPL-MCNC: 75 MG/DL (ref 65–140)
HCT VFR BLD AUTO: 23.7 % (ref 36.5–49.3)
HGB BLD-MCNC: 7.7 G/DL (ref 12–17)
IRON SATN MFR SERPL: 32 % (ref 15–50)
IRON SERPL-MCNC: 68 UG/DL (ref 50–212)
MCH RBC QN AUTO: 30.3 PG (ref 26.8–34.3)
MCHC RBC AUTO-ENTMCNC: 32.5 G/DL (ref 31.4–37.4)
MCV RBC AUTO: 93 FL (ref 82–98)
PLATELET # BLD AUTO: 220 THOUSANDS/UL (ref 149–390)
PMV BLD AUTO: 9.3 FL (ref 8.9–12.7)
POTASSIUM SERPL-SCNC: 4.5 MMOL/L (ref 3.5–5.3)
POTASSIUM SERPL-SCNC: 4.6 MMOL/L (ref 3.5–5.3)
POTASSIUM SERPL-SCNC: 4.7 MMOL/L (ref 3.5–5.3)
POTASSIUM SERPL-SCNC: 4.8 MMOL/L (ref 3.5–5.3)
POTASSIUM SERPL-SCNC: 5 MMOL/L (ref 3.5–5.3)
POTASSIUM SERPL-SCNC: 5.3 MMOL/L (ref 3.5–5.3)
RBC # BLD AUTO: 2.54 MILLION/UL (ref 3.88–5.62)
SODIUM SERPL-SCNC: 128 MMOL/L (ref 135–147)
SODIUM SERPL-SCNC: 134 MMOL/L (ref 135–147)
TIBC SERPL-MCNC: 211.4 UG/DL (ref 250–450)
TRANSFERRIN SERPL-MCNC: 151 MG/DL (ref 203–362)
UIBC SERPL-MCNC: 143 UG/DL (ref 155–355)
WBC # BLD AUTO: 7.68 THOUSAND/UL (ref 4.31–10.16)

## 2025-02-14 PROCEDURE — NC001 PR NO CHARGE: Performed by: INTERNAL MEDICINE

## 2025-02-14 PROCEDURE — 83540 ASSAY OF IRON: CPT | Performed by: INTERNAL MEDICINE

## 2025-02-14 PROCEDURE — 80048 BASIC METABOLIC PNL TOTAL CA: CPT | Performed by: INTERNAL MEDICINE

## 2025-02-14 PROCEDURE — 99223 1ST HOSP IP/OBS HIGH 75: CPT | Performed by: INTERNAL MEDICINE

## 2025-02-14 PROCEDURE — 82728 ASSAY OF FERRITIN: CPT | Performed by: INTERNAL MEDICINE

## 2025-02-14 PROCEDURE — 83550 IRON BINDING TEST: CPT | Performed by: INTERNAL MEDICINE

## 2025-02-14 PROCEDURE — 99233 SBSQ HOSP IP/OBS HIGH 50: CPT | Performed by: INTERNAL MEDICINE

## 2025-02-14 PROCEDURE — 99223 1ST HOSP IP/OBS HIGH 75: CPT | Performed by: UROLOGY

## 2025-02-14 PROCEDURE — 85027 COMPLETE CBC AUTOMATED: CPT | Performed by: INTERNAL MEDICINE

## 2025-02-14 PROCEDURE — 80048 BASIC METABOLIC PNL TOTAL CA: CPT | Performed by: NURSE PRACTITIONER

## 2025-02-14 RX ORDER — DESMOPRESSIN ACETATE 4 UG/ML
1 INJECTION, SOLUTION INTRAVENOUS; SUBCUTANEOUS ONCE
Status: DISCONTINUED | OUTPATIENT
Start: 2025-02-14 | End: 2025-02-14

## 2025-02-14 RX ORDER — DEXTROSE MONOHYDRATE 50 MG/ML
150 INJECTION, SOLUTION INTRAVENOUS CONTINUOUS
Status: DISPENSED | OUTPATIENT
Start: 2025-02-14 | End: 2025-02-14

## 2025-02-14 RX ORDER — DEXTROSE MONOHYDRATE 50 MG/ML
100 INJECTION, SOLUTION INTRAVENOUS CONTINUOUS
Status: DISCONTINUED | OUTPATIENT
Start: 2025-02-14 | End: 2025-02-15

## 2025-02-14 RX ORDER — FINASTERIDE 5 MG/1
5 TABLET, FILM COATED ORAL DAILY
Status: DISCONTINUED | OUTPATIENT
Start: 2025-02-14 | End: 2025-02-19 | Stop reason: HOSPADM

## 2025-02-14 RX ADMIN — FINASTERIDE 5 MG: 5 TABLET, FILM COATED ORAL at 14:15

## 2025-02-14 RX ADMIN — HEPARIN SODIUM 5000 UNITS: 5000 INJECTION, SOLUTION INTRAVENOUS; SUBCUTANEOUS at 22:35

## 2025-02-14 RX ADMIN — HALOPERIDOL 2.5 MG: 1 TABLET ORAL at 22:34

## 2025-02-14 RX ADMIN — FLUOXETINE HYDROCHLORIDE 20 MG: 20 CAPSULE ORAL at 09:38

## 2025-02-14 RX ADMIN — DEXTROSE 500 ML: 5 SOLUTION INTRAVENOUS at 05:34

## 2025-02-14 RX ADMIN — AMLODIPINE BESYLATE 5 MG: 5 TABLET ORAL at 09:38

## 2025-02-14 RX ADMIN — DEXTROSE 150 ML/HR: 5 SOLUTION INTRAVENOUS at 07:00

## 2025-02-14 RX ADMIN — Medication 1 PATCH: at 09:38

## 2025-02-14 RX ADMIN — DEXTROSE 150 ML/HR: 5 SOLUTION INTRAVENOUS at 17:42

## 2025-02-14 RX ADMIN — LEVOTHYROXINE SODIUM 150 MCG: 75 TABLET ORAL at 05:37

## 2025-02-14 RX ADMIN — HEPARIN SODIUM 5000 UNITS: 5000 INJECTION, SOLUTION INTRAVENOUS; SUBCUTANEOUS at 14:16

## 2025-02-14 RX ADMIN — TAMSULOSIN HYDROCHLORIDE 0.8 MG: 0.4 CAPSULE ORAL at 17:41

## 2025-02-14 RX ADMIN — ATORVASTATIN CALCIUM 80 MG: 80 TABLET, FILM COATED ORAL at 17:41

## 2025-02-14 RX ADMIN — HEPARIN SODIUM 5000 UNITS: 5000 INJECTION, SOLUTION INTRAVENOUS; SUBCUTANEOUS at 05:37

## 2025-02-14 NOTE — ASSESSMENT & PLAN NOTE
"Lab Results   Component Value Date    HGBA1C 5.6 01/30/2025       No results for input(s): \"POCGLU\" in the last 72 hours.    Blood Sugar Average: Last 72 hrs:    Glucose is overall well-controlled, currently no sign of neurogenic bladder at this time  "

## 2025-02-14 NOTE — PROGRESS NOTES
"Progress Note - Hospitalist   Name: Chas Soliz 59 y.o. male I MRN: [id number]  Unit/Bed#: [place] [address] -02 I Date of Admission: 2/13/2025   Date of Service: 2/14/2025 I Hospital Day: 1    Assessment & Plan  RICARDO (acute kidney injury) (HCC)  Secondary to obstructive uropathy  Mcclellan catheter to be inserted in the emergency department  Creatinine without improvement today, continue to trend and monitor for renal recovery  On D5 today for management of hyponatremia  Appreciate nephrology recommendations  Hypertension  Hypertensive urgency on admission suspect secondary to discomfort  Now improved after Mcclellan insertion  Continue amlodipine  Labetalol as needed  Tobacco use disorder  Nicotine patch  Type II diabetes mellitus with manifestations (HCC)  Lab Results   Component Value Date    HGBA1C 5.6 01/30/2025       No results for input(s): \"POCGLU\" in the last 72 hours.    Blood Sugar Average: Last 72 hrs:    Diet controlled recent A1c 5.6  Monitor fasting blood sugar  Hyperlipidemia  Continue statin  Other specified hypothyroidism  Continue levothyroxine  Abdominal pain (Resolved: 2/14/2025)  Suspect secondary to urinary retention  Resolved  Management in primary problem  Urinary retention  Suspect secondary to BPH versus urethral stricture  Mcclellan catheter in place and draining well  Urology consultation  Hyponatremia  Sodium 125 on admission  Patient did have rapid correction overnight  Case was discussed with on-call nephrology who started patient on D5 bolus and D5 infusion with every 4 hours BMPs      Anemia  Normocytic anemia  May be related to profound RICARDO  Patient denies any bleeding or melena  Iron panel pending  Trend hemoglobin  Transfuse to maintain greater than 7    VTE Pharmacologic Prophylaxis: heparin     Patient Centered Rounds:  Patient care rounds were performed with nursing    Discussions with Specialists or Other Care Team Provider: Independently reviewed case with nephrology as well as " urology    Education and Discussions with Family / Patient: Updated patient during rounds    Time Spent for Care: I have spent a total time of 56 minutes on 25 in caring for this patient including Diagnostic results, Risks and benefits of tx options, Instructions for management, Impressions, Counseling / Coordination of care, Documenting in the medical record, Reviewing / ordering tests, medicine, procedures  , and Communicating with other healthcare professionals .      Current Length of Stay: 1 day(s)    Current Patient Status: Inpatient   Certification Statement: The patient will continue to require additional inpatient hospital stay due to need for IVF, management of ricardo    Discharge Plan: Awaiting renal recovery, management of RICARDO    Code Status: Level 1 - Full Code      Subjective:   Patient seen and evaluated at bedside.  He feels well.  Much improved after Mcclellan catheter placement    Objective:     Vitals:   Temp (24hrs), Av.4 °F (36.9 °C), Min:98 °F (36.7 °C), Max:98.8 °F (37.1 °C)    Temp:  [98 °F (36.7 °C)-98.8 °F (37.1 °C)] 98.8 °F (37.1 °C)  HR:  [] 74  Resp:  [16-22] 20  BP: (134-207)/(65-94) 134/66  SpO2:  [92 %-98 %] 95 %  Body mass index is 38.38 kg/m².     Input and Output Summary (last 24 hours):       Intake/Output Summary (Last 24 hours) at 2025 0918  Last data filed at 2025 0914  Gross per 24 hour   Intake 850 ml   Output 6430 ml   Net -5580 ml       Physical Exam:     Physical Exam  Vitals reviewed.   Constitutional:       General: He is not in acute distress.     Appearance: He is well-developed. He is not ill-appearing, toxic-appearing or diaphoretic.   HENT:      Head: Normocephalic and atraumatic.      Mouth/Throat:      Mouth: Mucous membranes are moist.   Eyes:      General: No scleral icterus.     Extraocular Movements: Extraocular movements intact.   Cardiovascular:      Rate and Rhythm: Normal rate and regular rhythm.      Heart sounds: Normal heart sounds.    Pulmonary:      Effort: Pulmonary effort is normal. No respiratory distress.      Breath sounds: Normal breath sounds. No wheezing or rales.   Abdominal:      General: There is no distension.      Palpations: Abdomen is soft.      Tenderness: There is no abdominal tenderness. There is no guarding or rebound.   Musculoskeletal:         General: No swelling, tenderness or deformity.   Skin:     General: Skin is warm and dry.   Neurological:      General: No focal deficit present.      Mental Status: He is alert. Mental status is at baseline.   Psychiatric:         Mood and Affect: Mood normal.         Behavior: Behavior normal.         Thought Content: Thought content normal.         Judgment: Judgment normal.         Additional Data:     Labs: I have reviewed pertinent results     Results from last 7 days   Lab Units 02/14/25  0411 02/13/25  1604   WBC Thousand/uL 7.68 8.50   HEMOGLOBIN g/dL 7.7* 7.8*   HEMATOCRIT % 23.7* 23.9*   PLATELETS Thousands/uL 220 229   SEGS PCT %  --  73   LYMPHO PCT %  --  11*   MONO PCT %  --  12   EOS PCT %  --  3     Results from last 7 days   Lab Units 02/14/25  0347 02/13/25  1858 02/13/25  1604   SODIUM mmol/L 134*   < > 125*   POTASSIUM mmol/L 4.7   < > 5.5*   CHLORIDE mmol/L 106   < > 97   CO2 mmol/L 19*   < > 18*   BUN mg/dL 69*   < > 69*   CREATININE mg/dL 5.61*   < > 5.39*   ANION GAP mmol/L 9   < > 10   CALCIUM mg/dL 8.4   < > 8.5   ALBUMIN g/dL  --   --  4.2   TOTAL BILIRUBIN mg/dL  --   --  0.41   ALK PHOS U/L  --   --  64   ALT U/L  --   --  14   AST U/L  --   --  11*   GLUCOSE RANDOM mg/dL 142*   < > 94    < > = values in this interval not displayed.                         Imaging: I have reviewed pertinent imaging       Recent Cultures (last 7 days):           Last 24 Hours Medication List:   Current Facility-Administered Medications   Medication Dose Route Frequency Provider Last Rate    acetaminophen  650 mg Oral Q6H PRN Joseph Eaton DO      aluminum-magnesium  hydroxide-simethicone  30 mL Oral Q6H PRN Joseph Eaton DO      amLODIPine  5 mg Oral Daily Joseph Eaton DO      atorvastatin  80 mg Oral Daily With Dinner Joseph Eaton DO      dextrose  150 mL/hr Intravenous Continuous Nikunernie Salamanca  mL/hr (02/14/25 0700)    FLUoxetine  20 mg Oral Daily Joseph Eaton DO      haloperidol  2.5 mg Oral HS Joseph Eaton DO      heparin (porcine)  5,000 Units Subcutaneous Q8H MARK Joseph Eaton DO      labetalol  10 mg Intravenous Q4H PRN Joseph Eaton DO      levothyroxine  150 mcg Oral Early Morning Joseph Eaton DO      nicotine  1 patch Transdermal Daily Joseph Eaton DO      ondansetron  4 mg Intravenous Q6H PRN Joseph Eaton DO      oxyCODONE  5 mg Oral Q6H PRN Joseph Eaton DO      tamsulosin  0.8 mg Oral Daily With Dinner Joseph Eaton DO          Today, Patient Was Seen By: Joseph Eaton DO    ** Please Note: Dictation voice to text software may have been used in the creation of this document. **

## 2025-02-14 NOTE — ASSESSMENT & PLAN NOTE
Creatinine 5.39 on admission 2/13/25, already elevated to 5.55 as of 1/24/25  Peak creat 5.61, hopeful for improvement s/p pickard placement  Etiology -- moderate hydroureteronephrosis without stones and marked urinary bladder distension  Continue to monitor closely  Nephrology following - recommendations appreciated

## 2025-02-14 NOTE — ASSESSMENT & PLAN NOTE
Hypertensive urgency on admission suspect secondary to discomfort  Now improved after Mcclellan insertion  Continue amlodipine  Labetalol as needed

## 2025-02-14 NOTE — UTILIZATION REVIEW
Initial Clinical Review    Admission: Date/Time/Statement:   Admission Orders (From admission, onward)       Ordered        02/13/25 1804  INPATIENT ADMISSION  Once                          Orders Placed This Encounter   Procedures    INPATIENT ADMISSION     Standing Status:   Standing     Number of Occurrences:   1     Level of Care:   Med Surg [16]     Estimated length of stay:   More than 2 Midnights     Certification:   I certify that inpatient services are medically necessary for this patient for a duration of greater than two midnights. See H&P and MD Progress Notes for additional information about the patient's course of treatment.     ED Arrival Information       Expected   2/13/2025     Arrival   2/13/2025 15:48    Acuity   Urgent              Means of arrival   Walk-In    Escorted by   Self    Service   Hospitalist    Admission type   Emergency              Arrival complaint   Abdominal pain             Chief Complaint   Patient presents with    Abnormal Lab     Pt was sent in by gastro. States that they told him his kidney function was elevated. Also reports abdominal pain and sob    Shortness of Breath    Abdominal Pain       Initial Presentation: 59 y.o. male to ED from home w/ PMHX depression, hypertension, hyperlipidemia, hypothyroidism who presents to the hospital recommendation from GI office for abdominal pain and abnormal OP labs . Admitted IP status w/ RICARDO sec to obstructive uropathy . Mcclellan placed in ED . Plan to start bicarb gtt , trend BMP , urology consult . HTN cont amlodipine , labetalol prn . DM diet controlled. HLD statin . Hyponatremia Na 125 trend BMP q4hr . Anemia check Fe panel , trend hgb .     Anticipated Length of Stay/Certification Statement: Patient will be admitted on an Inpatient basis with an anticipated length of stay of greater than 2 midnights.   Justification for Hospital Stay: Management of severe RICARDO, hyponatremia requiring intervention as well as IV medications      Date: 2/14   Day 2: cont stay for IVF , mngt of RICARDO . Feeling better after pickard placement . -5580 ml net output last 24 hrs .     2/14 Quick Note   Overcorrection of Na . On Na bicarb gtt w. D5 . BMP q4hr .     2/14  Nephrology Consult   peak sCr 5.61, hopeful for improvement s/p pickard placement .  RICARDO in setting of moderate b/l hydroureteronephrosis without stones and marked urinary bladder distension . Cont IVF and amlodipine for HTN . 8.4L UOP s/p pickard placement .    2/14 Urology Consult   Cr 5.39 on admission , peaked 5.61 . Etiology -- moderate hydroureteronephrosis without stones and marked urinary bladder distension.   Continue to monitor closely. Pickard catheter inserted in ED (2/13) for 1880 ml output, clear angel urine . Cont flomax , finasteride . Urology signing off .     Date: 2/15  Day 3: Has surpassed a 2nd midnight with active treatments and services.cont stay for mngt of RICARDO , polyuria . -6100 ml net output last 24 hrs . Cr improved to 4.92 from 5.39. cont IV dextrose.   Suspect DC in 48-72 hrs       ED Treatment-Medication Administration from 02/13/2025 1513 to 02/13/2025 2022         Date/Time Order Dose Route Action     02/13/2025 1911 atorvastatin (LIPITOR) tablet 80 mg 80 mg Oral Given     02/13/2025 1911 tamsulosin (FLOMAX) capsule 0.8 mg 0.8 mg Oral Given            Scheduled Medications:  amLODIPine, 5 mg, Oral, Daily  atorvastatin, 80 mg, Oral, Daily With Dinner  finasteride, 5 mg, Oral, Daily  FLUoxetine, 20 mg, Oral, Daily  haloperidol, 2.5 mg, Oral, HS  heparin (porcine), 5,000 Units, Subcutaneous, Q8H MARK  levothyroxine, 150 mcg, Oral, Early Morning  nicotine, 1 patch, Transdermal, Daily  tamsulosin, 0.8 mg, Oral, Daily With Dinner      Continuous IV Infusions:  dextrose, 150 mL/hr, Intravenous, Continuous      PRN Meds:  acetaminophen, 650 mg, Oral, Q6H PRN  aluminum-magnesium hydroxide-simethicone, 30 mL, Oral, Q6H PRN  labetalol, 10 mg, Intravenous, Q4H PRN  ondansetron, 4 mg,  Intravenous, Q6H PRN  oxyCODONE, 5 mg, Oral, Q6H PRN      ED Triage Vitals   Temperature Pulse Respirations Blood Pressure SpO2 Pain Score   02/13/25 1554 02/13/25 1554 02/13/25 1554 02/13/25 1554 02/13/25 1554 02/13/25 2016   98.1 °F (36.7 °C) 100 22 (!) 207/94 97 % No Pain     Weight (last 2 days)       Date/Time Weight    02/13/25 2038 118 (259.92)    02/13/25 1553 132 (291.23)            Vital Signs (last 3 days)       Date/Time Temp Pulse Resp BP MAP (mmHg) SpO2 O2 Device Patient Position - Orthostatic VS Pain    02/14/25 2219 98.8 °F (37.1 °C) -- 18 130/70 89 -- -- -- --    02/14/25 21:17:10 98.9 °F (37.2 °C) 70 18 139/68 92 93 % None (Room air) -- --    02/14/25 2030 -- -- -- -- -- -- None (Room air) -- No Pain    02/14/25 15:19:35 98.7 °F (37.1 °C) 74 20 137/69 92 91 % None (Room air) Lying --    02/14/25 0934 -- -- -- -- -- -- -- -- No Pain    02/14/25 07:36:53 98.8 °F (37.1 °C) 74 20 134/66 89 95 % None (Room air) Lying --    02/13/25 21:36:11 98.6 °F (37 °C) 77 18 134/65 88 92 % -- -- --    02/13/25 2039 -- -- -- -- -- -- -- -- No Pain    02/13/25 20:35:37 98.5 °F (36.9 °C) 84 18 179/85 116 92 % None (Room air) -- --    02/13/25 2016 -- 92 16 180/90 -- 98 % -- -- No Pain    02/13/25 1554 98.1 °F (36.7 °C) 100 22 207/94 -- 97 % None (Room air) Sitting --              Pertinent Labs/Diagnostic Test Results:   Radiology:  CT abdomen pelvis wo contrast   Final Interpretation by Roberto Cuello MD (02/14 0812)   Addendum (preliminary) 1 of 1 by Roberto Cuello MD (02/14 0812)   ADDENDUM:      Upon further review there is funneling of the prostatic urethra.    Calcifications are noted along the posterior wall of the urethra within    the prostate on series 602, image 111, however do not clearly appear to be    obstructing. Underlying stricture is    possible. Patient reportedly has a Mcclellan in place and is adequately    draining. Findings were discussed with the clinical care team.      Final      Moderate bilateral  hydroureteronephrosis as well as marked urinary bladder distention. No obstructing calculi or definite central obstructing lesion identified on the current exam. Exact etiology is unclear however the possibility of pelvic floor laxity    could be considered.      Workstation performed: [id number]         XR chest 1 view portable   Final Interpretation by Jose Mendez MD (02/14 0645)      No radiographic evidence of acute intrathoracic process.            Workstation performed: [id number]           Cardiology:  ECG 12 lead   Final Result by Trey Zhang DO (02/13 2301)   ** Age and gender specific ECG analysis **   Normal sinus rhythm   Normal ECG   When compared with ECG of 13-Feb-2025 16:35,   No significant change was found   Confirmed by Trey Zhang ([id number]) on 2/13/2025 11:01:20 PM      ECG 12 lead   Final Result by Pita Schneider MD (02/13 1706)   ** Age and gender specific ECG analysis **   Normal sinus rhythm   Normal ECG   When compared with ECG of 01-Nov-2021 20:51,   T wave amplitude has increased in Lateral leads   Confirmed by Pita Schneider ([id number]) on 2/13/2025 5:06:47 PM        GI:  No orders to display           Results from last 7 days   Lab Units 02/15/25  0405 02/14/25  0411 02/13/25  1604   WBC Thousand/uL 9.19 7.68 8.50   HEMOGLOBIN g/dL 8.1* 7.7* 7.8*   HEMATOCRIT % 25.3* 23.7* 23.9*   PLATELETS Thousands/uL 230 220 229   TOTAL NEUT ABS Thousands/µL  --   --  6.29         Results from last 7 days   Lab Units 02/15/25  0339 02/14/25  2348 02/14/25  1947 02/14/25  1716 02/14/25  1229   SODIUM mmol/L 133* 132* 134* 134* 134*   POTASSIUM mmol/L 4.2 4.5 4.5 4.8 5.0   CHLORIDE mmol/L 104 104 104 105 106   CO2 mmol/L 21 21 21 21 21   ANION GAP mmol/L 8 7 9 8 7   BUN mg/dL 64* 66* 66* 66* 64*   CREATININE mg/dL 4.92* 4.99* 5.14* 5.20* 5.34*   EGFR ml/min/1.73sq m 11 11 11 11 10   CALCIUM mg/dL 8.5 8.6 8.5 8.7 8.7     Results from last 7 days   Lab Units 02/13/25  1604   AST U/L  11*   ALT U/L 14   ALK PHOS U/L 64   TOTAL PROTEIN g/dL 6.7   ALBUMIN g/dL 4.2   TOTAL BILIRUBIN mg/dL 0.41       Results from last 7 days   Lab Units 02/15/25  0339 02/14/25  2348 02/14/25  1947 02/14/25  1716 02/14/25  1229 02/14/25  0917 02/14/25  0347 02/13/25  2337 02/13/25  1858 02/13/25  1604   GLUCOSE RANDOM mg/dL 123 131 147* 111 114 131 142* 75 90 94       Results from last 7 days   Lab Units 02/13/25  2142 02/13/25  1858 02/13/25  1604   HS TNI 0HR ng/L  --   --  16   HS TNI 2HR ng/L  --  17  --    HSTNI D2 ng/L  --  1  --    HS TNI 4HR ng/L 19  --   --    HSTNI D4 ng/L 3  --   --        Results from last 7 days   Lab Units 02/13/25  1604   TSH 3RD GENERATON uIU/mL 3.842       Results from last 7 days   Lab Units 02/13/25  1604   BNP pg/mL 401*     Results from last 7 days   Lab Units 02/13/25  1604   LIPASE u/L 80       Past Medical History:   Diagnosis Date    Anxiety     Arthritis     Bipolar disorder (HCC)     Colon polyp     COPD (chronic obstructive pulmonary disease) (HCC)     Depression     Diabetes mellitus (HCC)     type 2    Gout     High triglycerides     Hypertension     Hypothyroidism 02/20/2013    Obesity     Paranoid schizophrenia (HCC)     Psychiatric disorder     Sciatica 12/03/2013    Seasonal allergies     Sleep apnea      Present on Admission:   Hypertension   Tobacco use disorder   Type II diabetes mellitus with manifestations (HCC)   Hyperlipidemia   Other specified hypothyroidism   RICARDO (acute kidney injury) (HCC)      Admitting Diagnosis: Urinary retention [R33.9]  SOB (shortness of breath) [R06.02]  Abdominal pain [R10.9]  Abnormal laboratory test [R89.9]  Acute kidney injury (HCC) [N17.9]  Age/Sex: 59 y.o. male    Network Utilization Review Department  ATTENTION: Please call with any questions or concerns to [phone] and carefully listen to the prompts so that you are directed to the right person. All voicemails are confidential.   For Discharge needs, contact Care Management  DC Support Team at [phone] opt. 2  Send all requests for admission clinical reviews, approved or denied determinations and any other requests to dedicated fax number below belonging to the campus where the patient is receiving treatment. List of dedicated fax numbers for the Facilities:  FACILITY NAME UR FAX NUMBER   ADMISSION DENIALS (Administrative/Medical Necessity) [phone]   DISCHARGE SUPPORT TEAM (NETWORK) [phone]   PARENT CHILD HEALTH (Maternity/NICU/Pediatrics) [phone]   [facility] [phone]   General acute hospital [phone]   [facility] [phone]   [facility] [phone]   [facility] [phone]   [facility] [phone]   [facility] [phone]   [facility] [phone]   [facility] [phone]   [facility] [phone]   [facility] [phone]   [facility] [phone]

## 2025-02-14 NOTE — ASSESSMENT & PLAN NOTE
-BP elevated on admission, rapid improvement noted  -continue IVF as above  -continue amlodipine 5mg daily, floamx

## 2025-02-14 NOTE — ASSESSMENT & PLAN NOTE
Suspect secondary to BPH versus urethral stricture  Mcclellan catheter in place and draining well  Urology consultation

## 2025-02-14 NOTE — TREATMENT PLAN
Renal on call note    Na rising rapidly. Hold D5/bicarb    Start D5W    BMP q 4 hr    May need DDAVP. Primary team verifying if 5L UOP recorded is accurate    Update - UOP is accurate minus 400 cc for irrigation. This is likely post obstructive diuresis.     Will give bolus d5w 500 cc over one hr and then d5w 150 /hr for 6 hours but in mean time continue bmp q 4. Utility of ddavp in current state is unclear so will start with these measures and then consider ddavp if needed based on response

## 2025-02-14 NOTE — ASSESSMENT & PLAN NOTE
Sodium 125 on admission  Patient did have rapid correction overnight  Case was discussed with on-call nephrology who started patient on D5 bolus and D5 infusion with every 4 hours BMPs

## 2025-02-14 NOTE — ASSESSMENT & PLAN NOTE
Normocytic anemia  May be related to profound RICARDO  Patient denies any bleeding or melena  Iron panel pending  Trend hemoglobin  Transfuse to maintain greater than 7

## 2025-02-14 NOTE — PLAN OF CARE
Problem: Potential for Falls  Goal: Patient will remain free of falls  Description: INTERVENTIONS:  - Educate patient/family on patient safety including physical limitations  - Instruct patient to call for assistance with activity   - Consult OT/PT to assist with strengthening/mobility   - Keep Call bell within reach  - Keep bed low and locked with side rails adjusted as appropriate  - Keep care items and personal belongings within reach  - Initiate and maintain comfort rounds  - Make Fall Risk Sign visible to staff  - Offer Toileting every 2 Hours, in advance of need  - Apply yellow socks and bracelet for high fall risk patients  - Consider moving patient to room near nurses station  Outcome: Progressing     Problem: PAIN - ADULT  Goal: Verbalizes/displays adequate comfort level or baseline comfort level  Description: Interventions:  - Encourage patient to monitor pain and request assistance  - Assess pain using appropriate pain scale  - Administer analgesics based on type and severity of pain and evaluate response  - Implement non-pharmacological measures as appropriate and evaluate response  - Consider cultural and social influences on pain and pain management  - Notify physician/advanced practitioner if interventions unsuccessful or patient reports new pain  Outcome: Progressing     Problem: INFECTION - ADULT  Goal: Absence or prevention of progression during hospitalization  Description: INTERVENTIONS:  - Assess and monitor for signs and symptoms of infection  - Monitor lab/diagnostic results  - Monitor all insertion sites, i.e. indwelling lines, tubes, and drains  - Monitor endotracheal if appropriate and nasal secretions for changes in amount and color  - Luana appropriate cooling/warming therapies per order  - Administer medications as ordered  - Instruct and encourage patient and family to use good hand hygiene technique  - Identify and instruct in appropriate isolation precautions for identified  infection/condition  Outcome: Progressing     Problem: SAFETY ADULT  Goal: Maintain or return to baseline ADL function  Description: INTERVENTIONS:  -  Assess patient's ability to carry out ADLs; assess patient's baseline for ADL function and identify physical deficits which impact ability to perform ADLs (bathing, care of mouth/teeth, toileting, grooming, dressing, etc.)  - Assess/evaluate cause of self-care deficits   - Assess range of motion  - Assess patient's mobility; develop plan if impaired  - Assess patient's need for assistive devices and provide as appropriate  - Encourage maximum independence but intervene and supervise when necessary  - Involve family in performance of ADLs  - Assess for home care needs following discharge   - Consider OT consult to assist with ADL evaluation and planning for discharge  - Provide patient education as appropriate  Outcome: Progressing  Goal: Maintains/Returns to pre admission functional level  Description: INTERVENTIONS:  - Perform AM-PAC 6 Click Basic Mobility/ Daily Activity assessment daily.  - Set and communicate daily mobility goal to care team and patient/family/caregiver.   - Collaborate with rehabilitation services on mobility goals if consulted  - Perform Range of Motion 4 times a day.  - Reposition patient every 2 hours.  - Dangle patient 3 times a day  - Stand patient 3 times a day  - Ambulate patient 3 times a day  - Out of bed to chair 3 times a day   - Out of bed for meals 3 times a day  - Out of bed for toileting  - Record patient progress and toleration of activity level   Outcome: Progressing     Problem: DISCHARGE PLANNING  Goal: Discharge to home or other facility with appropriate resources  Description: INTERVENTIONS:  - Identify barriers to discharge w/patient and caregiver  - Arrange for needed discharge resources and transportation as appropriate  - Identify discharge learning needs (meds, wound care, etc.)  - Arrange for interpretive services to  assist at discharge as needed  - Refer to Case Management Department for coordinating discharge planning if the patient needs post-hospital services based on physician/advanced practitioner order or complex needs related to functional status, cognitive ability, or social support system  Outcome: Progressing     Problem: Knowledge Deficit  Goal: Patient/family/caregiver demonstrates understanding of disease process, treatment plan, medications, and discharge instructions  Description: Complete learning assessment and assess knowledge base.  Interventions:  - Provide teaching at level of understanding  - Provide teaching via preferred learning methods  Outcome: Progressing     Problem: GENITOURINARY - ADULT  Goal: Maintains or returns to baseline urinary function  Description: INTERVENTIONS:  - Assess urinary function  - Encourage oral fluids to ensure adequate hydration if ordered  - Administer IV fluids as ordered to ensure adequate hydration  - Administer ordered medications as needed  - Offer frequent toileting  - Follow urinary retention protocol if ordered  Outcome: Progressing  Goal: Absence of urinary retention  Description: INTERVENTIONS:  - Assess patient's ability to void and empty bladder  - Monitor I/O  - Bladder scan as needed  - Discuss with physician/AP medications to alleviate retention as needed  - Discuss catheterization for long term situations as appropriate  Outcome: Progressing  Goal: Urinary catheter remains patent  Description: INTERVENTIONS:  - Assess patency of urinary catheter  - If patient has a chronic pickard, consider changing catheter if non-functioning  - Follow guidelines for intermittent irrigation of non-functioning urinary catheter  Outcome: Progressing     Problem: METABOLIC, FLUID AND ELECTROLYTES - ADULT  Goal: Electrolytes maintained within normal limits  Description: INTERVENTIONS:  - Monitor labs and assess patient for signs and symptoms of electrolyte imbalances  - Administer  electrolyte replacement as ordered  - Monitor response to electrolyte replacements, including repeat lab results as appropriate  - Instruct patient on fluid and nutrition as appropriate  Outcome: Progressing  Goal: Fluid balance maintained  Description: INTERVENTIONS:  - Monitor labs   - Monitor I/O and WT  - Instruct patient on fluid and nutrition as appropriate  - Assess for signs & symptoms of volume excess or deficit  Outcome: Progressing     Problem: HEMATOLOGIC - ADULT  Goal: Maintains hematologic stability  Description: INTERVENTIONS  - Assess for signs and symptoms of bleeding or hemorrhage  - Monitor labs  - Administer supportive blood products/factors as ordered and appropriate  Outcome: Progressing

## 2025-02-14 NOTE — QUICK NOTE
"Overcorrection of sodium  Patient asleep, has no complaints  On sodium bicarbonate drip with D5  May need DDAVP    Discussing with nephrology.  Consult placed for hyponatremia and RICARDO  BMP every 4 hours    Bicarb drip switched to dextrose infusion by Nephrology  \"If repeat bmp shows continued rise in Na may need ddavp. the utility of ddavp in a post obstructive diuresis is unclear so I did not dose it for now \"  "

## 2025-02-14 NOTE — CONSULTS
Consultation - Nephrology   Chas Soliz 59 y.o. male MRN: [id number]  Unit/Bed#: [place] [address] -02 Encounter: [id number]    ASSESSMENT and PLAN:  Assessment & Plan  RICARDO (acute kidney injury) (HCC)  -admit sCr 5.39 as of 2/13/25, already elevated to 5.55 as of 1/24/25  -peak sCr 5.61, hopeful for improvement s/p pickard placement  -RICARDO in setting of moderate b/l hydroureteronephrosis without stones and marked urinary bladder distension  -monitor BMP  -avoid IV dye/relative hypotension   -as concern for possible prerenal component and risk of post obstructive diuresis, monitor lytes on IVF. On D5W at 150ml/hr.   -consider changing to D51/2 NS pending repeat BMP, on q4hr BMP   Hypertension  -BP elevated on admission, rapid improvement noted  -continue IVF as above  -continue amlodipine 5mg daily, floamx  Urinary retention  -s/p pickard placement as above  -urology follows  -already over 8.4L UOP noted  Hyponatremia  -sNa 125 on admit, now 134  -rapid correction noted  -likely d/t post obstructive diuresis  -monitor q4hr BMP  -s/p D5W at 150ml/hr  -already over 8.4L UOP noted  Anemia  -Hgb 7.7, monitor CBC, transfuse prn  Polyuria  -d/t post obstructive diuresis  -monitor serial labs as at risk of electrolyte abnormalities      SUMMARY OF RECOMMENDATIONS:  Monitor BMP s/p pickard placement.    HISTORY OF PRESENT ILLNESS:  Requesting Physician: Joseph Eaton DO  Reason for Consult: RICARDO    Chas Soliz is a 59 y.o. year old male who was admitted to [facility] after presenting with back pain and abdominal distension. A renal consultation is requested today for assistance in the management of RICARDO.    He presented outpatient to GI due to anemia but found to have stomach bloating. Sent to ER. Found to have b/l moderate hydro with significant urinary retention(2L). Patient is s/p pickard placement with significant urine output. He is feeling better. Denies further back pain. Denies fevers, chills, nausea, vomiting, but did  have some diarrhea prior to admission which has resolved. Denies chest pain, shortness of breath, or leg edema. Denies dizziness. Does admit to decreased oral intake prior to admission. Appetite has returned. Was noticing he was urinating less volume and more frequently prior to admission. He admits to stopping his prostate medication for weeks.    PAST MEDICAL HISTORY:  Past Medical History:   Diagnosis Date    Anxiety     Arthritis     Bipolar disorder (HCC)     Colon polyp     COPD (chronic obstructive pulmonary disease) ([facility])     Depression     Diabetes mellitus (HCC)     type 2    Gout     High triglycerides     Hypertension     Hypothyroidism 02/20/2013    Obesity     Paranoid schizophrenia ([facility])     Psychiatric disorder     Sciatica 12/03/2013    Seasonal allergies     Sleep apnea        PAST SURGICAL HISTORY:  Past Surgical History:   Procedure Laterality Date    COLONOSCOPY      UMBILICAL HERNIA REPAIR      WISDOM TOOTH EXTRACTION         ALLERGIES:  Allergies   Allergen Reactions    No Active Allergies        SOCIAL HISTORY:  Social History     Substance and Sexual Activity   Alcohol Use Not Currently     Social History     Substance and Sexual Activity   Drug Use No     Social History     Tobacco Use   Smoking Status Every Day    Current packs/day: 0.25    Average packs/day: 0.3 packs/day for 35.0 years (8.8 ttl pk-yrs)    Types: Cigarettes, E-Cigarettes   Smokeless Tobacco Never       FAMILY HISTORY:  Family History   Problem Relation Age of Onset    Colon cancer Mother     Coronary artery disease Father     Hyperlipidemia Father     Coronary artery disease Family        MEDICATIONS:    Current Facility-Administered Medications:     acetaminophen (TYLENOL) tablet 650 mg, 650 mg, Oral, Q6H PRN, Joseph Eaton DO    aluminum-magnesium hydroxide-simethicone (MAALOX) oral suspension 30 mL, 30 mL, Oral, Q6H PRN, Joseph Eaton DO    amLODIPine (NORVASC) tablet 5 mg, 5 mg, Oral, Daily, Joseph  "DO Uma, 5 mg at 02/14/25 0938    atorvastatin (LIPITOR) tablet 80 mg, 80 mg, Oral, Daily With Dinner, Joseph Eaton DO, 80 mg at 02/13/25 1911    dextrose 5 % infusion, 150 mL/hr, Intravenous, Continuous, Silva Salamanca MD, Last Rate: 150 mL/hr at 02/14/25 0700, 150 mL/hr at 02/14/25 0700    FLUoxetine (PROzac) capsule 20 mg, 20 mg, Oral, Daily, Joseph Eaton DO, 20 mg at 02/14/25 0938    haloperidol (HALDOL) tablet 2.5 mg, 2.5 mg, Oral, HS, Joseph Eaton DO, 2.5 mg at 02/13/25 2148    heparin (porcine) subcutaneous injection 5,000 Units, 5,000 Units, Subcutaneous, Q8H MARK, Joseph Eaton DO, 5,000 Units at 02/14/25 0537    labetalol (NORMODYNE) injection 10 mg, 10 mg, Intravenous, Q4H PRN, Joseph Eaton DO    levothyroxine tablet 150 mcg, 150 mcg, Oral, Early Morning, Joseph Eaton DO, 150 mcg at 02/14/25 0537    nicotine (NICODERM CQ) 21 mg/24 hr TD 24 hr patch 1 patch, 1 patch, Transdermal, Daily, Joseph Eaton DO, 1 patch at 02/14/25 0938    ondansetron (ZOFRAN) injection 4 mg, 4 mg, Intravenous, Q6H PRN, Joseph Eaton DO    oxyCODONE (ROXICODONE) IR tablet 5 mg, 5 mg, Oral, Q6H PRN, Joseph Eaton DO    tamsulosin (FLOMAX) capsule 0.8 mg, 0.8 mg, Oral, Daily With Dinner, Joseph Eaton DO, 0.8 mg at 02/13/25 1911    REVIEW OF SYSTEMS:  More than 10 systems were reviewed. No other pertinent positive findings other than those mentioned in HPI.    PHYSICAL EXAM:  Current Weight: Weight - Scale: 118 kg (259 lb 14.8 oz)  First Weight: Weight - Scale: 132 kg (291 lb 3.6 oz)  Vitals:    02/13/25 2035 02/13/25 2038 02/13/25 2136 02/14/25 0736   BP: (!) 179/85  134/65 134/66   BP Location:    Right arm   Pulse: 84  77 74   Resp: 18  18 20   Temp: 98.5 °F (36.9 °C)  98.6 °F (37 °C) 98.8 °F (37.1 °C)   TempSrc: Oral  Oral Oral   SpO2: 92%  92% 95%   Weight:  118 kg (259 lb 14.8 oz)     Height: 5' 9\" (1.753 m) 5' 9\" (1.753 m)         Intake/Output Summary (Last 24 " hours) at 2/14/2025 1127  Last data filed at 2/14/2025 0914  Gross per 24 hour   Intake 850 ml   Output 8330 ml   Net -7480 ml     Physical Exam  Vitals reviewed.   Constitutional:       General: He is not in acute distress.     Appearance: Normal appearance. He is well-developed. He is obese. He is not diaphoretic.   HENT:      Head: Normocephalic and atraumatic.      Nose: Nose normal.      Mouth/Throat:      Mouth: Mucous membranes are moist.      Pharynx: No oropharyngeal exudate.   Eyes:      General: No scleral icterus.        Right eye: No discharge.         Left eye: No discharge.   Neck:      Thyroid: No thyromegaly.   Cardiovascular:      Rate and Rhythm: Normal rate and regular rhythm.      Heart sounds: Normal heart sounds.   Pulmonary:      Effort: Pulmonary effort is normal.      Breath sounds: Normal breath sounds. No wheezing or rales.   Abdominal:      General: Bowel sounds are normal. There is no distension.      Palpations: Abdomen is soft.      Tenderness: There is no abdominal tenderness.   Genitourinary:     Comments: pickard  Musculoskeletal:         General: No swelling. Normal range of motion.      Cervical back: Neck supple.   Lymphadenopathy:      Cervical: No cervical adenopathy.   Skin:     General: Skin is warm and dry.      Coloration: Skin is not jaundiced.      Findings: No rash.   Neurological:      General: No focal deficit present.      Mental Status: He is alert.      Comments: awake   Psychiatric:         Mood and Affect: Mood normal.         Behavior: Behavior normal.         Invasive Devices:   Urethral Catheter 16 Fr. (Active)   Reasons to continue Urinary Catheter  Acute urinary retention/obstruction failing urinary retention protocol 02/13/25 2243   Goal for Removal Will consult urology 02/13/25 2300   Site Assessment Clean;Skin intact 02/13/25 2243   Pickard Care Done 02/13/25 2243   Collection Container Standard drainage bag 02/13/25 2237   Securement Method Securing device  (Describe) 02/13/25 2237   Irrigant Normal saline 02/14/25 0305   Urethral Irrigation Intake (mL) 360 mL 02/14/25 0305   Output (mL) 1900 mL 02/14/25 0914   CAUTI Time-out performed before Urine Culture Yes 02/13/25 2243     Lab Results:   Results from last 7 days   Lab Units 02/14/25  0917 02/14/25  0411 02/14/25  0347 02/13/25  2337 02/13/25  1858 02/13/25  1604   WBC Thousand/uL  --  7.68  --   --   --  8.50   HEMOGLOBIN g/dL  --  7.7*  --   --   --  7.8*   HEMATOCRIT %  --  23.7*  --   --   --  23.9*   PLATELETS Thousands/uL  --  220  --   --   --  229   POTASSIUM mmol/L 4.6  --  4.7 5.3   < > 5.5*   CHLORIDE mmol/L 105  --  106 101   < > 97   CO2 mmol/L 21  --  19* 15*   < > 18*   BUN mg/dL 65*  --  69* 72*   < > 69*   CREATININE mg/dL 5.55*  --  5.61* 5.61*   < > 5.39*   CALCIUM mg/dL 8.6  --  8.4 8.6   < > 8.5   ALK PHOS U/L  --   --   --   --   --  64   ALT U/L  --   --   --   --   --  14   AST U/L  --   --   --   --   --  11*    < > = values in this interval not displayed.

## 2025-02-14 NOTE — CONSULTS
"Consultation - Urology   Name: Chas Soliz 59 y.o. male I MRN: [id number]  Unit/Bed#: [place] [address] -02 I Date of Admission: 2/13/2025   Date of Service: 2/14/2025 I Hospital Day: 1   Inpatient consult to Urology  Consult performed by: Kaylin Miller PA-C  Consult ordered by: Joseph Eaton DO        Physician Requesting Evaluation: Joseph Eaton DO   Reason for Evaluation / Principal Problem:   Urinary Retention   BPH       Assessment & Plan  RICARDO (acute kidney injury) (HCC)  Creatinine 5.39 on admission 2/13/25, already elevated to 5.55 as of 1/24/25  Peak creat 5.61, hopeful for improvement s/p pickard placement  Etiology -- moderate hydroureteronephrosis without stones and marked urinary bladder distension  Continue to monitor closely  Nephrology following - recommendations appreciated   Type II diabetes mellitus with manifestations (HCC)  Lab Results   Component Value Date    HGBA1C 5.6 01/30/2025       No results for input(s): \"POCGLU\" in the last 72 hours.    Blood Sugar Average: Last 72 hrs:    Glucose is overall well-controlled, currently no sign of neurogenic bladder at this time  Abdominal pain  Secondary to distended bladder - now resolved  Urinary retention  Pickard catheter inserted in ED (2/13) for 1880 ml output, clear angel urine  Pickard remains in placed draining pink-tinged urine, no clots   Denies pain   Denies prior episodes of retention   Previously on Flomax 0.8 mg daily but was not diligent about taking the medication; restart   Finasteride added     Plan:   Continue to monitor urine output and color closely  Based on degree of retention, recommend the patient maintain his Pickard catheter for 2 weeks and return to the office for trial of void  Continue Flomax 0.8 mg daily  Continue finasteride 5 mg daily  No further intervention needed at this time, urology will sign off    Urology service signing off.    History of Present Illness   Chas Soliz is a 59 y.o. male with PMH of " HT, HLD, hypothyroidism -- who presented to [facility] ED 2/13 from his GI office due to worsening abdominal pain and RICARDO. Creatinine 5.39 upon arrival.  CT a/p revealed -- moderate bilaterally hydronephrosis in the setting of a severely distended urinary bladder. No obstructing stones or lesions noted. Pickard catheter inserted in ED (2/13) for 1880 ml output, clear angel urine.     Today in consultation the patient appears well, lying in bed comfortably.  He denies prior episodes of urinary retention.  He reports previously being on Flomax 0.4 mg daily, but did not take the medication regularly.  Prior abdominal pain has entirely resolved following Pickard catheter placement.  Pickard catheter remains inserted draining clear light pink urine.  He denied symptoms of UTI prior to presenting to ED.  Reports mild diarrhea but he believes this is related to lactose.  We will follow-up within the outpatient setting.    Review of Systems   Constitutional:  Negative for activity change, chills, fatigue and fever.   Respiratory:  Positive for cough. Negative for apnea and shortness of breath.    Cardiovascular:  Negative for chest pain and leg swelling.   Gastrointestinal:  Negative for abdominal distention, abdominal pain, constipation, diarrhea, nausea and vomiting.   Genitourinary:  Positive for difficulty urinating (pickard in place). Negative for decreased urine volume, dysuria, flank pain, frequency, hematuria, penile pain, testicular pain and urgency.   Neurological:  Negative for dizziness and headaches.   Psychiatric/Behavioral: Negative.       Medical History Review: I have reviewed the patient's PMH, PSH, Social History, Family History, Meds, and Allergies     Objective :  Temp:  [98 °F (36.7 °C)-98.8 °F (37.1 °C)] 98.8 °F (37.1 °C)  HR:  [] 74  BP: (134-207)/(65-94) 134/66  Resp:  [16-22] 20  SpO2:  [92 %-98 %] 95 %  O2 Device: None (Room air)    I/O         02/12 0701 02/13 0700 02/13 0701 02/14 0700 02/14  0701  02/15 0700    P.O.  480     I.V. (mL/kg)  10 (0.1)     Total Intake(mL/kg)  490 (4.2)     Urine (mL/kg/hr)  6430     Total Output  6430     Net  -5940                  Lines/Drains/Airways       Active Status       Name Placement date Placement time Site Days    Urethral Catheter 16 Fr. 02/13/25  1900  --  less than 1                  Physical Exam  Vitals and nursing note reviewed.   Constitutional:       General: He is not in acute distress.     Appearance: Normal appearance. He is well-developed. He is obese. He is not ill-appearing.   HENT:      Head: Normocephalic and atraumatic.   Eyes:      Conjunctiva/sclera: Conjunctivae normal.   Cardiovascular:      Rate and Rhythm: Normal rate and regular rhythm.      Heart sounds: No murmur heard.  Pulmonary:      Effort: Pulmonary effort is normal. No respiratory distress.      Breath sounds: Normal breath sounds.   Abdominal:      General: Abdomen is flat. There is no distension.      Palpations: Abdomen is soft.      Tenderness: There is no abdominal tenderness. There is no right CVA tenderness or left CVA tenderness.   Genitourinary:     Comments: Mcclellan catheter remains inserted draining clear pink-tinged urine, no clots appreciated   Musculoskeletal:         General: No swelling.      Cervical back: Neck supple.   Skin:     General: Skin is warm and dry.      Capillary Refill: Capillary refill takes less than 2 seconds.   Neurological:      Mental Status: He is alert.   Psychiatric:         Mood and Affect: Mood normal.            Lab Results: I have reviewed the following results:CBC/BMP:   .     02/14/25  0347 02/14/25  0411   WBC  --  7.68   HGB  --  7.7*   HCT  --  23.7*   PLT  --  220   SODIUM 134*  --    K 4.7  --      --    CO2 19*  --    BUN 69*  --    CREATININE 5.61*  --    GLUC 142*  --     , Creatinine Clearance: Estimated Creatinine Clearance: 17.7 mL/min (A) (by C-G formula based on SCr of 5.61 mg/dL (H))., Lactic Acid: No new results in  "last 24 hours. , Procalcitonin: No results found for: \"PROCALCITONI\", Lipase:   Lab Results   Component Value Date    LIPASE 80 02/13/2025   , Blood Culture: No results found for: \"BLOODCX\", Urinalysis: No results found for: \"COLORU\", \"CLARITYU\", \"SPECGRAV\", \"PHUR\", \"LEUKOCYTESUR\", \"NITRITE\", \"PROTEINUA\", \"GLUCOSEU\", \"KETONESU\", \"BILIRUBINUR\", \"BLOODU\", Urine Culture: No results found for: \"URINECX\"  Recent Labs     02/13/25  1604 02/13/25  1858 02/14/25  0347 02/14/25  0411   WBC 8.50  --   --  7.68   HGB 7.8*  --   --  7.7*   HCT 23.9*  --   --  23.7*     --   --  220   SODIUM 125*   < > 134*  --    K 5.5*   < > 4.7  --    CL 97   < > 106  --    CO2 18*   < > 19*  --    BUN 69*   < > 69*  --    CREATININE 5.39*   < > 5.61*  --    GLUC 94   < > 142*  --    AST 11*  --   --   --    ALT 14  --   --   --    ALB 4.2  --   --   --    TBILI 0.41  --   --   --    ALKPHOS 64  --   --   --     < > = values in this interval not displayed.     Lab Results   Component Value Date    COLORU Colorless 01/24/2025    CLARITYU Clear 01/24/2025    SPECGRAV 1.008 01/24/2025    PHUR 6.0 01/24/2025    LEUKOCYTESUR Negative 01/24/2025    NITRITE Negative 01/24/2025    GLUCOSEU Negative 01/24/2025    KETONESU Negative 01/24/2025    BILIRUBINUR Negative 01/24/2025    BLOODU Negative 01/24/2025   ,   No results found for: \"URINECX\"    Imaging Results Review: I personally reviewed the following image studies/reports in PACS and discussed pertinent findings with Radiology: CT abdomen/pelvis. My interpretation of the radiology images/reports is: Moderate bilateral hydronephrosis due to severely distended bladder, BPH.  Other Study Results Review: No additional pertinent studies reviewed.    VTE Prophylaxis: VTE covered by:  heparin (porcine), Subcutaneous, 5,000 Units at 02/14/25 0537         "

## 2025-02-14 NOTE — ASSESSMENT & PLAN NOTE
Mcclellan catheter inserted in ED (2/13) for 1880 ml output, clear angel urine  Mcclellan remains in placed draining pink-tinged urine, no clots   Denies pain   Denies prior episodes of retention   Previously on Flomax 0.8 mg daily but was not diligent about taking the medication; restart   Finasteride added     Plan:   Continue to monitor urine output and color closely  Based on degree of retention, recommend the patient maintain his Mcclellan catheter for 2 weeks and return to the office for trial of void  Continue Flomax 0.8 mg daily  Continue finasteride 5 mg daily  No further intervention needed at this time, urology will sign off

## 2025-02-14 NOTE — PLAN OF CARE
Problem: Potential for Falls  Goal: Patient will remain free of falls  Description: INTERVENTIONS:  - Educate patient/family on patient safety including physical limitations  - Instruct patient to call for assistance with activity   - Consult OT/PT to assist with strengthening/mobility   - Keep Call bell within reach  - Keep bed low and locked with side rails adjusted as appropriate  - Keep care items and personal belongings within reach  - Initiate and maintain comfort rounds  - Make Fall Risk Sign visible to staff  - Offer Toileting every 2 Hours, in advance of need  - Apply yellow socks and bracelet for high fall risk patients  - Consider moving patient to room near nurses station  2/13/2025 2338 by Ellen Lanza  Outcome: Progressing     Problem: PAIN - ADULT  Goal: Verbalizes/displays adequate comfort level or baseline comfort level  Description: Interventions:  - Encourage patient to monitor pain and request assistance  - Assess pain using appropriate pain scale  - Administer analgesics based on type and severity of pain and evaluate response  - Implement non-pharmacological measures as appropriate and evaluate response  - Consider cultural and social influences on pain and pain management  - Notify physician/advanced practitioner if interventions unsuccessful or patient reports new pain  2/13/2025 2338 by Ellen Lanza  Outcome: Progressing     Problem: INFECTION - ADULT  Goal: Absence or prevention of progression during hospitalization  Description: INTERVENTIONS:  - Assess and monitor for signs and symptoms of infection  - Monitor lab/diagnostic results  - Monitor all insertion sites, i.e. indwelling lines, tubes, and drains  - Monitor endotracheal if appropriate and nasal secretions for changes in amount and color  - [place] appropriate cooling/warming therapies per order  - Administer medications as ordered  - Instruct and encourage patient and family to use good hand hygiene technique  - Identify and  instruct in appropriate isolation precautions for identified infection/condition  2/13/2025 2338 by Ellen Lanza  Outcome: Progressing     Problem: SAFETY ADULT  Goal: Maintain or return to baseline ADL function  Description: INTERVENTIONS:  -  Assess patient's ability to carry out ADLs; assess patient's baseline for ADL function and identify physical deficits which impact ability to perform ADLs (bathing, care of mouth/teeth, toileting, grooming, dressing, etc.)  - Assess/evaluate cause of self-care deficits   - Assess range of motion  - Assess patient's mobility; develop plan if impaired  - Assess patient's need for assistive devices and provide as appropriate  - Encourage maximum independence but intervene and supervise when necessary  - Involve family in performance of ADLs  - Assess for home care needs following discharge   - Consider OT consult to assist with ADL evaluation and planning for discharge  - Provide patient education as appropriate  2/13/2025 2338 by Ellen Lanza  Outcome: Progressing     Problem: SAFETY ADULT  Goal: Maintains/Returns to pre admission functional level  Description: INTERVENTIONS:  - Perform AM-PAC 6 Click Basic Mobility/ Daily Activity assessment daily.  - Set and communicate daily mobility goal to care team and patient/family/caregiver.   - Collaborate with rehabilitation services on mobility goals if consulted  - Perform Range of Motion 4 times a day.  - Reposition patient every 2 hours.  - Dangle patient 3 times a day  - Stand patient 3 times a day  - Ambulate patient 3 times a day  - Out of bed to chair 3 times a day   - Out of bed for meals 3 times a day  - Out of bed for toileting  - Record patient progress and toleration of activity level   2/13/2025 2338 by Ellen Lanza  Outcome: Progressing     Problem: DISCHARGE PLANNING  Goal: Discharge to home or other facility with appropriate resources  Description: INTERVENTIONS:  - Identify barriers to discharge w/patient and  caregiver  - Arrange for needed discharge resources and transportation as appropriate  - Identify discharge learning needs (meds, wound care, etc.)  - Arrange for interpretive services to assist at discharge as needed  - Refer to Case Management Department for coordinating discharge planning if the patient needs post-hospital services based on physician/advanced practitioner order or complex needs related to functional status, cognitive ability, or social support system  2/13/2025 2338 by Ellen Lanza  Outcome: Progressing     Problem: Knowledge Deficit  Goal: Patient/family/caregiver demonstrates understanding of disease process, treatment plan, medications, and discharge instructions  Description: Complete learning assessment and assess knowledge base.  Interventions:  - Provide teaching at level of understanding  - Provide teaching via preferred learning methods  2/13/2025 2338 by Ellen Lanza  Outcome: Progressing     Problem: GENITOURINARY - ADULT  Goal: Maintains or returns to baseline urinary function  Description: INTERVENTIONS:  - Assess urinary function  - Encourage oral fluids to ensure adequate hydration if ordered  - Administer IV fluids as ordered to ensure adequate hydration  - Administer ordered medications as needed  - Offer frequent toileting  - Follow urinary retention protocol if ordered  2/13/2025 2338 by Ellen Lanza  Outcome: Progressing     Problem: GENITOURINARY - ADULT  Goal: Absence of urinary retention  Description: INTERVENTIONS:  - Assess patient's ability to void and empty bladder  - Monitor I/O  - Bladder scan as needed  - Discuss with physician/AP medications to alleviate retention as needed  - Discuss catheterization for long term situations as appropriate  2/13/2025 2338 by Ellen Lanza  Outcome: Progressing     Problem: GENITOURINARY - ADULT  Goal: Urinary catheter remains patent  Description: INTERVENTIONS:  - Assess patency of urinary catheter  - If patient has a chronic pickard,  consider changing catheter if non-functioning  - Follow guidelines for intermittent irrigation of non-functioning urinary catheter  2/13/2025 2338 by Ellen Lanza  Outcome: Progressing     Problem: METABOLIC, FLUID AND ELECTROLYTES - ADULT  Goal: Electrolytes maintained within normal limits  Description: INTERVENTIONS:  - Monitor labs and assess patient for signs and symptoms of electrolyte imbalances  - Administer electrolyte replacement as ordered  - Monitor response to electrolyte replacements, including repeat lab results as appropriate  - Instruct patient on fluid and nutrition as appropriate  2/13/2025 2338 by Ellen Lanza  Outcome: Progressing     Problem: METABOLIC, FLUID AND ELECTROLYTES - ADULT  Goal: Fluid balance maintained  Description: INTERVENTIONS:  - Monitor labs   - Monitor I/O and WT  - Instruct patient on fluid and nutrition as appropriate  - Assess for signs & symptoms of volume excess or deficit  2/13/2025 2338 by Ellen Lanza  Outcome: Progressing     Problem: HEMATOLOGIC - ADULT  Goal: Maintains hematologic stability  Description: INTERVENTIONS  - Assess for signs and symptoms of bleeding or hemorrhage  - Monitor labs  - Administer supportive blood products/factors as ordered and appropriate  2/13/2025 2338 by Ellen Lanza  Outcome: Progressing

## 2025-02-14 NOTE — TELEPHONE ENCOUNTER
Patient admitted for urinary retention, Mcclellan catheter placed 2/13 for 1.8 L output.  He will maintain his Mcclellan catheter for 2 weeks and will need to be seen as a new patient in the office for trial of void and to discuss BPH control moving forward.  Currently on Flomax 0.8 mg and finasteride 5 mg started while inpatient.  Thank you!

## 2025-02-14 NOTE — ASSESSMENT & PLAN NOTE
-admit sCr 5.39 as of 2/13/25, already elevated to 5.55 as of 1/24/25  -peak sCr 5.61, hopeful for improvement s/p pickard placement  -RICARDO in setting of moderate b/l hydroureteronephrosis without stones and marked urinary bladder distension  -monitor BMP  -avoid IV dye/relative hypotension   -as concern for possible prerenal component and risk of post obstructive diuresis, monitor lytes on IVF. On D5W at 150ml/hr.   -consider changing to D51/2 NS pending repeat BMP, on q4hr BMP

## 2025-02-14 NOTE — ASSESSMENT & PLAN NOTE
-sNa 125 on admit, now 134  -rapid correction noted  -likely d/t post obstructive diuresis  -monitor q4hr BMP  -s/p D5W at 150ml/hr  -already over 8.4L UOP noted

## 2025-02-14 NOTE — ASSESSMENT & PLAN NOTE
Secondary to obstructive uropathy  Mcclellan catheter to be inserted in the emergency department  Creatinine without improvement today, continue to trend and monitor for renal recovery  On D5 today for management of hyponatremia  Appreciate nephrology recommendations

## 2025-02-15 PROBLEM — I48.91 A-FIB (HCC): Status: ACTIVE | Noted: 2025-02-15

## 2025-02-15 PROBLEM — R05.9 COUGH: Status: ACTIVE | Noted: 2025-02-15

## 2025-02-15 LAB
ANION GAP SERPL CALCULATED.3IONS-SCNC: 7 MMOL/L (ref 4–13)
ANION GAP SERPL CALCULATED.3IONS-SCNC: 8 MMOL/L (ref 4–13)
ANION GAP SERPL CALCULATED.3IONS-SCNC: 8 MMOL/L (ref 4–13)
ANION GAP SERPL CALCULATED.3IONS-SCNC: 9 MMOL/L (ref 4–13)
BUN SERPL-MCNC: 53 MG/DL (ref 5–25)
BUN SERPL-MCNC: 54 MG/DL (ref 5–25)
BUN SERPL-MCNC: 64 MG/DL (ref 5–25)
BUN SERPL-MCNC: 66 MG/DL (ref 5–25)
CALCIUM SERPL-MCNC: 8.5 MG/DL (ref 8.4–10.2)
CALCIUM SERPL-MCNC: 8.5 MG/DL (ref 8.4–10.2)
CALCIUM SERPL-MCNC: 8.6 MG/DL (ref 8.4–10.2)
CALCIUM SERPL-MCNC: 8.6 MG/DL (ref 8.4–10.2)
CHLORIDE SERPL-SCNC: 103 MMOL/L (ref 96–108)
CHLORIDE SERPL-SCNC: 103 MMOL/L (ref 96–108)
CHLORIDE SERPL-SCNC: 104 MMOL/L (ref 96–108)
CHLORIDE SERPL-SCNC: 104 MMOL/L (ref 96–108)
CO2 SERPL-SCNC: 21 MMOL/L (ref 21–32)
CO2 SERPL-SCNC: 22 MMOL/L (ref 21–32)
CREAT SERPL-MCNC: 4.41 MG/DL (ref 0.6–1.3)
CREAT SERPL-MCNC: 4.55 MG/DL (ref 0.6–1.3)
CREAT SERPL-MCNC: 4.92 MG/DL (ref 0.6–1.3)
CREAT SERPL-MCNC: 4.99 MG/DL (ref 0.6–1.3)
ERYTHROCYTE [DISTWIDTH] IN BLOOD BY AUTOMATED COUNT: 12.9 % (ref 11.6–15.1)
FLUAV RNA RESP QL NAA+PROBE: NEGATIVE
FLUBV RNA RESP QL NAA+PROBE: NEGATIVE
GFR SERPL CREATININE-BSD FRML MDRD: 11 ML/MIN/1.73SQ M
GFR SERPL CREATININE-BSD FRML MDRD: 11 ML/MIN/1.73SQ M
GFR SERPL CREATININE-BSD FRML MDRD: 13 ML/MIN/1.73SQ M
GFR SERPL CREATININE-BSD FRML MDRD: 13 ML/MIN/1.73SQ M
GLUCOSE SERPL-MCNC: 106 MG/DL (ref 65–140)
GLUCOSE SERPL-MCNC: 123 MG/DL (ref 65–140)
GLUCOSE SERPL-MCNC: 131 MG/DL (ref 65–140)
GLUCOSE SERPL-MCNC: 143 MG/DL (ref 65–140)
HCT VFR BLD AUTO: 25.3 % (ref 36.5–49.3)
HGB BLD-MCNC: 8.1 G/DL (ref 12–17)
MCH RBC QN AUTO: 30.1 PG (ref 26.8–34.3)
MCHC RBC AUTO-ENTMCNC: 32 G/DL (ref 31.4–37.4)
MCV RBC AUTO: 94 FL (ref 82–98)
PLATELET # BLD AUTO: 230 THOUSANDS/UL (ref 149–390)
PMV BLD AUTO: 9.9 FL (ref 8.9–12.7)
POTASSIUM SERPL-SCNC: 4.2 MMOL/L (ref 3.5–5.3)
POTASSIUM SERPL-SCNC: 4.3 MMOL/L (ref 3.5–5.3)
POTASSIUM SERPL-SCNC: 4.4 MMOL/L (ref 3.5–5.3)
POTASSIUM SERPL-SCNC: 4.5 MMOL/L (ref 3.5–5.3)
QRS AXIS: 15 DEGREES
QRSD INTERVAL: 84 MS
QT INTERVAL: 330 MS
QTC INTERVAL: 434 MS
RBC # BLD AUTO: 2.69 MILLION/UL (ref 3.88–5.62)
RSV RNA RESP QL NAA+PROBE: NEGATIVE
SARS-COV-2 RNA RESP QL NAA+PROBE: NEGATIVE
SODIUM SERPL-SCNC: 132 MMOL/L (ref 135–147)
SODIUM SERPL-SCNC: 133 MMOL/L (ref 135–147)
T WAVE AXIS: 67 DEGREES
VENTRICULAR RATE: 104 BPM
WBC # BLD AUTO: 9.19 THOUSAND/UL (ref 4.31–10.16)

## 2025-02-15 PROCEDURE — 80048 BASIC METABOLIC PNL TOTAL CA: CPT | Performed by: INTERNAL MEDICINE

## 2025-02-15 PROCEDURE — 93005 ELECTROCARDIOGRAM TRACING: CPT

## 2025-02-15 PROCEDURE — 99232 SBSQ HOSP IP/OBS MODERATE 35: CPT | Performed by: INTERNAL MEDICINE

## 2025-02-15 PROCEDURE — 0241U HB NFCT DS VIR RESP RNA 4 TRGT: CPT | Performed by: INTERNAL MEDICINE

## 2025-02-15 PROCEDURE — 80048 BASIC METABOLIC PNL TOTAL CA: CPT | Performed by: NURSE PRACTITIONER

## 2025-02-15 PROCEDURE — 93010 ELECTROCARDIOGRAM REPORT: CPT | Performed by: STUDENT IN AN ORGANIZED HEALTH CARE EDUCATION/TRAINING PROGRAM

## 2025-02-15 PROCEDURE — 85027 COMPLETE CBC AUTOMATED: CPT | Performed by: INTERNAL MEDICINE

## 2025-02-15 PROCEDURE — 99233 SBSQ HOSP IP/OBS HIGH 50: CPT | Performed by: INTERNAL MEDICINE

## 2025-02-15 RX ORDER — DEXTROSE MONOHYDRATE AND SODIUM CHLORIDE 5; .45 G/100ML; G/100ML
100 INJECTION, SOLUTION INTRAVENOUS CONTINUOUS
Status: DISCONTINUED | OUTPATIENT
Start: 2025-02-15 | End: 2025-02-18

## 2025-02-15 RX ORDER — METOPROLOL TARTRATE 25 MG/1
25 TABLET, FILM COATED ORAL EVERY 12 HOURS SCHEDULED
Status: DISCONTINUED | OUTPATIENT
Start: 2025-02-15 | End: 2025-02-16

## 2025-02-15 RX ORDER — GUAIFENESIN/DEXTROMETHORPHAN 100-10MG/5
10 SYRUP ORAL 3 TIMES DAILY
Status: COMPLETED | OUTPATIENT
Start: 2025-02-15 | End: 2025-02-18

## 2025-02-15 RX ORDER — METOPROLOL TARTRATE 1 MG/ML
2.5 INJECTION, SOLUTION INTRAVENOUS EVERY 6 HOURS PRN
Status: DISCONTINUED | OUTPATIENT
Start: 2025-02-15 | End: 2025-02-19 | Stop reason: HOSPADM

## 2025-02-15 RX ADMIN — FLUOXETINE HYDROCHLORIDE 20 MG: 20 CAPSULE ORAL at 09:18

## 2025-02-15 RX ADMIN — HEPARIN SODIUM 5000 UNITS: 5000 INJECTION, SOLUTION INTRAVENOUS; SUBCUTANEOUS at 14:45

## 2025-02-15 RX ADMIN — FINASTERIDE 5 MG: 5 TABLET, FILM COATED ORAL at 09:18

## 2025-02-15 RX ADMIN — GUAIFENESIN AND DEXTROMETHORPHAN 10 ML: 100; 10 SYRUP ORAL at 17:23

## 2025-02-15 RX ADMIN — HALOPERIDOL 2.5 MG: 1 TABLET ORAL at 22:22

## 2025-02-15 RX ADMIN — DEXTROSE AND SODIUM CHLORIDE 100 ML/HR: 5; .45 INJECTION, SOLUTION INTRAVENOUS at 16:37

## 2025-02-15 RX ADMIN — GUAIFENESIN AND DEXTROMETHORPHAN 10 ML: 100; 10 SYRUP ORAL at 22:22

## 2025-02-15 RX ADMIN — APIXABAN 5 MG: 5 TABLET, FILM COATED ORAL at 18:13

## 2025-02-15 RX ADMIN — Medication 1 PATCH: at 09:18

## 2025-02-15 RX ADMIN — ATORVASTATIN CALCIUM 80 MG: 80 TABLET, FILM COATED ORAL at 17:23

## 2025-02-15 RX ADMIN — METOPROLOL TARTRATE 25 MG: 25 TABLET, FILM COATED ORAL at 15:16

## 2025-02-15 RX ADMIN — TAMSULOSIN HYDROCHLORIDE 0.8 MG: 0.4 CAPSULE ORAL at 17:23

## 2025-02-15 RX ADMIN — AMLODIPINE BESYLATE 5 MG: 5 TABLET ORAL at 09:18

## 2025-02-15 RX ADMIN — HEPARIN SODIUM 5000 UNITS: 5000 INJECTION, SOLUTION INTRAVENOUS; SUBCUTANEOUS at 04:26

## 2025-02-15 RX ADMIN — LEVOTHYROXINE SODIUM 150 MCG: 75 TABLET ORAL at 04:26

## 2025-02-15 NOTE — PLAN OF CARE
Problem: Potential for Falls  Goal: Patient will remain free of falls  Description: INTERVENTIONS:  - Educate patient/family on patient safety including physical limitations  - Instruct patient to call for assistance with activity   - Consult OT/PT to assist with strengthening/mobility   - Keep Call bell within reach  - Keep bed low and locked with side rails adjusted as appropriate  - Keep care items and personal belongings within reach  - Initiate and maintain comfort rounds  - Make Fall Risk Sign visible to staff  - Offer Toileting every 2 Hours, in advance of need  - Apply yellow socks and bracelet for high fall risk patients  - Consider moving patient to room near nurses station  Outcome: Progressing     Problem: PAIN - ADULT  Goal: Verbalizes/displays adequate comfort level or baseline comfort level  Description: Interventions:  - Encourage patient to monitor pain and request assistance  - Assess pain using appropriate pain scale  - Administer analgesics based on type and severity of pain and evaluate response  - Implement non-pharmacological measures as appropriate and evaluate response  - Consider cultural and social influences on pain and pain management  - Notify physician/advanced practitioner if interventions unsuccessful or patient reports new pain  Outcome: Progressing     Problem: INFECTION - ADULT  Goal: Absence or prevention of progression during hospitalization  Description: INTERVENTIONS:  - Assess and monitor for signs and symptoms of infection  - Monitor lab/diagnostic results  - Monitor all insertion sites, i.e. indwelling lines, tubes, and drains  - Monitor endotracheal if appropriate and nasal secretions for changes in amount and color  - [place] appropriate cooling/warming therapies per order  - Administer medications as ordered  - Instruct and encourage patient and family to use good hand hygiene technique  - Identify and instruct in appropriate isolation precautions for identified  infection/condition  Outcome: Progressing     Problem: SAFETY ADULT  Goal: Maintain or return to baseline ADL function  Description: INTERVENTIONS:  -  Assess patient's ability to carry out ADLs; assess patient's baseline for ADL function and identify physical deficits which impact ability to perform ADLs (bathing, care of mouth/teeth, toileting, grooming, dressing, etc.)  - Assess/evaluate cause of self-care deficits   - Assess range of motion  - Assess patient's mobility; develop plan if impaired  - Assess patient's need for assistive devices and provide as appropriate  - Encourage maximum independence but intervene and supervise when necessary  - Involve family in performance of ADLs  - Assess for home care needs following discharge   - Consider OT consult to assist with ADL evaluation and planning for discharge  - Provide patient education as appropriate  Outcome: Progressing  Goal: Maintains/Returns to pre admission functional level  Description: INTERVENTIONS:  - Perform AM-PAC 6 Click Basic Mobility/ Daily Activity assessment daily.  - Set and communicate daily mobility goal to care team and patient/family/caregiver.   - Collaborate with rehabilitation services on mobility goals if consulted  - Perform Range of Motion 4 times a day.  - Reposition patient every 2 hours.  - Dangle patient 3 times a day  - Stand patient 3 times a day  - Ambulate patient 3 times a day  - Out of bed to chair 3 times a day   - Out of bed for meals 3 times a day  - Out of bed for toileting  - Record patient progress and toleration of activity level   Outcome: Progressing     Problem: DISCHARGE PLANNING  Goal: Discharge to home or other facility with appropriate resources  Description: INTERVENTIONS:  - Identify barriers to discharge w/patient and caregiver  - Arrange for needed discharge resources and transportation as appropriate  - Identify discharge learning needs (meds, wound care, etc.)  - Arrange for interpretive services to  assist at discharge as needed  - Refer to Case Management Department for coordinating discharge planning if the patient needs post-hospital services based on physician/advanced practitioner order or complex needs related to functional status, cognitive ability, or social support system  Outcome: Progressing     Problem: Knowledge Deficit  Goal: Patient/family/caregiver demonstrates understanding of disease process, treatment plan, medications, and discharge instructions  Description: Complete learning assessment and assess knowledge base.  Interventions:  - Provide teaching at level of understanding  - Provide teaching via preferred learning methods  Outcome: Progressing     Problem: GENITOURINARY - ADULT  Goal: Maintains or returns to baseline urinary function  Description: INTERVENTIONS:  - Assess urinary function  - Encourage oral fluids to ensure adequate hydration if ordered  - Administer IV fluids as ordered to ensure adequate hydration  - Administer ordered medications as needed  - Offer frequent toileting  - Follow urinary retention protocol if ordered  Outcome: Progressing  Goal: Absence of urinary retention  Description: INTERVENTIONS:  - Assess patient's ability to void and empty bladder  - Monitor I/O  - Bladder scan as needed  - Discuss with physician/AP medications to alleviate retention as needed  - Discuss catheterization for long term situations as appropriate  Outcome: Progressing  Goal: Urinary catheter remains patent  Description: INTERVENTIONS:  - Assess patency of urinary catheter  - If patient has a chronic pickard, consider changing catheter if non-functioning  - Follow guidelines for intermittent irrigation of non-functioning urinary catheter  Outcome: Progressing     Problem: METABOLIC, FLUID AND ELECTROLYTES - ADULT  Goal: Electrolytes maintained within normal limits  Description: INTERVENTIONS:  - Monitor labs and assess patient for signs and symptoms of electrolyte imbalances  - Administer  electrolyte replacement as ordered  - Monitor response to electrolyte replacements, including repeat lab results as appropriate  - Instruct patient on fluid and nutrition as appropriate  Outcome: Progressing  Goal: Fluid balance maintained  Description: INTERVENTIONS:  - Monitor labs   - Monitor I/O and WT  - Instruct patient on fluid and nutrition as appropriate  - Assess for signs & symptoms of volume excess or deficit  Outcome: Progressing     Problem: HEMATOLOGIC - ADULT  Goal: Maintains hematologic stability  Description: INTERVENTIONS  - Assess for signs and symptoms of bleeding or hemorrhage  - Monitor labs  - Administer supportive blood products/factors as ordered and appropriate  Outcome: Progressing

## 2025-02-15 NOTE — ASSESSMENT & PLAN NOTE
Sodium 125 on admission  Patient initially had rapid sodium correction  Patient sodium has been stable on D5, transition to D5 with half-normal saline given tachycardia  Nephrology following

## 2025-02-15 NOTE — ASSESSMENT & PLAN NOTE
Patient was found to have sustained heart rates in the 150s  Independently reviewed EKG showed atrial fibrillation with RVR  No prior history  [id number] or of 2, will review risk/benefits of anticoagulation and if agreeable we will start Eliquis  Will start metoprolol twice daily  Start telemetry  Obtain echocardiogram

## 2025-02-15 NOTE — PROGRESS NOTES
"    Progress Note - Nephrology   Chas Locksiw 59 y.o. male MRN: [id number]  Unit/Bed#: [place] [address] -02 Encounter: [id number]      Assessment / Plan:  Assessment & Plan  RICARDO (acute kidney injury) (HCC)  -admit sCr 5.39 as of 25, already elevated to 5.55 as of 25  -peak sCr 5.61, hopeful for improvement s/p pickard placement  -sCr now 4.92  -RICARDO in setting of moderate b/l hydroureteronephrosis without stones and marked urinary bladder distension  -monitor BMP  -avoid IV dye/relative hypotension   -as concern for possible prerenal component and risk of post obstructive diuresis, monitor lytes on IVF. On D5W at 150ml/hr. Will reduce to 100ml/hr as this may also be driving an osmotic diuresis.   -d/c low K diet as K dropping and likely to continue to do so with diuresis  -consider changing to D51/2 NS pending repeat BMP, on q12hr BMP   Hypertension  -BP elevated on admission, rapid improvement noted  -continue IVF as above  -continue amlodipine 5mg daily, floamx  Urinary retention  -s/p pickard placement as above  -urology follows  -already over 11.2L UOP noted since yesterday  Hyponatremia  -sNa 125 on admit, now 133 and stable  -rapid correction noted  -likely d/t post obstructive diuresis  -monitor q12hr BMP, next 8pm  -s/p D5W at 150ml/hr, monitor on reduced rate  -already over11.2L UOP noted  Anemia  -Hgb 8.1, monitor CBC, transfuse prn  Polyuria  -d/t post obstructive diuresis  -monitor serial labs as at risk of electrolyte abnormalities          Subjective:   Denies chest pain or shortness of breath. No complaints.      Objective:     Vitals: Blood pressure 139/68, pulse 70, temperature 98.9 °F (37.2 °C), temperature source Oral, resp. rate 18, height 5' 9\" (1.753 m), weight 118 kg (259 lb 14.8 oz), SpO2 93%.,Body mass index is 38.38 kg/m².Temp (24hrs), Av.8 °F (37.1 °C), Min:98.7 °F (37.1 °C), Max:98.9 °F (37.2 °C)      Weight (last 2 days)       Date/Time Weight    254 118 (259.92)    " 02/13/25 1553 132 (291.23)              Intake/Output Summary (Last 24 hours) at 2/14/2025 2149  Last data filed at 2/14/2025 2117  Gross per 24 hour   Intake 3400 ml   Output 18614 ml   Net -9680 ml     I/O last 24 hours:  In: 3530 [P.O.:3520; I.V.:10]  Out: 65811 [Urine:55090]        Physical Exam:   Physical Exam  Vitals reviewed.   Constitutional:       General: He is not in acute distress.     Appearance: He is well-developed. He is obese. He is not diaphoretic.   HENT:      Head: Normocephalic and atraumatic.      Nose: Nose normal.      Mouth/Throat:      Mouth: Mucous membranes are moist.      Pharynx: No oropharyngeal exudate.   Eyes:      General: No scleral icterus.        Right eye: No discharge.         Left eye: No discharge.   Neck:      Thyroid: No thyromegaly.   Cardiovascular:      Rate and Rhythm: Normal rate and regular rhythm.      Heart sounds: Normal heart sounds.   Pulmonary:      Effort: Pulmonary effort is normal.      Breath sounds: Normal breath sounds. No wheezing or rales.   Abdominal:      General: Bowel sounds are normal. There is no distension.      Palpations: Abdomen is soft.      Tenderness: There is no abdominal tenderness.   Genitourinary:     Comments: pickard  Musculoskeletal:         General: No swelling. Normal range of motion.      Cervical back: Neck supple.   Lymphadenopathy:      Cervical: No cervical adenopathy.   Skin:     General: Skin is warm and dry.      Coloration: Skin is not jaundiced.      Findings: No rash.   Neurological:      General: No focal deficit present.      Mental Status: He is alert.      Comments: awake   Psychiatric:         Mood and Affect: Mood normal.         Behavior: Behavior normal.         Invasive Devices       Peripheral Intravenous Line  Duration             Peripheral IV 02/13/25 Right Antecubital 1 day    Peripheral IV 02/14/25 Left;Ventral (anterior) Forearm <1 day              Drain  Duration             Urethral Catheter 16 Fr. 1 day                     Medications:    Scheduled Meds:  Current Facility-Administered Medications   Medication Dose Route Frequency Provider Last Rate    acetaminophen  650 mg Oral Q6H PRN Joseph Eaton, DO      aluminum-magnesium hydroxide-simethicone  30 mL Oral Q6H PRN Joseph Eaton, DO      amLODIPine  5 mg Oral Daily Joseph Eaton, DO      atorvastatin  80 mg Oral Daily With Dinner Joseph Eaton, DO      dextrose  150 mL/hr Intravenous Continuous Joseph Eaton,  mL/hr (02/14/25 1742)    finasteride  5 mg Oral Daily Kyalin Miller PA-C      FLUoxetine  20 mg Oral Daily Joseph Eaton, DO      haloperidol  2.5 mg Oral HS Joseph Eaton, DO      heparin (porcine)  5,000 Units Subcutaneous Q8H MARK Joseph Eaton, DO      labetalol  10 mg Intravenous Q4H PRN Joseph Eaton, DO      levothyroxine  150 mcg Oral Early Morning Joseph Eaton, DO      nicotine  1 patch Transdermal Daily Joseph Eaton, DO      ondansetron  4 mg Intravenous Q6H PRN Joseph Eaton, DO      tamsulosin  0.8 mg Oral Daily With Dinner Joseph Eaton, DO         PRN Meds:.  acetaminophen    aluminum-magnesium hydroxide-simethicone    labetalol    ondansetron    Continuous Infusions:dextrose, 150 mL/hr, Last Rate: 150 mL/hr (02/14/25 1742)            LAB RESULTS:      Results from last 7 days   Lab Units 02/14/25  1947 02/14/25  1716 02/14/25  1229 02/14/25  0917 02/14/25  0411 02/14/25  0347 02/13/25  2337 02/13/25  1858 02/13/25  1604   WBC Thousand/uL  --   --   --   --  7.68  --   --   --  8.50   HEMOGLOBIN g/dL  --   --   --   --  7.7*  --   --   --  7.8*   HEMATOCRIT %  --   --   --   --  23.7*  --   --   --  23.9*   PLATELETS Thousands/uL  --   --   --   --  220  --   --   --  229   SEGS PCT %  --   --   --   --   --   --   --   --  73   LYMPHO PCT %  --   --   --   --   --   --   --   --  11*   MONO PCT %  --   --   --   --   --   --   --   --  12   EOS PCT %  --   --   --   --   --   --    "--   --  3   POTASSIUM mmol/L 4.5 4.8 5.0 4.6  --  4.7 5.3 5.4* 5.5*   CHLORIDE mmol/L 104 105 106 105  --  106 101 100 97   CO2 mmol/L 21 21 21 21  --  19* 15* 17* 18*   BUN mg/dL 66* 66* 64* 65*  --  69* 72* 71* 69*   CREATININE mg/dL 5.14* 5.20* 5.34* 5.55*  --  5.61* 5.61* 5.57* 5.39*   CALCIUM mg/dL 8.5 8.7 8.7 8.6  --  8.4 8.6 8.5 8.5   ALK PHOS U/L  --   --   --   --   --   --   --   --  64   ALT U/L  --   --   --   --   --   --   --   --  14   AST U/L  --   --   --   --   --   --   --   --  11*       CUTURES:  No results found for: \"BLOODCX\", \"URINECX\"              Portions of the record may have been created with voice recognition software. Occasional wrong word or \"sound a like\" substitutions may have occurred due to the inherent limitations of voice recognition software. Read the chart carefully and recognize, using context, where substitutions have occurred.If you have any questions, please contact the dictating provider.  "

## 2025-02-15 NOTE — PLAN OF CARE
Problem: Potential for Falls  Goal: Patient will remain free of falls  Description: INTERVENTIONS:  - Educate patient/family on patient safety including physical limitations  - Instruct patient to call for assistance with activity   - Consult OT/PT to assist with strengthening/mobility   - Keep Call bell within reach  - Keep bed low and locked with side rails adjusted as appropriate  - Keep care items and personal belongings within reach  - Initiate and maintain comfort rounds  - Make Fall Risk Sign visible to staff  - Offer Toileting every 2 Hours, in advance of need  - Apply yellow socks and bracelet for high fall risk patients  - Consider moving patient to room near nurses station  Outcome: Progressing     Problem: PAIN - ADULT  Goal: Verbalizes/displays adequate comfort level or baseline comfort level  Description: Interventions:  - Encourage patient to monitor pain and request assistance  - Assess pain using appropriate pain scale  - Administer analgesics based on type and severity of pain and evaluate response  - Implement non-pharmacological measures as appropriate and evaluate response  - Consider cultural and social influences on pain and pain management  - Notify physician/advanced practitioner if interventions unsuccessful or patient reports new pain  Outcome: Progressing     Problem: INFECTION - ADULT  Goal: Absence or prevention of progression during hospitalization  Description: INTERVENTIONS:  - Assess and monitor for signs and symptoms of infection  - Monitor lab/diagnostic results  - Monitor all insertion sites, i.e. indwelling lines, tubes, and drains  - Monitor endotracheal if appropriate and nasal secretions for changes in amount and color  - [place] appropriate cooling/warming therapies per order  - Administer medications as ordered  - Instruct and encourage patient and family to use good hand hygiene technique  - Identify and instruct in appropriate isolation precautions for identified  infection/condition  Outcome: Progressing     Problem: SAFETY ADULT  Goal: Maintain or return to baseline ADL function  Description: INTERVENTIONS:  -  Assess patient's ability to carry out ADLs; assess patient's baseline for ADL function and identify physical deficits which impact ability to perform ADLs (bathing, care of mouth/teeth, toileting, grooming, dressing, etc.)  - Assess/evaluate cause of self-care deficits   - Assess range of motion  - Assess patient's mobility; develop plan if impaired  - Assess patient's need for assistive devices and provide as appropriate  - Encourage maximum independence but intervene and supervise when necessary  - Involve family in performance of ADLs  - Assess for home care needs following discharge   - Consider OT consult to assist with ADL evaluation and planning for discharge  - Provide patient education as appropriate  Outcome: Progressing  Goal: Maintains/Returns to pre admission functional level  Description: INTERVENTIONS:  - Perform AM-PAC 6 Click Basic Mobility/ Daily Activity assessment daily.  - Set and communicate daily mobility goal to care team and patient/family/caregiver.   - Collaborate with rehabilitation services on mobility goals if consulted  - Perform Range of Motion 4 times a day.  - Reposition patient every 2 hours.  - Dangle patient 3 times a day  - Stand patient 3 times a day  - Ambulate patient 3 times a day  - Out of bed to chair 3 times a day   - Out of bed for meals 3 times a day  - Out of bed for toileting  - Record patient progress and toleration of activity level   Outcome: Progressing     Problem: DISCHARGE PLANNING  Goal: Discharge to home or other facility with appropriate resources  Description: INTERVENTIONS:  - Identify barriers to discharge w/patient and caregiver  - Arrange for needed discharge resources and transportation as appropriate  - Identify discharge learning needs (meds, wound care, etc.)  - Arrange for interpretive services to  assist at discharge as needed  - Refer to Case Management Department for coordinating discharge planning if the patient needs post-hospital services based on physician/advanced practitioner order or complex needs related to functional status, cognitive ability, or social support system  Outcome: Progressing     Problem: Knowledge Deficit  Goal: Patient/family/caregiver demonstrates understanding of disease process, treatment plan, medications, and discharge instructions  Description: Complete learning assessment and assess knowledge base.  Interventions:  - Provide teaching at level of understanding  - Provide teaching via preferred learning methods  Outcome: Progressing     Problem: GENITOURINARY - ADULT  Goal: Maintains or returns to baseline urinary function  Description: INTERVENTIONS:  - Assess urinary function  - Encourage oral fluids to ensure adequate hydration if ordered  - Administer IV fluids as ordered to ensure adequate hydration  - Administer ordered medications as needed  - Offer frequent toileting  - Follow urinary retention protocol if ordered  Outcome: Progressing  Goal: Absence of urinary retention  Description: INTERVENTIONS:  - Assess patient's ability to void and empty bladder  - Monitor I/O  - Bladder scan as needed  - Discuss with physician/AP medications to alleviate retention as needed  - Discuss catheterization for long term situations as appropriate  Outcome: Progressing  Goal: Urinary catheter remains patent  Description: INTERVENTIONS:  - Assess patency of urinary catheter  - If patient has a chronic pickard, consider changing catheter if non-functioning  - Follow guidelines for intermittent irrigation of non-functioning urinary catheter  Outcome: Progressing     Problem: METABOLIC, FLUID AND ELECTROLYTES - ADULT  Goal: Electrolytes maintained within normal limits  Description: INTERVENTIONS:  - Monitor labs and assess patient for signs and symptoms of electrolyte imbalances  - Administer  electrolyte replacement as ordered  - Monitor response to electrolyte replacements, including repeat lab results as appropriate  - Instruct patient on fluid and nutrition as appropriate  Outcome: Progressing  Goal: Fluid balance maintained  Description: INTERVENTIONS:  - Monitor labs   - Monitor I/O and WT  - Instruct patient on fluid and nutrition as appropriate  - Assess for signs & symptoms of volume excess or deficit  Outcome: Progressing     Problem: HEMATOLOGIC - ADULT  Goal: Maintains hematologic stability  Description: INTERVENTIONS  - Assess for signs and symptoms of bleeding or hemorrhage  - Monitor labs  - Administer supportive blood products/factors as ordered and appropriate  Outcome: Progressing

## 2025-02-15 NOTE — ASSESSMENT & PLAN NOTE
Normocytic anemia  May be related to profound RICARDO  Patient denies any bleeding or melena  Iron panel without iron deficiency  Trend hemoglobin  Transfuse to maintain greater than 7

## 2025-02-15 NOTE — ASSESSMENT & PLAN NOTE
"Lab Results   Component Value Date    HGBA1C 5.6 01/30/2025       No results for input(s): \"POCGLU\" in the last 72 hours.    Blood Sugar Average: Last 72 hrs:    Diet controlled recent A1c 5.6  Monitor fasting blood sugar, remains well-controlled  "

## 2025-02-15 NOTE — UTILIZATION REVIEW
NOTIFICATION OF INPATIENT ADMISSION   AUTHORIZATION REQUEST   SERVICING FACILITY:   [place] [address]  Tax ID: [id number]  NPI: [id number] ATTENDING PROVIDER:  Attending Name and NPI#: Joseph Eaton Do [[id number]]  Address: [address] [place] [address]  Phone: [phone]     ADMISSION INFORMATION:  Place of Service: Inpatient [facility]  Place of Service Code: 21  Inpatient Admission Date/Time: 2/13/25  6:05 PM  Discharge Date/Time: No discharge date for patient encounter.  Admitting Diagnosis Code/Description:  Urinary retention [R33.9]  SOB (shortness of breath) [R06.02]  Abdominal pain [R10.9]  Abnormal laboratory test [R89.9]  Acute kidney injury (HCC) [N17.9]     UTILIZATION REVIEW CONTACT:  Corrie Pascal, Utilization   Network Utilization Review Department  Phone: [phone]  Fax [phone]  Email: [email or web address]  Contact for approvals/pending authorizations, clinical reviews, and discharge.     PHYSICIAN ADVISORY SERVICES:  Medical Necessity Denial & [place] Review  Phone: [phone]  Fax: [phone]  Email: [email or web address]     DISCHARGE SUPPORT TEAM:  For Patients Discharge Needs & Updates  Phone: [phone] opt. 2 Fax: [phone]  Email: [email or web address]

## 2025-02-15 NOTE — ASSESSMENT & PLAN NOTE
Patient reports chronic cough related to emphysema  Flu/COVID/RSV negative  Symptomatic treatment with Mucinex DM

## 2025-02-15 NOTE — PROGRESS NOTES
"Progress Note - Hospitalist   Name: Chas Soliz 59 y.o. male I MRN: [id number]  Unit/Bed#: [place] [address] -02 I Date of Admission: 2/13/2025   Date of Service: 2/15/2025 I Hospital Day: 2    Assessment & Plan  RICARDO (acute kidney injury) (HCC)  Secondary to obstructive uropathy  Mcclellan catheter to be inserted in the emergency department  Creatinine slowly trending down  Significant urine output consistent with postobstructive diuresis  Monitor for renal recovery  Continue IV fluids, changed to D5 half-normal saline  Hypertension  Hypertensive urgency on admission suspect secondary to discomfort  Now improved after Mcclellan insertion  BP well-controlled  Will hold further amlodipine to allow for rate control with metoprolol  Tobacco use disorder  Nicotine patch  Type II diabetes mellitus with manifestations (HCC)  Lab Results   Component Value Date    HGBA1C 5.6 01/30/2025       No results for input(s): \"POCGLU\" in the last 72 hours.    Blood Sugar Average: Last 72 hrs:    Diet controlled recent A1c 5.6  Monitor fasting blood sugar, remains well-controlled  Hyperlipidemia  Continue statin  Other specified hypothyroidism  Continue levothyroxine  Urinary retention  Suspect secondary to BPH versus urethral stricture  Mcclellan catheter in place and draining well  Urology evaluated-maintain Mcclellan catheter outpatient follow-up for further workup/definitive management  Hyponatremia  Sodium 125 on admission  Patient initially had rapid sodium correction  Patient sodium has been stable on D5, transition to D5 with half-normal saline given tachycardia  Nephrology following      Anemia  Normocytic anemia  May be related to profound RICARDO  Patient denies any bleeding or melena  Iron panel without iron deficiency  Trend hemoglobin  Transfuse to maintain greater than 7  Polyuria  Management as above  Start D5 half-normal saline  Cough  Patient reports chronic cough related to emphysema  Flu/COVID/RSV negative  Symptomatic treatment " with Mucinex DM  A-fib (HCC)  Patient was found to have sustained heart rates in the 150s  Independently reviewed EKG showed atrial fibrillation with RVR  No prior history  [id number] or of 2, will review risk/benefits of anticoagulation and if agreeable we will start Eliquis  Will start metoprolol twice daily  Start telemetry  Obtain echocardiogram      VTE Pharmacologic Prophylaxis: heparin     Patient Centered Rounds:  Patient care rounds were performed with nursing    Discussions with Specialists or Other Care Team Provider: Independently reviewed with nephrology    Education and Discussions with Family / Patient: Patient     Time Spent for Care: I have spent a total time of 56 minutes on 02/15/25 in caring for this patient including Diagnostic results, Risks and benefits of tx options, Instructions for management, Impressions, Counseling / Coordination of care, Documenting in the medical record, Reviewing / ordering tests, medicine, procedures  , and Communicating with other healthcare professionals .      Current Length of Stay: 2 day(s)    Current Patient Status: Inpatient   Certification Statement: The patient will continue to require additional inpatient hospital stay due to management of RICARDO, polyuria, atrial fibrillation with RVR    Discharge Plan: Suspect 48 to 72-hour discharge    Code Status: Level 1 - Full Code      Subjective:   Patient seen and evaluated at bedside.  He would like something for management of his cough.  Nursing reached out in the afternoon and noted tachycardia.  EKG showed atrial fibrillation.    Objective:     Vitals:   Temp (24hrs), Av.5 °F (36.9 °C), Min:97.7 °F (36.5 °C), Max:98.9 °F (37.2 °C)    Temp:  [97.7 °F (36.5 °C)-98.9 °F (37.2 °C)] 97.7 °F (36.5 °C)  HR:  [] 154  Resp:  [18] 18  BP: (113-139)/(65-71) 121/67  SpO2:  [93 %-95 %] 95 %  Body mass index is 38.38 kg/m².     Input and Output Summary (last 24 hours):       Intake/Output Summary (Last 24 hours) at  2/15/2025 1716  Last data filed at 2/15/2025 1425  Gross per 24 hour   Intake 2680 ml   Output 9200 ml   Net -6520 ml       Physical Exam:     Physical Exam  Vitals reviewed.   Constitutional:       General: He is not in acute distress.     Appearance: He is well-developed. He is not ill-appearing, toxic-appearing or diaphoretic.   HENT:      Head: Normocephalic and atraumatic.      Mouth/Throat:      Mouth: Mucous membranes are moist.   Eyes:      General: No scleral icterus.     Extraocular Movements: Extraocular movements intact.   Cardiovascular:      Rate and Rhythm: Normal rate and regular rhythm.      Heart sounds: Normal heart sounds.   Pulmonary:      Effort: Pulmonary effort is normal. No respiratory distress.      Breath sounds: Normal breath sounds. No wheezing or rales.   Abdominal:      General: There is no distension.      Palpations: Abdomen is soft.      Tenderness: There is no abdominal tenderness. There is no guarding or rebound.   Musculoskeletal:         General: No swelling, tenderness or deformity.   Skin:     General: Skin is warm and dry.   Neurological:      General: No focal deficit present.      Mental Status: He is alert. Mental status is at baseline.   Psychiatric:         Mood and Affect: Mood normal.         Behavior: Behavior normal.         Thought Content: Thought content normal.         Judgment: Judgment normal.         Additional Data:     Labs: I have reviewed pertinent results     Results from last 7 days   Lab Units 02/15/25  0405 02/14/25  0411 02/13/25  1604   WBC Thousand/uL 9.19   < > 8.50   HEMOGLOBIN g/dL 8.1*   < > 7.8*   HEMATOCRIT % 25.3*   < > 23.9*   PLATELETS Thousands/uL 230   < > 229   SEGS PCT %  --   --  73   LYMPHO PCT %  --   --  11*   MONO PCT %  --   --  12   EOS PCT %  --   --  3    < > = values in this interval not displayed.     Results from last 7 days   Lab Units 02/15/25  1500 02/13/25  1858 02/13/25  1604   SODIUM mmol/L 133*   < > 125*   POTASSIUM  mmol/L 4.4   < > 5.5*   CHLORIDE mmol/L 103   < > 97   CO2 mmol/L 21   < > 18*   BUN mg/dL 54*   < > 69*   CREATININE mg/dL 4.55*   < > 5.39*   ANION GAP mmol/L 9   < > 10   CALCIUM mg/dL 8.6   < > 8.5   ALBUMIN g/dL  --   --  4.2   TOTAL BILIRUBIN mg/dL  --   --  0.41   ALK PHOS U/L  --   --  64   ALT U/L  --   --  14   AST U/L  --   --  11*   GLUCOSE RANDOM mg/dL 143*   < > 94    < > = values in this interval not displayed.                         Imaging: I have reviewed pertinent imaging       Recent Cultures (last 7 days):           Last 24 Hours Medication List:   Current Facility-Administered Medications   Medication Dose Route Frequency Provider Last Rate    acetaminophen  650 mg Oral Q6H PRN Joseph Eaton DO      aluminum-magnesium hydroxide-simethicone  30 mL Oral Q6H PRN Joseph Eaton DO      atorvastatin  80 mg Oral Daily With Dinner Joseph Eaton DO      dextromethorphan-guaiFENesin  10 mL Oral TID Joseph Eaton DO      dextrose 5 % and sodium chloride 0.45 %  100 mL/hr Intravenous Continuous Joseph Eaton  mL/hr (02/15/25 1637)    finasteride  5 mg Oral Daily Kaylin Miller PA-C      FLUoxetine  20 mg Oral Daily Joseph Eaton DO      haloperidol  2.5 mg Oral HS Joseph Eaton DO      heparin (porcine)  5,000 Units Subcutaneous Q8H Counts include 234 beds at the [facility] Joseph Eaton DO      levothyroxine  150 mcg Oral Early Morning Joseph Eaton DO      metoprolol  2.5 mg Intravenous Q6H PRN Joseph Eaton DO      metoprolol tartrate  25 mg Oral Q12H Counts include 234 beds at the [facility] Joseph Eaton DO      nicotine  1 patch Transdermal Daily Joseph Eaton DO      ondansetron  4 mg Intravenous Q6H PRN Joseph Eaton DO      tamsulosin  0.8 mg Oral Daily With Dinner Joseph Eaton DO          Today, Patient Was Seen By: Joseph Eaton DO    ** Please Note: Dictation voice to text software may have been used in the creation of this document. **

## 2025-02-15 NOTE — ASSESSMENT & PLAN NOTE
Hypertensive urgency on admission suspect secondary to discomfort  Now improved after Mcclellan insertion  BP well-controlled  Will hold further amlodipine to allow for rate control with metoprolol

## 2025-02-15 NOTE — ASSESSMENT & PLAN NOTE
Suspect secondary to BPH versus urethral stricture  Mcclellan catheter in place and draining well  Urology evaluated-maintain Mcclellan catheter outpatient follow-up for further workup/definitive management

## 2025-02-15 NOTE — ASSESSMENT & PLAN NOTE
Secondary to obstructive uropathy  Mcclellan catheter to be inserted in the emergency department  Creatinine slowly trending down  Significant urine output consistent with postobstructive diuresis  Monitor for renal recovery  Continue IV fluids, changed to D5 half-normal saline

## 2025-02-15 NOTE — ASSESSMENT & PLAN NOTE
-admit sCr 5.39 as of 2/13/25, already elevated to 5.55 as of 1/24/25  -peak sCr 5.61, hopeful for improvement s/p pickard placement  -sCr now 4.92  -RICARDO in setting of moderate b/l hydroureteronephrosis without stones and marked urinary bladder distension  -monitor BMP  -avoid IV dye/relative hypotension   -as concern for possible prerenal component and risk of post obstructive diuresis, monitor lytes on IVF. On D5W at 150ml/hr. Will reduce to 100ml/hr as this may also be driving an osmotic diuresis.   -d/c low K diet as K dropping and likely to continue to do so with diuresis  -consider changing to D51/2 NS pending repeat BMP, on q12hr BMP

## 2025-02-15 NOTE — ASSESSMENT & PLAN NOTE
-sNa 125 on admit, now 133 and stable  -rapid correction noted  -likely d/t post obstructive diuresis  -monitor q12hr BMP, next 8pm  -s/p D5W at 150ml/hr, monitor on reduced rate  -already over11.2L UOP noted

## 2025-02-16 LAB
ANION GAP SERPL CALCULATED.3IONS-SCNC: 8 MMOL/L (ref 4–13)
BUN SERPL-MCNC: 54 MG/DL (ref 5–25)
CALCIUM SERPL-MCNC: 8.5 MG/DL (ref 8.4–10.2)
CHLORIDE SERPL-SCNC: 104 MMOL/L (ref 96–108)
CO2 SERPL-SCNC: 21 MMOL/L (ref 21–32)
CREAT SERPL-MCNC: 4.22 MG/DL (ref 0.6–1.3)
ERYTHROCYTE [DISTWIDTH] IN BLOOD BY AUTOMATED COUNT: 12.9 % (ref 11.6–15.1)
GFR SERPL CREATININE-BSD FRML MDRD: 14 ML/MIN/1.73SQ M
GLUCOSE SERPL-MCNC: 105 MG/DL (ref 65–140)
HCT VFR BLD AUTO: 29.9 % (ref 36.5–49.3)
HGB BLD-MCNC: 9.6 G/DL (ref 12–17)
MAGNESIUM SERPL-MCNC: 2.1 MG/DL (ref 1.9–2.7)
MCH RBC QN AUTO: 29.8 PG (ref 26.8–34.3)
MCHC RBC AUTO-ENTMCNC: 32.1 G/DL (ref 31.4–37.4)
MCV RBC AUTO: 93 FL (ref 82–98)
PHOSPHATE SERPL-MCNC: 4.2 MG/DL (ref 2.3–4.1)
PLATELET # BLD AUTO: 263 THOUSANDS/UL (ref 149–390)
PMV BLD AUTO: 9.9 FL (ref 8.9–12.7)
POTASSIUM SERPL-SCNC: 4.3 MMOL/L (ref 3.5–5.3)
RBC # BLD AUTO: 3.22 MILLION/UL (ref 3.88–5.62)
SODIUM SERPL-SCNC: 133 MMOL/L (ref 135–147)
WBC # BLD AUTO: 7.96 THOUSAND/UL (ref 4.31–10.16)

## 2025-02-16 PROCEDURE — 84100 ASSAY OF PHOSPHORUS: CPT | Performed by: INTERNAL MEDICINE

## 2025-02-16 PROCEDURE — 85027 COMPLETE CBC AUTOMATED: CPT | Performed by: INTERNAL MEDICINE

## 2025-02-16 PROCEDURE — 99232 SBSQ HOSP IP/OBS MODERATE 35: CPT | Performed by: INTERNAL MEDICINE

## 2025-02-16 PROCEDURE — 83735 ASSAY OF MAGNESIUM: CPT | Performed by: INTERNAL MEDICINE

## 2025-02-16 PROCEDURE — 80048 BASIC METABOLIC PNL TOTAL CA: CPT | Performed by: INTERNAL MEDICINE

## 2025-02-16 RX ORDER — PANTOPRAZOLE SODIUM 40 MG/1
40 TABLET, DELAYED RELEASE ORAL
Status: DISCONTINUED | OUTPATIENT
Start: 2025-02-17 | End: 2025-02-16

## 2025-02-16 RX ORDER — PANTOPRAZOLE SODIUM 40 MG/1
40 TABLET, DELAYED RELEASE ORAL
Status: DISCONTINUED | OUTPATIENT
Start: 2025-02-16 | End: 2025-02-19 | Stop reason: HOSPADM

## 2025-02-16 RX ADMIN — HALOPERIDOL 2.5 MG: 1 TABLET ORAL at 22:44

## 2025-02-16 RX ADMIN — ONDANSETRON 4 MG: 2 INJECTION INTRAMUSCULAR; INTRAVENOUS at 13:27

## 2025-02-16 RX ADMIN — LEVOTHYROXINE SODIUM 150 MCG: 75 TABLET ORAL at 05:10

## 2025-02-16 RX ADMIN — FINASTERIDE 5 MG: 5 TABLET, FILM COATED ORAL at 07:56

## 2025-02-16 RX ADMIN — APIXABAN 5 MG: 5 TABLET, FILM COATED ORAL at 22:40

## 2025-02-16 RX ADMIN — Medication 1 PATCH: at 07:57

## 2025-02-16 RX ADMIN — GUAIFENESIN AND DEXTROMETHORPHAN 10 ML: 100; 10 SYRUP ORAL at 22:40

## 2025-02-16 RX ADMIN — ATORVASTATIN CALCIUM 80 MG: 80 TABLET, FILM COATED ORAL at 15:44

## 2025-02-16 RX ADMIN — GUAIFENESIN AND DEXTROMETHORPHAN 10 ML: 100; 10 SYRUP ORAL at 07:56

## 2025-02-16 RX ADMIN — METOPROLOL TARTRATE 25 MG: 25 TABLET, FILM COATED ORAL at 07:56

## 2025-02-16 RX ADMIN — APIXABAN 5 MG: 5 TABLET, FILM COATED ORAL at 07:56

## 2025-02-16 RX ADMIN — TAMSULOSIN HYDROCHLORIDE 0.8 MG: 0.4 CAPSULE ORAL at 15:44

## 2025-02-16 RX ADMIN — GUAIFENESIN AND DEXTROMETHORPHAN 10 ML: 100; 10 SYRUP ORAL at 15:44

## 2025-02-16 RX ADMIN — SODIUM CHLORIDE 1000 ML: 0.9 INJECTION, SOLUTION INTRAVENOUS at 12:23

## 2025-02-16 RX ADMIN — FLUOXETINE HYDROCHLORIDE 20 MG: 20 CAPSULE ORAL at 07:56

## 2025-02-16 RX ADMIN — PANTOPRAZOLE SODIUM 40 MG: 40 TABLET, DELAYED RELEASE ORAL at 15:44

## 2025-02-16 NOTE — ASSESSMENT & PLAN NOTE
-admit sCr 5.39 as of 2/13/25, already elevated to 5.55 as of 1/24/25  -peak sCr 5.61, hopeful for improvement s/p pickard placement  -sCr now 4.22  -RICARDO in setting of moderate b/l hydroureteronephrosis without stones and marked urinary bladder distension  -monitor BMP  -avoid IV dye/relative hypotension   -as concern for possible prerenal component and risk of post obstructive diuresis, monitor lytes on IVF. On D5/1/2NS at 100ml/hr.   -f/u am BMP

## 2025-02-16 NOTE — ASSESSMENT & PLAN NOTE
Hypertensive urgency on admission suspect secondary to discomfort  Now improved after Mcclellan insertion  Amlodipine held to allow for rate control  BP is soft, suspect component of metoprolol and postobstructive diuresis  Will bolus with 1 L normal saline  Reduce metoprolol to 12.5 twice daily with hold parameters

## 2025-02-16 NOTE — PLAN OF CARE
Problem: Potential for Falls  Goal: Patient will remain free of falls  Description: INTERVENTIONS:  - Educate patient/family on patient safety including physical limitations  - Instruct patient to call for assistance with activity   - Consult OT/PT to assist with strengthening/mobility   - Keep Call bell within reach  - Keep bed low and locked with side rails adjusted as appropriate  - Keep care items and personal belongings within reach  - Initiate and maintain comfort rounds  - Make Fall Risk Sign visible to staff  - Offer Toileting every 2 Hours, in advance of need  - Apply yellow socks and bracelet for high fall risk patients  - Consider moving patient to room near nurses station  Outcome: Progressing     Problem: PAIN - ADULT  Goal: Verbalizes/displays adequate comfort level or baseline comfort level  Description: Interventions:  - Encourage patient to monitor pain and request assistance  - Assess pain using appropriate pain scale  - Administer analgesics based on type and severity of pain and evaluate response  - Implement non-pharmacological measures as appropriate and evaluate response  - Consider cultural and social influences on pain and pain management  - Notify physician/advanced practitioner if interventions unsuccessful or patient reports new pain  Outcome: Progressing     Problem: INFECTION - ADULT  Goal: Absence or prevention of progression during hospitalization  Description: INTERVENTIONS:  - Assess and monitor for signs and symptoms of infection  - Monitor lab/diagnostic results  - Monitor all insertion sites, i.e. indwelling lines, tubes, and drains  - Monitor endotracheal if appropriate and nasal secretions for changes in amount and color  - [place] appropriate cooling/warming therapies per order  - Administer medications as ordered  - Instruct and encourage patient and family to use good hand hygiene technique  - Identify and instruct in appropriate isolation precautions for identified  infection/condition  Outcome: Progressing     Problem: SAFETY ADULT  Goal: Maintain or return to baseline ADL function  Description: INTERVENTIONS:  -  Assess patient's ability to carry out ADLs; assess patient's baseline for ADL function and identify physical deficits which impact ability to perform ADLs (bathing, care of mouth/teeth, toileting, grooming, dressing, etc.)  - Assess/evaluate cause of self-care deficits   - Assess range of motion  - Assess patient's mobility; develop plan if impaired  - Assess patient's need for assistive devices and provide as appropriate  - Encourage maximum independence but intervene and supervise when necessary  - Involve family in performance of ADLs  - Assess for home care needs following discharge   - Consider OT consult to assist with ADL evaluation and planning for discharge  - Provide patient education as appropriate  Outcome: Progressing  Goal: Maintains/Returns to pre admission functional level  Description: INTERVENTIONS:  - Perform AM-PAC 6 Click Basic Mobility/ Daily Activity assessment daily.  - Set and communicate daily mobility goal to care team and patient/family/caregiver.   - Collaborate with rehabilitation services on mobility goals if consulted  - Perform Range of Motion 4 times a day.  - Reposition patient every 2 hours.  - Dangle patient 3 times a day  - Stand patient 3 times a day  - Ambulate patient 3 times a day  - Out of bed to chair 3 times a day   - Out of bed for meals 3 times a day  - Out of bed for toileting  - Record patient progress and toleration of activity level   Outcome: Progressing     Problem: DISCHARGE PLANNING  Goal: Discharge to home or other facility with appropriate resources  Description: INTERVENTIONS:  - Identify barriers to discharge w/patient and caregiver  - Arrange for needed discharge resources and transportation as appropriate  - Identify discharge learning needs (meds, wound care, etc.)  - Arrange for interpretive services to  assist at discharge as needed  - Refer to Case Management Department for coordinating discharge planning if the patient needs post-hospital services based on physician/advanced practitioner order or complex needs related to functional status, cognitive ability, or social support system  Outcome: Progressing     Problem: Knowledge Deficit  Goal: Patient/family/caregiver demonstrates understanding of disease process, treatment plan, medications, and discharge instructions  Description: Complete learning assessment and assess knowledge base.  Interventions:  - Provide teaching at level of understanding  - Provide teaching via preferred learning methods  Outcome: Progressing     Problem: GENITOURINARY - ADULT  Goal: Maintains or returns to baseline urinary function  Description: INTERVENTIONS:  - Assess urinary function  - Encourage oral fluids to ensure adequate hydration if ordered  - Administer IV fluids as ordered to ensure adequate hydration  - Administer ordered medications as needed  - Offer frequent toileting  - Follow urinary retention protocol if ordered  Outcome: Progressing  Goal: Absence of urinary retention  Description: INTERVENTIONS:  - Assess patient's ability to void and empty bladder  - Monitor I/O  - Bladder scan as needed  - Discuss with physician/AP medications to alleviate retention as needed  - Discuss catheterization for long term situations as appropriate  Outcome: Progressing  Goal: Urinary catheter remains patent  Description: INTERVENTIONS:  - Assess patency of urinary catheter  - If patient has a chronic pickard, consider changing catheter if non-functioning  - Follow guidelines for intermittent irrigation of non-functioning urinary catheter  Outcome: Progressing     Problem: METABOLIC, FLUID AND ELECTROLYTES - ADULT  Goal: Electrolytes maintained within normal limits  Description: INTERVENTIONS:  - Monitor labs and assess patient for signs and symptoms of electrolyte imbalances  - Administer  electrolyte replacement as ordered  - Monitor response to electrolyte replacements, including repeat lab results as appropriate  - Instruct patient on fluid and nutrition as appropriate  Outcome: Progressing  Goal: Fluid balance maintained  Description: INTERVENTIONS:  - Monitor labs   - Monitor I/O and WT  - Instruct patient on fluid and nutrition as appropriate  - Assess for signs & symptoms of volume excess or deficit  Outcome: Progressing     Problem: HEMATOLOGIC - ADULT  Goal: Maintains hematologic stability  Description: INTERVENTIONS  - Assess for signs and symptoms of bleeding or hemorrhage  - Monitor labs  - Administer supportive blood products/factors as ordered and appropriate  Outcome: Progressing

## 2025-02-16 NOTE — PROGRESS NOTES
"Progress Note - Hospitalist   Name: Chas Soliz 59 y.o. male I MRN: [id number]  Unit/Bed#: [place] [address] -02 I Date of Admission: 2/13/2025   Date of Service: 2/16/2025 I Hospital Day: 3    Assessment & Plan  RICARDO (acute kidney injury) (HCC)  Secondary to obstructive uropathy  Mcclellan catheter to be inserted in the emergency department  Creatinine slowly trending down  Significant urine output consistent with postobstructive diuresis  Monitor for renal recovery  Continue IV fluids,  D5 half-normal saline  Hypertension  Hypertensive urgency on admission suspect secondary to discomfort  Now improved after Mcclellan insertion  Amlodipine held to allow for rate control  BP is soft, suspect component of metoprolol and postobstructive diuresis  Will bolus with 1 L normal saline  Reduce metoprolol to 12.5 twice daily with hold parameters  Tobacco use disorder  Nicotine patch  Type II diabetes mellitus with manifestations (HCC)  Lab Results   Component Value Date    HGBA1C 5.6 01/30/2025       No results for input(s): \"POCGLU\" in the last 72 hours.    Blood Sugar Average: Last 72 hrs:    Diet controlled recent A1c 5.6  Monitor fasting blood sugar, remains well-controlled  Hyperlipidemia  Continue statin  Other specified hypothyroidism  Continue levothyroxine  Urinary retention  Suspect secondary to BPH versus urethral stricture  Mcclellan catheter in place and draining well  Urology evaluated-maintain Mcclellan catheter outpatient follow-up for further workup/definitive management  Hyponatremia  Sodium 125 on admission  Patient initially had rapid sodium correction  Patient required D5 and now sodium has remained stable on current IV fluids      Anemia  Normocytic anemia  May be related to profound RICARDO  Patient denies any bleeding or melena  Iron panel without iron deficiency  Trend hemoglobin  Transfuse to maintain greater than 7  Polyuria  Management as above  Bolus of 1 L IV fluid  Continue D5 half-normal saline  Cough  Patient " reports chronic cough related to emphysema  Flu/COVID/RSV negative  Symptomatic treatment with Mucinex DM  A-fib (HCC)  Patient was found to have sustained heart rates in the 150s  Independently reviewed EKG showed atrial fibrillation with RVR, converted to NSR shortly after   No prior history  [id number] or of 2, started on Eliquis  Continue metoprolol twice daily with hold parameters  No events on telemetry overnight, will continue for 24 more hours  Follow-up echocardiogram      VTE Pharmacologic Prophylaxis: Apixaban    Patient Centered Rounds:  Patient care rounds were performed with nursing    Discussions with Specialists or Other Care Team Provider: Independently reviewed with nephrology    Education and Discussions with Family / Patient: Patient    Time Spent for Care: I have spent a total time of 55 minutes on 25 in caring for this patient including Diagnostic results, Prognosis, Impressions, Counseling / Coordination of care, Documenting in the medical record, Reviewing / ordering tests, medicine, procedures  , and Communicating with other healthcare professionals .      Current Length of Stay: 3 day(s)    Current Patient Status: Inpatient   Certification Statement: The patient will continue to require additional inpatient hospital stay due to management of sissy, polyuria, afib     Discharge Plan: 48 hours pending renal recovery    Code Status: Level 1 - Full Code      Subjective:   Patient seen and evaluated at bedside.  Episode of vomiting overnight.  Now resolved.  He feels well    Objective:     Vitals:   Temp (24hrs), Av.1 °F (36.7 °C), Min:97.7 °F (36.5 °C), Max:98.9 °F (37.2 °C)    Temp:  [97.7 °F (36.5 °C)-98.9 °F (37.2 °C)] 97.8 °F (36.6 °C)  HR:  [] 65  Resp:  [17-18] 18  BP: ()/(53-80) 93/53  SpO2:  [92 %-100 %] 100 %  Body mass index is 38.38 kg/m².     Input and Output Summary (last 24 hours):       Intake/Output Summary (Last 24 hours) at 2025 4884  Last data filed  at 2/16/2025 0938  Gross per 24 hour   Intake 2160 ml   Output 4602 ml   Net -2442 ml       Physical Exam:     Physical Exam  Vitals reviewed.   Constitutional:       General: He is not in acute distress.     Appearance: He is well-developed. He is not ill-appearing, toxic-appearing or diaphoretic.   HENT:      Head: Normocephalic and atraumatic.      Mouth/Throat:      Mouth: Mucous membranes are moist.   Eyes:      General: No scleral icterus.     Extraocular Movements: Extraocular movements intact.   Cardiovascular:      Rate and Rhythm: Normal rate and regular rhythm.      Heart sounds: Normal heart sounds.   Pulmonary:      Effort: Pulmonary effort is normal. No respiratory distress.      Breath sounds: Normal breath sounds. No wheezing or rales.   Abdominal:      General: There is no distension.      Palpations: Abdomen is soft.      Tenderness: There is no abdominal tenderness. There is no guarding or rebound.   Musculoskeletal:         General: No swelling, tenderness or deformity.   Skin:     General: Skin is warm and dry.   Neurological:      General: No focal deficit present.      Mental Status: He is alert. Mental status is at baseline.   Psychiatric:         Mood and Affect: Mood normal.         Behavior: Behavior normal.         Thought Content: Thought content normal.         Judgment: Judgment normal.         Additional Data:     Labs: I have reviewed pertinent results     Results from last 7 days   Lab Units 02/16/25  0503 02/14/25  0411 02/13/25  1604   WBC Thousand/uL 7.96   < > 8.50   HEMOGLOBIN g/dL 9.6*   < > 7.8*   HEMATOCRIT % 29.9*   < > 23.9*   PLATELETS Thousands/uL 263   < > 229   SEGS PCT %  --   --  73   LYMPHO PCT %  --   --  11*   MONO PCT %  --   --  12   EOS PCT %  --   --  3    < > = values in this interval not displayed.     Results from last 7 days   Lab Units 02/16/25  0755 02/13/25  1858 02/13/25  1604   SODIUM mmol/L 133*   < > 125*   POTASSIUM mmol/L 4.3   < > 5.5*   CHLORIDE  mmol/L 104   < > 97   CO2 mmol/L 21   < > 18*   BUN mg/dL 54*   < > 69*   CREATININE mg/dL 4.22*   < > 5.39*   ANION GAP mmol/L 8   < > 10   CALCIUM mg/dL 8.5   < > 8.5   ALBUMIN g/dL  --   --  4.2   TOTAL BILIRUBIN mg/dL  --   --  0.41   ALK PHOS U/L  --   --  64   ALT U/L  --   --  14   AST U/L  --   --  11*   GLUCOSE RANDOM mg/dL 105   < > 94    < > = values in this interval not displayed.                         Imaging: I have reviewed pertinent imaging       Recent Cultures (last 7 days):           Last 24 Hours Medication List:   Current Facility-Administered Medications   Medication Dose Route Frequency Provider Last Rate    acetaminophen  650 mg Oral Q6H PRN Joseph Eaton DO      aluminum-magnesium hydroxide-simethicone  30 mL Oral Q6H PRN Joseph Eaton DO      apixaban  5 mg Oral BID Joseph Eaton DO      atorvastatin  80 mg Oral Daily With Dinner Joseph Eaton DO      dextromethorphan-guaiFENesin  10 mL Oral TID Joseph Eaton DO      dextrose 5 % and sodium chloride 0.45 %  100 mL/hr Intravenous Continuous Joseph Eaton  mL/hr (02/15/25 1637)    finasteride  5 mg Oral Daily Kaylin Miller PA-C      FLUoxetine  20 mg Oral Daily Joseph Eaton DO      haloperidol  2.5 mg Oral HS Joseph Eaton DO      levothyroxine  150 mcg Oral Early Morning Joseph Eaton DO      metoprolol  2.5 mg Intravenous Q6H PRN Joseph Eaton DO      metoprolol tartrate  12.5 mg Oral Q12H MARK Joseph Eaton DO      nicotine  1 patch Transdermal Daily Joseph Eaton DO      ondansetron  4 mg Intravenous Q6H PRN Joseph Eaton DO      sodium chloride  1,000 mL Intravenous Once Joseph Eaton DO      tamsulosin  0.8 mg Oral Daily With Dinner Joseph Eaton DO          Today, Patient Was Seen By: Joseph Eaton DO    ** Please Note: Dictation voice to text software may have been used in the creation of this document. **

## 2025-02-16 NOTE — PLAN OF CARE
Problem: Potential for Falls  Goal: Patient will remain free of falls  Description: INTERVENTIONS:  - Educate patient/family on patient safety including physical limitations  - Instruct patient to call for assistance with activity   - Consult OT/PT to assist with strengthening/mobility   - Keep Call bell within reach  - Keep bed low and locked with side rails adjusted as appropriate  - Keep care items and personal belongings within reach  - Initiate and maintain comfort rounds  - Make Fall Risk Sign visible to staff  - Offer Toileting every 2 Hours, in advance of need  - Apply yellow socks and bracelet for high fall risk patients  - Consider moving patient to room near nurses station  Outcome: Progressing     Problem: PAIN - ADULT  Goal: Verbalizes/displays adequate comfort level or baseline comfort level  Description: Interventions:  - Encourage patient to monitor pain and request assistance  - Assess pain using appropriate pain scale  - Administer analgesics based on type and severity of pain and evaluate response  - Implement non-pharmacological measures as appropriate and evaluate response  - Consider cultural and social influences on pain and pain management  - Notify physician/advanced practitioner if interventions unsuccessful or patient reports new pain  Outcome: Progressing     Problem: INFECTION - ADULT  Goal: Absence or prevention of progression during hospitalization  Description: INTERVENTIONS:  - Assess and monitor for signs and symptoms of infection  - Monitor lab/diagnostic results  - Monitor all insertion sites, i.e. indwelling lines, tubes, and drains  - Monitor endotracheal if appropriate and nasal secretions for changes in amount and color  - [place] appropriate cooling/warming therapies per order  - Administer medications as ordered  - Instruct and encourage patient and family to use good hand hygiene technique  - Identify and instruct in appropriate isolation precautions for identified  infection/condition  Outcome: Progressing     Problem: SAFETY ADULT  Goal: Maintain or return to baseline ADL function  Description: INTERVENTIONS:  -  Assess patient's ability to carry out ADLs; assess patient's baseline for ADL function and identify physical deficits which impact ability to perform ADLs (bathing, care of mouth/teeth, toileting, grooming, dressing, etc.)  - Assess/evaluate cause of self-care deficits   - Assess range of motion  - Assess patient's mobility; develop plan if impaired  - Assess patient's need for assistive devices and provide as appropriate  - Encourage maximum independence but intervene and supervise when necessary  - Involve family in performance of ADLs  - Assess for home care needs following discharge   - Consider OT consult to assist with ADL evaluation and planning for discharge  - Provide patient education as appropriate  Outcome: Progressing  Goal: Maintains/Returns to pre admission functional level  Description: INTERVENTIONS:  - Perform AM-PAC 6 Click Basic Mobility/ Daily Activity assessment daily.  - Set and communicate daily mobility goal to care team and patient/family/caregiver.   - Collaborate with rehabilitation services on mobility goals if consulted  - Perform Range of Motion 4 times a day.  - Reposition patient every 2 hours.  - Dangle patient 3 times a day  - Stand patient 3 times a day  - Ambulate patient 3 times a day  - Out of bed to chair 3 times a day   - Out of bed for meals 3 times a day  - Out of bed for toileting  - Record patient progress and toleration of activity level   Outcome: Progressing     Problem: DISCHARGE PLANNING  Goal: Discharge to home or other facility with appropriate resources  Description: INTERVENTIONS:  - Identify barriers to discharge w/patient and caregiver  - Arrange for needed discharge resources and transportation as appropriate  - Identify discharge learning needs (meds, wound care, etc.)  - Arrange for interpretive services to  assist at discharge as needed  - Refer to Case Management Department for coordinating discharge planning if the patient needs post-hospital services based on physician/advanced practitioner order or complex needs related to functional status, cognitive ability, or social support system  Outcome: Progressing     Problem: Knowledge Deficit  Goal: Patient/family/caregiver demonstrates understanding of disease process, treatment plan, medications, and discharge instructions  Description: Complete learning assessment and assess knowledge base.  Interventions:  - Provide teaching at level of understanding  - Provide teaching via preferred learning methods  Outcome: Progressing     Problem: GENITOURINARY - ADULT  Goal: Maintains or returns to baseline urinary function  Description: INTERVENTIONS:  - Assess urinary function  - Encourage oral fluids to ensure adequate hydration if ordered  - Administer IV fluids as ordered to ensure adequate hydration  - Administer ordered medications as needed  - Offer frequent toileting  - Follow urinary retention protocol if ordered  Outcome: Progressing  Goal: Absence of urinary retention  Description: INTERVENTIONS:  - Assess patient's ability to void and empty bladder  - Monitor I/O  - Bladder scan as needed  - Discuss with physician/AP medications to alleviate retention as needed  - Discuss catheterization for long term situations as appropriate  Outcome: Progressing  Goal: Urinary catheter remains patent  Description: INTERVENTIONS:  - Assess patency of urinary catheter  - If patient has a chronic pickard, consider changing catheter if non-functioning  - Follow guidelines for intermittent irrigation of non-functioning urinary catheter  Outcome: Progressing     Problem: METABOLIC, FLUID AND ELECTROLYTES - ADULT  Goal: Electrolytes maintained within normal limits  Description: INTERVENTIONS:  - Monitor labs and assess patient for signs and symptoms of electrolyte imbalances  - Administer  electrolyte replacement as ordered  - Monitor response to electrolyte replacements, including repeat lab results as appropriate  - Instruct patient on fluid and nutrition as appropriate  Outcome: Progressing  Goal: Fluid balance maintained  Description: INTERVENTIONS:  - Monitor labs   - Monitor I/O and WT  - Instruct patient on fluid and nutrition as appropriate  - Assess for signs & symptoms of volume excess or deficit  Outcome: Progressing     Problem: HEMATOLOGIC - ADULT  Goal: Maintains hematologic stability  Description: INTERVENTIONS  - Assess for signs and symptoms of bleeding or hemorrhage  - Monitor labs  - Administer supportive blood products/factors as ordered and appropriate  Outcome: Progressing

## 2025-02-16 NOTE — ASSESSMENT & PLAN NOTE
Secondary to obstructive uropathy  Mcclellan catheter to be inserted in the emergency department  Creatinine slowly trending down  Significant urine output consistent with postobstructive diuresis  Monitor for renal recovery  Continue IV fluids,  D5 half-normal saline

## 2025-02-16 NOTE — ASSESSMENT & PLAN NOTE
Sodium 125 on admission  Patient initially had rapid sodium correction  Patient required D5 and now sodium has remained stable on current IV fluids

## 2025-02-16 NOTE — PROGRESS NOTES
"    Progress Note - Nephrology   Chas Soliz 59 y.o. male MRN: [id number]  Unit/Bed#: [place] [address] -02 Encounter: [id number]      Assessment / Plan:  Assessment & Plan  RICARDO (acute kidney injury) (HCC)  -admit sCr 5.39 as of 25, already elevated to 5.55 as of 25  -peak sCr 5.61, hopeful for improvement s/p pickard placement  -sCr now 4.22  -RICARDO in setting of moderate b/l hydroureteronephrosis without stones and marked urinary bladder distension  -monitor BMP  -avoid IV dye/relative hypotension   -as concern for possible prerenal component and risk of post obstructive diuresis, monitor lytes on IVF. On D5/1/2NS at 100ml/hr.   -f/u am BMP  Hypertension  -BP elevated on admission, rapid improvement noted  -continue IVF as above  -continue metoprolol 25mg twice daily, flomax  Urinary retention  -s/p pickard placement as above  -urology follows  -already over 6.9L UOP noted since yesterday  Hyponatremia  -sNa 125 on admit, now 133 and stable  -rapid correction noted  -likely d/t post obstructive diuresis  -monitor BMP  -s/p D5 1/2NS  at 100ml/hr, monitor on reduced rate  Anemia  -Hgb 9.6, monitor CBC, transfuse prn  Polyuria  -d/t post obstructive diuresis  -monitor serial labs as at risk of electrolyte abnormalities          Subjective:   Denies chest pain or shortness of breath. No complaints.      Objective:     Vitals: Blood pressure 148/80, pulse 94, temperature 98.9 °F (37.2 °C), resp. rate 17, height 5' 9\" (1.753 m), weight 118 kg (259 lb 14.8 oz), SpO2 95%.,Body mass index is 38.38 kg/m².Temp (24hrs), Av.3 °F (36.8 °C), Min:97.7 °F (36.5 °C), Max:98.9 °F (37.2 °C)      Weight (last 2 days)       None              Intake/Output Summary (Last 24 hours) at 2025 1039  Last data filed at 2025 0938  Gross per 24 hour   Intake 2160 ml   Output 4602 ml   Net -2442 ml     I/O last 24 hours:  In: 3120 [P.O.:3120]  Out: 6902 [Urine:6900; Emesis/NG output:2]        Physical Exam:   Physical " Exam  Vitals reviewed.   Constitutional:       General: He is not in acute distress.     Appearance: Normal appearance. He is well-developed. He is obese. He is not diaphoretic.   HENT:      Head: Normocephalic and atraumatic.      Nose: Nose normal.      Mouth/Throat:      Mouth: Mucous membranes are moist.      Pharynx: No oropharyngeal exudate.   Eyes:      General: No scleral icterus.        Right eye: No discharge.         Left eye: No discharge.   Neck:      Thyroid: No thyromegaly.   Cardiovascular:      Rate and Rhythm: Normal rate and regular rhythm.      Heart sounds: Normal heart sounds.   Pulmonary:      Effort: Pulmonary effort is normal.      Breath sounds: Normal breath sounds. No wheezing or rales.   Abdominal:      General: Bowel sounds are normal. There is no distension.      Palpations: Abdomen is soft.      Tenderness: There is no abdominal tenderness.   Genitourinary:     Comments: pickard  Musculoskeletal:         General: No swelling. Normal range of motion.      Cervical back: Neck supple.   Lymphadenopathy:      Cervical: No cervical adenopathy.   Skin:     General: Skin is warm and dry.      Coloration: Skin is not jaundiced.      Findings: No rash.   Neurological:      General: No focal deficit present.      Mental Status: He is alert.      Comments: awake   Psychiatric:         Mood and Affect: Mood normal.         Behavior: Behavior normal.         Invasive Devices       Peripheral Intravenous Line  Duration             Peripheral IV 02/13/25 Right Antecubital 2 days    Peripheral IV 02/15/25 Distal;Right;Ventral (anterior) Forearm <1 day              Drain  Duration             Urethral Catheter 16 Fr. 2 days                    Medications:    Scheduled Meds:  Current Facility-Administered Medications   Medication Dose Route Frequency Provider Last Rate    acetaminophen  650 mg Oral Q6H PRN Joseph Eaton DO      aluminum-magnesium hydroxide-simethicone  30 mL Oral Q6H PRN Joseph  Uma, DO      apixaban  5 mg Oral BID Joseph Eaton, DO      atorvastatin  80 mg Oral Daily With Dinner Joseph Eaton, DO      dextromethorphan-guaiFENesin  10 mL Oral TID Joseph Eaton, DO      dextrose 5 % and sodium chloride 0.45 %  100 mL/hr Intravenous Continuous Joseph Eaton,  mL/hr (02/15/25 1637)    finasteride  5 mg Oral Daily Kaylin Miller PA-C      FLUoxetine  20 mg Oral Daily Joseph Eaton, DO      haloperidol  2.5 mg Oral HS Joseph Eaton, DO      levothyroxine  150 mcg Oral Early Morning Joseph Eaton, DO      metoprolol  2.5 mg Intravenous Q6H PRN Joseph Eaton, DO      metoprolol tartrate  25 mg Oral Q12H MARK Joseph Eaton, DO      nicotine  1 patch Transdermal Daily Joseph Eaton, DO      ondansetron  4 mg Intravenous Q6H PRN Joseph Eaton, DO      tamsulosin  0.8 mg Oral Daily With Dinner Joseph Eaton, DO         PRN Meds:.  acetaminophen    aluminum-magnesium hydroxide-simethicone    metoprolol    ondansetron    Continuous Infusions:dextrose 5 % and sodium chloride 0.45 %, 100 mL/hr, Last Rate: 100 mL/hr (02/15/25 1637)            LAB RESULTS:      Results from last 7 days   Lab Units 02/16/25  0755 02/16/25  0503 02/15/25  2312 02/15/25  1500 02/15/25  0405 02/15/25  0339 02/14/25  2348 02/14/25  1947 02/14/25  1716 02/14/25  0917 02/14/25  0411 02/13/25  1858 02/13/25  1604   WBC Thousand/uL  --  7.96  --   --  9.19  --   --   --   --   --  7.68  --  8.50   HEMOGLOBIN g/dL  --  9.6*  --   --  8.1*  --   --   --   --   --  7.7*  --  7.8*   HEMATOCRIT %  --  29.9*  --   --  25.3*  --   --   --   --   --  23.7*  --  23.9*   PLATELETS Thousands/uL  --  263  --   --  230  --   --   --   --   --  220  --  229   SEGS PCT %  --   --   --   --   --   --   --   --   --   --   --   --  73   LYMPHO PCT %  --   --   --   --   --   --   --   --   --   --   --   --  11*   MONO PCT %  --   --   --   --   --   --   --   --   --   --   --   --  12  "  EOS PCT %  --   --   --   --   --   --   --   --   --   --   --   --  3   POTASSIUM mmol/L 4.3  --  4.3 4.4  --  4.2 4.5 4.5 4.8   < >  --    < > 5.5*   CHLORIDE mmol/L 104  --  103 103  --  104 104 104 105   < >  --    < > 97   CO2 mmol/L 21  --  22 21  --  21 21 21 21   < >  --    < > 18*   BUN mg/dL 54*  --  53* 54*  --  64* 66* 66* 66*   < >  --    < > 69*   CREATININE mg/dL 4.22*  --  4.41* 4.55*  --  4.92* 4.99* 5.14* 5.20*   < >  --    < > 5.39*   CALCIUM mg/dL 8.5  --  8.5 8.6  --  8.5 8.6 8.5 8.7   < >  --    < > 8.5   ALK PHOS U/L  --   --   --   --   --   --   --   --   --   --   --   --  64   ALT U/L  --   --   --   --   --   --   --   --   --   --   --   --  14   AST U/L  --   --   --   --   --   --   --   --   --   --   --   --  11*   MAGNESIUM mg/dL  --  2.1  --   --   --   --   --   --   --   --   --   --   --    PHOSPHORUS mg/dL  --  4.2*  --   --   --   --   --   --   --   --   --   --   --     < > = values in this interval not displayed.       CUTURES:  No results found for: \"BLOODCX\", \"URINECX\"              Portions of the record may have been created with voice recognition software. Occasional wrong word or \"sound a like\" substitutions may have occurred due to the inherent limitations of voice recognition software. Read the chart carefully and recognize, using context, where substitutions have occurred.If you have any questions, please contact the dictating provider.  "

## 2025-02-16 NOTE — ASSESSMENT & PLAN NOTE
-sNa 125 on admit, now 133 and stable  -rapid correction noted  -likely d/t post obstructive diuresis  -monitor BMP  -s/p D5 1/2NS  at 100ml/hr, monitor on reduced rate

## 2025-02-16 NOTE — ASSESSMENT & PLAN NOTE
Patient was found to have sustained heart rates in the 150s  Independently reviewed EKG showed atrial fibrillation with RVR, converted to NSR shortly after   No prior history  [id number] or of 2, started on Eliquis  Continue metoprolol twice daily with hold parameters  No events on telemetry overnight, will continue for 24 more hours  Follow-up echocardiogram

## 2025-02-16 NOTE — ASSESSMENT & PLAN NOTE
-BP elevated on admission, rapid improvement noted  -continue IVF as above  -continue metoprolol 25mg twice daily, flomax

## 2025-02-17 ENCOUNTER — HOME HEALTH ADMISSION (OUTPATIENT)
Dept: HOME HEALTH SERVICES | Facility: HOME HEALTHCARE | Age: 60
End: 2025-02-17
Payer: COMMERCIAL

## 2025-02-17 ENCOUNTER — APPOINTMENT (INPATIENT)
Dept: NON INVASIVE DIAGNOSTICS | Facility: HOSPITAL | Age: 60
End: 2025-02-17
Payer: COMMERCIAL

## 2025-02-17 LAB
ANION GAP SERPL CALCULATED.3IONS-SCNC: 8 MMOL/L (ref 4–13)
BUN SERPL-MCNC: 46 MG/DL (ref 5–25)
CALCIUM SERPL-MCNC: 8 MG/DL (ref 8.4–10.2)
CHLORIDE SERPL-SCNC: 105 MMOL/L (ref 96–108)
CO2 SERPL-SCNC: 21 MMOL/L (ref 21–32)
CREAT SERPL-MCNC: 3.75 MG/DL (ref 0.6–1.3)
ERYTHROCYTE [DISTWIDTH] IN BLOOD BY AUTOMATED COUNT: 12.9 % (ref 11.6–15.1)
GFR SERPL CREATININE-BSD FRML MDRD: 16 ML/MIN/1.73SQ M
GLUCOSE SERPL-MCNC: 90 MG/DL (ref 65–140)
HCT VFR BLD AUTO: 26.4 % (ref 36.5–49.3)
HGB BLD-MCNC: 8.5 G/DL (ref 12–17)
MCH RBC QN AUTO: 30.4 PG (ref 26.8–34.3)
MCHC RBC AUTO-ENTMCNC: 32.2 G/DL (ref 31.4–37.4)
MCV RBC AUTO: 94 FL (ref 82–98)
PLATELET # BLD AUTO: 229 THOUSANDS/UL (ref 149–390)
PMV BLD AUTO: 10 FL (ref 8.9–12.7)
POTASSIUM SERPL-SCNC: 4.2 MMOL/L (ref 3.5–5.3)
RBC # BLD AUTO: 2.8 MILLION/UL (ref 3.88–5.62)
SODIUM SERPL-SCNC: 134 MMOL/L (ref 135–147)
WBC # BLD AUTO: 4.26 THOUSAND/UL (ref 4.31–10.16)

## 2025-02-17 PROCEDURE — 85027 COMPLETE CBC AUTOMATED: CPT | Performed by: INTERNAL MEDICINE

## 2025-02-17 PROCEDURE — 99232 SBSQ HOSP IP/OBS MODERATE 35: CPT | Performed by: INTERNAL MEDICINE

## 2025-02-17 PROCEDURE — 80048 BASIC METABOLIC PNL TOTAL CA: CPT | Performed by: INTERNAL MEDICINE

## 2025-02-17 PROCEDURE — 99232 SBSQ HOSP IP/OBS MODERATE 35: CPT | Performed by: FAMILY MEDICINE

## 2025-02-17 PROCEDURE — 93306 TTE W/DOPPLER COMPLETE: CPT

## 2025-02-17 RX ORDER — METOPROLOL TARTRATE 25 MG/1
12.5 TABLET, FILM COATED ORAL EVERY 12 HOURS SCHEDULED
Qty: 30 TABLET | Refills: 0 | Status: SHIPPED | OUTPATIENT
Start: 2025-02-17

## 2025-02-17 RX ADMIN — ATORVASTATIN CALCIUM 80 MG: 80 TABLET, FILM COATED ORAL at 16:51

## 2025-02-17 RX ADMIN — FINASTERIDE 5 MG: 5 TABLET, FILM COATED ORAL at 08:55

## 2025-02-17 RX ADMIN — DEXTROSE AND SODIUM CHLORIDE 100 ML/HR: 5; .45 INJECTION, SOLUTION INTRAVENOUS at 18:06

## 2025-02-17 RX ADMIN — Medication 12.5 MG: at 08:55

## 2025-02-17 RX ADMIN — GUAIFENESIN AND DEXTROMETHORPHAN 10 ML: 100; 10 SYRUP ORAL at 21:28

## 2025-02-17 RX ADMIN — GUAIFENESIN AND DEXTROMETHORPHAN 10 ML: 100; 10 SYRUP ORAL at 16:51

## 2025-02-17 RX ADMIN — APIXABAN 5 MG: 5 TABLET, FILM COATED ORAL at 21:28

## 2025-02-17 RX ADMIN — HALOPERIDOL 2.5 MG: 1 TABLET ORAL at 21:28

## 2025-02-17 RX ADMIN — GUAIFENESIN AND DEXTROMETHORPHAN 10 ML: 100; 10 SYRUP ORAL at 08:56

## 2025-02-17 RX ADMIN — FLUOXETINE HYDROCHLORIDE 20 MG: 20 CAPSULE ORAL at 08:55

## 2025-02-17 RX ADMIN — PANTOPRAZOLE SODIUM 40 MG: 40 TABLET, DELAYED RELEASE ORAL at 05:06

## 2025-02-17 RX ADMIN — APIXABAN 5 MG: 5 TABLET, FILM COATED ORAL at 08:55

## 2025-02-17 RX ADMIN — LEVOTHYROXINE SODIUM 150 MCG: 75 TABLET ORAL at 05:06

## 2025-02-17 RX ADMIN — DEXTROSE AND SODIUM CHLORIDE 100 ML/HR: 5; .45 INJECTION, SOLUTION INTRAVENOUS at 08:03

## 2025-02-17 RX ADMIN — TAMSULOSIN HYDROCHLORIDE 0.8 MG: 0.4 CAPSULE ORAL at 16:51

## 2025-02-17 RX ADMIN — Medication 12.5 MG: at 21:28

## 2025-02-17 NOTE — ASSESSMENT & PLAN NOTE
-admit sCr 5.39 as of 2/13/25, was already elevated to 5.55 as of 1/24/25  -Creatinine peaked at 5.61 on 2/13, kidney function improving with creatinine down to 3.75 this morning  -RICARDO in setting of moderate b/l hydroureteronephrosis without stones and marked urinary bladder distension  -Concern for postobstructive diuresis, continue with IV fluids, follow daily labs

## 2025-02-17 NOTE — PLAN OF CARE
Problem: Potential for Falls  Goal: Patient will remain free of falls  Description: INTERVENTIONS:  - Educate patient/family on patient safety including physical limitations  - Instruct patient to call for assistance with activity   - Consult OT/PT to assist with strengthening/mobility   - Keep Call bell within reach  - Keep bed low and locked with side rails adjusted as appropriate  - Keep care items and personal belongings within reach  - Initiate and maintain comfort rounds  - Make Fall Risk Sign visible to staff  - Offer Toileting every 2 Hours, in advance of need  - Apply yellow socks and bracelet for high fall risk patients  - Consider moving patient to room near nurses station  Outcome: Progressing     Problem: PAIN - ADULT  Goal: Verbalizes/displays adequate comfort level or baseline comfort level  Description: Interventions:  - Encourage patient to monitor pain and request assistance  - Assess pain using appropriate pain scale  - Administer analgesics based on type and severity of pain and evaluate response  - Implement non-pharmacological measures as appropriate and evaluate response  - Consider cultural and social influences on pain and pain management  - Notify physician/advanced practitioner if interventions unsuccessful or patient reports new pain  Outcome: Progressing     Problem: INFECTION - ADULT  Goal: Absence or prevention of progression during hospitalization  Description: INTERVENTIONS:  - Assess and monitor for signs and symptoms of infection  - Monitor lab/diagnostic results  - Monitor all insertion sites, i.e. indwelling lines, tubes, and drains  - Monitor endotracheal if appropriate and nasal secretions for changes in amount and color  - [place] appropriate cooling/warming therapies per order  - Administer medications as ordered  - Instruct and encourage patient and family to use good hand hygiene technique  - Identify and instruct in appropriate isolation precautions for identified  infection/condition  Outcome: Progressing     Problem: SAFETY ADULT  Goal: Maintain or return to baseline ADL function  Description: INTERVENTIONS:  -  Assess patient's ability to carry out ADLs; assess patient's baseline for ADL function and identify physical deficits which impact ability to perform ADLs (bathing, care of mouth/teeth, toileting, grooming, dressing, etc.)  - Assess/evaluate cause of self-care deficits   - Assess range of motion  - Assess patient's mobility; develop plan if impaired  - Assess patient's need for assistive devices and provide as appropriate  - Encourage maximum independence but intervene and supervise when necessary  - Involve family in performance of ADLs  - Assess for home care needs following discharge   - Consider OT consult to assist with ADL evaluation and planning for discharge  - Provide patient education as appropriate  Outcome: Progressing  Goal: Maintains/Returns to pre admission functional level  Description: INTERVENTIONS:  - Perform AM-PAC 6 Click Basic Mobility/ Daily Activity assessment daily.  - Set and communicate daily mobility goal to care team and patient/family/caregiver.   - Collaborate with rehabilitation services on mobility goals if consulted  - Perform Range of Motion 4 times a day.  - Reposition patient every 2 hours.  - Dangle patient 3 times a day  - Stand patient 3 times a day  - Ambulate patient 3 times a day  - Out of bed to chair 3 times a day   - Out of bed for meals 3 times a day  - Out of bed for toileting  - Record patient progress and toleration of activity level   Outcome: Progressing     Problem: DISCHARGE PLANNING  Goal: Discharge to home or other facility with appropriate resources  Description: INTERVENTIONS:  - Identify barriers to discharge w/patient and caregiver  - Arrange for needed discharge resources and transportation as appropriate  - Identify discharge learning needs (meds, wound care, etc.)  - Arrange for interpretive services to  assist at discharge as needed  - Refer to Case Management Department for coordinating discharge planning if the patient needs post-hospital services based on physician/advanced practitioner order or complex needs related to functional status, cognitive ability, or social support system  Outcome: Progressing     Problem: Knowledge Deficit  Goal: Patient/family/caregiver demonstrates understanding of disease process, treatment plan, medications, and discharge instructions  Description: Complete learning assessment and assess knowledge base.  Interventions:  - Provide teaching at level of understanding  - Provide teaching via preferred learning methods  Outcome: Progressing     Problem: GENITOURINARY - ADULT  Goal: Maintains or returns to baseline urinary function  Description: INTERVENTIONS:  - Assess urinary function  - Encourage oral fluids to ensure adequate hydration if ordered  - Administer IV fluids as ordered to ensure adequate hydration  - Administer ordered medications as needed  - Offer frequent toileting  - Follow urinary retention protocol if ordered  Outcome: Progressing  Goal: Absence of urinary retention  Description: INTERVENTIONS:  - Assess patient's ability to void and empty bladder  - Monitor I/O  - Bladder scan as needed  - Discuss with physician/AP medications to alleviate retention as needed  - Discuss catheterization for long term situations as appropriate  Outcome: Progressing  Goal: Urinary catheter remains patent  Description: INTERVENTIONS:  - Assess patency of urinary catheter  - If patient has a chronic pickard, consider changing catheter if non-functioning  - Follow guidelines for intermittent irrigation of non-functioning urinary catheter  Outcome: Progressing     Problem: METABOLIC, FLUID AND ELECTROLYTES - ADULT  Goal: Electrolytes maintained within normal limits  Description: INTERVENTIONS:  - Monitor labs and assess patient for signs and symptoms of electrolyte imbalances  - Administer  electrolyte replacement as ordered  - Monitor response to electrolyte replacements, including repeat lab results as appropriate  - Instruct patient on fluid and nutrition as appropriate  Outcome: Progressing  Goal: Fluid balance maintained  Description: INTERVENTIONS:  - Monitor labs   - Monitor I/O and WT  - Instruct patient on fluid and nutrition as appropriate  - Assess for signs & symptoms of volume excess or deficit  Outcome: Progressing     Problem: HEMATOLOGIC - ADULT  Goal: Maintains hematologic stability  Description: INTERVENTIONS  - Assess for signs and symptoms of bleeding or hemorrhage  - Monitor labs  - Administer supportive blood products/factors as ordered and appropriate  Outcome: Progressing     Problem: CARDIOVASCULAR - ADULT  Goal: Maintains optimal cardiac output and hemodynamic stability  Description: INTERVENTIONS:  - Monitor I/O, vital signs and rhythm  - Monitor for S/S and trends of decreased cardiac output  - Administer and titrate ordered vasoactive medications to optimize hemodynamic stability  - Assess quality of pulses, skin color and temperature  - Assess for signs of decreased coronary artery perfusion  - Instruct patient to report change in severity of symptoms  Outcome: Progressing  Goal: Absence of cardiac dysrhythmias or at baseline rhythm  Description: INTERVENTIONS:  - Continuous cardiac monitoring, vital signs, obtain 12 lead EKG if ordered  - Administer antiarrhythmic and heart rate control medications as ordered  - Monitor electrolytes and administer replacement therapy as ordered  Outcome: Progressing

## 2025-02-17 NOTE — PROGRESS NOTES
Progress Note - Nephrology   Name: Chas Soliz 59 y.o. male I MRN: [id number]  Unit/Bed#: [place] [address] -02 I Date of Admission: 2/13/2025   Date of Service: 2/17/2025 I Hospital Day: 4    Assessment & Plan  RICARDO (acute kidney injury) (HCC)  -admit sCr 5.39 as of 2/13/25, was already elevated to 5.55 as of 1/24/25  -Creatinine peaked at 5.61 on 2/13, kidney function improving with creatinine down to 3.75 this morning  -RICARDO in setting of moderate b/l hydroureteronephrosis without stones and marked urinary bladder distension  -Concern for postobstructive diuresis, continue with IV fluids, follow daily labs  Hypertension  -BP elevated on admission, blood pressure improved and stable  -continue IVF as above  -continue metoprolol 25mg twice daily, flomax  Urinary retention  -s/p pickard placement as above  -Urology on board  -Remains polyuric but improving urine output 4.9 L in the last 24 hours, continue IV fluids as above  Hyponatremia  -sNa 125 on admit, stable at 134  -rapid correction noted  -likely d/t post obstructive diuresis  -monitor BMP  -Continue with IV fluids  Anemia  -Hemoglobin decreased to 8.5 this morning, monitor CBC, transfuse prn for hemoglobin less than 7  Polyuria  -d/t post obstructive diuresis  -monitor serial labs as at risk of electrolyte abnormalities    I have reviewed the nephrology recommendations including continue with current IV fluid, renal function improving, follow daily labs, and we are in agreement with renal plan including the information outlined above.     Subjective   Brief History of Admission -     Patient seen and examined, denies significant complaints other than nausea yesterday that improved after he removed nicotine patch.  Denies any chest pain, no shortness of breath, no abdominal pain, no vomiting    Objective :  Temp:  [97.8 °F (36.6 °C)-100.5 °F (38.1 °C)] 98.9 °F (37.2 °C)  HR:  [65-86] 69  BP: ()/(49-67) 110/67  Resp:  [17-18] 17  SpO2:  [91 %-100 %] 96  %    Current Weight: Weight - Scale: 118 kg (259 lb 14.8 oz)  First Weight: Weight - Scale: 132 kg (291 lb 3.6 oz)  I/O         02/15 0701  02/16 0700 02/16 0701 02/17 0700 02/17 0701  02/18 0700    P.O. 2640 1920     Total Intake(mL/kg) 2640 (22.4) 1920 (16.3)     Urine (mL/kg/hr) 6900 (2.4) 4950 (1.7)     Emesis/NG output 2 0     Total Output 6902 4950     Net -4262 -3030            Unmeasured Emesis Occurrence  1 x           Physical Exam  General: conscious, cooperative, in not acute distress  Eyes: conjunctivae pink, anicteric sclerae  ENT: lips and mucous membranes moist  Neck: supple, no JVD  Chest: clear breath sounds bilateral, no crackles, ronchus or wheezings  CVS: distinct S1 & S2, normal rate, regular rhythm  Abdomen: soft, non-tender, non-distended, normoactive bowel sounds  Extremities: no edema of both legs  Skin: no rash  Neuro: awake, alert, oriented  Urinary Mcclellan catheter in place    Medications:    Current Facility-Administered Medications:     acetaminophen (TYLENOL) tablet 650 mg, 650 mg, Oral, Q6H PRN, Joseph Eaton DO    aluminum-magnesium hydroxide-simethicone (MAALOX) oral suspension 30 mL, 30 mL, Oral, Q6H PRN, Joseph Eaton DO    apixaban (ELIQUIS) tablet 5 mg, 5 mg, Oral, BID, Joseph Eaton DO, 5 mg at 02/17/25 0855    atorvastatin (LIPITOR) tablet 80 mg, 80 mg, Oral, Daily With Dinner, Joseph Eaton DO, 80 mg at 02/16/25 1544    dextromethorphan-guaiFENesin (ROBITUSSIN DM) oral syrup 10 mL, 10 mL, Oral, TID, Joseph Eaton DO, 10 mL at 02/17/25 0856    dextrose 5 % and sodium chloride 0.45 % infusion, 100 mL/hr, Intravenous, Continuous, Joseph Eaton DO, Last Rate: 100 mL/hr at 02/17/25 0803, 100 mL/hr at 02/17/25 0803    finasteride (PROSCAR) tablet 5 mg, 5 mg, Oral, Daily, Kaylin Miller PA-C, 5 mg at 02/17/25 0855    FLUoxetine (PROzac) capsule 20 mg, 20 mg, Oral, Daily, Joseph Eaton DO, 20 mg at 02/17/25 0855    haloperidol (HALDOL) tablet 2.5  mg, 2.5 mg, Oral, HS, Joseph Eaton DO, 2.5 mg at 02/16/25 2244    levothyroxine tablet 150 mcg, 150 mcg, Oral, Early Morning, Joseph Eaton DO, 150 mcg at 02/17/25 0506    metoprolol (LOPRESSOR) injection 2.5 mg, 2.5 mg, Intravenous, Q6H PRN, Joseph Eaton DO    metoprolol tartrate (LOPRESSOR) partial tablet 12.5 mg, 12.5 mg, Oral, Q12H MARK, Joseph Eaton DO, 12.5 mg at 02/17/25 0855    nicotine (NICODERM CQ) 21 mg/24 hr TD 24 hr patch 1 patch, 1 patch, Transdermal, Daily, Joseph Eaton DO, 1 patch at 02/16/25 0757    ondansetron (ZOFRAN) injection 4 mg, 4 mg, Intravenous, Q6H PRN, Joseph Eaton DO, 4 mg at 02/16/25 1327    pantoprazole (PROTONIX) EC tablet 40 mg, 40 mg, Oral, Early Morning, Joseph Eaton DO, 40 mg at 02/17/25 0506    tamsulosin (FLOMAX) capsule 0.8 mg, 0.8 mg, Oral, Daily With Dinner, Joseph Eaton DO, 0.8 mg at 02/16/25 1544      Lab Results: I have reviewed the following results:  Results from last 7 days   Lab Units 02/17/25  0525 02/16/25  0755 02/16/25  0503 02/15/25  2312 02/15/25  1500 02/15/25  0405 02/15/25  0339 02/14/25  2348 02/14/25  1947 02/14/25  0917 02/14/25  0411 02/13/25  1858 02/13/25  1604   WBC Thousand/uL 4.26*  --  7.96  --   --  9.19  --   --   --   --  7.68  --  8.50   HEMOGLOBIN g/dL 8.5*  --  9.6*  --   --  8.1*  --   --   --   --  7.7*  --  7.8*   HEMATOCRIT % 26.4*  --  29.9*  --   --  25.3*  --   --   --   --  23.7*  --  23.9*   PLATELETS Thousands/uL 229  --  263  --   --  230  --   --   --   --  220  --  229   POTASSIUM mmol/L 4.2 4.3  --  4.3 4.4  --  4.2 4.5 4.5   < >  --    < > 5.5*   CHLORIDE mmol/L 105 104  --  103 103  --  104 104 104   < >  --    < > 97   CO2 mmol/L 21 21  --  22 21  --  21 21 21   < >  --    < > 18*   BUN mg/dL 46* 54*  --  53* 54*  --  64* 66* 66*   < >  --    < > 69*   CREATININE mg/dL 3.75* 4.22*  --  4.41* 4.55*  --  4.92* 4.99* 5.14*   < >  --    < > 5.39*   CALCIUM mg/dL 8.0* 8.5  --  8.5 8.6   "--  8.5 8.6 8.5   < >  --    < > 8.5   MAGNESIUM mg/dL  --   --  2.1  --   --   --   --   --   --   --   --   --   --    PHOSPHORUS mg/dL  --   --  4.2*  --   --   --   --   --   --   --   --   --   --    ALBUMIN g/dL  --   --   --   --   --   --   --   --   --   --   --   --  4.2    < > = values in this interval not displayed.       Administrative Statements     Portions of the record may have been created with voice recognition software. Occasional wrong word or \"sound a like\" substitutions may have occurred due to the inherent limitations of voice recognition software. Read the chart carefully and recognize, using context, where substitutions have occurred.If you have any questions, please contact the dictating provider.  "

## 2025-02-17 NOTE — PROGRESS NOTES
"Progress Note - Hospitalist   Name: Chas Soliz 59 y.o. male I MRN: [id number]  Unit/Bed#: [place] [address] -02 I Date of Admission: 2/13/2025   Date of Service: 2/17/2025 I Hospital Day: 4    Assessment & Plan  RICARDO (acute kidney injury) (HCC)  Secondary to obstructive uropathy from BPH.   Pickard catheter inserted in the emergency department  Creatinine slowly trending down  Significant urine output consistent with postobstructive diuresis  Monitor for renal recovery  Continue IV fluids,  D5 half-normal saline, creatinine is improving. Peaked at 5.4, today 3.75.  Nephrology appreciated.  Discharge with pickard catheter with outpatient f/u with urology, voiding trial in 2 weeks.   Continue flomax and finasteride  Hypertension  Hypertensive urgency on admission suspect secondary to discomfort  Now improved after Pickard insertion  Amlodipine held to allow for rate control  BP is soft, suspect component of metoprolol and postobstructive diuresis  Will bolus with 1 L normal saline  Reduce metoprolol to 12.5 twice daily with hold parameters  Tobacco use disorder  Nicotine patch  Type II diabetes mellitus with manifestations (HCC)  Lab Results   Component Value Date    HGBA1C 5.6 01/30/2025       No results for input(s): \"POCGLU\" in the last 72 hours.    Blood Sugar Average: Last 72 hrs:    Diet controlled recent A1c 5.6  Monitor fasting blood sugar, remains well-controlled  Hyperlipidemia  Continue statin  Other specified hypothyroidism  Continue levothyroxine  Urinary retention  Suspect secondary to BPH versus urethral stricture  Pickard catheter in place and draining well  Urology evaluated-maintain Pickard catheter outpatient follow-up for further workup/definitive management  Hyponatremia  Sodium 125 on admission  Patient initially had rapid sodium correction  Patient required D5 and now sodium has remained stable on current IV fluids      Anemia  Normocytic anemia  May be related to profound RICARDO  Patient denies any " bleeding or melena  Iron panel without iron deficiency  Trend hemoglobin  Transfuse to maintain greater than 7  Polyuria  Management as above  Bolus of 1 L IV fluid  Continue D5 half-normal saline  Cough  Patient reports chronic cough related to emphysema  Flu/COVID/RSV negative  Symptomatic treatment with Mucinex DM  A-fib (HCC)  Patient was found to have sustained heart rates in the 150s  Independently reviewed EKG showed atrial fibrillation with RVR, converted to NSR shortly after   No prior history  [id number] or of 2, started on Eliquis  Continue metoprolol twice daily with hold parameters  No events on telemetry overnight, will continue for 24 more hours  Follow-up echocardiogram      VTE Pharmacologic Prophylaxis: VTE Score: 3 Moderate Risk (Score 3-4) - Pharmacological DVT Prophylaxis Ordered: apixaban (Eliquis).    Mobility:   Basic Mobility Inpatient Raw Score: 24  JH-HLM Goal: 8: Walk 250 feet or more  JH-HLM Achieved: 7: Walk 25 feet or more  JH-HLM Goal NOT achieved. Continue with multidisciplinary rounding and encourage appropriate mobility to improve upon JH-HLM goals.    Patient Centered Rounds: I performed bedside rounds with nursing staff today.      Current Length of Stay: 4 day(s)  Current Patient Status: Inpatient   Certification Statement: The patient will continue to require additional inpatient hospital stay due to pending nephroloyg clearance  Discharge Plan: Anticipate discharge tomorrow to home.    Code Status: Level 1 - Full Code    Subjective     Patient feels well. In room air.     Objective :  Temp:  [98.3 °F (36.8 °C)-100.5 °F (38.1 °C)] 98.3 °F (36.8 °C)  HR:  [69-86] 80  BP: (104-113)/(49-70) 107/70  Resp:  [17] 17  SpO2:  [91 %-98 %] 98 %    Body mass index is 38.25 kg/m².     Input and Output Summary (last 24 hours):     Intake/Output Summary (Last 24 hours) at 2/17/2025 1217  Last data filed at 2/17/2025 0803  Gross per 24 hour   Intake 1440 ml   Output 6400 ml   Net -4960 ml        Physical Exam  Vitals and nursing note reviewed.   Constitutional:       General: He is not in acute distress.     Appearance: Normal appearance. He is obese.   HENT:      Head: Normocephalic and atraumatic.      Right Ear: External ear normal.      Left Ear: External ear normal.      Nose: Nose normal.   Eyes:      Extraocular Movements: Extraocular movements intact.   Pulmonary:      Effort: Pulmonary effort is normal.   Musculoskeletal:      Cervical back: Normal range of motion.   Neurological:      Mental Status: He is alert. Mental status is at baseline.   Psychiatric:         Mood and Affect: Mood normal.           Lines/Drains:  Lines/Drains/Airways       Active Status       Name Placement date Placement time Site Days    Urethral Catheter 16 Fr. 02/13/25  1900  --  3                  Urinary Catheter:  Goal for removal: N/A- Discharging with [facility]           Telemetry:  Telemetry Orders (From admission, onward)               24 Hour Telemetry Monitoring  Continuous x 24 Hours (Telem)        Expiring   Question:  Reason for 24 Hour Telemetry  Answer:  Arrhythmias requiring acute medical intervention / PPM or ICD malfunction                     Telemetry Reviewed: Atrial fibrillation. HR averaging 80s  Indication for Continued Telemetry Use: Arrthymias requiring medical therapy               Lab Results: I have reviewed the following results:   Results from last 7 days   Lab Units 02/17/25  0525 02/14/25  0411 02/13/25  1604   WBC Thousand/uL 4.26*   < > 8.50   HEMOGLOBIN g/dL 8.5*   < > 7.8*   HEMATOCRIT % 26.4*   < > 23.9*   PLATELETS Thousands/uL 229   < > 229   SEGS PCT %  --   --  73   LYMPHO PCT %  --   --  11*   MONO PCT %  --   --  12   EOS PCT %  --   --  3    < > = values in this interval not displayed.     Results from last 7 days   Lab Units 02/17/25  0525 02/13/25  1858 02/13/25  1604   SODIUM mmol/L 134*   < > 125*   POTASSIUM mmol/L 4.2   < > 5.5*   CHLORIDE mmol/L 105   < > 97   CO2  mmol/L 21   < > 18*   BUN mg/dL 46*   < > 69*   CREATININE mg/dL 3.75*   < > 5.39*   ANION GAP mmol/L 8   < > 10   CALCIUM mg/dL 8.0*   < > 8.5   ALBUMIN g/dL  --   --  4.2   TOTAL BILIRUBIN mg/dL  --   --  0.41   ALK PHOS U/L  --   --  64   ALT U/L  --   --  14   AST U/L  --   --  11*   GLUCOSE RANDOM mg/dL 90   < > 94    < > = values in this interval not displayed.                       Recent Cultures (last 7 days):            Last 24 Hours Medication List:     Current Facility-Administered Medications:     acetaminophen (TYLENOL) tablet 650 mg, Q6H PRN    aluminum-magnesium hydroxide-simethicone (MAALOX) oral suspension 30 mL, Q6H PRN    apixaban (ELIQUIS) tablet 5 mg, BID    atorvastatin (LIPITOR) tablet 80 mg, Daily With Dinner    dextromethorphan-guaiFENesin (ROBITUSSIN DM) oral syrup 10 mL, TID    dextrose 5 % and sodium chloride 0.45 % infusion, Continuous, Last Rate: 100 mL/hr (02/17/25 0803)    finasteride (PROSCAR) tablet 5 mg, Daily    FLUoxetine (PROzac) capsule 20 mg, Daily    haloperidol (HALDOL) tablet 2.5 mg, HS    levothyroxine tablet 150 mcg, Early Morning    metoprolol (LOPRESSOR) injection 2.5 mg, Q6H PRN    metoprolol tartrate (LOPRESSOR) partial tablet 12.5 mg, Q12H MARK    nicotine (NICODERM CQ) 21 mg/24 hr TD 24 hr patch 1 patch, Daily    ondansetron (ZOFRAN) injection 4 mg, Q6H PRN    pantoprazole (PROTONIX) EC tablet 40 mg, Early Morning    tamsulosin (FLOMAX) capsule 0.8 mg, Daily With Dinner    Administrative Statements   Today, Patient Was Seen By: Lorna Cabezas MD      **Please Note: This note may have been constructed using a voice recognition system.**

## 2025-02-17 NOTE — ASSESSMENT & PLAN NOTE
-BP elevated on admission, blood pressure improved and stable  -continue IVF as above  -continue metoprolol 25mg twice daily, flomax

## 2025-02-17 NOTE — ASSESSMENT & PLAN NOTE
Secondary to obstructive uropathy from BPH.   Pickard catheter inserted in the emergency department  Creatinine slowly trending down  Significant urine output consistent with postobstructive diuresis  Monitor for renal recovery  Continue IV fluids,  D5 half-normal saline, creatinine is improving. Peaked at 5.4, today 3.75.  Nephrology appreciated.  Discharge with pickard catheter with outpatient f/u with urology, voiding trial in 2 weeks.   Continue flomax and finasteride

## 2025-02-17 NOTE — PLAN OF CARE
Problem: Potential for Falls  Goal: Patient will remain free of falls  Description: INTERVENTIONS:  - Educate patient/family on patient safety including physical limitations  - Instruct patient to call for assistance with activity   - Consult OT/PT to assist with strengthening/mobility   - Keep Call bell within reach  - Keep bed low and locked with side rails adjusted as appropriate  - Keep care items and personal belongings within reach  - Initiate and maintain comfort rounds  - Make Fall Risk Sign visible to staff  - Offer Toileting every 2 Hours, in advance of need  - Apply yellow socks and bracelet for high fall risk patients  - Consider moving patient to room near nurses station  Outcome: Progressing     Problem: PAIN - ADULT  Goal: Verbalizes/displays adequate comfort level or baseline comfort level  Description: Interventions:  - Encourage patient to monitor pain and request assistance  - Assess pain using appropriate pain scale  - Administer analgesics based on type and severity of pain and evaluate response  - Implement non-pharmacological measures as appropriate and evaluate response  - Consider cultural and social influences on pain and pain management  - Notify physician/advanced practitioner if interventions unsuccessful or patient reports new pain  Outcome: Progressing     Problem: INFECTION - ADULT  Goal: Absence or prevention of progression during hospitalization  Description: INTERVENTIONS:  - Assess and monitor for signs and symptoms of infection  - Monitor lab/diagnostic results  - Monitor all insertion sites, i.e. indwelling lines, tubes, and drains  - Monitor endotracheal if appropriate and nasal secretions for changes in amount and color  - [place] appropriate cooling/warming therapies per order  - Administer medications as ordered  - Instruct and encourage patient and family to use good hand hygiene technique  - Identify and instruct in appropriate isolation precautions for identified  infection/condition  Outcome: Progressing     Problem: SAFETY ADULT  Goal: Maintain or return to baseline ADL function  Description: INTERVENTIONS:  -  Assess patient's ability to carry out ADLs; assess patient's baseline for ADL function and identify physical deficits which impact ability to perform ADLs (bathing, care of mouth/teeth, toileting, grooming, dressing, etc.)  - Assess/evaluate cause of self-care deficits   - Assess range of motion  - Assess patient's mobility; develop plan if impaired  - Assess patient's need for assistive devices and provide as appropriate  - Encourage maximum independence but intervene and supervise when necessary  - Involve family in performance of ADLs  - Assess for home care needs following discharge   - Consider OT consult to assist with ADL evaluation and planning for discharge  - Provide patient education as appropriate  Outcome: Progressing  Goal: Maintains/Returns to pre admission functional level  Description: INTERVENTIONS:  - Perform AM-PAC 6 Click Basic Mobility/ Daily Activity assessment daily.  - Set and communicate daily mobility goal to care team and patient/family/caregiver.   - Collaborate with rehabilitation services on mobility goals if consulted  - Perform Range of Motion 4 times a day.  - Reposition patient every 2 hours.  - Dangle patient 3 times a day  - Stand patient 3 times a day  - Ambulate patient 3 times a day  - Out of bed to chair 3 times a day   - Out of bed for meals 3 times a day  - Out of bed for toileting  - Record patient progress and toleration of activity level   Outcome: Progressing     Problem: DISCHARGE PLANNING  Goal: Discharge to home or other facility with appropriate resources  Description: INTERVENTIONS:  - Identify barriers to discharge w/patient and caregiver  - Arrange for needed discharge resources and transportation as appropriate  - Identify discharge learning needs (meds, wound care, etc.)  - Arrange for interpretive services to  assist at discharge as needed  - Refer to Case Management Department for coordinating discharge planning if the patient needs post-hospital services based on physician/advanced practitioner order or complex needs related to functional status, cognitive ability, or social support system  Outcome: Progressing     Problem: Knowledge Deficit  Goal: Patient/family/caregiver demonstrates understanding of disease process, treatment plan, medications, and discharge instructions  Description: Complete learning assessment and assess knowledge base.  Interventions:  - Provide teaching at level of understanding  - Provide teaching via preferred learning methods  Outcome: Progressing     Problem: GENITOURINARY - ADULT  Goal: Maintains or returns to baseline urinary function  Description: INTERVENTIONS:  - Assess urinary function  - Encourage oral fluids to ensure adequate hydration if ordered  - Administer IV fluids as ordered to ensure adequate hydration  - Administer ordered medications as needed  - Offer frequent toileting  - Follow urinary retention protocol if ordered  Outcome: Progressing  Goal: Absence of urinary retention  Description: INTERVENTIONS:  - Assess patient's ability to void and empty bladder  - Monitor I/O  - Bladder scan as needed  - Discuss with physician/AP medications to alleviate retention as needed  - Discuss catheterization for long term situations as appropriate  Outcome: Progressing  Goal: Urinary catheter remains patent  Description: INTERVENTIONS:  - Assess patency of urinary catheter  - If patient has a chronic pickard, consider changing catheter if non-functioning  - Follow guidelines for intermittent irrigation of non-functioning urinary catheter  Outcome: Progressing     Problem: METABOLIC, FLUID AND ELECTROLYTES - ADULT  Goal: Electrolytes maintained within normal limits  Description: INTERVENTIONS:  - Monitor labs and assess patient for signs and symptoms of electrolyte imbalances  - Administer  electrolyte replacement as ordered  - Monitor response to electrolyte replacements, including repeat lab results as appropriate  - Instruct patient on fluid and nutrition as appropriate  Outcome: Progressing  Goal: Fluid balance maintained  Description: INTERVENTIONS:  - Monitor labs   - Monitor I/O and WT  - Instruct patient on fluid and nutrition as appropriate  - Assess for signs & symptoms of volume excess or deficit  Outcome: Progressing     Problem: HEMATOLOGIC - ADULT  Goal: Maintains hematologic stability  Description: INTERVENTIONS  - Assess for signs and symptoms of bleeding or hemorrhage  - Monitor labs  - Administer supportive blood products/factors as ordered and appropriate  Outcome: Progressing

## 2025-02-17 NOTE — ASSESSMENT & PLAN NOTE
Patient was found to have sustained heart rates in the 150s  Independently reviewed EKG showed atrial fibrillation with RVR, converted to NSR shortly after   No prior history  [id number] or of 2, started on Eliquis  Continue metoprolol twice daily with hold parameters  No events on telemetry overnight, will continue for 24 more hours  Follow-up echocardiogram

## 2025-02-17 NOTE — ASSESSMENT & PLAN NOTE
-s/p pickard placement as above  -Urology on board  -Remains polyuric but improving urine output 4.9 L in the last 24 hours, continue IV fluids as above

## 2025-02-17 NOTE — CASE MANAGEMENT
Case Management Discharge Planning Note    Patient name Chas CASTLE Stasiw  Location [facility] 2 M* MRN [id number]  : 1965 Date 2025       Current Admission Date: 2025  Current Admission Diagnosis:RICARDO (acute kidney injury) ([facility])   Patient Active Problem List    Diagnosis Date Noted Date Diagnosed    Cough 02/15/2025     A-fib ([facility]) 02/15/2025     Polyuria 2025     Urinary retention 2025     Hyponatremia 2025     Anemia 2025     Other specified hypothyroidism 2024     RICARDO (acute kidney injury) ([facility]) 2024     Left thigh pain 2024     Hyperlipidemia 2024     Varicose veins of both lower extremities with pain 2023     Type II diabetes mellitus with manifestations ([facility]) 03/15/2021     Benign essential hypertension 10/30/2013     Hypertension 2013     Tobacco use disorder 2013       LOS (days): 4  Geometric Mean LOS (GMLOS) (days): 3.2  Days to GMLOS:-0.6     OBJECTIVE:  Risk of Unplanned Readmission Score: 25.64         Current admission status: Inpatient   Preferred Pharmacy:   [facility]/pharmacy #[id number] - [address] [address]  Phone: [phone] Fax: [phone]    Primary Care Provider: Josue Maravilla MD    Primary Insurance: [employer]  Secondary Insurance: [facility]    DISCHARGE DETAILS:       Freedom of Choice: Yes  Comments - Freedom of Choice: Made blanket referrals for VNA and will f/u with choice list with pt/family.                     Requested Home Health Care         Is the patient interested in HHC at discharge?: Yes  Home Health Discipline requested:: Nursing  Home Health Agency Name:: Other  Home Health Follow-Up Provider:: PCP  Home Health Services Needed:: Urinary Incontinence Catheter Management  Homebound Criteria Met:: Requires the Assistance of Another Person for Safe Ambulation or to Leave the Home  Supporting Clincal  Findings:: Fatigues Easliy in Short Distances, Limited Endurance                   Treatment Team Recommendation: Home with Home Health Care  Discharge Destination Plan:: Home with Home Health Care                Additional Comments: LSW covering case today. Pt will go home with pickard cath. Made blanket referrals to VNA and will need to f/u with choice list.

## 2025-02-17 NOTE — ASSESSMENT & PLAN NOTE
-sNa 125 on admit, stable at 134  -rapid correction noted  -likely d/t post obstructive diuresis  -monitor BMP  -Continue with IV fluids

## 2025-02-18 LAB
ANION GAP SERPL CALCULATED.3IONS-SCNC: 9 MMOL/L (ref 4–13)
AORTIC ROOT: 4.3 CM
AORTIC VALVE MEAN VELOCITY: 11.1 M/S
ASCENDING AORTA: 4.2 CM
AV MEAN PRESS GRAD SYS DOP V1V2: 6 MMHG
AV PEAK GRADIENT: 11 MMHG
AV VMAX SYS DOP: 1.69 M/S
BASOPHILS # BLD AUTO: 0.03 THOUSANDS/ΜL (ref 0–0.1)
BASOPHILS NFR BLD AUTO: 1 % (ref 0–1)
BSA FOR ECHO PROCEDURE: 2.31 M2
BUN SERPL-MCNC: 42 MG/DL (ref 5–25)
CALCIUM SERPL-MCNC: 7.8 MG/DL (ref 8.4–10.2)
CHLORIDE SERPL-SCNC: 106 MMOL/L (ref 96–108)
CO2 SERPL-SCNC: 19 MMOL/L (ref 21–32)
CREAT SERPL-MCNC: 3.33 MG/DL (ref 0.6–1.3)
DOP CALC AO VTI: 23 CM
DOP CALC LVOT AREA: 4.15 CM2
DOP CALC LVOT DIAMETER: 2.3 CM
E WAVE DECELERATION TIME: 300 MS
E/A RATIO: 0.65
EOSINOPHIL # BLD AUTO: 0.37 THOUSAND/ΜL (ref 0–0.61)
EOSINOPHIL NFR BLD AUTO: 7 % (ref 0–6)
ERYTHROCYTE [DISTWIDTH] IN BLOOD BY AUTOMATED COUNT: 12.8 % (ref 11.6–15.1)
FRACTIONAL SHORTENING: 45 (ref 28–44)
GFR SERPL CREATININE-BSD FRML MDRD: 19 ML/MIN/1.73SQ M
GLUCOSE SERPL-MCNC: 89 MG/DL (ref 65–140)
HCT VFR BLD AUTO: 27.8 % (ref 36.5–49.3)
HGB BLD-MCNC: 8.7 G/DL (ref 12–17)
IMM GRANULOCYTES # BLD AUTO: 0.01 THOUSAND/UL (ref 0–0.2)
IMM GRANULOCYTES NFR BLD AUTO: 0 % (ref 0–2)
INTERVENTRICULAR SEPTUM IN DIASTOLE (PARASTERNAL SHORT AXIS VIEW): 1.6 CM
INTERVENTRICULAR SEPTUM: 1.6 CM (ref 0.6–1.1)
LAAS-AP2: 31 CM2
LAAS-AP4: 24 CM2
LEFT ATRIUM SIZE: 5.7 CM
LEFT ATRIUM VOLUME (MOD BIPLANE): 97 ML
LEFT ATRIUM VOLUME INDEX (MOD BIPLANE): 42 ML/M2
LEFT INTERNAL DIMENSION IN SYSTOLE: 3.3 CM (ref 2.1–4)
LEFT VENTRICULAR INTERNAL DIMENSION IN DIASTOLE: 6 CM (ref 3.5–6)
LEFT VENTRICULAR POSTERIOR WALL IN END DIASTOLE: 1.4 CM
LEFT VENTRICULAR STROKE VOLUME: 137 ML
LV EF US.2D.A4C+ESTIMATED: 71 %
LVSV (TEICH): 137 ML
LYMPHOCYTES # BLD AUTO: 1.17 THOUSANDS/ΜL (ref 0.6–4.47)
LYMPHOCYTES NFR BLD AUTO: 22 % (ref 14–44)
MCH RBC QN AUTO: 30.1 PG (ref 26.8–34.3)
MCHC RBC AUTO-ENTMCNC: 31.3 G/DL (ref 31.4–37.4)
MCV RBC AUTO: 96 FL (ref 82–98)
MONOCYTES # BLD AUTO: 1.13 THOUSAND/ΜL (ref 0.17–1.22)
MONOCYTES NFR BLD AUTO: 21 % (ref 4–12)
MV E'TISSUE VEL-SEP: 4 CM/S
MV PEAK A VEL: 1.03 M/S
MV PEAK E VEL: 67 CM/S
MV STENOSIS PRESSURE HALF TIME: 87 MS
MV VALVE AREA P 1/2 METHOD: 2.5
NEUTROPHILS # BLD AUTO: 2.6 THOUSANDS/ΜL (ref 1.85–7.62)
NEUTS SEG NFR BLD AUTO: 49 % (ref 43–75)
NRBC BLD AUTO-RTO: 0 /100 WBCS
PLATELET # BLD AUTO: 239 THOUSANDS/UL (ref 149–390)
PMV BLD AUTO: 9.9 FL (ref 8.9–12.7)
POTASSIUM SERPL-SCNC: 4.2 MMOL/L (ref 3.5–5.3)
RBC # BLD AUTO: 2.89 MILLION/UL (ref 3.88–5.62)
RIGHT ATRIAL 2D VOLUME: 62 ML
RIGHT ATRIUM AREA SYSTOLE A4C: 21.2 CM2
RIGHT VENTRICLE ID DIMENSION: 4.1 CM
SINOTUBULAR JUNCTION: 3.4 CM
SL CV LEFT ATRIUM LENGTH A2C: 7.1 CM
SL CV LV EF: 65
SL CV PED ECHO LEFT VENTRICLE DIASTOLIC VOLUME (MOD BIPLANE) 2D: 181 ML
SL CV PED ECHO LEFT VENTRICLE SYSTOLIC VOLUME (MOD BIPLANE) 2D: 44 ML
SL CV SINUS OF VALSALVA 2D: 4.5 CM
SODIUM SERPL-SCNC: 134 MMOL/L (ref 135–147)
STJ: 3.4 CM
TR MAX PG: 25 MMHG
TR PEAK VELOCITY: 2.5 M/S
TRICUSPID ANNULAR PLANE SYSTOLIC EXCURSION: 2.9 CM
TRICUSPID VALVE PEAK REGURGITATION VELOCITY: 2.49 M/S
WBC # BLD AUTO: 5.31 THOUSAND/UL (ref 4.31–10.16)

## 2025-02-18 PROCEDURE — 99232 SBSQ HOSP IP/OBS MODERATE 35: CPT | Performed by: INTERNAL MEDICINE

## 2025-02-18 PROCEDURE — 80048 BASIC METABOLIC PNL TOTAL CA: CPT | Performed by: FAMILY MEDICINE

## 2025-02-18 PROCEDURE — 93306 TTE W/DOPPLER COMPLETE: CPT | Performed by: INTERNAL MEDICINE

## 2025-02-18 PROCEDURE — 85025 COMPLETE CBC W/AUTO DIFF WBC: CPT | Performed by: FAMILY MEDICINE

## 2025-02-18 RX ADMIN — TAMSULOSIN HYDROCHLORIDE 0.8 MG: 0.4 CAPSULE ORAL at 17:27

## 2025-02-18 RX ADMIN — APIXABAN 5 MG: 5 TABLET, FILM COATED ORAL at 09:26

## 2025-02-18 RX ADMIN — HALOPERIDOL 2.5 MG: 1 TABLET ORAL at 21:27

## 2025-02-18 RX ADMIN — Medication 12.5 MG: at 21:27

## 2025-02-18 RX ADMIN — APIXABAN 5 MG: 5 TABLET, FILM COATED ORAL at 21:27

## 2025-02-18 RX ADMIN — PANTOPRAZOLE SODIUM 40 MG: 40 TABLET, DELAYED RELEASE ORAL at 05:11

## 2025-02-18 RX ADMIN — ATORVASTATIN CALCIUM 80 MG: 80 TABLET, FILM COATED ORAL at 17:27

## 2025-02-18 RX ADMIN — FLUOXETINE HYDROCHLORIDE 20 MG: 20 CAPSULE ORAL at 09:23

## 2025-02-18 RX ADMIN — GUAIFENESIN AND DEXTROMETHORPHAN 10 ML: 100; 10 SYRUP ORAL at 09:23

## 2025-02-18 RX ADMIN — Medication 12.5 MG: at 09:27

## 2025-02-18 RX ADMIN — FINASTERIDE 5 MG: 5 TABLET, FILM COATED ORAL at 09:23

## 2025-02-18 RX ADMIN — LEVOTHYROXINE SODIUM 150 MCG: 75 TABLET ORAL at 05:11

## 2025-02-18 NOTE — PLAN OF CARE
Problem: GENITOURINARY - ADULT  Goal: Urinary catheter remains patent  Description: INTERVENTIONS:  - Assess patency of urinary catheter  - If patient has a chronic pickard, consider changing catheter if non-functioning  - Follow guidelines for intermittent irrigation of non-functioning urinary catheter  Outcome: Progressing     Problem: METABOLIC, FLUID AND ELECTROLYTES - ADULT  Goal: Fluid balance maintained  Description: INTERVENTIONS:  - Monitor labs   - Monitor I/O and WT  - Instruct patient on fluid and nutrition as appropriate  - Assess for signs & symptoms of volume excess or deficit  Outcome: Progressing     Problem: CARDIOVASCULAR - ADULT  Goal: Maintains optimal cardiac output and hemodynamic stability  Description: INTERVENTIONS:  - Monitor I/O, vital signs and rhythm  - Monitor for S/S and trends of decreased cardiac output  - Administer and titrate ordered vasoactive medications to optimize hemodynamic stability  - Assess quality of pulses, skin color and temperature  - Assess for signs of decreased coronary artery perfusion  - Instruct patient to report change in severity of symptoms  Outcome: Progressing     Problem: Knowledge Deficit  Goal: Patient/family/caregiver demonstrates understanding of disease process, treatment plan, medications, and discharge instructions  Description: Complete learning assessment and assess knowledge base.  Interventions:  - Provide teaching at level of understanding  - Provide teaching via preferred learning methods  Outcome: Progressing     Problem: DISCHARGE PLANNING  Goal: Discharge to home or other facility with appropriate resources  Description: INTERVENTIONS:  - Identify barriers to discharge w/patient and caregiver  - Arrange for needed discharge resources and transportation as appropriate  - Identify discharge learning needs (meds, wound care, etc.)  - Arrange for interpretive services to assist at discharge as needed  - Refer to Case Management Department for  coordinating discharge planning if the patient needs post-hospital services based on physician/advanced practitioner order or complex needs related to functional status, cognitive ability, or social support system  Outcome: Progressing

## 2025-02-18 NOTE — CASE MANAGEMENT
Case Management Assessment & Discharge Planning Note    Patient name Chas Soliz  Location South 2 /South 2 M* MRN [id number]  : 1965 Date 2025       Current Admission Date: 2025  Current Admission Diagnosis:RICARDO (acute kidney injury) (HCC)   Patient Active Problem List    Diagnosis Date Noted Date Diagnosed    Cough 02/15/2025     A-fib (HCC) 02/15/2025     Polyuria 2025     Urinary retention 2025     Hyponatremia 2025     Anemia 2025     Other specified hypothyroidism 2024     RICARDO (acute kidney injury) (HCC) 2024     Left thigh pain 2024     Hyperlipidemia 2024     Varicose veins of both lower extremities with pain 2023     Type II diabetes mellitus with manifestations ([facility]) 03/15/2021     Benign essential hypertension 10/30/2013     Hypertension 2013     Tobacco use disorder 2013       LOS (days): 5  Geometric Mean LOS (GMLOS) (days): 3  Days to GMLOS:-1.9     OBJECTIVE:    Risk of Unplanned Readmission Score: 25.97         Current admission status: Inpatient       Preferred Pharmacy:   [facility]/pharmacy #[id number] - [place] [id number] [place] [id number]  Phone: [phone] Fax: [phone]    Primary Care Provider: Josue Maravilla MD    Primary Insurance: [employer]  Secondary Insurance: [employer] [facility]    ASSESSMENT:  Active Health Care Proxies       HayderolandJermaine min Health Care Representative - Brother   Primary Phone: [phone] (Home)                 Advance Directives  Does patient have a Health Care POA?: Yes (not in EHR)  Does patient have Advance Directives?: Yes (not in EHR)  Advance Directives: Living will, Power of  for health care  Primary Contact: Jermaine Soliz- Brother         Readmission Root Cause  30 Day Readmission: No    Patient Information  Admitted from:: Home  Mental Status: Alert  During Assessment patient was accompanied by: Not  accompanied during assessment  Assessment information provided by:: Patient  Primary Caregiver: Self  Support Systems: Friends/neighbors, Family members, Other (Comment) (HHA-Waiver)  County of Residence: [place]  What city do you live in?: [place]  Home entry access options. Select all that apply.: No steps to enter home  Type of Current Residence: Apartment  Floor Level: 1  Upon entering residence, is there a bedroom on the main floor (no further steps)?: Yes  Upon entering residence, is there a bathroom on the main floor (no further steps)?: Yes  Living Arrangements: Lives Alone  Is patient a ?: No    Activities of Daily Living Prior to Admission  Functional Status: Assistance (receives 1hr HHA/wk who does light housecleaning and laundry.  Is able to have more than 1hr/wk through Waiver but declines additional asst.  Also has [employer] delivery)  Completes ADLs independently?: Yes  Ambulates independently?: Yes  Does patient use assisted devices?: No  Does patient currently own DME?: No  Does patient have a history of Outpatient Therapy (PT/OT)?: No  Does the patient have a history of Short-Term Rehab?: No  Does patient have a history of HHC?: No  Does patient currently have HHC?: No    Current Home Health Care  Home Health Agency Name:: [facility]    Patient Information Continued  Income Source: SSI/SSD  Does patient have prescription coverage?: Yes (Uses [employer] [place] ([address]rd [address]))  Does patient have a history of substance abuse?: No  Does patient have a history of Mental Health Diagnosis?: Yes (Bipolar)  Is patient receiving treatment for mental health?: Yes (Sees Dr Soriano [id number])  Has patient received inpatient treatment related to mental health in the last 2 years?: No         Means of Transportation  Means of Transport to Appts:: Drives Self (car in garage)          DISCHARGE DETAILS:    Discharge planning discussed with:: pt  Freedom of Choice: Yes  Comments - Freedom of Choice: Discussed with  pt agreeing to [facility]- reserved in [place]  CM contacted family/caregiver?: No- see comments (declined need)  Were Treatment Team discharge recommendations reviewed with patient/caregiver?: Yes  Did patient/caregiver verbalize understanding of patient care needs?: Yes       Contacts  Patient Contacts: Jermaine Cliffmechelle-brother  Relationship to Patient:: Family  Contact Method: Phone  Phone Number: [phone]  Reason/Outcome: Emergency Contact    Requested Home Health Care         Is the patient interested in HHC at discharge?: Yes  Home Health Discipline requested:: Nursing  Home Health Agency Name:: [facility] Follow-Up Provider:: PCP  Home Health Services Needed:: Urinary Incontinence Catheter Management  Homebound Criteria Met:: Psychological Issues  Supporting Clincal Findings:: Limited Endurance    DME Referral Provided  Referral made for DME?: No         Would you like to participate in our [facility] service program?  : No - Declined    Treatment Team Recommendation: Home with Home Health Care  Discharge Destination Plan:: Home with Home Health Care ([facility])  Transport at Discharge : Self (car in hospital garage)

## 2025-02-18 NOTE — ASSESSMENT & PLAN NOTE
New onset atrial fibrillation converted to sinus rhythm  No previous episode.  Started on low-dose metoprolol  [id number] elevated started on apixaban.  Echocardiogram with preserved LV function.

## 2025-02-18 NOTE — ASSESSMENT & PLAN NOTE
-Severe RICARDO, admit sCr 5.39 as of 2/13/25, was already elevated to 5.55 as of 1/24/25  -Creatinine peaked at 5.61 on 2/13.  -Function continues to improve down to 3.33 this morning, patient remains polyuric improving with 3.8 L of urine output the last 24 hours  -RICARDO in setting of moderate b/l hydroureteronephrosis without stones and marked urinary bladder distension  -Recommend to stop IV fluids, encourage oral intake, follow daily labs, if his kidney function continues to improve off IV fluids and urine output decreases then he will be stable from kidney standpoint of view to be discharged tomorrow

## 2025-02-18 NOTE — ASSESSMENT & PLAN NOTE
-sNa 125 on admit, stable at 134 this morning  -rapid correction noted  -likely d/t post obstructive diuresis  Stop IV fluids as above and follow labs in the morning

## 2025-02-18 NOTE — PROGRESS NOTES
Progress Note - Nephrology   Name: Chas Soliz 59 y.o. male I MRN: [id number]  Unit/Bed#: [place] [address] -02 I Date of Admission: 2/13/2025   Date of Service: 2/18/2025 I Hospital Day: 5    Assessment & Plan  RICARDO (acute kidney injury) (HCC)  -Severe RICARDO, admit sCr 5.39 as of 2/13/25, was already elevated to 5.55 as of 1/24/25  -Creatinine peaked at 5.61 on 2/13.  -Function continues to improve down to 3.33 this morning, patient remains polyuric improving with 3.8 L of urine output the last 24 hours  -RICARDO in setting of moderate b/l hydroureteronephrosis without stones and marked urinary bladder distension  -Recommend to stop IV fluids, encourage oral intake, follow daily labs, if his kidney function continues to improve off IV fluids and urine output decreases then he will be stable from kidney standpoint of view to be discharged tomorrow   Hypertension  -BP elevated on admission, pressure improved and is now is stable, follow low-salt diet  -Stop IV fluids as above  -continue metoprolol 25mg twice daily, flomax  Urinary retention  -s/p pickard placement as above  -Urology on board  -Remains polyuric but improving urine output 3.8 L in the last 24 hours, stop IV fluids as above  Hyponatremia  -sNa 125 on admit, stable at 134 this morning  -rapid correction noted  -likely d/t post obstructive diuresis  Stop IV fluids as above and follow labs in the morning  Anemia  -Hemoglobin low but is stable at 8.7 this morning, monitor CBC, transfuse prn for hemoglobin less than 7  Polyuria  -d/t post obstructive diuresis  -monitor serial labs as at risk of electrolyte abnormalities  Improving, stop IV fluids above  Cough    A-fib (HCC)      I have reviewed the nephrology recommendations including fluids, encourage oral intake, follow labs and urine output in the morning, with internal medicine attending, and we are in agreement with renal plan including the information outlined above.     Subjective   Brief History of  Admission -     Seen and examined, no complaints, no chest pain, no shortness of breath, no nausea vomiting    Objective :  Temp:  [97.8 °F (36.6 °C)-98.9 °F (37.2 °C)] 98.9 °F (37.2 °C)  HR:  [64-80] 67  BP: (106-146)/(57-81) 146/81  Resp:  [16-18] 16  SpO2:  [93 %-98 %] 94 %  O2 Device: None (Room air)    Current Weight: Weight - Scale: 117 kg (259 lb)  First Weight: Weight - Scale: 132 kg (291 lb 3.6 oz)  I/O         02/16 0701  02/17 0700 02/17 0701  02/18 0700 02/18 0701  02/19 0700    P.O. 1920 1440     I.V. (mL/kg)  3000 (25.6)     Total Intake(mL/kg) 1920 (16.3) 4440 (37.9)     Urine (mL/kg/hr) 4950 (1.7) 3800 (1.4) 2000 (4.8)    Emesis/NG output 0      Stool  0     Total Output 4950 3800 2000    Net -3030 +640 -2000           Unmeasured Stool Occurrence  1 x     Unmeasured Emesis Occurrence 1 x            Physical Exam  General: conscious, cooperative, in not acute distress  Eyes: conjunctivae pink, anicteric sclerae  ENT: lips and mucous membranes moist  Neck: supple, no JVD  Chest: clear breath sounds bilateral, no crackles, ronchus or wheezings  CVS: distinct S1 & S2, normal rate, regular rhythm  Abdomen: soft, non-tender, non-distended, normoactive bowel sounds  Extremities: no edema of both legs  Skin: no rash  Neuro: awake, alert, oriented  Urinary Mcclellan catheter in place    Medications:    Current Facility-Administered Medications:     acetaminophen (TYLENOL) tablet 650 mg, 650 mg, Oral, Q6H PRN, Joseph Eaton DO    aluminum-magnesium hydroxide-simethicone (MAALOX) oral suspension 30 mL, 30 mL, Oral, Q6H PRN, Joseph Eaton DO    apixaban (ELIQUIS) tablet 5 mg, 5 mg, Oral, BID, Joseph Eaton DO, 5 mg at 02/18/25 0926    atorvastatin (LIPITOR) tablet 80 mg, 80 mg, Oral, Daily With Dinner, Joseph Eaton DO, 80 mg at 02/17/25 1651    finasteride (PROSCAR) tablet 5 mg, 5 mg, Oral, Daily, Kaylin Miller PA-C, 5 mg at 02/18/25 0923    FLUoxetine (PROzac) capsule 20 mg, 20 mg, Oral,  Daily, Joseph Eaton DO, 20 mg at 02/18/25 0923    haloperidol (HALDOL) tablet 2.5 mg, 2.5 mg, Oral, HS, Joseph Eaton DO, 2.5 mg at 02/17/25 2128    levothyroxine tablet 150 mcg, 150 mcg, Oral, Early Morning, Joseph Eaton DO, 150 mcg at 02/18/25 0511    metoprolol (LOPRESSOR) injection 2.5 mg, 2.5 mg, Intravenous, Q6H PRN, Joseph Eaton DO    metoprolol tartrate (LOPRESSOR) partial tablet 12.5 mg, 12.5 mg, Oral, Q12H MARK, Joseph Eaton DO, 12.5 mg at 02/18/25 0927    nicotine (NICODERM CQ) 21 mg/24 hr TD 24 hr patch 1 patch, 1 patch, Transdermal, Daily, Joseph Eaton DO, 1 patch at 02/16/25 0757    ondansetron (ZOFRAN) injection 4 mg, 4 mg, Intravenous, Q6H PRN, Joseph Eaton DO, 4 mg at 02/16/25 1327    pantoprazole (PROTONIX) EC tablet 40 mg, 40 mg, Oral, Early Morning, Joseph Eaton DO, 40 mg at 02/18/25 0511    tamsulosin (FLOMAX) capsule 0.8 mg, 0.8 mg, Oral, Daily With Dinner, Joseph Eaton DO, 0.8 mg at 02/17/25 1651      Lab Results: I have reviewed the following results:  Results from last 7 days   Lab Units 02/18/25  0510 02/17/25  0525 02/16/25  0755 02/16/25  0503 02/15/25  2312 02/15/25  1500 02/15/25  0405 02/15/25  0339 02/14/25  2348 02/14/25  0917 02/14/25  0411 02/13/25  1858 02/13/25  1604   WBC Thousand/uL 5.31 4.26*  --  7.96  --   --  9.19  --   --   --  7.68  --  8.50   HEMOGLOBIN g/dL 8.7* 8.5*  --  9.6*  --   --  8.1*  --   --   --  7.7*  --  7.8*   HEMATOCRIT % 27.8* 26.4*  --  29.9*  --   --  25.3*  --   --   --  23.7*  --  23.9*   PLATELETS Thousands/uL 239 229  --  263  --   --  230  --   --   --  220  --  229   POTASSIUM mmol/L 4.2 4.2 4.3  --  4.3 4.4  --  4.2 4.5   < >  --    < > 5.5*   CHLORIDE mmol/L 106 105 104  --  103 103  --  104 104   < >  --    < > 97   CO2 mmol/L 19* 21 21  --  22 21  --  21 21   < >  --    < > 18*   BUN mg/dL 42* 46* 54*  --  53* 54*  --  64* 66*   < >  --    < > 69*   CREATININE mg/dL 3.33* 3.75* 4.22*  --   "4.41* 4.55*  --  4.92* 4.99*   < >  --    < > 5.39*   CALCIUM mg/dL 7.8* 8.0* 8.5  --  8.5 8.6  --  8.5 8.6   < >  --    < > 8.5   MAGNESIUM mg/dL  --   --   --  2.1  --   --   --   --   --   --   --   --   --    PHOSPHORUS mg/dL  --   --   --  4.2*  --   --   --   --   --   --   --   --   --    ALBUMIN g/dL  --   --   --   --   --   --   --   --   --   --   --   --  4.2    < > = values in this interval not displayed.       Administrative Statements     Portions of the record may have been created with voice recognition software. Occasional wrong word or \"sound a like\" substitutions may have occurred due to the inherent limitations of voice recognition software. Read the chart carefully and recognize, using context, where substitutions have occurred.If you have any questions, please contact the dictating provider.  "

## 2025-02-18 NOTE — ASSESSMENT & PLAN NOTE
History of hypertension hypothyroidism who presented to hospital with abnormal labs  Went to see gastroenterology and was sent to the hospital for kidney injury.  Creatinine 5.39 on admission.  Found to have urinary retention/obstructive uropathy from BPH  Seen by urology.  Started on finasteride and tamsulosin.  Urinary catheter has been placed.  Does have postobstructive diuresis  IV fluids being held tonight and reassess for discharge tomorrow    Results from last 7 days   Lab Units 02/18/25  0510 02/17/25  0525 02/16/25  0755 02/15/25  2312 02/15/25  1500 02/15/25  0339 02/14/25  2348 02/14/25  1947 02/14/25  1716   BUN mg/dL 42* 46* 54* 53* 54* 64* 66* 66* 66*   CREATININE mg/dL 3.33* 3.75* 4.22* 4.41* 4.55* 4.92* 4.99* 5.14* 5.20*   EGFR ml/min/1.73sq m 19 16 14 13 13 11 11 11 11

## 2025-02-18 NOTE — ASSESSMENT & PLAN NOTE
Elevated blood pressures upon arrival now stable  Continue holding amlodipine.  On low-dose metoprolol for new onset atrial fibrillation

## 2025-02-18 NOTE — ASSESSMENT & PLAN NOTE
Sodium 125 on admission but then had correction.  Management per nephrology.    Results from last 7 days   Lab Units 02/18/25  0510 02/17/25  0525 02/16/25  0755 02/15/25  4929   SODIUM mmol/L 134* 134* 133* 133*

## 2025-02-18 NOTE — ASSESSMENT & PLAN NOTE
Normocytic anemia without evidence of blood loss    Results from last 7 days   Lab Units 02/18/25  0510 02/17/25  0525 02/16/25  0503 02/15/25  0405   HEMOGLOBIN g/dL 8.7* 8.5* 9.6* 8.1*

## 2025-02-18 NOTE — PROGRESS NOTES
Progress Note - Hospitalist   Name: Chas Soliz 59 y.o. male I MRN: [id number]  Unit/Bed#: [place] [address] -02 I Date of Admission: 2/13/2025   Date of Service: 2/18/2025 I Hospital Day: 5    Assessment & Plan  RICARDO (acute kidney injury) ([facility])  History of hypertension hypothyroidism who presented to hospital with abnormal labs  Went to see gastroenterology and was sent to the hospital for kidney injury.  Creatinine 5.39 on admission.  Found to have urinary retention/obstructive uropathy from BPH  Seen by urology.  Started on finasteride and tamsulosin.  Urinary catheter has been placed.  Does have postobstructive diuresis  IV fluids being held tonight and reassess for discharge tomorrow    Results from last 7 days   Lab Units 02/18/25  0510 02/17/25  0525 02/16/25  0755 02/15/25  2312 02/15/25  1500 02/15/25  0339 02/14/25  2348 02/14/25  1947 02/14/25  1716   BUN mg/dL 42* 46* 54* 53* 54* 64* 66* 66* 66*   CREATININE mg/dL 3.33* 3.75* 4.22* 4.41* 4.55* 4.92* 4.99* 5.14* 5.20*   EGFR ml/min/1.73sq m 19 16 14 13 13 11 11 11 11     Hyponatremia  Sodium 125 on admission but then had correction.  Management per nephrology.    Results from last 7 days   Lab Units 02/18/25  0510 02/17/25  0525 02/16/25  0755 02/15/25  2312   SODIUM mmol/L 134* 134* 133* 133*     Urinary retention  Seen by urology discharging with urinary catheter and outpatient voiding trial  A-fib ([facility])  New onset atrial fibrillation converted to sinus rhythm  No previous episode.  Started on low-dose metoprolol  [id number] elevated started on apixaban.  Echocardiogram with preserved LV function.  Anemia  Normocytic anemia without evidence of blood loss    Results from last 7 days   Lab Units 02/18/25  0510 02/17/25  0525 02/16/25  0503 02/15/25  0405   HEMOGLOBIN g/dL 8.7* 8.5* 9.6* 8.1*     Hypertension  Elevated blood pressures upon arrival now stable  Continue holding amlodipine.  On low-dose metoprolol for new onset atrial  fibrillation  Tobacco use disorder  Continue nicotine patch  Hyperlipidemia  Continue atorvastatin  Other specified hypothyroidism  Continue levothyroxine    VTE Pharmacologic Prophylaxis: VTE Score: 3 Moderate Risk (Score 3-4) - Pharmacological DVT Prophylaxis Ordered: apixaban (Eliquis).    Mobility:   Basic Mobility Inpatient Raw Score: 24  JH-HLM Goal: 8: Walk 250 feet or more  JH-HLM Achieved: 7: Walk 25 feet or more  JH-HLM Goal achieved. Continue to encourage appropriate mobility.    Patient Centered Rounds: I have performed bedside rounds with nursing staff today.  Discussions with Specialists or Other Care Team Provider: Case management and nephrology    Education and Discussions with Family / Patient:     Current Length of Stay: 5 day(s)  Current Patient Status: Inpatient   Certification Statement: The patient will continue to require additional inpatient hospital stay due to monitoring renal function stopping IV fluids  Discharge Plan: Anticipate discharge tomorrow to home.    Code Status: Level 1 - Full Code    Subjective   Patient seen and examined.  Feeling better wants to go home.    Objective   Vitals:   Temp (24hrs), Av.3 °F (36.8 °C), Min:97.7 °F (36.5 °C), Max:98.9 °F (37.2 °C)    Temp:  [97.7 °F (36.5 °C)-98.9 °F (37.2 °C)] 98.3 °F (36.8 °C)  HR:  [63-67] 63  Resp:  [16-18] 16  BP: (105-146)/(57-81) 120/62  SpO2:  [93 %-97 %] 97 %  Body mass index is 38.25 kg/m².     Input and Output Summary (last 24 hours):     Intake/Output Summary (Last 24 hours) at 2025 1800  Last data filed at 2025 1401  Gross per 24 hour   Intake 3960 ml   Output 5430 ml   Net -1470 ml       Physical Exam  Vitals reviewed.   Constitutional:       General: He is not in acute distress.  HENT:      Head: Atraumatic.   Eyes:      Extraocular Movements: Extraocular movements intact.   Cardiovascular:      Rate and Rhythm: Regular rhythm.   Pulmonary:      Effort: Pulmonary effort is normal.      Breath sounds:  Decreased breath sounds present. No wheezing.   Abdominal:      General: Bowel sounds are normal.      Palpations: Abdomen is soft.      Tenderness: There is no abdominal tenderness.   Musculoskeletal:         General: No swelling.   Skin:     General: Skin is warm and dry.   Neurological:      General: No focal deficit present.      Mental Status: He is alert.   Psychiatric:         Mood and Affect: Mood normal.       Lines/Drains:  Invasive Devices       Peripheral Intravenous Line  Duration             Peripheral IV 02/15/25 Right Wrist 3 days              Drain  Duration             Urethral Catheter 16 Fr. 4 days                  Urinary Catheter:  Goal for removal: N/A- Discharging with Mcclellan                 Lab Results: I have reviewed the following results:   Results from last 7 days   Lab Units 02/18/25  0510 02/17/25  0525 02/16/25  0503   WBC Thousand/uL 5.31 4.26* 7.96   HEMOGLOBIN g/dL 8.7* 8.5* 9.6*   PLATELETS Thousands/uL 239 229 263   MCV fL 96 94 93     Results from last 7 days   Lab Units 02/18/25  0510 02/17/25  0525 02/16/25  0755 02/13/25  1858 02/13/25  1604   SODIUM mmol/L 134* 134* 133*   < > 125*   POTASSIUM mmol/L 4.2 4.2 4.3   < > 5.5*   CHLORIDE mmol/L 106 105 104   < > 97   CO2 mmol/L 19* 21 21   < > 18*   ANION GAP mmol/L 9 8 8   < > 10   BUN mg/dL 42* 46* 54*   < > 69*   CREATININE mg/dL 3.33* 3.75* 4.22*   < > 5.39*   CALCIUM mg/dL 7.8* 8.0* 8.5   < > 8.5   ALBUMIN g/dL  --   --   --   --  4.2   TOTAL BILIRUBIN mg/dL  --   --   --   --  0.41   ALK PHOS U/L  --   --   --   --  64   ALT U/L  --   --   --   --  14   AST U/L  --   --   --   --  11*   EGFR ml/min/1.73sq m 19 16 14   < > 10   GLUCOSE RANDOM mg/dL 89 90 105   < > 94    < > = values in this interval not displayed.     Results from last 7 days   Lab Units 02/16/25  0503   MAGNESIUM mg/dL 2.1   PHOSPHORUS mg/dL 4.2*         Results from last 7 days   Lab Units 02/13/25  2142 02/13/25  1858 02/13/25  1604   HS TNI 0HR ng/L  --    --  16   HS TNI 2HR ng/L  --  17  --    HS TNI 4HR ng/L 19  --   --                       Results from last 7 days   Lab Units 02/13/25  1604   TSH 3RD GENERATON uIU/mL 3.842       Recent Cultures (last 7 days):         Imaging:  Reviewed radiology reports from this admission including:  CT abdomen pelvis wo contrast  Addendum Date: 2/14/2025  ADDENDUM: Upon further review there is funneling of the prostatic urethra. Calcifications are noted along the posterior wall of the urethra within the prostate on series 602, image 111, however do not clearly appear to be obstructing. Underlying stricture is possible. Patient reportedly has a Mcclellan in place and is adequately draining. Findings were discussed with the clinical care team.    Result Date: 2/14/2025  Impression: Moderate bilateral hydroureteronephrosis as well as marked urinary bladder distention. No obstructing calculi or definite central obstructing lesion identified on the current exam. Exact etiology is unclear however the possibility of pelvic floor laxity could be considered. Workstation performed: [id number]     XR chest 1 view portable  Result Date: 2/14/2025  Impression: No radiographic evidence of acute intrathoracic process. Workstation performed: [id number]       Last 24 Hours Medication List:     Current Facility-Administered Medications:     acetaminophen (TYLENOL) tablet 650 mg, Q6H PRN    aluminum-magnesium hydroxide-simethicone (MAALOX) oral suspension 30 mL, Q6H PRN    apixaban (ELIQUIS) tablet 5 mg, BID    atorvastatin (LIPITOR) tablet 80 mg, Daily With Dinner    finasteride (PROSCAR) tablet 5 mg, Daily    FLUoxetine (PROzac) capsule 20 mg, Daily    haloperidol (HALDOL) tablet 2.5 mg, HS    levothyroxine tablet 150 mcg, Early Morning    metoprolol (LOPRESSOR) injection 2.5 mg, Q6H PRN    metoprolol tartrate (LOPRESSOR) partial tablet 12.5 mg, Q12H MARK    nicotine (NICODERM CQ) 21 mg/24 hr TD 24 hr patch 1 patch, Daily    ondansetron (ZOFRAN)  injection 4 mg, Q6H PRN    pantoprazole (PROTONIX) EC tablet 40 mg, Early Morning    tamsulosin (FLOMAX) capsule 0.8 mg, Daily With Dinner    Administrative Statements   Today, Patient Was Seen By: Hermann Preciado, DO  I have spent a total time of 35 minutes in caring for this patient on the day of the visit/encounter including Documenting in the medical record, Reviewing/placing orders in the medical record (including tests, medications, and/or procedures), and Communicating with other healthcare professionals .    **Please Note: This note may have been constructed using a voice recognition system.**

## 2025-02-18 NOTE — ASSESSMENT & PLAN NOTE
-s/p pickard placement as above  -Urology on board  -Remains polyuric but improving urine output 3.8 L in the last 24 hours, stop IV fluids as above

## 2025-02-18 NOTE — ASSESSMENT & PLAN NOTE
-BP elevated on admission, pressure improved and is now is stable, follow low-salt diet  -Stop IV fluids as above  -continue metoprolol 25mg twice daily, flomax

## 2025-02-18 NOTE — ASSESSMENT & PLAN NOTE
-d/t post obstructive diuresis  -monitor serial labs as at risk of electrolyte abnormalities  Improving, stop IV fluids above

## 2025-02-18 NOTE — PLAN OF CARE
Problem: PAIN - ADULT  Goal: Verbalizes/displays adequate comfort level or baseline comfort level  Description: Interventions:  - Encourage patient to monitor pain and request assistance  - Assess pain using appropriate pain scale  - Administer analgesics based on type and severity of pain and evaluate response  - Implement non-pharmacological measures as appropriate and evaluate response  - Consider cultural and social influences on pain and pain management  - Notify physician/advanced practitioner if interventions unsuccessful or patient reports new pain  2/17/2025 2252 by Ashley Duenas RN  Outcome: Progressing  2/17/2025 2252 by Ashley Duenas RN  Outcome: Progressing     Problem: DISCHARGE PLANNING  Goal: Discharge to home or other facility with appropriate resources  Description: INTERVENTIONS:  - Identify barriers to discharge w/patient and caregiver  - Arrange for needed discharge resources and transportation as appropriate  - Identify discharge learning needs (meds, wound care, etc.)  - Arrange for interpretive services to assist at discharge as needed  - Refer to Case Management Department for coordinating discharge planning if the patient needs post-hospital services based on physician/advanced practitioner order or complex needs related to functional status, cognitive ability, or social support system  2/17/2025 2252 by Ashley Duenas RN  Outcome: Progressing  2/17/2025 2252 by Ashley Duenas RN  Outcome: Progressing     Problem: Knowledge Deficit  Goal: Patient/family/caregiver demonstrates understanding of disease process, treatment plan, medications, and discharge instructions  Description: Complete learning assessment and assess knowledge base.  Interventions:  - Provide teaching at level of understanding  - Provide teaching via preferred learning methods  2/17/2025 2252 by Ashley Duenas RN  Outcome: Progressing  2/17/2025 2252 by Ashley Duenas RN  Outcome:  Progressing     Problem: GENITOURINARY - ADULT  Goal: Maintains or returns to baseline urinary function  Description: INTERVENTIONS:  - Assess urinary function  - Encourage oral fluids to ensure adequate hydration if ordered  - Administer IV fluids as ordered to ensure adequate hydration  - Administer ordered medications as needed  - Offer frequent toileting  - Follow urinary retention protocol if ordered  2/17/2025 2252 by Ashley Deunas RN  Outcome: Progressing  2/17/2025 2252 by Ashley Duenas RN  Outcome: Progressing  Goal: Absence of urinary retention  Description: INTERVENTIONS:  - Assess patient's ability to void and empty bladder  - Monitor I/O  - Bladder scan as needed  - Discuss with physician/AP medications to alleviate retention as needed  - Discuss catheterization for long term situations as appropriate  2/17/2025 2252 by Ashley Duenas RN  Outcome: Progressing  2/17/2025 2252 by Ashley Duenas RN  Outcome: Progressing  Goal: Urinary catheter remains patent  Description: INTERVENTIONS:  - Assess patency of urinary catheter  - If patient has a chronic pickard, consider changing catheter if non-functioning  - Follow guidelines for intermittent irrigation of non-functioning urinary catheter  2/17/2025 2252 by Ashley Duenas RN  Outcome: Progressing  2/17/2025 2252 by Ashley Duenas RN  Outcome: Progressing     Problem: METABOLIC, FLUID AND ELECTROLYTES - ADULT  Goal: Electrolytes maintained within normal limits  Description: INTERVENTIONS:  - Monitor labs and assess patient for signs and symptoms of electrolyte imbalances  - Administer electrolyte replacement as ordered  - Monitor response to electrolyte replacements, including repeat lab results as appropriate  - Instruct patient on fluid and nutrition as appropriate  2/17/2025 2252 by Ashley Duenas RN  Outcome: Progressing  2/17/2025 2252 by Ashley Duenas RN  Outcome: Progressing  Goal: Fluid balance  maintained  Description: INTERVENTIONS:  - Monitor labs   - Monitor I/O and WT  - Instruct patient on fluid and nutrition as appropriate  - Assess for signs & symptoms of volume excess or deficit  2/17/2025 2252 by Ashley Duenas RN  Outcome: Progressing  2/17/2025 2252 by Ashley Duenas RN  Outcome: Progressing

## 2025-02-18 NOTE — ASSESSMENT & PLAN NOTE
-Hemoglobin low but is stable at 8.7 this morning, monitor CBC, transfuse prn for hemoglobin less than 7

## 2025-02-19 ENCOUNTER — TRANSITIONAL CARE MANAGEMENT (OUTPATIENT)
Dept: FAMILY MEDICINE CLINIC | Facility: CLINIC | Age: 60
End: 2025-02-19

## 2025-02-19 ENCOUNTER — TELEPHONE (OUTPATIENT)
Dept: NEPHROLOGY | Facility: CLINIC | Age: 60
End: 2025-02-19

## 2025-02-19 VITALS
BODY MASS INDEX: 38.36 KG/M2 | DIASTOLIC BLOOD PRESSURE: 65 MMHG | TEMPERATURE: 98.1 F | HEART RATE: 60 BPM | SYSTOLIC BLOOD PRESSURE: 121 MMHG | HEIGHT: 69 IN | WEIGHT: 259 LBS | RESPIRATION RATE: 18 BRPM | OXYGEN SATURATION: 97 %

## 2025-02-19 DIAGNOSIS — I10 BENIGN ESSENTIAL HYPERTENSION: Primary | ICD-10-CM

## 2025-02-19 DIAGNOSIS — N17.9 AKI (ACUTE KIDNEY INJURY) (HCC): ICD-10-CM

## 2025-02-19 LAB
ANION GAP SERPL CALCULATED.3IONS-SCNC: 7 MMOL/L (ref 4–13)
BUN SERPL-MCNC: 38 MG/DL (ref 5–25)
CALCIUM SERPL-MCNC: 8.1 MG/DL (ref 8.4–10.2)
CHLORIDE SERPL-SCNC: 104 MMOL/L (ref 96–108)
CO2 SERPL-SCNC: 23 MMOL/L (ref 21–32)
CREAT SERPL-MCNC: 3.04 MG/DL (ref 0.6–1.3)
ERYTHROCYTE [DISTWIDTH] IN BLOOD BY AUTOMATED COUNT: 13.1 % (ref 11.6–15.1)
GFR SERPL CREATININE-BSD FRML MDRD: 21 ML/MIN/1.73SQ M
GLUCOSE SERPL-MCNC: 83 MG/DL (ref 65–140)
HCT VFR BLD AUTO: 29 % (ref 36.5–49.3)
HGB BLD-MCNC: 9.5 G/DL (ref 12–17)
MCH RBC QN AUTO: 31.7 PG (ref 26.8–34.3)
MCHC RBC AUTO-ENTMCNC: 32.8 G/DL (ref 31.4–37.4)
MCV RBC AUTO: 97 FL (ref 82–98)
PLATELET # BLD AUTO: 294 THOUSANDS/UL (ref 149–390)
PMV BLD AUTO: 10.1 FL (ref 8.9–12.7)
POTASSIUM SERPL-SCNC: 3.9 MMOL/L (ref 3.5–5.3)
RBC # BLD AUTO: 3 MILLION/UL (ref 3.88–5.62)
SODIUM SERPL-SCNC: 134 MMOL/L (ref 135–147)
WBC # BLD AUTO: 7.45 THOUSAND/UL (ref 4.31–10.16)

## 2025-02-19 PROCEDURE — 80048 BASIC METABOLIC PNL TOTAL CA: CPT | Performed by: INTERNAL MEDICINE

## 2025-02-19 PROCEDURE — 99232 SBSQ HOSP IP/OBS MODERATE 35: CPT | Performed by: INTERNAL MEDICINE

## 2025-02-19 PROCEDURE — 85027 COMPLETE CBC AUTOMATED: CPT | Performed by: INTERNAL MEDICINE

## 2025-02-19 PROCEDURE — 99239 HOSP IP/OBS DSCHRG MGMT >30: CPT | Performed by: INTERNAL MEDICINE

## 2025-02-19 RX ORDER — FINASTERIDE 5 MG/1
5 TABLET, FILM COATED ORAL DAILY
Qty: 90 TABLET | Refills: 0 | Status: SHIPPED | OUTPATIENT
Start: 2025-02-20

## 2025-02-19 RX ORDER — PANTOPRAZOLE SODIUM 40 MG/1
40 TABLET, DELAYED RELEASE ORAL
Qty: 90 TABLET | Refills: 0 | Status: SHIPPED | OUTPATIENT
Start: 2025-02-20

## 2025-02-19 RX ADMIN — PANTOPRAZOLE SODIUM 40 MG: 40 TABLET, DELAYED RELEASE ORAL at 05:05

## 2025-02-19 RX ADMIN — FLUOXETINE HYDROCHLORIDE 20 MG: 20 CAPSULE ORAL at 08:41

## 2025-02-19 RX ADMIN — Medication 12.5 MG: at 08:41

## 2025-02-19 RX ADMIN — LEVOTHYROXINE SODIUM 150 MCG: 75 TABLET ORAL at 05:05

## 2025-02-19 RX ADMIN — APIXABAN 5 MG: 5 TABLET, FILM COATED ORAL at 08:41

## 2025-02-19 RX ADMIN — FINASTERIDE 5 MG: 5 TABLET, FILM COATED ORAL at 08:41

## 2025-02-19 NOTE — ASSESSMENT & PLAN NOTE
History of hypertension hypothyroidism who presented to hospital with abnormal labs  Went to see gastroenterology and was sent to the hospital for kidney injury.  Creatinine 5.39 on admission.  Found to have urinary retention/obstructive uropathy from BPH  Seen by urology.  Started on finasteride.  Continue silodosin  Urinary catheter has been placed.  Does have postobstructive diuresis  Renal function stable.  Patient given prescription for BMP in 3 days and needs follow-up with nephrology.  Ambulatory referral placed.    Results from last 7 days   Lab Units 02/19/25  0456 02/18/25  0510 02/17/25  0525 02/16/25  0755 02/15/25  2312 02/15/25  1500 02/15/25  0339 02/14/25  2348 02/14/25  1947   BUN mg/dL 38* 42* 46* 54* 53* 54* 64* 66* 66*   CREATININE mg/dL 3.04* 3.33* 3.75* 4.22* 4.41* 4.55* 4.92* 4.99* 5.14*   EGFR ml/min/1.73sq m 21 19 16 14 13 13 11 11 11

## 2025-02-19 NOTE — DISCHARGE SUMMARY
Discharge Summary - Hospitalist   Name: Chas Soliz 59 y.o. male I MRN: [id number]  Unit/Bed#: [place] [address] -02 I Date of Admission: 2/13/2025   Date of Service: 2/19/2025 I Hospital Day: 6    Assessment & Plan  RICARDO (acute kidney injury) ([facility])  History of hypertension hypothyroidism who presented to hospital with abnormal labs  Went to see gastroenterology and was sent to the hospital for kidney injury.  Creatinine 5.39 on admission.  Found to have urinary retention/obstructive uropathy from BPH  Seen by urology.  Started on finasteride.  Continue silodosin  Urinary catheter has been placed.  Does have postobstructive diuresis  Renal function stable.  Patient given prescription for BMP in 3 days and needs follow-up with nephrology.  Ambulatory referral placed.    Results from last 7 days   Lab Units 02/19/25  0456 02/18/25  0510 02/17/25  0525 02/16/25  0755 02/15/25  2312 02/15/25  1500 02/15/25  0339 02/14/25  2348 02/14/25  1947   BUN mg/dL 38* 42* 46* 54* 53* 54* 64* 66* 66*   CREATININE mg/dL 3.04* 3.33* 3.75* 4.22* 4.41* 4.55* 4.92* 4.99* 5.14*   EGFR ml/min/1.73sq m 21 19 16 14 13 13 11 11 11     Hyponatremia  Sodium 125 on admission but then had correction.  Management per nephrology.    Results from last 7 days   Lab Units 02/19/25  0456 02/18/25  0510 02/17/25  0525 02/16/25  0755   SODIUM mmol/L 134* 134* 134* 133*     Urinary retention  Seen by urology discharging with urinary catheter and outpatient voiding trial  Ambulatory referral placed.  A-fib (HCC)  New onset atrial fibrillation converted to sinus rhythm  No previous episode.  Started on low-dose metoprolol  [id number] elevated started on apixaban.  Confirmed cost $0  Echocardiogram with preserved LV function.  Ambulatory referral placed for cardiology.  Anemia  Normocytic anemia without evidence of blood loss    Results from last 7 days   Lab Units 02/19/25  0456 02/18/25  0510 02/17/25  0525 02/16/25  0503   HEMOGLOBIN g/dL 9.5* 8.7*  8.5* 9.6*     Hypertension  Elevated blood pressures upon arrival now stable  Continue holding amlodipine.  On low-dose metoprolol for new onset atrial fibrillation  Tobacco use disorder  Continue nicotine patch  Hyperlipidemia  Continue atorvastatin  Other specified hypothyroidism  Continue levothyroxine      Medical Problems       Resolved Problems  Date Reviewed: 2/18/2025          Resolved    Abdominal pain 2/14/2025     Resolved by  Joseph Eaton DO         Discharging Physician / Practitioner: Hermann Preciado DO  PCP: Josue Maravilla MD  Admission Date:   Admission Orders (From admission, onward)       Ordered        02/13/25 1804  INPATIENT ADMISSION  Once                        Discharge Date: 02/19/25    Consultations During Hospital Stay:  IP CONSULT TO UROLOGY  IP CONSULT TO NEPHROLOGY     Procedures Performed:   Echo complete w/ contrast if indicated  Result Date: 2/18/2025  Narrative:   Left Ventricle: Left ventricular cavity size is mildly dilated. There is moderate concentric hypertrophy. The left ventricular ejection fraction is 65% by visual estimation. Systolic function is normal. Wall motion is normal. There is probable diastolic dysfunction.  Diastolic staging precluded by the presence of arrhythmia.   Right Ventricle: Right ventricular cavity size is normal. Systolic function is normal.   Left Atrium: The atrium is mild to moderately dilated.   Mitral Valve: There is mild annular calcification. There is trace regurgitation.   Tricuspid Valve: There is trace regurgitation. Pulmonary artery systolic pressures are estimated at 28 mmHg.   Pulmonic Valve: There is moderate regurgitation.   Aorta: The aortic root is mildly dilated. The ascending aorta is mildly dilated. The aortic root is 4.30 cm. The ascending aorta is 4.2 cm.   Compared to report from February 15, 2022, MD and aortic root dilatation.  Atrial fibrillation is now noted.       Images:  CT abdomen pelvis wo contrast  Addendum Date:  2/14/2025  ADDENDUM: Upon further review there is funneling of the prostatic urethra. Calcifications are noted along the posterior wall of the urethra within the prostate on series 602, image 111, however do not clearly appear to be obstructing. Underlying stricture is possible. Patient reportedly has a Mcclellan in place and is adequately draining. Findings were discussed with the clinical care team.    Result Date: 2/14/2025  Impression: Moderate bilateral hydroureteronephrosis as well as marked urinary bladder distention. No obstructing calculi or definite central obstructing lesion identified on the current exam. Exact etiology is unclear however the possibility of pelvic floor laxity could be considered. Workstation performed: [id number]     XR chest 1 view portable  Result Date: 2/14/2025  Impression: No radiographic evidence of acute intrathoracic process. Workstation performed: [id number]       Lab Results: I have reviewed the following results:  Results from last 7 days   Lab Units 02/19/25  0456 02/18/25  0510 02/17/25  0525 02/16/25  0503 02/15/25  0405 02/14/25  0411 02/13/25  1604   WBC Thousand/uL 7.45 5.31 4.26* 7.96 9.19 7.68 8.50   HEMOGLOBIN g/dL 9.5* 8.7* 8.5* 9.6* 8.1* 7.7* 7.8*   HEMATOCRIT % 29.0* 27.8* 26.4* 29.9* 25.3* 23.7* 23.9*   MCV fL 97 96 94 93 94 93 92   PLATELETS Thousands/uL 294 239 229 263 230 220 229     Results from last 7 days   Lab Units 02/19/25  0456 02/18/25  0510 02/17/25  0525 02/16/25  0755 02/15/25  2312 02/15/25  1500 02/15/25  0339 02/14/25  2348 02/14/25  1947 02/14/25  1716 02/13/25  1858 02/13/25  1604   SODIUM mmol/L 134* 134* 134* 133* 133* 133* 133* 132* 134* 134*   < > 125*   POTASSIUM mmol/L 3.9 4.2 4.2 4.3 4.3 4.4 4.2 4.5 4.5 4.8   < > 5.5*   CHLORIDE mmol/L 104 106 105 104 103 103 104 104 104 105   < > 97   CO2 mmol/L 23 19* 21 21 22 21 21 21 21 21   < > 18*   BUN mg/dL 38* 42* 46* 54* 53* 54* 64* 66* 66* 66*   < > 69*   CREATININE mg/dL 3.04* 3.33* 3.75* 4.22*  4.41* 4.55* 4.92* 4.99* 5.14* 5.20*   < > 5.39*   CALCIUM mg/dL 8.1* 7.8* 8.0* 8.5 8.5 8.6 8.5 8.6 8.5 8.7   < > 8.5   ALBUMIN g/dL  --   --   --   --   --   --   --   --   --   --   --  4.2   TOTAL BILIRUBIN mg/dL  --   --   --   --   --   --   --   --   --   --   --  0.41   ALK PHOS U/L  --   --   --   --   --   --   --   --   --   --   --  64   ALT U/L  --   --   --   --   --   --   --   --   --   --   --  14   AST U/L  --   --   --   --   --   --   --   --   --   --   --  11*   EGFR ml/min/1.73sq m 21 19 16 14 13 13 11 11 11 11   < > 10   GLUCOSE RANDOM mg/dL 83 89 90 105 106 143* 123 131 147* 111   < > 94    < > = values in this interval not displayed.         Results from last 7 days   Lab Units 02/13/25  2142 02/13/25  1858 02/13/25  1604   HS TNI 0HR ng/L  --   --  16   HS TNI 2HR ng/L  --  17  --    HS TNI 4HR ng/L 19  --   --                       Results from last 7 days   Lab Units 02/13/25  1604   TSH 3RD GENERATON uIU/mL 3.842         Results from last 7 days   Lab Units 02/15/25  1518   SARS-COV-2  Negative   INFLUENZA A PCR  Negative   INFLUENZA B PCR  Negative   RSV PCR  Negative       Incidental Findings:      Test Results Pending at Discharge (will require follow up):      Reason for Admission:   Abnormal Lab (Pt was sent in by gastro. States that they told him his kidney function was elevated. Also reports abdominal pain and sob), Shortness of Breath, and Abdominal Pain    Hospital Course:   Chas Soliz is a 59 y.o. male patient who originally presented to the hospital on 2/13/2025 due to abnormal labs.  The patient was sent in by gastroenterology for renal dysfunction.  During his hospitalization he was found to have obstructive uropathy.  Urinary catheter was placed.  He was seen by urology.  Nephrology did see the patient for kidney injury.  He required IV fluids because of significant diuresis.  His renal function has improved and has been off of IV fluids.  He is recommended BMP in 3  "days.  He did have new onset atrial fibrillation has been started on metoprolol and apixaban.    Please see above list of diagnoses and related plan for additional information.     Condition at Discharge: stable     Discharge Day Visit / Exam:   Subjective: Patient seen and examined.  Wants to go home.    Vitals: Blood Pressure: 121/65 (02/19/25 0731)  Pulse: 60 (02/19/25 0731)  Temperature: 98.1 °F (36.7 °C) (02/19/25 0731)  Temp Source: Oral (02/19/25 0731)  Respirations: 18 (02/19/25 0731)  Height: 5' 9\" (175.3 cm) (02/17/25 1129)  Weight - Scale: 117 kg (259 lb) (02/17/25 1129)  SpO2: 97 % (02/19/25 0731)    Exam:   Physical Exam  Vitals reviewed.   Constitutional:       General: He is not in acute distress.  HENT:      Head: Atraumatic.   Cardiovascular:      Rate and Rhythm: Regular rhythm.   Pulmonary:      Effort: Pulmonary effort is normal.      Breath sounds: No wheezing.   Abdominal:      General: Bowel sounds are normal.      Palpations: Abdomen is soft.      Tenderness: There is no abdominal tenderness.   Musculoskeletal:         General: No swelling.   Skin:     General: Skin is warm and dry.   Neurological:      General: No focal deficit present.      Mental Status: He is alert.   Psychiatric:         Mood and Affect: Mood normal.       Discussion with Family: Left voicemail for brother Jermaine    Discharge instructions/Information to patient and family:   See after visit summary for information provided to patient and family.      Provisions for Follow-Up Care:  See after visit summary for information related to follow-up care and any pertinent home health orders.      Mobility at time of Discharge:  Basic Mobility Inpatient Raw Score: 24  JH-HLM Goal: 8: Walk 250 feet or more  JH-HLM Achieved: 6: Walk 10 steps or more  JH-HLM Goal NOT achieved. Continue with multidisciplinary rounding and encourage appropriate mobility to improve upon JH-HLM goals.    Disposition:   Home    Planned Readmission: No   "   Discharge Medications:  See after visit summary for reconciled discharge medications provided to patient and family.      Administrative Statements   I spent 35 minutes discharging the patient. This time was spent on the day of discharge. I had direct contact with the patient on the day of discharge. Greater than 50% of the total time was spent examining patient, answering all patient questions, arranging and discussing plan of care with patient as well as directly providing post-discharge instructions.  Additional time then spent on discharge activities.    **Please Note: This note may have been constructed using a voice recognition system**

## 2025-02-19 NOTE — ASSESSMENT & PLAN NOTE
-sNa 125 on admit, improved and stable at 134  -likely d/t post obstructive diuresis  Off IV fluids

## 2025-02-19 NOTE — TELEPHONE ENCOUNTER
----- Message from Colin Rendon MD sent at 2/19/2025 10:19 AM EST -----  Please arrange hospital follow-up with first available AP or physician in 2 to 3 weeks with repeat labs (please order CBC, renal function panel, PTH and UPC).  Thanks,

## 2025-02-19 NOTE — ASSESSMENT & PLAN NOTE
-BP elevated on admission, pressure improved and stable  -continue metoprolol 25mg twice daily, flomax

## 2025-02-19 NOTE — ASSESSMENT & PLAN NOTE
Sodium 125 on admission but then had correction.  Management per nephrology.    Results from last 7 days   Lab Units 02/19/25  0456 02/18/25  0510 02/17/25  0525 02/16/25  0755   SODIUM mmol/L 134* 134* 134* 133*

## 2025-02-19 NOTE — ASSESSMENT & PLAN NOTE
New onset atrial fibrillation converted to sinus rhythm  No previous episode.  Started on low-dose metoprolol  [id number] elevated started on apixaban.  Confirmed cost $0  Echocardiogram with preserved LV function.  Ambulatory referral placed for cardiology.

## 2025-02-19 NOTE — ASSESSMENT & PLAN NOTE
-Severe RICARDO, admit sCr 5.39 as of 2/13/25, was already elevated to 5.55 as of 1/24/25  -Creatinine peaked at 5.61 on 2/13.  -RICARDO in setting of moderate b/l hydroureteronephrosis without stones and marked urinary bladder distension.  -IV fluids were stopped 2/18, kidney function continues to improve with a creatinine down to 3.04 this morning.  - Noted patient remains with significant polyuria with 6.3 L documented in the last 24 hours  -Discussed with patient that given polyuria he is NOT clear from my end to be discharged yet, he reports he really wants to go home.  -Discussed with internal medicine attending, if patient is discharged he will need a repeat BMP in 3 days, encourage oral intake, no NSAIDs, will need close follow-up with primary care doctor  -Sent message to office to arrange hospital follow-up in the next 3 to 4 weeks

## 2025-02-19 NOTE — ASSESSMENT & PLAN NOTE
Normocytic anemia without evidence of blood loss    Results from last 7 days   Lab Units 02/19/25  0456 02/18/25  0510 02/17/25  0525 02/16/25  0503   HEMOGLOBIN g/dL 9.5* 8.7* 8.5* 9.6*

## 2025-02-19 NOTE — ASSESSMENT & PLAN NOTE
Seen by urology discharging with urinary catheter and outpatient voiding trial  Ambulatory referral placed.   Unique Flap 1 Name: Nose Myocutanous Flap

## 2025-02-19 NOTE — PLAN OF CARE
Problem: PAIN - ADULT  Goal: Verbalizes/displays adequate comfort level or baseline comfort level  Description: Interventions:  - Encourage patient to monitor pain and request assistance  - Assess pain using appropriate pain scale  - Administer analgesics based on type and severity of pain and evaluate response  - Implement non-pharmacological measures as appropriate and evaluate response  - Consider cultural and social influences on pain and pain management  - Notify physician/advanced practitioner if interventions unsuccessful or patient reports new pain  2/18/2025 2352 by Ashley Duenas RN  Outcome: Progressing  2/18/2025 2351 by Ashley Duenas RN  Outcome: Progressing  2/18/2025 2348 by Ashley Duenas RN  Outcome: Progressing     Problem: INFECTION - ADULT  Goal: Absence or prevention of progression during hospitalization  Description: INTERVENTIONS:  - Assess and monitor for signs and symptoms of infection  - Monitor lab/diagnostic results  - Monitor all insertion sites, i.e. indwelling lines, tubes, and drains  - Monitor endotracheal if appropriate and nasal secretions for changes in amount and color  - Sullivan appropriate cooling/warming therapies per order  - Administer medications as ordered  - Instruct and encourage patient and family to use good hand hygiene technique  - Identify and instruct in appropriate isolation precautions for identified infection/condition  2/18/2025 2352 by Ashley Duenas RN  Outcome: Progressing  2/18/2025 2351 by Ashley Duenas RN  Outcome: Progressing  2/18/2025 2348 by Ashley Duenas RN  Outcome: Progressing     Problem: DISCHARGE PLANNING  Goal: Discharge to home or other facility with appropriate resources  Description: INTERVENTIONS:  - Identify barriers to discharge w/patient and caregiver  - Arrange for needed discharge resources and transportation as appropriate  - Identify discharge learning needs (meds, wound care, etc.)  - Arrange for  interpretive services to assist at discharge as needed  - Refer to Case Management Department for coordinating discharge planning if the patient needs post-hospital services based on physician/advanced practitioner order or complex needs related to functional status, cognitive ability, or social support system  2/18/2025 2352 by Ashley Duenas RN  Outcome: Progressing  2/18/2025 2351 by Ashley Duenas RN  Outcome: Progressing  2/18/2025 2348 by Ashley Duenas RN  Outcome: Progressing     Problem: Knowledge Deficit  Goal: Patient/family/caregiver demonstrates understanding of disease process, treatment plan, medications, and discharge instructions  Description: Complete learning assessment and assess knowledge base.  Interventions:  - Provide teaching at level of understanding  - Provide teaching via preferred learning methods  2/18/2025 2352 by Ashley Duenas RN  Outcome: Progressing  2/18/2025 2351 by Ashley Duenas RN  Outcome: Progressing  2/18/2025 2348 by Ashley Duenas RN  Outcome: Progressing

## 2025-02-19 NOTE — TELEPHONE ENCOUNTER
New Patient    Appointment Scheduling  What office location does the patient prefer?: [place]  What is the reason for the patient's appointment?: Urinary Retention  Have patient records been requested?:  If No, are the records showing in Epic: In Epic    Appointment Details  Date: 2/26/25  Time:    8:20 AM & 2:30 PM  Location:   [place]  Provider:  Jose/ Nurse  Does the appointment need further review? (Reason)      HISTORY  Is the patient having active symptoms? If so, describe symptoms: No  Has the patient had any previous Urologist(s)?: no  Was the patient seen in the ED?: 2/13/25  Has the patient had any outside testing done?: no  Does patient have Imaging/Lab Results:   CT 2/13/25   IMPRESSION:     Moderate bilateral hydroureteronephrosis as well as marked urinary bladder distention. No obstructing calculi or definite central obstructing lesion identified on the current exam. Exact etiology is unclear however the possibility of pelvic floor laxity   could be considered.    INSURANCE   Have you confirmed Patient's insurance? yes  Is the insurance accepted?  yes  Is the insurance active? yes

## 2025-02-19 NOTE — ASSESSMENT & PLAN NOTE
-Hemoglobin low but is stable at 9.5 this morning, monitor CBC, transfuse prn for hemoglobin less than 7

## 2025-02-19 NOTE — ASSESSMENT & PLAN NOTE
-s/p pickard placement as above  -Urology on board  -Remains polyuric but improving urine output 6.3 L in the last 24 hours,

## 2025-02-19 NOTE — PROGRESS NOTES
Progress Note - Nephrology   Name: Chas Soliz 59 y.o. male I MRN: [id number]  Unit/Bed#: [place] [address] -02 I Date of Admission: 2/13/2025   Date of Service: 2/19/2025 I Hospital Day: 6    Assessment & Plan  RICARDO (acute kidney injury) ([facility])  -Severe RICARDO, admit sCr 5.39 as of 2/13/25, was already elevated to 5.55 as of 1/24/25  -Creatinine peaked at 5.61 on 2/13.  -RICARDO in setting of moderate b/l hydroureteronephrosis without stones and marked urinary bladder distension.  -IV fluids were stopped 2/18, kidney function continues to improve with a creatinine down to 3.04 this morning.  - Noted patient remains with significant polyuria with 6.3 L documented in the last 24 hours  -Discussed with patient that given polyuria he is NOT clear from my end to be discharged yet, he reports he really wants to go home.  -Discussed with internal medicine attending, if patient is discharged he will need a repeat BMP in 3 days, encourage oral intake, no NSAIDs, will need close follow-up with primary care doctor  -Sent message to office to arrange hospital follow-up in the next 3 to 4 weeks  Hypertension  -BP elevated on admission, pressure improved and stable  -continue metoprolol 25mg twice daily, flomax  Urinary retention  -s/p pickard placement as above  -Urology on board  -Remains polyuric but improving urine output 6.3 L in the last 24 hours,  Hyponatremia  -sNa 125 on admit, improved and stable at 134  -likely d/t post obstructive diuresis  Off IV fluids  Anemia  -Hemoglobin low but is stable at 9.5 this morning, monitor CBC, transfuse prn for hemoglobin less than 7  A-fib ([facility])      I have reviewed the nephrology recommendations including kidney function improving off IV fluids, remains polyuric, with internal medicine attending Dr. Preciado, and we are in agreement with renal plan including the information outlined above.     Subjective   Brief History of Admission -     Seen and examined, feeling better, no chest pain, no  shortness of breath, no nausea, no vomiting, reports eating okay.  Patient reports he wants to go home today    Objective :  Temp:  [97.7 °F (36.5 °C)-98.9 °F (37.2 °C)] 98.1 °F (36.7 °C)  HR:  [60-65] 60  BP: (105-146)/(58-81) 121/65  Resp:  [16-18] 18  SpO2:  [96 %-97 %] 97 %  O2 Device: None (Room air)    Current Weight: Weight - Scale: 117 kg (259 lb)  First Weight: Weight - Scale: 132 kg (291 lb 3.6 oz)  I/O         02/17 0701  02/18 0700 02/18 0701  02/19 0700 02/19 0701  02/20 0700    P.O. 1440 1680 1200    I.V. (mL/kg) 3000 (25.6)      Total Intake(mL/kg) 4440 (37.9) 1680 (14.4) 1200 (10.3)    Urine (mL/kg/hr) 3800 (1.4) 6380 (2.3)     Emesis/NG output       Stool 0      Total Output 3800 6380     Net +640 -4700 +1200           Unmeasured Stool Occurrence 1 x            Physical Exam  General: conscious, cooperative, in not acute distress  Eyes: conjunctivae pink, anicteric sclerae  ENT: lips and mucous membranes moist  Neck: supple, no JVD  Chest: clear breath sounds bilateral, no crackles, ronchus or wheezings  CVS: distinct S1 & S2, normal rate, regular rhythm  Abdomen: soft, non-tender, non-distended, normoactive bowel sounds  Extremities: no edema of both legs  Skin: no rash  Neuro: awake, alert, oriented  Urinary Mcclellan catheter in place    Medications:    Current Facility-Administered Medications:     acetaminophen (TYLENOL) tablet 650 mg, 650 mg, Oral, Q6H PRN, Joseph Eaton DO    aluminum-magnesium hydroxide-simethicone (MAALOX) oral suspension 30 mL, 30 mL, Oral, Q6H PRN, Joseph Eaton DO    apixaban (ELIQUIS) tablet 5 mg, 5 mg, Oral, BID, Joseph Eaton DO, 5 mg at 02/19/25 0841    atorvastatin (LIPITOR) tablet 80 mg, 80 mg, Oral, Daily With Dinner, Joseph Eaton DO, 80 mg at 02/18/25 1727    finasteride (PROSCAR) tablet 5 mg, 5 mg, Oral, Daily, Kaylin Miller PA-C, 5 mg at 02/19/25 0841    FLUoxetine (PROzac) capsule 20 mg, 20 mg, Oral, Daily, Joseph Eaton DO, 20 mg at  02/19/25 0841    haloperidol (HALDOL) tablet 2.5 mg, 2.5 mg, Oral, HS, Joseph Eaton DO, 2.5 mg at 02/18/25 2127    levothyroxine tablet 150 mcg, 150 mcg, Oral, Early Morning, Joseph Eaton DO, 150 mcg at 02/19/25 0505    metoprolol (LOPRESSOR) injection 2.5 mg, 2.5 mg, Intravenous, Q6H PRN, Joseph Eaton DO    metoprolol tartrate (LOPRESSOR) partial tablet 12.5 mg, 12.5 mg, Oral, Q12H MARK, Joseph Eaton DO, 12.5 mg at 02/19/25 0841    nicotine (NICODERM CQ) 21 mg/24 hr TD 24 hr patch 1 patch, 1 patch, Transdermal, Daily, Joseph Eaton DO, 1 patch at 02/16/25 0757    ondansetron (ZOFRAN) injection 4 mg, 4 mg, Intravenous, Q6H PRN, Joseph Eaton DO, 4 mg at 02/16/25 1327    pantoprazole (PROTONIX) EC tablet 40 mg, 40 mg, Oral, Early Morning, Joseph Eaton DO, 40 mg at 02/19/25 0505    tamsulosin (FLOMAX) capsule 0.8 mg, 0.8 mg, Oral, Daily With Dinner, Joseph Eaton DO, 0.8 mg at 02/18/25 1727      Lab Results: I have reviewed the following results:  Results from last 7 days   Lab Units 02/19/25  0456 02/18/25  0510 02/17/25  0525 02/16/25  0755 02/16/25  0503 02/15/25  2312 02/15/25  1500 02/15/25  0405 02/15/25  0339 02/14/25  0917 02/14/25  0411 02/13/25  1858 02/13/25  1604   WBC Thousand/uL 7.45 5.31 4.26*  --  7.96  --   --  9.19  --   --  7.68  --  8.50   HEMOGLOBIN g/dL 9.5* 8.7* 8.5*  --  9.6*  --   --  8.1*  --   --  7.7*  --  7.8*   HEMATOCRIT % 29.0* 27.8* 26.4*  --  29.9*  --   --  25.3*  --   --  23.7*  --  23.9*   PLATELETS Thousands/uL 294 239 229  --  263  --   --  230  --   --  220  --  229   POTASSIUM mmol/L 3.9 4.2 4.2 4.3  --  4.3 4.4  --  4.2   < >  --    < > 5.5*   CHLORIDE mmol/L 104 106 105 104  --  103 103  --  104   < >  --    < > 97   CO2 mmol/L 23 19* 21 21  --  22 21  --  21   < >  --    < > 18*   BUN mg/dL 38* 42* 46* 54*  --  53* 54*  --  64*   < >  --    < > 69*   CREATININE mg/dL 3.04* 3.33* 3.75* 4.22*  --  4.41* 4.55*  --  4.92*   < >  --     "< > 5.39*   CALCIUM mg/dL 8.1* 7.8* 8.0* 8.5  --  8.5 8.6  --  8.5   < >  --    < > 8.5   MAGNESIUM mg/dL  --   --   --   --  2.1  --   --   --   --   --   --   --   --    PHOSPHORUS mg/dL  --   --   --   --  4.2*  --   --   --   --   --   --   --   --    ALBUMIN g/dL  --   --   --   --   --   --   --   --   --   --   --   --  4.2    < > = values in this interval not displayed.       Administrative Statements     Portions of the record may have been created with voice recognition software. Occasional wrong word or \"sound a like\" substitutions may have occurred due to the inherent limitations of voice recognition software. Read the chart carefully and recognize, using context, where substitutions have occurred.If you have any questions, please contact the dictating provider.  "

## 2025-02-20 ENCOUNTER — HOME CARE VISIT (OUTPATIENT)
Dept: HOME HEALTH SERVICES | Facility: HOME HEALTHCARE | Age: 60
End: 2025-02-20
Payer: COMMERCIAL

## 2025-02-20 ENCOUNTER — PATIENT OUTREACH (OUTPATIENT)
Dept: CASE MANAGEMENT | Facility: OTHER | Age: 60
End: 2025-02-20

## 2025-02-20 VITALS
HEART RATE: 70 BPM | RESPIRATION RATE: 18 BRPM | OXYGEN SATURATION: 98 % | DIASTOLIC BLOOD PRESSURE: 68 MMHG | TEMPERATURE: 99.2 F | SYSTOLIC BLOOD PRESSURE: 132 MMHG

## 2025-02-20 PROCEDURE — G0299 HHS/HOSPICE OF RN EA 15 MIN: HCPCS

## 2025-02-20 NOTE — UTILIZATION REVIEW
NOTIFICATION OF ADMISSION DISCHARGE   This is a Notification of Discharge from [facility]. Please be advised that this patient has been discharge from our facility. Below you will find the admission and discharge date and time including the patient’s disposition.   UTILIZATION REVIEW CONTACT:  Corrie Pascal MA  Utilization   Network Utilization Review Department  Phone: [phone] x carefully listen to the prompts. All voicemails are confidential.  Email: [email or web address]     ADMISSION INFORMATION  PRESENTATION DATE: 2/13/2025  3:55 PM  OBERVATION ADMISSION DATE: N/A  INPATIENT ADMISSION DATE: 2/13/25  6:05 PM   DISCHARGE DATE: 2/19/2025  1:50 PM   DISPOSITION:Home with Home Health Care    Network Utilization Review Department  ATTENTION: Please call with any questions or concerns to [phone] and carefully listen to the prompts so that you are directed to the right person. All voicemails are confidential.   For Discharge needs, contact Care Management DC Support Team at [phone] opt. 2  Send all requests for admission clinical reviews, approved or denied determinations and any other requests to dedicated fax number below belonging to the campus where the patient is receiving treatment. List of dedicated fax numbers for the Facilities:  FACILITY NAME UR FAX NUMBER   ADMISSION DENIALS (Administrative/Medical Necessity) [phone]   DISCHARGE SUPPORT TEAM ([facility]) [phone]   PARENT CHILD HEALTH (Maternity/NICU/Pediatrics) [phone]   [facility] [phone]   [facility] [phone]   [facility] [phone]   [facility] [phone]   [facility] [phone]   [facility] [phone]   [facility] [phone]   [facility] [phone]   [facility] [phone]   [facility] [phone]   [facility] [phone]   [facility] [phone]

## 2025-02-21 NOTE — CASE COMMUNICATION
Medication discrepancies or Major drug interactions pt reports he did not know to take half tablet of metoprolol and took a whole tablet this am. Vital signs WNL this visit.   Abnormal clinical findings   This report is informational only, no response is needed  [facility] has Admitted your patient to Home Health service with the following disciplines: SN  Patient stated goals of care to have catheter removed and to be able to go to  the diner again  Potential barriers to goal achievement pt lives alone  Primary focus of home health care:Genitourinary  Anticipated visit pattern and next visit date 2w2 1w1 next visit on 2.22.25   Thank you for allowing us to participate in the care of your patient.      Clarita Banegas RN

## 2025-02-22 ENCOUNTER — APPOINTMENT (OUTPATIENT)
Dept: LAB | Age: 60
End: 2025-02-22
Payer: COMMERCIAL

## 2025-02-22 ENCOUNTER — HOME CARE VISIT (OUTPATIENT)
Dept: HOME HEALTH SERVICES | Facility: HOME HEALTHCARE | Age: 60
End: 2025-02-22
Payer: COMMERCIAL

## 2025-02-22 VITALS
SYSTOLIC BLOOD PRESSURE: 120 MMHG | DIASTOLIC BLOOD PRESSURE: 70 MMHG | HEART RATE: 60 BPM | TEMPERATURE: 97.1 F | RESPIRATION RATE: 20 BRPM | OXYGEN SATURATION: 100 %

## 2025-02-22 DIAGNOSIS — N17.9 ACUTE KIDNEY INJURY (HCC): ICD-10-CM

## 2025-02-22 LAB
ANION GAP SERPL CALCULATED.3IONS-SCNC: 9 MMOL/L (ref 4–13)
BUN SERPL-MCNC: 43 MG/DL (ref 5–25)
CALCIUM SERPL-MCNC: 9.4 MG/DL (ref 8.4–10.2)
CHLORIDE SERPL-SCNC: 100 MMOL/L (ref 96–108)
CO2 SERPL-SCNC: 27 MMOL/L (ref 21–32)
CREAT SERPL-MCNC: 2.92 MG/DL (ref 0.6–1.3)
GFR SERPL CREATININE-BSD FRML MDRD: 22 ML/MIN/1.73SQ M
GLUCOSE SERPL-MCNC: 95 MG/DL (ref 65–140)
POTASSIUM SERPL-SCNC: 4.3 MMOL/L (ref 3.5–5.3)
SODIUM SERPL-SCNC: 136 MMOL/L (ref 135–147)

## 2025-02-22 PROCEDURE — 80048 BASIC METABOLIC PNL TOTAL CA: CPT

## 2025-02-22 PROCEDURE — 36415 COLL VENOUS BLD VENIPUNCTURE: CPT

## 2025-02-22 PROCEDURE — G0299 HHS/HOSPICE OF RN EA 15 MIN: HCPCS

## 2025-02-23 ENCOUNTER — HOME CARE VISIT (OUTPATIENT)
Dept: HOME HEALTH SERVICES | Facility: HOME HEALTHCARE | Age: 60
End: 2025-02-23
Payer: COMMERCIAL

## 2025-02-24 ENCOUNTER — HOME CARE VISIT (OUTPATIENT)
Dept: HOME HEALTH SERVICES | Facility: HOME HEALTHCARE | Age: 60
End: 2025-02-24
Payer: COMMERCIAL

## 2025-02-24 ENCOUNTER — PATIENT OUTREACH (OUTPATIENT)
Dept: CASE MANAGEMENT | Facility: OTHER | Age: 60
End: 2025-02-24

## 2025-02-24 VITALS
TEMPERATURE: 97.6 F | SYSTOLIC BLOOD PRESSURE: 160 MMHG | DIASTOLIC BLOOD PRESSURE: 80 MMHG | HEART RATE: 73 BPM | OXYGEN SATURATION: 99 %

## 2025-02-24 PROCEDURE — G0299 HHS/HOSPICE OF RN EA 15 MIN: HCPCS

## 2025-02-24 NOTE — PROGRESS NOTES
Outpatient care management note- incoming call back from the patient.     Chas reports he is anxious about the appt with urology on 2/26/25 as he has some discomfort with the catheter. He also reports not wanting to manage it out in public. He states he plans to re-schedule the GI appt for tomorrow. He is wearing a leg bag for comfort but finds he needs to empty it more often; he is using the large bag at HS. Confirmed he is taking the Proscar, Metoprolol and Eliquis. Also confirmed he is no longer taking the Amlodipine.     No transportation barrier as the patient drives himself, mainly locally to appts.     He reports he is receiving  [facility] nursing 2x/week. They saw him today and checked his BP and pulse ox. Patient reports his BP was elevated but pulse ox was WNL. Advised to discuss BP at upcoming PCP appt as he was recently taken off the Amlodipine. Chas is aware he needs to see cardiology but would like to take care of the urology/pickard issue first.     Patient has no questions or concerns at this time. He is agreeable to further follow up next week.

## 2025-02-24 NOTE — PROGRESS NOTES
Outpatient Care Management note- CM referral, HRR    Chart review completed    Initial call placed to patient and left voicemail to return call    Patient with h/o HTN, tobacco use, anemia, DM2, HLD, RICARDO, new onset Afib- on Eliquis    Patient was admitted to [facility] 2/13/25 to 2/19/25 for RICARDO after he was seen by GI and sent to the ED from the office. On admission, patient's Cr was 5.39. He was found to have urinary retention/obstructive uropathy r/t BPH. Pickard cath was placed and he was started on Proscar 5 mg daily. He was d/c with the pickard in place and [facility] nursing.     He was found to have new onset Afib. Started on Metoprolol 12.5 mg BID and stop Amlodipine. Eliquis was also started at 5 mg BID. Patient advised to follow up with cardiology.     Repeat BMP was due in 3 days and patient obtained labs on 2/22/25.    Patient is scheduled with GI 2/25/25, Urology on 2/26/25 for pickard removal and PVR, 2/27/25 PCP    Await call back from patient.

## 2025-02-24 NOTE — CASE COMMUNICATION
1815...Patient called into [facility] regarding leg strap for pickard catheter. States the clip on it became dislodged and he attempted to reinsert pickard tubing into it. He was calling to make sure he performed correctly. Patients pickard catheter draining without issue.

## 2025-02-26 ENCOUNTER — OFFICE VISIT (OUTPATIENT)
Dept: UROLOGY | Facility: CLINIC | Age: 60
End: 2025-02-26
Payer: COMMERCIAL

## 2025-02-26 VITALS
SYSTOLIC BLOOD PRESSURE: 124 MMHG | DIASTOLIC BLOOD PRESSURE: 80 MMHG | HEIGHT: 69 IN | BODY MASS INDEX: 35.7 KG/M2 | HEART RATE: 68 BPM | OXYGEN SATURATION: 98 % | WEIGHT: 241 LBS

## 2025-02-26 DIAGNOSIS — R33.9 URINARY RETENTION: Primary | ICD-10-CM

## 2025-02-26 LAB — POST-VOID RESIDUAL VOLUME, ML POC: 931 ML

## 2025-02-26 PROCEDURE — 51798 US URINE CAPACITY MEASURE: CPT

## 2025-02-26 PROCEDURE — 99204 OFFICE O/P NEW MOD 45 MIN: CPT

## 2025-02-26 NOTE — PROGRESS NOTES
Patient returned to the office this afternoon for PVR. Patient states he has only urinated a small amount since pickard removal morning.   ml  Recent Results (from the past hour)   POCT Measure PVR    Collection Time: 02/26/25  3:04 PM   Result Value Ref Range    POST-VOID RESIDUAL VOLUME, ML  mL      Discussed options for bladder drainage with the patient and he prefers to learn CIC.  Advised the patient to wash his hands with soap and water then cleanse the head of the penis with soap and water.  Patient to prepare the catheter by squeezing the lubricant pouch to allow the catheter to be coated prior to insertion. Then gently insert the catheter towards the umbilicus  using your dominant hand.  Once catheter begins to drain urine, insert the catheter another inch. Remove catheter when bladder is completely emptied.  Patient performed this procedure without incident.    Samples of Lofric 14 fr coude given to the patient. Patient unable to pass straight catheter due to enlargement of prostate.  Advised patient to begin CIC 4X/day.  Referral made to [facility] for catheter supplies.  Patient to return 3/3/2025 for cysto with Dr Sevilla at the [facility] office.

## 2025-02-26 NOTE — ADDENDUM NOTE
Addended by: TC NOBLES on: 2/26/2025 03:26 PM     Modules accepted: Orders     Patient's first and last name, , procedure, and correct site confirmed prior to the start of procedure.

## 2025-02-26 NOTE — PROGRESS NOTES
Office Visit- Urology  Chas Soliz 1965 MRN: [id number]      Assessment/Discussion/Plan    59 y.o. male managed by     Urinary retention  -Patient was hospitalized on 2/13/2025 due to urinary retention.  Patient had 1.8 L of urine output from bladder and was admitted due to RICARDO with moderate bilateral hydronephrosis noted at time of CT scan before catheterization  -Patient was maintained on silodosin 8 mg.  He was initiated on finasteride during hospitalization course  -Per physician attestation on consult note can consider teaching of CIC versus suprapubic catheter placement versus evaluation for surgical outlet procedure with cystoscopy.  -In the office today patient is adamant that catheter is removed for trial void today.  He already has an appointment scheduled for 230 this afternoon.  I discussed with patient trial of void and that while we can certainly see how he does with that I am concerned that there could be repeated episode of urinary tension given the degree of urinary tension that he had prior.  Patient will return this afternoon to see if he is able to spontaneously urinate and if his PVR is below 300 mL.  If he is not able to spontaneously urinate or he has a elevated PVR above 300 mL.  Discussed that we would either have to consider replacing indwelling catheter until he can have cystoscopy or consider CIC.  Patient states that he does not think that he would want to have indwelling catheter reinserted so we would plan for CIC teaching session.  If patient is able to pass trial of void I still think that we need to have very close monitoring and patient should be still scheduled for cystoscopy.  Would advise that patient have a PVR check sometime next week and with plans to have cystoscopy to evaluate further    2.  Prostate cancer screening  -PSA low at 0.24 in January 2025  -ALLEGRA deferred today because PSA is low and with trial void being conducted today      Chief Complaint:   Chas  is a 59 y.o. male presenting to the office for an initial evaluation regarding urinary retention        Subjective    Patient is a 59-year-old male with no reported past urologic history who presents to the office today for outpatient follow-up in regards to urinary retention.  He was seen by gastroenterology with note of abdominal mass and with RICARDO noted on blood work.  T CT scan demonstrated a severely distended bladder with bilateral hydronephrosis a Mcclellan catheter was inserted with report of over1.8L.  [facility]rology was consulted during his hospitalization.  Patient was maintained on Rapaflo 4 mg prior to his hospitalization.  He was initiated on finasteride.  He returns to the office today to discuss next steps and for trial void he has a catheter in place.  He states that he needs to get the catheter out as it is causing him discomfort.  Patient states that it is previous hospitalization in November 2024 he was told that he was emptying his bladder all the way but did not need a catheter as he was able to urinate spontaneously but other than this he denies any preceding urinary tract symptoms.  He denies any gross hematuria      ROS:   Review of Systems   Constitutional: Negative.  Negative for chills, fatigue and fever.   HENT: Negative.     Respiratory:  Negative for shortness of breath.    Cardiovascular:  Negative for chest pain.   Gastrointestinal: Negative.  Negative for abdominal pain.   Endocrine: Negative.    Musculoskeletal: Negative.    Skin: Negative.    Neurological: Negative.  Negative for dizziness and light-headedness.   Hematological: Negative.    Psychiatric/Behavioral: Negative.           Past Medical History  Past Medical History:   Diagnosis Date    Anxiety     Arthritis     Bipolar disorder (HCC)     Colon polyp     COPD (chronic obstructive pulmonary disease) (HCC)     Depression     Diabetes mellitus (HCC)     type 2    Gout     High triglycerides     Hypertension      Hypothyroidism 02/20/2013    Obesity     Paranoid schizophrenia (HCC)     Psychiatric disorder     Sciatica 12/03/2013    Seasonal allergies     Sleep apnea        Past Surgical History  Past Surgical History:   Procedure Laterality Date    COLONOSCOPY      UMBILICAL HERNIA REPAIR      WISDOM TOOTH EXTRACTION         Past Family History  Family History   Problem Relation Age of Onset    Colon cancer Mother     Coronary artery disease Father     Hyperlipidemia Father     Coronary artery disease Family        Past Social history  Social History     Socioeconomic History    Marital status: Single     Spouse name: Not on file    Number of children: Not on file    Years of education: Not on file    Highest education level: Not on file   Occupational History    Not on file   Tobacco Use    Smoking status: Every Day     Current packs/day: 0.25     Average packs/day: 0.3 packs/day for 35.0 years (8.8 ttl pk-yrs)     Types: Cigarettes, E-Cigarettes    Smokeless tobacco: Never   Vaping Use    Vaping status: Never Used   Substance and Sexual Activity    Alcohol use: Not Currently    Drug use: No    Sexual activity: Not Currently     Partners: Female   Other Topics Concern    Not on file   Social History Narrative    Not on file     Social Drivers of Health     Financial Resource Strain: Low Risk  (11/14/2023)    Overall Financial Resource Strain (CARDIA)     Difficulty of Paying Living Expenses: Not hard at all   Food Insecurity: No Food Insecurity (2/13/2025)    Nursing - Inadequate Food Risk Classification     Worried About Running Out of Food in the Last Year: Never true     Ran Out of Food in the Last Year: Never true     Ran Out of Food in the Last Year: Never true   Transportation Needs: No Transportation Needs (2/20/2025)    OASIS : Transportation     Lack of Transportation (Medical): No     Lack of Transportation (Non-Medical): No     Patient Unable or Declines to Respond: No   Physical Activity: Insufficiently  Active (2020)    Exercise Vital Sign     Days of Exercise per Week: 4 days     Minutes of Exercise per Session: 30 min   Stress: No Stress Concern Present (2020)    [employer] - Occupational Stress Questionnaire     Feeling of Stress : Not at all   Social Connections: Unknown (2020)    Social Connection and Isolation Panel [NHANES]     Frequency of Communication with Friends and Family: Patient declined     Frequency of Social Gatherings with Friends and Family: Patient declined     Attends Adventism Services: Patient declined     Active Member of Clubs or Organizations: Patient declined     Attends Club or Organization Meetings: Patient declined     Marital Status: Patient declined   Intimate Partner Violence: Unknown (2025)    Nursing IPS     Feels Physically and Emotionally Safe: Not on file     Physically Hurt by Someone: Not on file     Humiliated or Emotionally Abused by Someone: Not on file     Physically Hurt by Someone: No     Hurt or Threatened by Someone: No   Housing Stability: Unknown (2025)    Nursing: Inadequate Housing Risk Classification     Has Housing: Not on file     Worried About Losing Housing: Not on file     Unable to Get Utilities: Not on file     Unable to Pay for Housing in the Last Year: No     Has Housin       Current Medications  Current Outpatient Medications   Medication Sig Dispense Refill    apixaban (ELIQUIS) 5 mg Take 1 tablet (5 mg total) by mouth 2 (two) times a day 60 tablet 0    atorvastatin (LIPITOR) 80 mg tablet Take 1 tablet (80 mg total) by mouth daily with dinner 90 tablet 3    fenofibrate (TRICOR) 145 mg tablet Take 1 tablet (145 mg total) by mouth daily with dinner 90 tablet 3    finasteride (PROSCAR) 5 mg tablet Take 1 tablet (5 mg total) by mouth daily 90 tablet 0    FLUoxetine (PROzac) 20 mg capsule Take 1 capsule (20 mg total) by mouth daily 90 capsule 3    fluticasone (FLONASE) 50 mcg/act nasal spray 2  "sprays into each nostril daily (Patient taking differently: 2 sprays into each nostril if needed for allergies or rhinitis.) 1 g 0    haloperidol (HALDOL) 5 mg tablet Take 0.5 tablets (2.5 mg total) by mouth daily at bedtime 90 tablet 3    levothyroxine 150 mcg tablet Take 1 tablet (150 mcg total) by mouth daily in the early morning 100 tablet 3    metoprolol tartrate (LOPRESSOR) 25 mg tablet Take 0.5 tablets (12.5 mg total) by mouth every 12 (twelve) hours 30 tablet 0    pantoprazole (PROTONIX) 40 mg tablet Take 1 tablet (40 mg total) by mouth daily in the early morning 90 tablet 0    Silodosin 8 MG CAPS Take 1 capsule by mouth daily at bedtime 90 capsule 3    VITAMIN D, CHOLECALCIFEROL, PO Take 50,000 Units by mouth once a week       Current Facility-Administered Medications   Medication Dose Route Frequency Provider Last Rate Last Admin    cyanocobalamin injection 1,000 mcg  1,000 mcg Intramuscular Q30 Days    1,000 mcg at 01/30/25 1552       Allergies  Allergies   Allergen Reactions    No Active Allergies        OBJECTIVE    Vitals   Vitals:    02/26/25 0833   BP: 124/80   BP Location: Left arm   Patient Position: Sitting   Cuff Size: Adult   Pulse: 68   SpO2: 98%   Weight: 109 kg (241 lb)   Height: 5' 9\" (1.753 m)       PVR:    Physical Exam  Constitutional:       General: He is not in acute distress.     Appearance: Normal appearance. He is normal weight. He is not ill-appearing or toxic-appearing.   HENT:      Head: Normocephalic and atraumatic.   Eyes:      Conjunctiva/sclera: Conjunctivae normal.   Cardiovascular:      Rate and Rhythm: Normal rate.   Pulmonary:      Effort: Pulmonary effort is normal. No respiratory distress.   Skin:     General: Skin is warm and dry.   Neurological:      General: No focal deficit present.      Mental Status: He is alert and oriented to person, place, and time.      Cranial Nerves: No cranial nerve deficit.   Psychiatric:         Mood and Affect: Mood normal.         " Behavior: Behavior normal.         Thought Content: Thought content normal.          Labs:     Lab Results   Component Value Date    PSA 0.246 01/24/2025    PSA 0.30 03/05/2024    PSA 0.3 05/12/2022    PSA 0.7 02/10/2020     Lab Results   Component Value Date    CREATININE 2.92 (H) 02/22/2025      Lab Results   Component Value Date    HGBA1C 5.6 01/30/2025     Lab Results   Component Value Date    CALCIUM 9.4 02/22/2025    K 4.3 02/22/2025    CO2 27 02/22/2025     02/22/2025    BUN 43 (H) 02/22/2025    CREATININE 2.92 (H) 02/22/2025       I have personally reviewed all pertinent lab results and reviewed with patient    Imaging       Jose Rdz PA-C  Date: 2/26/2025 Time: 9:20 AM  [facility]    This note was written using fluency dictation software. Please excuse any resulting minor grammatical errors.

## 2025-02-27 ENCOUNTER — OFFICE VISIT (OUTPATIENT)
Dept: FAMILY MEDICINE CLINIC | Facility: CLINIC | Age: 60
End: 2025-02-27
Payer: COMMERCIAL

## 2025-02-27 VITALS
WEIGHT: 235 LBS | TEMPERATURE: 98.6 F | HEART RATE: 74 BPM | SYSTOLIC BLOOD PRESSURE: 120 MMHG | OXYGEN SATURATION: 98 % | RESPIRATION RATE: 14 BRPM | HEIGHT: 69 IN | BODY MASS INDEX: 34.8 KG/M2 | DIASTOLIC BLOOD PRESSURE: 72 MMHG

## 2025-02-27 DIAGNOSIS — F17.210 CIGARETTE SMOKER: ICD-10-CM

## 2025-02-27 DIAGNOSIS — E06.3 HYPOTHYROIDISM DUE TO HASHIMOTO'S THYROIDITIS: ICD-10-CM

## 2025-02-27 DIAGNOSIS — I48.91 ATRIAL FIBRILLATION, UNSPECIFIED TYPE (HCC): Primary | ICD-10-CM

## 2025-02-27 DIAGNOSIS — D50.9 IRON DEFICIENCY ANEMIA, UNSPECIFIED IRON DEFICIENCY ANEMIA TYPE: ICD-10-CM

## 2025-02-27 DIAGNOSIS — D64.9 ANEMIA, UNSPECIFIED TYPE: ICD-10-CM

## 2025-02-27 DIAGNOSIS — E11.8 TYPE II DIABETES MELLITUS WITH MANIFESTATIONS (HCC): ICD-10-CM

## 2025-02-27 DIAGNOSIS — N40.0 ENLARGED PROSTATE: ICD-10-CM

## 2025-02-27 DIAGNOSIS — R33.9 URINARY RETENTION: ICD-10-CM

## 2025-02-27 DIAGNOSIS — N13.9 OBSTRUCTIVE UROPATHY: ICD-10-CM

## 2025-02-27 PROCEDURE — 99496 TRANSJ CARE MGMT HIGH F2F 7D: CPT | Performed by: INTERNAL MEDICINE

## 2025-02-27 RX ORDER — LEVOTHYROXINE SODIUM 150 UG/1
150 TABLET ORAL
Qty: 100 TABLET | Refills: 3 | Status: SHIPPED | OUTPATIENT
Start: 2025-02-27

## 2025-02-27 RX ORDER — FINASTERIDE 5 MG/1
5 TABLET, FILM COATED ORAL DAILY
Qty: 90 TABLET | Refills: 0 | Status: SHIPPED | OUTPATIENT
Start: 2025-02-27

## 2025-02-27 RX ORDER — FENOFIBRATE 145 MG/1
145 TABLET, COATED ORAL
Qty: 90 TABLET | Refills: 3 | Status: SHIPPED | OUTPATIENT
Start: 2025-02-27

## 2025-02-27 RX ORDER — ATORVASTATIN CALCIUM 80 MG/1
80 TABLET, FILM COATED ORAL
Qty: 90 TABLET | Refills: 3 | Status: SHIPPED | OUTPATIENT
Start: 2025-02-27 | End: 2025-05-28

## 2025-02-27 RX ORDER — SILODOSIN 8 MG/1
8 CAPSULE ORAL
Qty: 90 CAPSULE | Refills: 3 | Status: SHIPPED | OUTPATIENT
Start: 2025-02-27

## 2025-02-27 RX ORDER — METOPROLOL TARTRATE 25 MG/1
12.5 TABLET, FILM COATED ORAL EVERY 12 HOURS SCHEDULED
Qty: 30 TABLET | Refills: 0 | Status: SHIPPED | OUTPATIENT
Start: 2025-02-27

## 2025-02-27 NOTE — PROGRESS NOTES
Transition of Care Visit  Name: Chas Soliz      : 1965      MRN: [id number]  Encounter Provider: Jouse Maravilla MD  Encounter Date: 2025   Encounter department: [facility] CARE [facility]    Assessment & Plan  Urinary retention    Orders:    finasteride (PROSCAR) 5 mg tablet; Take 1 tablet (5 mg total) by mouth daily    Hypothyroidism due to Hashimoto's thyroiditis    Orders:    levothyroxine 150 mcg tablet; Take 1 tablet (150 mcg total) by mouth daily in the early morning    Iron deficiency anemia, unspecified iron deficiency anemia type    Orders:    levothyroxine 150 mcg tablet; Take 1 tablet (150 mcg total) by mouth daily in the early morning    Type II diabetes mellitus with manifestations (HCC)    Lab Results   Component Value Date    HGBA1C 5.6 2025       Orders:    Silodosin 8 MG CAPS; Take 1 capsule by mouth daily at bedtime    Enlarged prostate    Orders:    Silodosin 8 MG CAPS; Take 1 capsule by mouth daily at bedtime    Cigarette smoker    Orders:    Silodosin 8 MG CAPS; Take 1 capsule by mouth daily at bedtime    Obstructive uropathy    Orders:    Silodosin 8 MG CAPS; Take 1 capsule by mouth daily at bedtime    Anemia, unspecified type    Orders:    Silodosin 8 MG CAPS; Take 1 capsule by mouth daily at bedtime    BMI 35.0-35.9,adult    Orders:    fenofibrate (TRICOR) 145 mg tablet; Take 1 tablet (145 mg total) by mouth daily with dinner    atorvastatin (LIPITOR) 80 mg tablet; Take 1 tablet (80 mg total) by mouth daily with dinner    Atrial fibrillation, unspecified type (HCC)    Orders:    metoprolol tartrate (LOPRESSOR) 25 mg tablet; Take 0.5 tablets (12.5 mg total) by mouth every 12 (twelve) hours    FU w Urology  FU a cardiology  Life style mod  Stop smoking  RTC in 2-3 mos w Blood work       History of Present Illness     Transitional Care Management Review:   Chas Sloiz is a 59 y.o. male here for TCM follow up.     During the TCM phone call  "patient stated:  TCM Call       Date and time call was made  2/19/2025  3:29 PM    Patient was hospitialized at  [facility]    Date of Admission  02/13/25    Date of discharge  02/19/25    Disposition  Home    Were the patients medications reviewed and updated  Yes          TCM Call       Scheduled for follow up?  Yes    I have advised the patient to call PCP with any new or worsening symptoms  Joi Man, MR          59 Y O Man is here for Post Hospital / TCM visit, he feels little better, D/C summary and Med list reviewed,....      Review of Systems   Constitutional:  Positive for fatigue. Negative for chills and fever.   HENT:  Positive for postnasal drip. Negative for congestion, facial swelling, sore throat, trouble swallowing and voice change.    Eyes:  Negative for pain, discharge and visual disturbance.   Respiratory:  Negative for cough, shortness of breath and wheezing.    Cardiovascular:  Negative for chest pain, palpitations and leg swelling.   Gastrointestinal:  Negative for abdominal pain, blood in stool, constipation, diarrhea and nausea.   Endocrine: Negative for polydipsia, polyphagia and polyuria.   Genitourinary:  Positive for dysuria. Negative for difficulty urinating, hematuria and urgency.   Musculoskeletal:  Negative for arthralgias and myalgias.   Skin:  Negative for rash.   Neurological:  Positive for dizziness. Negative for tremors, weakness and headaches.   Hematological:  Negative for adenopathy. Does not bruise/bleed easily.   Psychiatric/Behavioral:  Negative for dysphoric mood, sleep disturbance and suicidal ideas.      Objective   /72 (BP Location: Left arm, Patient Position: Sitting, Cuff Size: Standard)   Pulse 74   Temp 98.6 °F (37 °C) (Tympanic Core)   Resp 14   Ht 5' 9\" (1.753 m)   Wt 107 kg (235 lb)   SpO2 98%   BMI 34.70 kg/m²     Physical Exam  Vitals and nursing note reviewed.   Constitutional:       General: He is not in acute distress.     " Appearance: He is well-developed.   HENT:      Head: Normocephalic and atraumatic.      Right Ear: External ear normal.      Left Ear: External ear normal.   Eyes:      Conjunctiva/sclera: Conjunctivae normal.      Pupils: Pupils are equal, round, and reactive to light.   Neck:      Thyroid: No thyromegaly.      Trachea: No tracheal deviation.   Cardiovascular:      Rate and Rhythm: Normal rate and regular rhythm.      Heart sounds: Murmur heard.      No friction rub.   Pulmonary:      Effort: Pulmonary effort is normal. No respiratory distress.      Breath sounds: Wheezing present.   Abdominal:      General: Bowel sounds are normal. There is no distension.      Palpations: Abdomen is soft.      Tenderness: There is no abdominal tenderness.   Musculoskeletal:         General: No swelling or deformity. Normal range of motion.      Cervical back: Neck supple.   Skin:     General: Skin is warm and dry.      Capillary Refill: Capillary refill takes less than 2 seconds.      Findings: No erythema or rash.   Neurological:      Mental Status: He is alert and oriented to person, place, and time.      Cranial Nerves: No cranial nerve deficit.      Coordination: Coordination normal.      Deep Tendon Reflexes: Reflexes normal.   Psychiatric:         Mood and Affect: Mood normal.         Behavior: Behavior normal.       Medications have been reviewed by provider in current encounter

## 2025-02-27 NOTE — ASSESSMENT & PLAN NOTE
Orders:    metoprolol tartrate (LOPRESSOR) 25 mg tablet; Take 0.5 tablets (12.5 mg total) by mouth every 12 (twelve) hours    FU w Urology  FU a cardiology  Life style mod  Stop smoking  RTC in 2-3 mos w Blood work

## 2025-02-27 NOTE — PROGRESS NOTES
TCM Call       Date and time call was made  2/19/2025  3:29 PM    Patient was hospitialized at  [facility]    Date of Admission  02/13/25    Date of discharge  02/19/25    Disposition  Home    Were the patients medications reviewed and updated  Yes          TCM Call       Scheduled for follow up?  Yes    I have advised the patient to call PCP with any new or worsening symptoms  Joi Man MR

## 2025-02-27 NOTE — ASSESSMENT & PLAN NOTE
Orders:    levothyroxine 150 mcg tablet; Take 1 tablet (150 mcg total) by mouth daily in the early morning

## 2025-02-27 NOTE — ASSESSMENT & PLAN NOTE
Lab Results   Component Value Date    HGBA1C 5.6 01/30/2025       Orders:    Silodosin 8 MG CAPS; Take 1 capsule by mouth daily at bedtime

## 2025-02-28 ENCOUNTER — APPOINTMENT (OUTPATIENT)
Dept: HOME HEALTH SERVICES | Facility: HOME HEALTHCARE | Age: 60
End: 2025-02-28
Payer: COMMERCIAL

## 2025-02-28 ENCOUNTER — HOME CARE VISIT (OUTPATIENT)
Dept: HOME HEALTH SERVICES | Facility: HOME HEALTHCARE | Age: 60
End: 2025-02-28
Payer: COMMERCIAL

## 2025-02-28 VITALS
DIASTOLIC BLOOD PRESSURE: 72 MMHG | SYSTOLIC BLOOD PRESSURE: 128 MMHG | TEMPERATURE: 97.3 F | HEART RATE: 72 BPM | OXYGEN SATURATION: 99 %

## 2025-02-28 PROCEDURE — G0299 HHS/HOSPICE OF RN EA 15 MIN: HCPCS

## 2025-03-03 ENCOUNTER — PROCEDURE VISIT (OUTPATIENT)
Dept: UROLOGY | Facility: CLINIC | Age: 60
End: 2025-03-03
Payer: COMMERCIAL

## 2025-03-03 ENCOUNTER — TELEPHONE (OUTPATIENT)
Age: 60
End: 2025-03-03

## 2025-03-03 VITALS
BODY MASS INDEX: 36.14 KG/M2 | OXYGEN SATURATION: 99 % | WEIGHT: 244 LBS | DIASTOLIC BLOOD PRESSURE: 80 MMHG | SYSTOLIC BLOOD PRESSURE: 120 MMHG | HEART RATE: 77 BPM | HEIGHT: 69 IN

## 2025-03-03 DIAGNOSIS — R33.9 URINARY RETENTION: Primary | ICD-10-CM

## 2025-03-03 PROCEDURE — 52000 CYSTOURETHROSCOPY: CPT | Performed by: UROLOGY

## 2025-03-03 NOTE — TELEPHONE ENCOUNTER
Patient had a cysto this morning with Dr Sevilla in [facility].    Pt wanting to know why his bladder wall is collapsing and if he saw a bladder tumor?    Pt stated his brother, who is also a patient of Dr Sevilla, had the same issue with urinary retention but it ended up being a bladder tumor and wanted him to ask.     Pt requesting a call back at [phone] and gives permission to leave a detailed VM if he does not answer

## 2025-03-03 NOTE — PROGRESS NOTES
Patient admitted to the hospital found to be in urinary retention with 1.8 L in his bladder.  No known cause for neurogenic bladder.  Denies diabetes, stroke, other neurologic condition.  Denies constipation.    Unfortunately, did not tolerate Mcclellan catheter and was taught self-catheterization.  He was instructed to catheterize 4 times daily.  He is actually catheterizing 3 times daily.  He says he goes well and he empties completely.  He is not able to void spontaneously.    CT reviewed.  Prostate not overly enlarged.    He reports that he empties his bladder with self-catheterization 2 hours prior to visit.       Cystoscopy     Date/Time  3/3/2025 8:30 AM     Performed by  Ronnie Sevilla MD   Authorized by  Ronnie Sevilla MD         Procedure Details:    Additional Procedure Details: Patient was prepped with chlorhexidine and lidocaine jelly.  Flexible cystoscope was placed.  Prostate is not obstructing.  Bladder is completely full and cloudy and there is no visibility.  We decided to their irrigate his bladder and empty at with syringe.  We emptied over 1.5 L of cloudy yellow urine with debris.  Bladder was then refilled with about 300 cc.  Mucosa appears smooth.  Is very patulous and redundant.  Not all bladder mucosa can be evaluated due to its redundancy.  There is no outlet obstruction.  Prostate appears relatively small and nonobstructing no median lobe.    Plan  Once again we discussed self-catheterization technique.  This was reviewed with the nurse once again.  He needs to catheterize at least 4-5 times daily to completion.  Discussed that any chance of spontaneous voiding again will be contingent upon having his bladder empty and restoring contractility.  Advised that it is unlikely he will void spontaneously again and will require self-catheterization or indwelling catheter in the form of urethral Mcclellan or suprapubic tube.  He definitely does not want indwelling tube and says he will be  sure to keep his bladder empty with self catheterizations.

## 2025-03-03 NOTE — TELEPHONE ENCOUNTER
"Called and spoke with the patient and made him aware Dr Sevilla did not find any tumors in his bladder.  Patient asking \" why isn't my bladder working\" Patient has been on Haldol for 40 years. Patient questioning if the medication could be the cause of his neurogenic bladder.   Will forward to AP for advice then call the patient back.  "

## 2025-03-04 ENCOUNTER — HOME CARE VISIT (OUTPATIENT)
Dept: HOME HEALTH SERVICES | Facility: HOME HEALTHCARE | Age: 60
End: 2025-03-04
Payer: COMMERCIAL

## 2025-03-04 VITALS
SYSTOLIC BLOOD PRESSURE: 120 MMHG | DIASTOLIC BLOOD PRESSURE: 60 MMHG | HEART RATE: 90 BPM | OXYGEN SATURATION: 98 % | TEMPERATURE: 98.9 F | RESPIRATION RATE: 16 BRPM

## 2025-03-04 PROCEDURE — G0300 HHS/HOSPICE OF LPN EA 15 MIN: HCPCS

## 2025-03-04 NOTE — TELEPHONE ENCOUNTER
Called and spoke with Chas. Advised that unfortunately Dr. Sevilla does not know why his bladder is not functioning at this time. Patient plans to continue performing CIC as recommended as he is unable to tolerate indwelling Mcclellan. Patient hopeful that he will be able to urinate some on his own in between catheterizations. He will follow up as scheduled with AP in 3 months.

## 2025-03-06 ENCOUNTER — TELEPHONE (OUTPATIENT)
Dept: HEMATOLOGY ONCOLOGY | Facility: CLINIC | Age: 60
End: 2025-03-06

## 2025-03-06 ENCOUNTER — PATIENT OUTREACH (OUTPATIENT)
Dept: CASE MANAGEMENT | Facility: OTHER | Age: 60
End: 2025-03-06

## 2025-03-06 NOTE — PROGRESS NOTES
Outpatient Care Management note- follow up call    Chart review completed    Left message on voicemail for patient to return call    As follow up last RN CM outreach, patient was anxious and concerned about his follow up with urology and wanting the pickard catheter removed. Patient had an appt with urology on 2/26/25 where the pickard was removed and voiding trial initiated. Patient failed the voiding trial with a PVR of 931 ml. He was instructed and educated on CIC 4-5x/daily.     Chas then underwent a cystoscopy on 3/3/25 where there were no obstructions noted and the bladder was emptied with 1.5L of urine. The prostate is non-obstructing.     He is to RTO for urology in 3 months. Continue to CIC 5-6x/daily    Noted that patient requested to be d/c from [facility] on 3/4/25, no further SN need.     Await return call from patient.

## 2025-03-06 NOTE — TELEPHONE ENCOUNTER
Attempted to contact patient due to missed visit. Left a voicemail with new appointment details and number for the [facility] to reschedule if needed.

## 2025-03-07 ENCOUNTER — PATIENT OUTREACH (OUTPATIENT)
Dept: CASE MANAGEMENT | Facility: OTHER | Age: 60
End: 2025-03-07

## 2025-03-07 NOTE — PROGRESS NOTES
"Outpatient Care Management note- Incoming call back from the patient    Refer to RN CM note from 3/6/25. Chas reports he is CIC 4x/day. If he has more liquid or a \"full feeling\" he will cath a 5th time. He reports doing 2 today so far and his volumes are high. He does not collect to measure, just cath straight into the toilet. He states his urine is yellow, not bloody and denies burning or UTI symptoms.     He reports overall he is doing \"ok\" and is hopeful that CIC is not lifelong for him and he can spontaneously urinate on his own. He is aware to contact urology if he does void on his own or develops concerning urinary symptoms.     Chas states the [facility] has signed off and d/c earlier this week. He did not feel the need for further follow up and does receive waiver services. He reports a HHA comes for 1 hour weekly to clean, cook or go to the store. He has the option to increase his hours but does not have the need at this time as he is still drives and has independent functional mobility. The HHA mainly cleans his 1 bedroom apartment.     Chas is requesting a follow up call next week to make sure he has no needs since the CIC is new.     Weekly outreach scheduled.   "

## 2025-03-10 ENCOUNTER — HOME CARE VISIT (OUTPATIENT)
Dept: HOME HEALTH SERVICES | Facility: HOME HEALTHCARE | Age: 60
End: 2025-03-10
Payer: COMMERCIAL

## 2025-03-11 ENCOUNTER — TELEPHONE (OUTPATIENT)
Dept: NEPHROLOGY | Facility: CLINIC | Age: 60
End: 2025-03-11

## 2025-03-12 ENCOUNTER — TELEPHONE (OUTPATIENT)
Dept: GASTROENTEROLOGY | Facility: MEDICAL CENTER | Age: 60
End: 2025-03-12

## 2025-03-12 ENCOUNTER — OFFICE VISIT (OUTPATIENT)
Dept: GASTROENTEROLOGY | Facility: MEDICAL CENTER | Age: 60
End: 2025-03-12
Payer: COMMERCIAL

## 2025-03-12 ENCOUNTER — TELEPHONE (OUTPATIENT)
Age: 60
End: 2025-03-12

## 2025-03-12 VITALS
HEART RATE: 59 BPM | TEMPERATURE: 98.4 F | DIASTOLIC BLOOD PRESSURE: 66 MMHG | WEIGHT: 243 LBS | SYSTOLIC BLOOD PRESSURE: 109 MMHG | BODY MASS INDEX: 35.99 KG/M2 | HEIGHT: 69 IN

## 2025-03-12 DIAGNOSIS — Z80.0 FAMILY HISTORY OF COLON CANCER: ICD-10-CM

## 2025-03-12 DIAGNOSIS — D50.9 IRON DEFICIENCY ANEMIA, UNSPECIFIED IRON DEFICIENCY ANEMIA TYPE: ICD-10-CM

## 2025-03-12 DIAGNOSIS — Z12.11 SCREEN FOR COLON CANCER: Primary | ICD-10-CM

## 2025-03-12 DIAGNOSIS — D64.9 NORMOCYTIC ANEMIA: Primary | ICD-10-CM

## 2025-03-12 DIAGNOSIS — D64.9 ANEMIA, UNSPECIFIED TYPE: ICD-10-CM

## 2025-03-12 PROCEDURE — 99213 OFFICE O/P EST LOW 20 MIN: CPT

## 2025-03-12 RX ORDER — AMLODIPINE BESYLATE 5 MG/1
1 TABLET ORAL DAILY
COMMUNITY
Start: 2025-03-05

## 2025-03-12 RX ORDER — POLYETHYLENE GLYCOL 3350, SODIUM SULFATE ANHYDROUS, SODIUM BICARBONATE, SODIUM CHLORIDE, POTASSIUM CHLORIDE 236; 22.74; 6.74; 5.86; 2.97 G/4L; G/4L; G/4L; G/4L; G/4L
4000 POWDER, FOR SOLUTION ORAL ONCE
Qty: 4000 ML | Refills: 0 | Status: SHIPPED | OUTPATIENT
Start: 2025-03-12 | End: 2025-03-12

## 2025-03-12 NOTE — TELEPHONE ENCOUNTER
Pt saw gi today. Pt is anemic but they dont think his iron pill is needed anymore because of his stabilizing levels. Also they recommned he gets a colonoscopy but pt lives alone, is required to fast for two days, had a bad experience with the last colonoscopy because he didn't do the prep right, and he really doesn't want to do another one. He is asking if Dr. Maravilla can order him to do the at home kit even though its not as effective. Please advise. Pt asks that we leave a detailed VM if he doesn't answer.

## 2025-03-12 NOTE — PROGRESS NOTES
Name: Chas Soliz      : 1965      MRN: [id number]  Encounter Provider: Meg Deng PA-C  Encounter Date: 3/12/2025   Encounter department: [facility]  :  Assessment & Plan  Normocytic anemia  Patient noted to have anemia since 2024.  Most recently Hgb 9.5.  Will plan EGD and colonoscopy for further evaluation.  Follow-up with hematology as scheduled.  Orders:    EGD; Future    Colonoscopy; Future    polyethylene glycol (Golytely) 4000 mL solution; Take 4,000 mL by mouth once for 1 dose Take 4000 mL by mouth once for 1 dose. Use as directed    Family history of colon cancer  Due for screening colonoscopy as he had poor prep and difficult exam previously.  Will plan for procedures at hospital with 2-day prep.  Orders:    Colonoscopy; Future    polyethylene glycol (Golytely) 4000 mL solution; Take 4,000 mL by mouth once for 1 dose Take 4000 mL by mouth once for 1 dose. Use as directed      Follow-up after procedure    History of Present Illness     Chas Soliz is a 60 y.o. male with PMH of HTN, A-fib, type 2 diabetes, HLD, anemia who presents follow-up.  Patient was seen by Dr. Vargas 2025 for acutely worsening right-sided abdominal pain and palpable abdominal mass.  Patient also with shortness of breath and anemia.  Patient was recommended to go to the hospital.  Patient was hospitalized  to 2025 for RICARDO, hyponatremia, urinary retention, new onset A-fib.  On hospitalization he was found to have obstructive uropathy and urinary catheter was placed.  Patient was seen by urology and nephrology for this and improved with IV fluids.  He also had new onset A-fib and was started on metoprolol and apixaban.    Patient has been feeling well since hospitalization.  He is still having to catheterize himself often.  He is following with urology for this.  He is having regular bowel movements formed.  Denies unintentional weight loss, nausea,  vomiting, heartburn, reflux, dysphagia, abdominal pain. Mother had colon cancer. No frequent NSAIDs/aspirin use, hx gastric bypass frequent blood donations, melena/hematochezia/hematemesis.     Most recent labs 2/19/2025 showed hemoglobin 9.5, MCV 97, platelets 294.  Iron 68, iron saturation 32, ferritin 243.    Prior imaging/procedures:  CT A/P without contrast 2/13/2025: Moderate bilateral hydroureteronephrosis as well as marked urinary bladder distention  Ultrasound abdominal wall 1/31/2025: Poorly marginated ill-defined hypoechoic nonvascular area at the site of suspected mass measuring 2.5 x 0.5 x 2.1 cm  Colonoscopy 10/2019: Inadequate bowel prep, exam moderately difficult due to patient discomfort, patient physique, poor prep.  Normal colon, recommended 3-year recall    Review of Systems   Constitutional:  Negative for appetite change, chills and fever.   Respiratory:  Negative for shortness of breath.    Gastrointestinal:  Negative for abdominal pain, blood in stool, constipation, diarrhea, nausea and vomiting.     Medical History Reviewed by provider this encounter:     .  Current Outpatient Medications on File Prior to Visit   Medication Sig Dispense Refill    amLODIPine (NORVASC) 5 mg tablet Take 1 tablet by mouth in the morning      apixaban (ELIQUIS) 5 mg Take 1 tablet (5 mg total) by mouth 2 (two) times a day 60 tablet 0    atorvastatin (LIPITOR) 80 mg tablet Take 1 tablet (80 mg total) by mouth daily with dinner 90 tablet 3    fenofibrate (TRICOR) 145 mg tablet Take 1 tablet (145 mg total) by mouth daily with dinner 90 tablet 3    finasteride (PROSCAR) 5 mg tablet Take 1 tablet (5 mg total) by mouth daily 90 tablet 0    FLUoxetine (PROzac) 20 mg capsule Take 1 capsule (20 mg total) by mouth daily 90 capsule 3    fluticasone (FLONASE) 50 mcg/act nasal spray 2 sprays into each nostril daily 1 g 0    haloperidol (HALDOL) 5 mg tablet Take 0.5 tablets (2.5 mg total) by mouth daily at bedtime 90 tablet 3     "levothyroxine 150 mcg tablet Take 1 tablet (150 mcg total) by mouth daily in the early morning 100 tablet 3    metoprolol tartrate (LOPRESSOR) 25 mg tablet Take 0.5 tablets (12.5 mg total) by mouth every 12 (twelve) hours 30 tablet 0    pantoprazole (PROTONIX) 40 mg tablet Take 1 tablet (40 mg total) by mouth daily in the early morning 90 tablet 0    Silodosin 8 MG CAPS Take 1 capsule by mouth daily at bedtime 90 capsule 3    VITAMIN D, CHOLECALCIFEROL, PO Take 50,000 Units by mouth once a week       Current Facility-Administered Medications on File Prior to Visit   Medication Dose Route Frequency Provider Last Rate Last Admin    cyanocobalamin injection 1,000 mcg  1,000 mcg Intramuscular Q30 Days    1,000 mcg at 01/30/25 2602      Social History     Tobacco Use    Smoking status: Every Day     Current packs/day: 0.25     Average packs/day: 0.3 packs/day for 35.0 years (8.8 ttl pk-yrs)     Types: Cigarettes, E-Cigarettes    Smokeless tobacco: Never   Vaping Use    Vaping status: Never Used   Substance and Sexual Activity    Alcohol use: Not Currently    Drug use: No    Sexual activity: Not Currently     Partners: Female        Objective   /66 (Patient Position: Sitting, Cuff Size: Large)   Pulse 59   Temp 98.4 °F (36.9 °C) (Tympanic)   Ht 5' 9\" (1.753 m)   Wt 110 kg (243 lb)   BMI 35.88 kg/m²      Physical Exam  Vitals reviewed.   Constitutional:       General: He is not in acute distress.     Appearance: He is not ill-appearing.   HENT:      Head: Normocephalic and atraumatic.   Cardiovascular:      Rate and Rhythm: Normal rate and regular rhythm.   Pulmonary:      Effort: Pulmonary effort is normal.      Breath sounds: Normal breath sounds.   Abdominal:      General: Abdomen is flat. Bowel sounds are normal. There is no distension.      Palpations: Abdomen is soft.      Tenderness: There is no abdominal tenderness.   Skin:     General: Skin is warm and dry.   Neurological:      Mental Status: He is " alert. Mental status is at baseline.

## 2025-03-12 NOTE — TELEPHONE ENCOUNTER
Our mutual patient, Chas Soliz, is scheduled for the following   procedure: Colonoscopy and Endoscopy   On: 4/10/25   With: Dr. Gomez    Chas Soliz is taking the following blood thinner: Eliquis       Can this be stopped 2 days prior to the procedure?         Thank you,  Doron Swenson [facility]

## 2025-03-12 NOTE — TELEPHONE ENCOUNTER
Procedure: Colon/EGD  Date: 4/10/25  Physician performing: Dr. Gomez  Location of procedure:    Instructions given to patient: 2 day golytely  Diabetic: No  Clearances: Yes  -Eliquis hold requested

## 2025-03-13 NOTE — TELEPHONE ENCOUNTER
Pt called for a status update on his inquiry from yesterday stating that he never heard anything back.    Confirmed with pt that Dr. Maravilla placed an order for the Cologuard yesterday at 4:48 PM.     Advised pt that this order is sent electronically to [facility] and that he should be receiving the kit in the mail shortly and if he has any further questions to please let us know.    Pt thanks us for our help.

## 2025-03-14 ENCOUNTER — PATIENT OUTREACH (OUTPATIENT)
Dept: CASE MANAGEMENT | Facility: OTHER | Age: 60
End: 2025-03-14

## 2025-03-14 NOTE — PROGRESS NOTES
Outpatient Care Management note- follow up call    Chart review completed    Spoke with Chas crawford who reports he is doing the same this week. He continues to CIC 4x/day and feels empty with no fullness or discomfort. He denies fever, pain/discomfort and reports the urine is yellow. He reports voiding a few drops on his own in the morning but nothing significant. Advised to continue to monitor for a symptoms and if he voids on his own, to report to urology. Patient understood.    Patient denies need for further RN CM outreach. Will close referral at this time.

## 2025-03-17 ENCOUNTER — TELEPHONE (OUTPATIENT)
Age: 60
End: 2025-03-17

## 2025-03-17 NOTE — TELEPHONE ENCOUNTER
Patient called in to advise that since he was in the hospital, he has been self-cathering 4 times a day and cannot do a urine test at the lab. He is asking what should he do?

## 2025-03-18 NOTE — TELEPHONE ENCOUNTER
LVM informing patient he was cleared by his doctor to hold his eliquis 2 days prior to the procedure.

## 2025-03-19 ENCOUNTER — APPOINTMENT (OUTPATIENT)
Dept: LAB | Age: 60
End: 2025-03-19
Payer: COMMERCIAL

## 2025-03-19 ENCOUNTER — TELEPHONE (OUTPATIENT)
Dept: NEPHROLOGY | Facility: CLINIC | Age: 60
End: 2025-03-19

## 2025-03-19 DIAGNOSIS — N17.9 AKI (ACUTE KIDNEY INJURY) (HCC): ICD-10-CM

## 2025-03-19 DIAGNOSIS — I10 BENIGN ESSENTIAL HYPERTENSION: ICD-10-CM

## 2025-03-19 DIAGNOSIS — E11.8 TYPE II DIABETES MELLITUS WITH MANIFESTATIONS (HCC): ICD-10-CM

## 2025-03-19 DIAGNOSIS — E06.3 HYPOTHYROIDISM DUE TO HASHIMOTO'S THYROIDITIS: ICD-10-CM

## 2025-03-19 LAB
ALBUMIN SERPL BCG-MCNC: 4 G/DL (ref 3.5–5)
ANION GAP SERPL CALCULATED.3IONS-SCNC: 8 MMOL/L (ref 4–13)
BASOPHILS # BLD AUTO: 0.08 THOUSANDS/ÂΜL (ref 0–0.1)
BASOPHILS NFR BLD AUTO: 1 % (ref 0–1)
BUN SERPL-MCNC: 31 MG/DL (ref 5–25)
CALCIUM SERPL-MCNC: 9.3 MG/DL (ref 8.4–10.2)
CHLORIDE SERPL-SCNC: 100 MMOL/L (ref 96–108)
CO2 SERPL-SCNC: 24 MMOL/L (ref 21–32)
CREAT SERPL-MCNC: 2.26 MG/DL (ref 0.6–1.3)
CREAT UR-MCNC: 31.7 MG/DL
EOSINOPHIL # BLD AUTO: 0.21 THOUSAND/ÂΜL (ref 0–0.61)
EOSINOPHIL NFR BLD AUTO: 3 % (ref 0–6)
ERYTHROCYTE [DISTWIDTH] IN BLOOD BY AUTOMATED COUNT: 12.8 % (ref 11.6–15.1)
GFR SERPL CREATININE-BSD FRML MDRD: 30 ML/MIN/1.73SQ M
GLUCOSE P FAST SERPL-MCNC: 83 MG/DL (ref 65–99)
HCT VFR BLD AUTO: 33 % (ref 36.5–49.3)
HGB BLD-MCNC: 10.5 G/DL (ref 12–17)
IMM GRANULOCYTES # BLD AUTO: 0.05 THOUSAND/UL (ref 0–0.2)
IMM GRANULOCYTES NFR BLD AUTO: 1 % (ref 0–2)
LYMPHOCYTES # BLD AUTO: 1.42 THOUSANDS/ÂΜL (ref 0.6–4.47)
LYMPHOCYTES NFR BLD AUTO: 17 % (ref 14–44)
MCH RBC QN AUTO: 30.8 PG (ref 26.8–34.3)
MCHC RBC AUTO-ENTMCNC: 31.8 G/DL (ref 31.4–37.4)
MCV RBC AUTO: 97 FL (ref 82–98)
MONOCYTES # BLD AUTO: 1.05 THOUSAND/ÂΜL (ref 0.17–1.22)
MONOCYTES NFR BLD AUTO: 13 % (ref 4–12)
NEUTROPHILS # BLD AUTO: 5.47 THOUSANDS/ÂΜL (ref 1.85–7.62)
NEUTS SEG NFR BLD AUTO: 65 % (ref 43–75)
NRBC BLD AUTO-RTO: 0 /100 WBCS
PHOSPHATE SERPL-MCNC: 3.1 MG/DL (ref 2.3–4.1)
PLATELET # BLD AUTO: 340 THOUSANDS/UL (ref 149–390)
PMV BLD AUTO: 9.8 FL (ref 8.9–12.7)
POTASSIUM SERPL-SCNC: 4.7 MMOL/L (ref 3.5–5.3)
PROT UR-MCNC: 46.4 MG/DL
PROT/CREAT UR: 1.5 MG/G{CREAT} (ref 0–0.1)
PTH-INTACT SERPL-MCNC: 49.4 PG/ML (ref 12–88)
RBC # BLD AUTO: 3.41 MILLION/UL (ref 3.88–5.62)
SODIUM SERPL-SCNC: 132 MMOL/L (ref 135–147)
T4 FREE SERPL-MCNC: 1.21 NG/DL (ref 0.61–1.12)
TSH SERPL DL<=0.05 MIU/L-ACNC: 0.91 UIU/ML (ref 0.45–4.5)
WBC # BLD AUTO: 8.28 THOUSAND/UL (ref 4.31–10.16)

## 2025-03-19 PROCEDURE — 80069 RENAL FUNCTION PANEL: CPT

## 2025-03-19 PROCEDURE — 84443 ASSAY THYROID STIM HORMONE: CPT

## 2025-03-19 PROCEDURE — 84156 ASSAY OF PROTEIN URINE: CPT

## 2025-03-19 PROCEDURE — 82570 ASSAY OF URINE CREATININE: CPT

## 2025-03-19 PROCEDURE — 36415 COLL VENOUS BLD VENIPUNCTURE: CPT

## 2025-03-19 PROCEDURE — 83970 ASSAY OF PARATHORMONE: CPT

## 2025-03-19 PROCEDURE — 84439 ASSAY OF FREE THYROXINE: CPT

## 2025-03-19 PROCEDURE — 85025 COMPLETE CBC W/AUTO DIFF WBC: CPT

## 2025-03-19 NOTE — TELEPHONE ENCOUNTER
Called patient  reminding to please complete blood and urine test prior to 3/25 appointment with Dr. Rendon. Patient stated that will do labs before appt.

## 2025-03-20 PROBLEM — R05.9 COUGH: Status: RESOLVED | Noted: 2025-02-15 | Resolved: 2025-03-20

## 2025-03-25 ENCOUNTER — OFFICE VISIT (OUTPATIENT)
Dept: NEPHROLOGY | Facility: CLINIC | Age: 60
End: 2025-03-25
Payer: COMMERCIAL

## 2025-03-25 VITALS
BODY MASS INDEX: 35.15 KG/M2 | WEIGHT: 238 LBS | DIASTOLIC BLOOD PRESSURE: 66 MMHG | OXYGEN SATURATION: 98 % | HEART RATE: 57 BPM | SYSTOLIC BLOOD PRESSURE: 110 MMHG

## 2025-03-25 DIAGNOSIS — E78.5 HYPERLIPIDEMIA, UNSPECIFIED HYPERLIPIDEMIA TYPE: ICD-10-CM

## 2025-03-25 DIAGNOSIS — N17.9 ACUTE KIDNEY INJURY (HCC): Primary | ICD-10-CM

## 2025-03-25 DIAGNOSIS — N31.9 NEUROGENIC BLADDER: ICD-10-CM

## 2025-03-25 DIAGNOSIS — E87.1 HYPONATREMIA: ICD-10-CM

## 2025-03-25 DIAGNOSIS — I10 BENIGN ESSENTIAL HYPERTENSION: ICD-10-CM

## 2025-03-25 DIAGNOSIS — R33.9 URINARY RETENTION: ICD-10-CM

## 2025-03-25 DIAGNOSIS — F17.200 TOBACCO USE DISORDER: ICD-10-CM

## 2025-03-25 PROCEDURE — G2211 COMPLEX E/M VISIT ADD ON: HCPCS | Performed by: INTERNAL MEDICINE

## 2025-03-25 PROCEDURE — 99214 OFFICE O/P EST MOD 30 MIN: CPT | Performed by: INTERNAL MEDICINE

## 2025-03-25 NOTE — PROGRESS NOTES
Name: Chas Soliz      : 1965      MRN: [id number]  Encounter Provider: Colin Rendon MD  Encounter Date: 3/25/2025   [facility]  :  Assessment & Plan  Acute kidney injury (HCC)    Orders:    Ambulatory Referral to Nephrology    Basic metabolic panel; Future  Patient hospitalized 2025 with severe RICARDO in the setting of obstructive uropathy with urinary retention moderate bilateral hydroureteronephrosis urinary bladder distention.  Patient required Mcclellan catheter placement.  Creatinine peaked at 5.6 on  improved down to 3.04 on discharge day  Mcclellan catheter was removed and currently he is doing straight catheterization 4 times a day as recommended by urology  Most recent creatinine down to 2.26 on 3/19/2025.  Avoid NSAIDs.  Follow low-salt diet.  Continue with extra catheterization as per urology.  Advised to quit smoking.  Plan to follow BMP in 1 months, if kidney function continues to improve follow-up in 6 months  Neurogenic bladder    Orders:    Basic metabolic panel; Future  Follows with urology.  Previously required Mcclellan catheter.  Currently using a straight catheterization 4 times a day  Urinary retention       Advised to continue close follow-up with urology.  Continue straight catheterization as per urology 4 times a day  Benign essential hypertension    Orders:    Basic metabolic panel; Future  Pressure today well-controlled.  Follow low-salt diet.  No changes on medication.    Hyponatremia    Orders:    Basic metabolic panel; Future  Recent sodium is stable at 132.  Plan to follow BMP in 4 weeks.    Hyperlipidemia, unspecified hyperlipidemia type       On atorvastatin and fenofibrate as per primary care doctor  Last lipid panel well-controlled on 2025  Tobacco use disorder       Report currently back to smoking 1 pack a day.  Advised to quit smoking.  Advised to discuss further with primary care doctor      Patient  Instructions   As we discussed in the office visit and after reviewing most recent blood test your kidney function is improving with a more recent creatinine down to 2.2.  During recent hospitalization your creatinine was as high as 5.5.  Continue with straight catheterization 4 times a day as recommended by urology.  Follow low-salt diet.  Stay well-hydrated.  Avoid NSAIDs (no ibuprofen, Motrin, Advil, Aleve, naproxen).  Okay to take Tylenol or acetaminophen as needed for pain.  Continue close follow your primary care doctor and urologist.  I would like you to go for a repeat blood test in 1 months (BMP), we will contact you with the results, IF your kidney function continues to improve I would like to see you back in the office in 6 months.  We discussed about importance of quitting smoking, advised to discuss with your primary care doctor regarding smoking cessation options-treatment.      It was a pleasure evaluating your patient in the office today. Thank you for allowing our team to participate in the care of  Chas Soliz. Please do not hesitate to contact our team if further issues/questions shall arise in the interim.     History of Present Illness   HPI  Chas Soliz is a 60 y.o. male who presents to the office for hospital follow-up.  Noted patient was seen during recent hospitalization in February 2025 while he was admitted with severe RICARDO with creatinine up to 5.6 in the setting of obstructive uropathy with moderate bilateral hydronephrosis and marked distended urine bladder, requiring Mcclellan catheter placement.    Since discharge he follow-up with urology, Mcclellan catheter was removed and currently doing straight catheterization 4 times a day.  Patient today in general is doing well other than chronic lower back pain.  Denies any chest pain, no shortness of breath, no leg swelling.  Denies any abdominal pain, no recent nausea, no vomiting, no diarrhea or constipation.  Reports he is not able to  urinate too much and needs to do a straight catheterization 4 times a day.  Denies dysuria or gross hematuria    Smoking 1 pack a day    History obtained from: patient  Pertinent Medical History   Hypertension.  Hyperlipidemia.  Tobacco abuse.  Morbid obesity.  Recent episode of RICARDO in the setting of obstructive uropathy  Urinary retention suspected secondary to neurogenic bladder    Review of Systems  ROS: All the systems were reviewed and were negative except as documented on the H&P.    Medical History Reviewed by provider this encounter:  Allergies  Meds     .       Current Outpatient Medications on File Prior to Visit   Medication Sig Dispense Refill    amLODIPine (NORVASC) 5 mg tablet Take 1 tablet by mouth in the morning      apixaban (ELIQUIS) 5 mg Take 1 tablet (5 mg total) by mouth 2 (two) times a day 60 tablet 0    atorvastatin (LIPITOR) 80 mg tablet Take 1 tablet (80 mg total) by mouth daily with dinner 90 tablet 3    fenofibrate (TRICOR) 145 mg tablet Take 1 tablet (145 mg total) by mouth daily with dinner 90 tablet 3    finasteride (PROSCAR) 5 mg tablet Take 1 tablet (5 mg total) by mouth daily 90 tablet 0    FLUoxetine (PROzac) 20 mg capsule Take 1 capsule (20 mg total) by mouth daily 90 capsule 3    fluticasone (FLONASE) 50 mcg/act nasal spray 2 sprays into each nostril daily 1 g 0    haloperidol (HALDOL) 5 mg tablet Take 0.5 tablets (2.5 mg total) by mouth daily at bedtime 90 tablet 3    levothyroxine 150 mcg tablet Take 1 tablet (150 mcg total) by mouth daily in the early morning 100 tablet 3    pantoprazole (PROTONIX) 40 mg tablet Take 1 tablet (40 mg total) by mouth daily in the early morning 90 tablet 0    polyethylene glycol (Golytely) 4000 mL solution Take 4,000 mL by mouth once for 1 dose Take 4000 mL by mouth once for 1 dose. Use as directed 4000 mL 0    Silodosin 8 MG CAPS Take 1 capsule by mouth daily at bedtime 90 capsule 3    VITAMIN D, CHOLECALCIFEROL, PO Take 50,000 Units by mouth  once a week      metoprolol tartrate (LOPRESSOR) 25 mg tablet Take 0.5 tablets (12.5 mg total) by mouth every 12 (twelve) hours 30 tablet 0     Current Facility-Administered Medications on File Prior to Visit   Medication Dose Route Frequency Provider Last Rate Last Admin    cyanocobalamin injection 1,000 mcg  1,000 mcg Intramuscular Q30 Days    1,000 mcg at 01/30/25 1552     Objective   /66 (BP Location: Left arm, Patient Position: Sitting, Cuff Size: Adult)   Pulse 57   Wt 108 kg (238 lb)   SpO2 98%   BMI 35.15 kg/m²      Physical Exam  General: Morbidly obese, cooperative, in not acute distress  Eyes: conjunctivae pink, anicteric sclerae  ENT: lips and mucous membranes moist  Neck: supple, no JVD  Chest: clear breath sounds bilateral, no crackles, ronchus or wheezings  CVS: distinct S1 & S2, normal rate, regular rhythm  Abdomen: soft, non-tender, non-distended, normoactive bowel sounds  Back: no CVA tenderness  Extremities: no edema of both legs  Skin: no rash  Neuro: awake, alert, oriented      Laboratory Results:  Results from last 7 days   Lab Units 03/19/25  1132   WBC Thousand/uL 8.28   HEMOGLOBIN g/dL 10.5*   HEMATOCRIT % 33.0*   PLATELETS Thousands/uL 340   POTASSIUM mmol/L 4.7   CHLORIDE mmol/L 100   CO2 mmol/L 24   BUN mg/dL 31*   CREATININE mg/dL 2.26*   CALCIUM mg/dL 9.3   PHOSPHORUS mg/dL 3.1       Results for orders placed or performed in visit on 03/19/25   Protein / creatinine ratio, urine   Result Value Ref Range    Creatinine, Ur 31.7 Reference range not established. mg/dL    Protein Urine Random 46.4 Reference range not established. mg/dL    Prot/Creat Ratio, Ur 1.5 (H) 0.0 - 0.1

## 2025-03-25 NOTE — ASSESSMENT & PLAN NOTE
Report currently back to smoking 1 pack a day.  Advised to quit smoking.  Advised to discuss further with primary care doctor

## 2025-03-25 NOTE — ASSESSMENT & PLAN NOTE
On atorvastatin and fenofibrate as per primary care doctor  Last lipid panel well-controlled on 1/2025

## 2025-03-25 NOTE — PATIENT INSTRUCTIONS
As we discussed in the office visit and after reviewing most recent blood test your kidney function is improving with a more recent creatinine down to 2.2.  During recent hospitalization your creatinine was as high as 5.5.  Continue with straight catheterization 4 times a day as recommended by urology.  Follow low-salt diet.  Stay well-hydrated.  Avoid NSAIDs (no ibuprofen, Motrin, Advil, Aleve, naproxen).  Okay to take Tylenol or acetaminophen as needed for pain.  Continue close follow your primary care doctor and urologist.  I would like you to go for a repeat blood test in 1 months (BMP), we will contact you with the results, IF your kidney function continues to improve I would like to see you back in the office in 6 months.  We discussed about importance of quitting smoking, advised to discuss with your primary care doctor regarding smoking cessation options-treatment.

## 2025-03-25 NOTE — ASSESSMENT & PLAN NOTE
Orders:    Basic metabolic panel; Future  Pressure today well-controlled.  Follow low-salt diet.  No changes on medication.

## 2025-03-25 NOTE — ASSESSMENT & PLAN NOTE
Orders:    Ambulatory Referral to Nephrology    Basic metabolic panel; Future  Patient hospitalized 2/14/2025 with severe RICARDO in the setting of obstructive uropathy with urinary retention moderate bilateral hydroureteronephrosis urinary bladder distention.  Patient required Mcclellan catheter placement.  Creatinine peaked at 5.6 on 2/13 improved down to 3.04 on discharge day  Mcclellan catheter was removed and currently he is doing straight catheterization 4 times a day as recommended by urology  Most recent creatinine down to 2.26 on 3/19/2025.  Avoid NSAIDs.  Follow low-salt diet.  Continue with extra catheterization as per urology.  Advised to quit smoking.  Plan to follow BMP in 1 months, if kidney function continues to improve follow-up in 6 months

## 2025-03-25 NOTE — ASSESSMENT & PLAN NOTE
Advised to continue close follow-up with urology.  Continue straight catheterization as per urology 4 times a day

## 2025-03-25 NOTE — ASSESSMENT & PLAN NOTE
Orders:    Basic metabolic panel; Future  Recent sodium is stable at 132.  Plan to follow BMP in 4 weeks.

## 2025-03-28 DIAGNOSIS — I48.91 ATRIAL FIBRILLATION, UNSPECIFIED TYPE (HCC): ICD-10-CM

## 2025-03-28 RX ORDER — METOPROLOL TARTRATE 25 MG/1
12.5 TABLET, FILM COATED ORAL EVERY 12 HOURS SCHEDULED
Qty: 30 TABLET | Refills: 0 | Status: SHIPPED | OUTPATIENT
Start: 2025-03-28

## 2025-03-28 NOTE — TELEPHONE ENCOUNTER
Previously filled while patient was at the hospital. Needs Dr. Maravilla to take over refilling. Only has 1 pill left.    Reason for call:   [x] Refill   [] Prior Auth  [] Other:     Office:   [x] PCP/Provider -   [] Specialty/Provider -     Medication: Apixaban     Dose/Frequency: 5 mg tablet taken by mouth 2x daily     Quantity: 180    Pharmacy: [facility]/pharmacy #[id number] - [place] [id number] [place] [address] [phone]     Local Pharmacy   Does the patient have enough for 3 days?   [] Yes   [x] No - Send as HP to POD    Mail Away Pharmacy   Does the patient have enough for 10 days?   [] Yes   [] No - Send as HP to POD

## 2025-04-01 PROBLEM — I48.0 PAROXYSMAL ATRIAL FIBRILLATION (HCC): Status: ACTIVE | Noted: 2025-02-15

## 2025-04-01 NOTE — PROGRESS NOTES
Chas Soliz  1965  [id number]  [facility]  [address] [place] [id number] [phone]    1. Paroxysmal atrial fibrillation (HCC)  POCT ECG      2. Benign essential hypertension  POCT ECG      3. Tobacco use disorder  nicotine polacrilex (COMMIT) 2 MG lozenge      4. Mixed hyperlipidemia        5. Aortic ectasia (HCC)          Assessment/Plan  TODAY (04/02/25):   EKG shows NSR today. PCP already ordered holter monitor. Encouraged him to set this up. Would continue lopressor 12.5 mg BID and Eliquis.  BP well controlled today, continue current regimen.   I discussed risk of untreated TAMARA with him. He will think about referral but does not want one today.   Cholesterol well controlled, continue current regimen.   Discussed smoking cessation. Will try nicotine lozenges. Patches make him sick and unable to chew gum since he doesn't have teeth.   RTO in four months with Dr. Tee   Paroxysmal atrial fibrillation   - diagnosed during hospitalization in February 2025 with spontaneous conversion to NSR  - rate control: lopressor 12.5 mg BID  - AC: Eliquis 5 mg BID   - [id number]: 2   Hypertension   - TTE 02/17/25: LVEF 65% with moderate concentric hypertrophy, probable DD, mild to moderate LAE, mild MAC, moderate OK, ascending aortic 4.2 cm  - BP in office: 122/72 mmHg  - current rx: amlodipine 5 mg daily, lopressor 12.5 mg BID  Hyperlipidemia  - lipid panel 01/24/25: cholesterol 109, triglycerides 90, HDL 42, LDL 49  - current rx: atorvastatin 80 mg daily, fenofibrate 145 mg daily   Tobacco use- smoking 2 pack of cigarettes a day  TAMARA- not using CPAP  Urinary retention     Other cardiac testing:  Exercise stress test 12/08/21: no evidence of ischemia, poor exercise capacity     Interval History:   This is a 59 y/o male with a PMH of PAF on AC, HTN, HLD, tobacco use, and urinary retention who is presenting for hospital follow up. He was admitted to [facility]  02/13/25-02/19/25 for abnormal labs including RICARDO. He was found to have urinary retention from BPH and started on finasteride and silodosin with a pickard catheter placed. Since them, appears he has been using a straight catheter at home. During this admission, he was noted to have an episode of atrial fibrillation with RVR and converted back to NSR rhythm spontaneously. He was started on Eliquis and metoprolol.     Pt states he has been doing well since he has been home from the hospital. He is using a straight cath 3-4 times a day and kidney numbers are improving. He was aware of his rhythm/rate when he went into AF and describes at as a chest discomfort. He has not felt this since he has been home. I had a long discussion with him on the risk factors of of AF. He admits that he had a sleep study done years ago and was given a CPAP machine to use. Admits he does not use this. I discussed risks of untreated TAMARA including increase risk of AF and sudden cardiac death. He is not interested in sleep medicine referral today. He denies any alcohol use. Admits to smoking 2 pack of cigarettes a day for many years. He did try patches in the hospital but they made him sick. He does not have any teeth so unable to chew the nicotine gum. Family history of coronary angioplasty in his dad and open heart surgery in his mom. 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 This is now hard for him since he needs to self-cath daily.        Past Medical History:   Diagnosis Date    Anxiety     Arthritis     Bipolar disorder (HCC)     Colon polyp     COPD (chronic obstructive pulmonary disease) (Former[facility])     Depression     Diabetes mellitus (HCC)     type 2    Gout     High triglycerides     Hypertension     Hypothyroidism 02/20/2013    Obesity     Paranoid schizophrenia (HCC)     Psychiatric disorder     Sciatica 12/03/2013    Seasonal allergies     Sleep apnea     Urinary retention      Social History     Socioeconomic  History    Marital status: Single     Spouse name: Not on file    Number of children: Not on file    Years of education: Not on file    Highest education level: Not on file   Occupational History    Not on file   Tobacco Use    Smoking status: Every Day     Current packs/day: 0.25     Average packs/day: 0.3 packs/day for 35.0 years (8.8 ttl pk-yrs)     Types: Cigarettes, E-Cigarettes    Smokeless tobacco: Never   Vaping Use    Vaping status: Never Used   Substance and Sexual Activity    Alcohol use: Not Currently    Drug use: No    Sexual activity: Not Currently     Partners: Female   Other Topics Concern    Not on file   Social History Narrative    Not on file     Social Drivers of Health     Financial Resource Strain: Low Risk  (11/14/2023)    Overall Financial Resource Strain (CARDIA)     Difficulty of Paying Living Expenses: Not hard at all   Food Insecurity: No Food Insecurity (2/13/2025)    Nursing - Inadequate Food Risk Classification     Worried About Running Out of Food in the Last Year: Never true     Ran Out of Food in the Last Year: Never true     Ran Out of Food in the Last Year: Never true   Transportation Needs: No Transportation Needs (3/10/2025)    OASIS : Transportation     Lack of Transportation (Medical): No     Lack of Transportation (Non-Medical): No     Patient Unable or Declines to Respond: No   Physical Activity: Insufficiently Active (7/30/2020)    Exercise Vital Sign     Days of Exercise per Week: 4 days     Minutes of Exercise per Session: 30 min   Stress: No Stress Concern Present (7/30/2020)    [place] [employer] - Occupational Stress Questionnaire     Feeling of Stress : Not at all   Social Connections: Unknown (7/30/2020)    Social Connection and Isolation Panel [NHANES]     Frequency of Communication with Friends and Family: Patient declined     Frequency of Social Gatherings with Friends and Family: Patient declined     Attends Anabaptist Services: Patient  declined     Active Member of Clubs or Organizations: Patient declined     Attends Club or Organization Meetings: Patient declined     Marital Status: Patient declined   Intimate Partner Violence: Unknown (2025)    Nursing IPS     Feels Physically and Emotionally Safe: Not on file     Physically Hurt by Someone: Not on file     Humiliated or Emotionally Abused by Someone: Not on file     Physically Hurt by Someone: No     Hurt or Threatened by Someone: No   Housing Stability: Unknown (2025)    Nursing: Inadequate Housing Risk Classification     Has Housing: Not on file     Worried About Losing Housing: Not on file     Unable to Get Utilities: Not on file     Unable to Pay for Housing in the Last Year: No     Has Housin      Family History   Problem Relation Age of Onset    Colon cancer Mother     Coronary artery disease Father     Hyperlipidemia Father     Coronary artery disease Family      Past Surgical History:   Procedure Laterality Date    COLONOSCOPY      UMBILICAL HERNIA REPAIR      WISDOM TOOTH EXTRACTION         Current Outpatient Medications:     amLODIPine (NORVASC) 5 mg tablet, Take 1 tablet by mouth in the morning, Disp: , Rfl:     apixaban (ELIQUIS) 5 mg, Take 1 tablet (5 mg total) by mouth 2 (two) times a day, Disp: 180 tablet, Rfl: 0    atorvastatin (LIPITOR) 80 mg tablet, Take 1 tablet (80 mg total) by mouth daily with dinner, Disp: 90 tablet, Rfl: 3    fenofibrate (TRICOR) 145 mg tablet, Take 1 tablet (145 mg total) by mouth daily with dinner, Disp: 90 tablet, Rfl: 3    finasteride (PROSCAR) 5 mg tablet, Take 1 tablet (5 mg total) by mouth daily, Disp: 90 tablet, Rfl: 0    FLUoxetine (PROzac) 20 mg capsule, Take 1 capsule (20 mg total) by mouth daily, Disp: 90 capsule, Rfl: 3    fluticasone (FLONASE) 50 mcg/act nasal spray, 2 sprays into each nostril daily, Disp: 1 g, Rfl: 0    haloperidol (HALDOL) 5 mg tablet, Take 0.5 tablets (2.5 mg total) by mouth daily at bedtime, Disp: 90  tablet, Rfl: 3    levothyroxine 150 mcg tablet, Take 1 tablet (150 mcg total) by mouth daily in the early morning, Disp: 100 tablet, Rfl: 3    metoprolol tartrate (LOPRESSOR) 25 mg tablet, TAKE 0.5 TABLETS (12.5 MG TOTAL) BY MOUTH EVERY 12 (TWELVE) HOURS, Disp: 30 tablet, Rfl: 0    nicotine polacrilex (COMMIT) 2 MG lozenge, Apply 1 lozenge (2 mg total) to the mouth or throat as needed for smoking cessation, Disp: 100 each, Rfl: 0    pantoprazole (PROTONIX) 40 mg tablet, Take 1 tablet (40 mg total) by mouth daily in the early morning, Disp: 90 tablet, Rfl: 0    Silodosin 8 MG CAPS, Take 1 capsule by mouth daily at bedtime, Disp: 90 capsule, Rfl: 3    VITAMIN D, CHOLECALCIFEROL, PO, Take 50,000 Units by mouth once a week, Disp: , Rfl:     polyethylene glycol (Golytely) 4000 mL solution, Take 4,000 mL by mouth once for 1 dose Take 4000 mL by mouth once for 1 dose. Use as directed, Disp: 4000 mL, Rfl: 0    Current Facility-Administered Medications:     cyanocobalamin injection 1,000 mcg, 1,000 mcg, Intramuscular, Q30 Days, , 1,000 mcg at 01/30/25 1552  Allergies   Allergen Reactions    No Active Allergies        Labs:     Chemistry        Component Value Date/Time    K 4.7 03/19/2025 1132    K 4.9 08/29/2017 0705     03/19/2025 1132     08/29/2017 0705    CO2 24 03/19/2025 1132    CO2 24 08/29/2017 0705    BUN 31 (H) 03/19/2025 1132    BUN 21 08/29/2017 0705    CREATININE 2.26 (H) 03/19/2025 1132        Component Value Date/Time    CALCIUM 9.3 03/19/2025 1132    CALCIUM 9.1 08/29/2017 0705    ALKPHOS 64 02/13/2025 1604    ALKPHOS 69 08/29/2017 0705    AST 11 (L) 02/13/2025 1604    AST 13 08/29/2017 0705    ALT 14 02/13/2025 1604    ALT 17 08/29/2017 0705        Lab Results   Component Value Date    HDL 42 01/24/2025    HDL 43 09/23/2024    HDL 38 (L) 03/05/2024     Lab Results   Component Value Date    LDLCALC 49 01/24/2025    LDLCALC 79 09/23/2024    LDLCALC 50 03/05/2024     Lab Results   Component Value  "Date    TRIG 90 01/24/2025    TRIG 69 09/23/2024    TRIG 88 03/05/2024     Imaging: No results found.    ECG:    Sinus bradycardia      Review of Systems   Constitutional: Negative for chills, diaphoresis, fever, malaise/fatigue and weight gain.   Cardiovascular:  Negative for chest pain, dyspnea on exertion, irregular heartbeat, leg swelling, near-syncope, orthopnea, palpitations, paroxysmal nocturnal dyspnea and syncope.   Respiratory:  Negative for cough, shortness of breath, sleep disturbances due to breathing, snoring and wheezing.    Skin:  Negative for rash.   Gastrointestinal:  Negative for bloating, abdominal pain and nausea.   Genitourinary:  Positive for dysuria.   Neurological:  Negative for dizziness and light-headedness.       Vitals:    04/02/25 1252   BP: 122/72   Pulse: 65   SpO2: 99%     Vitals:    04/02/25 1252   Weight: 111 kg (245 lb 9.6 oz)     Height: 5' 9\" (175.3 cm)   Body mass index is 36.27 kg/m².    Physical Exam  Vitals and nursing note reviewed.   Constitutional:       General: He is not in acute distress.     Appearance: Normal appearance. He is obese. He is not ill-appearing.   HENT:      Head: Normocephalic and atraumatic.      Nose: Nose normal.      Mouth/Throat:      Mouth: Mucous membranes are moist.      Comments: Poor dentition  Eyes:      Conjunctiva/sclera: Conjunctivae normal.   Cardiovascular:      Rate and Rhythm: Regular rhythm. Bradycardia present.      Pulses:           Radial pulses are 2+ on the right side and 2+ on the left side.      Heart sounds: S1 normal and S2 normal. No murmur heard.  Pulmonary:      Effort: Pulmonary effort is normal. No respiratory distress.      Breath sounds: Decreased air movement present. No stridor. Wheezing (faint wheezing) present. No rhonchi or rales.   Abdominal:      General: There is no distension.   Musculoskeletal:         General: No swelling.      Cervical back: Neck supple.      Right lower leg: No edema.      Left lower leg: " No edema.   Skin:     General: Skin is warm.      Capillary Refill: Capillary refill takes less than 2 seconds.   Neurological:      General: No focal deficit present.      Mental Status: He is alert.   Psychiatric:         Thought Content: Thought content normal.

## 2025-04-02 ENCOUNTER — TELEPHONE (OUTPATIENT)
Dept: FAMILY MEDICINE CLINIC | Facility: CLINIC | Age: 60
End: 2025-04-02

## 2025-04-02 ENCOUNTER — OFFICE VISIT (OUTPATIENT)
Dept: CARDIOLOGY CLINIC | Facility: CLINIC | Age: 60
End: 2025-04-02
Payer: COMMERCIAL

## 2025-04-02 VITALS
DIASTOLIC BLOOD PRESSURE: 72 MMHG | BODY MASS INDEX: 36.38 KG/M2 | WEIGHT: 245.6 LBS | HEIGHT: 69 IN | HEART RATE: 65 BPM | OXYGEN SATURATION: 99 % | SYSTOLIC BLOOD PRESSURE: 122 MMHG

## 2025-04-02 DIAGNOSIS — F17.200 TOBACCO USE DISORDER: ICD-10-CM

## 2025-04-02 DIAGNOSIS — I10 BENIGN ESSENTIAL HYPERTENSION: ICD-10-CM

## 2025-04-02 DIAGNOSIS — E78.2 MIXED HYPERLIPIDEMIA: ICD-10-CM

## 2025-04-02 DIAGNOSIS — I48.0 PAROXYSMAL ATRIAL FIBRILLATION (HCC): Primary | ICD-10-CM

## 2025-04-02 DIAGNOSIS — I48.91 ATRIAL FIBRILLATION, UNSPECIFIED TYPE (HCC): Primary | ICD-10-CM

## 2025-04-02 DIAGNOSIS — I77.819 AORTIC ECTASIA (HCC): ICD-10-CM

## 2025-04-02 PROCEDURE — 99214 OFFICE O/P EST MOD 30 MIN: CPT

## 2025-04-02 PROCEDURE — 93000 ELECTROCARDIOGRAM COMPLETE: CPT

## 2025-04-02 RX ORDER — POLYETHYLENE GLYCOL 3350 17 G
2 POWDER IN PACKET (EA) ORAL AS NEEDED
Qty: 100 EACH | Refills: 0 | Status: SHIPPED | OUTPATIENT
Start: 2025-04-02 | End: 2025-04-10

## 2025-04-02 NOTE — TELEPHONE ENCOUNTER
Called and spoke with pt will call to schedule for holter and he states he will call and schedule as soon as possible and will follow up at his next visit on 4/10

## 2025-04-02 NOTE — TELEPHONE ENCOUNTER
Patient called the RX Refill Line. Message is being forwarded to the office.     Patient called and needed clarification on medication. States that he received Lopressor 25 mg in the hosiptal along with Eliquis 5 mg and believes that he does not need to take both and only needs to take the Lopressor. Would like someone to clarify if he needs to just to take the Lopressor or if he needs to take both and needs script sent over to the pharmacy.     Please contact patient at [phone]

## 2025-04-08 ENCOUNTER — APPOINTMENT (OUTPATIENT)
Dept: LAB | Age: 60
End: 2025-04-08
Payer: COMMERCIAL

## 2025-04-08 DIAGNOSIS — E11.69 HYPERLIPIDEMIA ASSOCIATED WITH TYPE 2 DIABETES MELLITUS  (HCC): ICD-10-CM

## 2025-04-08 DIAGNOSIS — E11.8 TYPE II DIABETES MELLITUS WITH MANIFESTATIONS (HCC): ICD-10-CM

## 2025-04-08 DIAGNOSIS — F17.210 CIGARETTE SMOKER: ICD-10-CM

## 2025-04-08 DIAGNOSIS — N40.0 ENLARGED PROSTATE: ICD-10-CM

## 2025-04-08 DIAGNOSIS — D64.9 ANEMIA, UNSPECIFIED TYPE: ICD-10-CM

## 2025-04-08 DIAGNOSIS — E78.5 HYPERLIPIDEMIA ASSOCIATED WITH TYPE 2 DIABETES MELLITUS  (HCC): ICD-10-CM

## 2025-04-08 DIAGNOSIS — N13.9 OBSTRUCTIVE UROPATHY: ICD-10-CM

## 2025-04-08 LAB
ALBUMIN SERPL BCG-MCNC: 3.8 G/DL (ref 3.5–5)
ALP SERPL-CCNC: 43 U/L (ref 34–104)
ALT SERPL W P-5'-P-CCNC: 10 U/L (ref 7–52)
ANION GAP SERPL CALCULATED.3IONS-SCNC: 7 MMOL/L (ref 4–13)
AST SERPL W P-5'-P-CCNC: 13 U/L (ref 13–39)
BILIRUB SERPL-MCNC: 0.31 MG/DL (ref 0.2–1)
BUN SERPL-MCNC: 30 MG/DL (ref 5–25)
CALCIUM SERPL-MCNC: 9.1 MG/DL (ref 8.4–10.2)
CHLORIDE SERPL-SCNC: 106 MMOL/L (ref 96–108)
CHOLEST SERPL-MCNC: 115 MG/DL (ref ?–200)
CO2 SERPL-SCNC: 26 MMOL/L (ref 21–32)
CREAT SERPL-MCNC: 1.94 MG/DL (ref 0.6–1.3)
GFR SERPL CREATININE-BSD FRML MDRD: 36 ML/MIN/1.73SQ M
GLUCOSE P FAST SERPL-MCNC: 98 MG/DL (ref 65–99)
HDLC SERPL-MCNC: 41 MG/DL
LDLC SERPL CALC-MCNC: 66 MG/DL (ref 0–100)
POTASSIUM SERPL-SCNC: 4.7 MMOL/L (ref 3.5–5.3)
PROT SERPL-MCNC: 6.4 G/DL (ref 6.4–8.4)
SODIUM SERPL-SCNC: 139 MMOL/L (ref 135–147)
TRIGL SERPL-MCNC: 41 MG/DL (ref ?–150)

## 2025-04-08 PROCEDURE — 80061 LIPID PANEL: CPT

## 2025-04-08 PROCEDURE — 36415 COLL VENOUS BLD VENIPUNCTURE: CPT

## 2025-04-08 PROCEDURE — 80053 COMPREHEN METABOLIC PANEL: CPT

## 2025-04-10 ENCOUNTER — OFFICE VISIT (OUTPATIENT)
Dept: FAMILY MEDICINE CLINIC | Facility: CLINIC | Age: 60
End: 2025-04-10
Payer: COMMERCIAL

## 2025-04-10 VITALS
HEIGHT: 69 IN | DIASTOLIC BLOOD PRESSURE: 82 MMHG | SYSTOLIC BLOOD PRESSURE: 124 MMHG | BODY MASS INDEX: 36.43 KG/M2 | TEMPERATURE: 97.9 F | OXYGEN SATURATION: 97 % | WEIGHT: 246 LBS | RESPIRATION RATE: 15 BRPM | HEART RATE: 86 BPM

## 2025-04-10 DIAGNOSIS — E11.8 TYPE II DIABETES MELLITUS WITH MANIFESTATIONS (HCC): ICD-10-CM

## 2025-04-10 DIAGNOSIS — D64.9 ANEMIA, UNSPECIFIED TYPE: ICD-10-CM

## 2025-04-10 DIAGNOSIS — F17.210 CIGARETTE SMOKER: ICD-10-CM

## 2025-04-10 DIAGNOSIS — N40.0 ENLARGED PROSTATE: ICD-10-CM

## 2025-04-10 DIAGNOSIS — N13.9 OBSTRUCTIVE UROPATHY: ICD-10-CM

## 2025-04-10 DIAGNOSIS — R33.9 URINARY RETENTION: ICD-10-CM

## 2025-04-10 PROCEDURE — 99214 OFFICE O/P EST MOD 30 MIN: CPT | Performed by: INTERNAL MEDICINE

## 2025-04-10 PROCEDURE — G2211 COMPLEX E/M VISIT ADD ON: HCPCS | Performed by: INTERNAL MEDICINE

## 2025-04-10 RX ORDER — FINASTERIDE 5 MG/1
5 TABLET, FILM COATED ORAL DAILY
Qty: 90 TABLET | Refills: 3 | Status: SHIPPED | OUTPATIENT
Start: 2025-04-10

## 2025-04-10 RX ORDER — NITROFURANTOIN 25; 75 MG/1; MG/1
100 CAPSULE ORAL
Qty: 30 CAPSULE | Refills: 2 | Status: SHIPPED | OUTPATIENT
Start: 2025-04-10 | End: 2025-07-09

## 2025-04-10 RX ORDER — NICOTINE 21 MG/24HR
1 PATCH, TRANSDERMAL 24 HOURS TRANSDERMAL EVERY 24 HOURS
Qty: 28 PATCH | Refills: 0 | Status: SHIPPED | OUTPATIENT
Start: 2025-04-10

## 2025-04-10 RX ORDER — SILODOSIN 8 MG/1
8 CAPSULE ORAL
Qty: 90 CAPSULE | Refills: 3 | Status: SHIPPED | OUTPATIENT
Start: 2025-04-10

## 2025-04-10 NOTE — ASSESSMENT & PLAN NOTE
[facility] min GI and Hematology    Orders:    Silodosin 8 MG CAPS; Take 1 capsule by mouth daily at bedtime

## 2025-04-10 NOTE — ASSESSMENT & PLAN NOTE
Lab Results   Component Value Date    HGBA1C 5.6 01/30/2025       Orders:    Silodosin 8 MG CAPS; Take 1 capsule by mouth daily at bedtime    Comprehensive metabolic panel; Future    Lipid Panel with Direct LDL reflex; Future    TSH, 3rd generation with Free T4 reflex; Future    UA (URINE) with reflex to Scope; Future    PSA Total, Diagnostic; Future    CBC and differential; Future    Magnesium; Future

## 2025-04-10 NOTE — ASSESSMENT & PLAN NOTE
[facility] min Urology  Continue; rapaflo 8 mg daily and;  Orders:    finasteride (PROSCAR) 5 mg tablet; Take 1 tablet (5 mg total) by mouth daily    nitrofurantoin (MACROBID) 100 mg capsule; Take 1 capsule (100 mg total) by mouth daily after lunch

## 2025-04-10 NOTE — PROGRESS NOTES
Name: Chas Soliz      : 1965      MRN: [id number]  Encounter Provider: Josue Maravilla MD  Encounter Date: 4/10/2025   Encounter department: [facility] CARE [facility]  :  Assessment & Plan  Type II diabetes mellitus with manifestations (HCC)    Lab Results   Component Value Date    HGBA1C 5.6 2025       Orders:    Silodosin 8 MG CAPS; Take 1 capsule by mouth daily at bedtime    Comprehensive metabolic panel; Future    Lipid Panel with Direct LDL reflex; Future    TSH, 3rd generation with Free T4 reflex; Future    UA (URINE) with reflex to Scope; Future    PSA Total, Diagnostic; Future    CBC and differential; Future    Magnesium; Future    Enlarged prostate    Orders:    Silodosin 8 MG CAPS; Take 1 capsule by mouth daily at bedtime    UA (URINE) with reflex to Scope; Future    PSA Total, Diagnostic; Future    CBC and differential; Future    Magnesium; Future    nitrofurantoin (MACROBID) 100 mg capsule; Take 1 capsule (100 mg total) by mouth daily after lunch    Cigarette smoker    Orders:    Silodosin 8 MG CAPS; Take 1 capsule by mouth daily at bedtime    nicotine (NICODERM CQ) 14 mg/24hr TD 24 hr patch; Place 1 patch on the skin over 24 hours every 24 hours    nicotine (NICODERM CQ) 7 mg/24hr TD 24 hr patch; Place 1 patch on the skin over 24 hours every 24 hours    UA (URINE) with reflex to Scope; Future    PSA Total, Diagnostic; Future    CBC and differential; Future    Magnesium; Future    Obstructive uropathy  Improving slowly  Continue same  Renew :  Orders:    Silodosin 8 MG CAPS; Take 1 capsule by mouth daily at bedtime    UA (URINE) with reflex to Scope; Future    PSA Total, Diagnostic; Future    CBC and differential; Future    Magnesium; Future    Anemia, unspecified type    FU w GI and Hematology    Orders:    Silodosin 8 MG CAPS; Take 1 capsule by mouth daily at bedtime    Urinary retention  FU w Urology  Continue; rapaflo 8 mg daily and;  Orders:    finasteride  "(PROSCAR) 5 mg tablet; Take 1 tablet (5 mg total) by mouth daily    nitrofurantoin (MACROBID) 100 mg capsule; Take 1 capsule (100 mg total) by mouth daily after lunch           History of Present Illness   60 Y O Man is here for Regular Check up, he still straight cath. Himself 4-5 times per day, recent Blood work and med list reviewed,...      Review of Systems   Constitutional:  Negative for chills, fatigue and fever.   HENT:  Negative for congestion, facial swelling, sore throat, trouble swallowing and voice change.    Eyes:  Negative for pain, discharge and visual disturbance.   Respiratory:  Negative for cough, shortness of breath and wheezing.    Cardiovascular:  Negative for chest pain, palpitations and leg swelling.   Gastrointestinal:  Negative for abdominal pain, blood in stool, constipation, diarrhea and nausea.   Endocrine: Negative for polydipsia, polyphagia and polyuria.   Genitourinary:  Negative for difficulty urinating, hematuria and urgency.   Musculoskeletal:  Negative for arthralgias and myalgias.   Skin:  Negative for rash.   Neurological:  Negative for dizziness, tremors, weakness and headaches.   Hematological:  Negative for adenopathy. Does not bruise/bleed easily.   Psychiatric/Behavioral:  Negative for dysphoric mood, sleep disturbance and suicidal ideas.        Objective   /82 (BP Location: Left arm, Patient Position: Sitting, Cuff Size: Standard)   Pulse 86   Temp 97.9 °F (36.6 °C) (Tympanic Core)   Resp 15   Ht 5' 9\" (1.753 m)   Wt 112 kg (246 lb)   SpO2 97%   BMI 36.33 kg/m²      Physical Exam  Vitals and nursing note reviewed.   Constitutional:       General: He is not in acute distress.     Appearance: He is well-developed.   HENT:      Head: Normocephalic and atraumatic.      Right Ear: External ear normal.      Left Ear: External ear normal.   Eyes:      Conjunctiva/sclera: Conjunctivae normal.      Pupils: Pupils are equal, round, and reactive to light.   Neck:      " Thyroid: No thyromegaly.      Trachea: No tracheal deviation.   Cardiovascular:      Rate and Rhythm: Normal rate and regular rhythm.      Heart sounds: Murmur heard.      No friction rub.   Pulmonary:      Effort: Pulmonary effort is normal. No respiratory distress.      Breath sounds: Wheezing present.   Abdominal:      General: Bowel sounds are normal. There is no distension.      Palpations: Abdomen is soft.      Tenderness: There is no abdominal tenderness.   Musculoskeletal:         General: No swelling or deformity. Normal range of motion.      Cervical back: Neck supple.   Skin:     General: Skin is warm and dry.      Capillary Refill: Capillary refill takes less than 2 seconds.      Findings: No erythema or rash.   Neurological:      Mental Status: He is alert and oriented to person, place, and time.      Cranial Nerves: No cranial nerve deficit.      Coordination: Coordination normal.      Deep Tendon Reflexes: Reflexes normal.   Psychiatric:         Mood and Affect: Mood normal.         Behavior: Behavior normal.

## 2025-04-15 ENCOUNTER — NURSE TRIAGE (OUTPATIENT)
Age: 60
End: 2025-04-15

## 2025-04-15 DIAGNOSIS — L29.0 RECTAL ITCHING: Primary | ICD-10-CM

## 2025-04-15 RX ORDER — HYDROCORTISONE 25 MG/G
CREAM TOPICAL 2 TIMES DAILY
Qty: 28 G | Refills: 1 | Status: SHIPPED | OUTPATIENT
Start: 2025-04-15

## 2025-04-15 NOTE — TELEPHONE ENCOUNTER
"FOLLOW UP: Home care advice given.  Patient asking for prescription for rectal cream to be sent to his pharmacy as he cannot afford to purchase OTC at this time.Please call back to patient to advise.    REASON FOR CONVERSATION: Anal Itching    SYMPTOMS: Rectal itching and soreness.  Happens off and on.  Has been using hemorrhoid wipes without relief.  Denies any constipation or history of hemorrhoids.    OTHER: Instructed in Sitz baths and OTC remedies.  Patient states that he cannot afford to purchase OTC medication at this time and is asking for prescription.     DISPOSITION: Home Care      Reason for Disposition   Mild rectal pain    Answer Assessment - Initial Assessment Questions  1. SYMPTOM:  \"What's the main symptom you're concerned about?\" (e.g., pain, itching, swelling, rash)      Rectal discomfort and soreness  2. ONSET: \"When did the symptoms  start?\"      Unsure- it's been awhile  3. RECTAL PAIN: \"Do you have any pain around your rectum?\" \"How bad is the pain?\"  (Scale 0-10; or none, mild, moderate, severe)      Mild- feels sore around the area  4. RECTAL ITCHING: \"Do you have any itching in this area?\" \"How bad is the itching?\"  (Scale 0-10; or none, mild, moderate, severe)      Moderate itching at times  5. CONSTIPATION: \"Do you have constipation?\" If Yes, ask: \"How often do you have a bowel movement (BM)?\"  (Normal range: 3 times a day to every 3 days)  \"When was your last BM?\"        Denies constipation, has BM daily  6. CAUSE: \"What do you think is causing the anus symptoms?\"      Unsure  7. OTHER SYMPTOMS: \"Do you have any other symptoms?\"  (e.g., abdomen pain, fever, rectal bleeding, vomiting)      Denies    Protocols used: Rectal Symptoms-Adult-OH    "

## 2025-04-15 NOTE — TELEPHONE ENCOUNTER
Regarding: Rectum itch and pain  ----- Message from Radha SCHMIDT sent at 4/15/2025  2:49 PM EDT -----  Patient called and stated he has an Itchy feeling on his rectum and it hurts to sit down.  He believes it may be hemorrhoids but he doesn't have a history with that medical condition. He requested to send the PCP a message for medication. I advised he would have to be seen in the office and he declined. The patient's PCP is out of the office all week with no additional providers in the office.

## 2025-04-24 ENCOUNTER — APPOINTMENT (OUTPATIENT)
Dept: LAB | Age: 60
End: 2025-04-24
Payer: COMMERCIAL

## 2025-04-24 DIAGNOSIS — E87.1 HYPONATREMIA: ICD-10-CM

## 2025-04-24 DIAGNOSIS — I10 BENIGN ESSENTIAL HYPERTENSION: ICD-10-CM

## 2025-04-24 DIAGNOSIS — N17.9 ACUTE KIDNEY INJURY (HCC): ICD-10-CM

## 2025-04-24 DIAGNOSIS — N31.9 NEUROGENIC BLADDER: ICD-10-CM

## 2025-04-24 LAB
ANION GAP SERPL CALCULATED.3IONS-SCNC: 9 MMOL/L (ref 4–13)
BUN SERPL-MCNC: 27 MG/DL (ref 5–25)
CALCIUM SERPL-MCNC: 9.2 MG/DL (ref 8.4–10.2)
CHLORIDE SERPL-SCNC: 99 MMOL/L (ref 96–108)
CO2 SERPL-SCNC: 25 MMOL/L (ref 21–32)
CREAT SERPL-MCNC: 2.06 MG/DL (ref 0.6–1.3)
GFR SERPL CREATININE-BSD FRML MDRD: 33 ML/MIN/1.73SQ M
GLUCOSE P FAST SERPL-MCNC: 89 MG/DL (ref 65–99)
POTASSIUM SERPL-SCNC: 4.3 MMOL/L (ref 3.5–5.3)
SODIUM SERPL-SCNC: 133 MMOL/L (ref 135–147)

## 2025-04-24 PROCEDURE — 80048 BASIC METABOLIC PNL TOTAL CA: CPT

## 2025-04-24 PROCEDURE — 36415 COLL VENOUS BLD VENIPUNCTURE: CPT

## 2025-04-25 ENCOUNTER — RESULTS FOLLOW-UP (OUTPATIENT)
Dept: NEPHROLOGY | Facility: CLINIC | Age: 60
End: 2025-04-25

## 2025-04-25 DIAGNOSIS — E87.1 HYPONATREMIA: ICD-10-CM

## 2025-04-25 DIAGNOSIS — N17.9 ACUTE KIDNEY INJURY (HCC): Primary | ICD-10-CM

## 2025-04-25 DIAGNOSIS — N17.9 AKI (ACUTE KIDNEY INJURY) (HCC): ICD-10-CM

## 2025-04-25 DIAGNOSIS — I10 BENIGN ESSENTIAL HYPERTENSION: ICD-10-CM

## 2025-04-25 NOTE — TELEPHONE ENCOUNTER
Patient called back, he asked if his lab slips can be mailed out to him and if he can have a reminder phone call at the end of August to go for labs.  PAULA

## 2025-04-25 NOTE — TELEPHONE ENCOUNTER
LVM to patient with the following information: kidney function continues to slowly improve since recent hospitalization  Plan to see him back as scheduled with repeat blood and urine test  before next appointment. Advised to please call our office to let us know he received the message and if have any other questions or concerns.       Labs have been added.

## 2025-04-25 NOTE — TELEPHONE ENCOUNTER
----- Message from Colin Rendon MD sent at 4/25/2025  1:59 PM EDT -----  Creatinine down to 2.06  Please contact patient and tell him that kidney function continues to slowly improve since recent hospitalization  Plan to see him back as scheduled with repeat labs (please order CBC, renal function panel, PTH and UPC before next appointment).  Thanks,  ----- Message -----  From: Lab, Background User  Sent: 4/24/2025   5:49 PM EDT  To: Colin Rendon MD

## 2025-04-27 ENCOUNTER — NURSE TRIAGE (OUTPATIENT)
Dept: OTHER | Facility: OTHER | Age: 60
End: 2025-04-27

## 2025-04-27 NOTE — TELEPHONE ENCOUNTER
"FOLLOW UP: Patient is requesting medications for cough, Offered to make an appointment but patient declined and would like Dr. Maravilla to review symptoms and possibly send over prescriptions. Advised to follow up with the office tomorrow morning.    REASON FOR CONVERSATION: Cough    SYMPTOMS: Cough, wheezing, SOB when coughing, sore throat    OTHER: N/A    DISPOSITION: Home Care        Reason for Disposition   Cough    Answer Assessment - Initial Assessment Questions  1. ONSET: \"When did the cough begin?\"         Last week     2. SEVERITY: \"How bad is the cough today?\"         Too early so hard to say, was up at night coughing.    3. SPUTUM: \"Describe the color of your sputum\" (e.g., none, dry cough; clear, white, yellow, green)        Sometimes brings up clear mucous     4. HEMOPTYSIS: \"Are you coughing up any blood?\" If Yes, ask: \"How much?\" (e.g., flecks, streaks, tablespoons, etc.)        Denies     5. DIFFICULTY BREATHING: \"Are you having difficulty breathing?\" If Yes, ask: \"How bad is it?\" (e.g., mild, moderate, severe)         Sometimes gets SOB from coughing so much, otherwise breathing is okay. Does have a little bit of wheezing.     6. FEVER: \"Do you have a fever?\" If Yes, ask: \"What is your temperature, how was it measured, and when did it start?\"        Denies, felt chills a few times last week but doesn't feel like has a fever now      7. CARDIAC HISTORY: \"Do you have any history of heart disease?\" (e.g., heart attack, congestive heart failure)         On blood thinners for a-fib     8. LUNG HISTORY: \"Do you have any history of lung disease?\"  (e.g., pulmonary embolus, asthma, emphysema)        COPD, smoker     9. PE RISK FACTORS: \"Do you have a history of blood clots?\" (or: recent major surgery, recent prolonged travel, bedridden)        Denies h/o blood clots, denies recent surgeries    10. OTHER SYMPTOMS: \"Do you have any other symptoms?\" (e.g., runny nose, wheezing, chest pain)          Wheezing, " "runny nose sometimes, sore throat     11. PREGNANCY: \"Is there any chance you are pregnant?\" \"When was your last menstrual period?\"          N/A    12. TRAVEL: \"Have you traveled out of the country in the last month?\" (e.g., travel history, exposures)          Denies    Protocols used: Cough - Acute Non-Productive-Adult-AH    "

## 2025-04-28 DIAGNOSIS — J06.9 ACUTE URI: Primary | ICD-10-CM

## 2025-04-28 RX ORDER — BENZONATATE 100 MG/1
100 CAPSULE ORAL 3 TIMES DAILY PRN
Qty: 20 CAPSULE | Refills: 0 | Status: SHIPPED | OUTPATIENT
Start: 2025-04-28

## 2025-04-28 NOTE — TELEPHONE ENCOUNTER
Patient called, is kindly requesting a return call.   Due to his cough, should he postpone getting the HOLTER MONITOR tomorrow 4/29?    For the COUGH, Is Primary Care Provider  sending medication to the pharmacy, [facility] [place]?    Declined to schedule appointment at this time.    Please advise.  Patient call back# [phone]

## 2025-04-28 NOTE — TELEPHONE ENCOUNTER
Patient called in to see if Dr. Maravilla is going to call in a cough medicine. Advised that he would need to make an appointment oint since his last appointment on 4/10/25 was just a normal follow up - Patient stated that he will wait a few days as he is getting a heart monitor on.

## 2025-04-29 ENCOUNTER — TELEPHONE (OUTPATIENT)
Dept: FAMILY MEDICINE CLINIC | Facility: CLINIC | Age: 60
End: 2025-04-29

## 2025-04-29 DIAGNOSIS — J06.9 ACUTE URI: Primary | ICD-10-CM

## 2025-04-29 RX ORDER — GUAIFENESIN 600 MG/1
600 TABLET, EXTENDED RELEASE ORAL EVERY 12 HOURS SCHEDULED
Qty: 20 TABLET | Refills: 0 | Status: SHIPPED | OUTPATIENT
Start: 2025-04-29

## 2025-04-30 ENCOUNTER — TELEPHONE (OUTPATIENT)
Dept: FAMILY MEDICINE CLINIC | Facility: CLINIC | Age: 60
End: 2025-04-30

## 2025-04-30 ENCOUNTER — TELEPHONE (OUTPATIENT)
Age: 60
End: 2025-04-30

## 2025-04-30 NOTE — TELEPHONE ENCOUNTER
Caller: Chas Soliz    Doctor: Dr. Carolina    Reason for call: appt/checked chart to see last date seen/2024    Call back#: NA

## 2025-04-30 NOTE — TELEPHONE ENCOUNTER
PA for guaiFENesin 600mg 12hr tablet EXCLUDED from plan       Reason:        Message sent to office clinical pool Yes

## 2025-05-08 ENCOUNTER — TELEPHONE (OUTPATIENT)
Dept: NEPHROLOGY | Facility: CLINIC | Age: 60
End: 2025-05-08

## 2025-05-08 NOTE — TELEPHONE ENCOUNTER
Called and left a voicemail regarding your upcoming appointment on 9/26/2025 with Dr. Rendon in [place], [place]      Unfortunately, due to a change in the provider's schedule we need to change your appointment.

## 2025-05-13 DIAGNOSIS — I48.91 ATRIAL FIBRILLATION, UNSPECIFIED TYPE (HCC): ICD-10-CM

## 2025-05-13 RX ORDER — METOPROLOL TARTRATE 25 MG/1
12.5 TABLET, FILM COATED ORAL EVERY 12 HOURS SCHEDULED
Qty: 90 TABLET | Refills: 1 | Status: SHIPPED | OUTPATIENT
Start: 2025-05-13

## 2025-05-14 ENCOUNTER — TELEPHONE (OUTPATIENT)
Age: 60
End: 2025-05-14

## 2025-05-14 NOTE — TELEPHONE ENCOUNTER
Patient is asking if he should continue taking diflunisal. The medication was discontinued from his active list on 01/30/25. He has finished the prescription dated 11/07/24. Patient said he started taking the med in December or January and is now out of refills.    If patient is to continue taking this med, please send new prescription to:  [facility]/pharmacy #[id number] - [place] - [address] [place] [address]     Patient is requesting a follow up phone call at #[phone]

## 2025-05-17 ENCOUNTER — OFFICE VISIT (OUTPATIENT)
Dept: URGENT CARE | Age: 60
End: 2025-05-17
Payer: COMMERCIAL

## 2025-05-17 VITALS
WEIGHT: 252 LBS | SYSTOLIC BLOOD PRESSURE: 131 MMHG | OXYGEN SATURATION: 96 % | TEMPERATURE: 97.6 F | HEIGHT: 69 IN | HEART RATE: 58 BPM | DIASTOLIC BLOOD PRESSURE: 63 MMHG | RESPIRATION RATE: 16 BRPM | BODY MASS INDEX: 37.33 KG/M2

## 2025-05-17 DIAGNOSIS — B35.2 TINEA MANUS: ICD-10-CM

## 2025-05-17 DIAGNOSIS — B35.3 TINEA PEDIS OF BOTH FEET: Primary | ICD-10-CM

## 2025-05-17 PROCEDURE — 99213 OFFICE O/P EST LOW 20 MIN: CPT | Performed by: FAMILY MEDICINE

## 2025-05-17 RX ORDER — CLOTRIMAZOLE 1 %
CREAM (GRAM) TOPICAL 2 TIMES DAILY
Qty: 60 G | Refills: 1 | Status: SHIPPED | OUTPATIENT
Start: 2025-05-17

## 2025-05-17 NOTE — PATIENT INSTRUCTIONS
"Patient Education     Ringworm, athlete's foot, and jock itch   The Basics   Written by the doctors and editors at [facility]   What are ringworm, athlete's foot, and jock itch? -- These are all skin infections caused by a fungus. These types of fungal infections are also called \"tinea.\"  Some people call these fungal infections \"ringworm.\" This is because they often cause a ring-shaped, red, itchy rash on the skin (picture 1). But a ring-shaped rash is not always there. Depending on where the infection is, it can look different:   People with athlete's foot might instead have moist, raw skin between their toes, or flaking skin on the bottoms of their feet (picture 2 and picture 3).   People with jock itch often just have a rash on their groin. It usually starts in the fold where the thigh meets the groin area .   Sometimes, especially in children, the fungus can infect the scalp. On the scalp, the infection can look like a bald spot or a round flaky patch of skin (picture 5 and picture 6).  How did I get a fungal infection? -- You can catch fungal infections through skin-to-skin contact with a person who is infected. You can also catch them from an infected dog or cat.  You can also get the infections from places where the fungus might be, such as:   A shower stall   A locker room floor   The area near a pool  If you have a fungal infection on 1 part of your body, you can also spread it to other parts. For instance, a fungal infection on your feet could spread to your groin.  How are fungal infections treated? -- The treatment for a fungal infection depends on which body part is affected:   If you have a fungal infection on your scalp, you must take pills to kill the fungus. Treatment for scalp infections usually lasts 1 to 3 months.   If you have a fungal infection on your feet, groin, or another body part, most of the time, you will not need pills. Instead, you can use a special gel, cream, lotion, or powder to " kill the fungus. Treatment with these products lasts 2 to 4 weeks.   If you have a fungal infection on your groin and on your feet, you must treat both infections at the same time. If you don't, the infection on your feet can spread to your groin again.  What problems should I watch for? -- If you are being treated for a fungal infection, call your doctor or nurse for advice if:   Your fungal infection spreads.   You have any of the following symptoms:   Fever of 100.4°F (38°C) or higher   Chills   Swelling, redness, or warmth around the infected area   Pain when touching the area   Your fungal infection doesn't go away after treatment.  How do I keep from getting a fungal infection again? -- If someone in your home has had a fungal infection on their scalp:   Get rid of any eagle, brushes, barrettes, or other hair products that could have the fungus on them.   Make sure that a doctor or nurse checks everyone in the house for a fungal infection on the scalp. The doctor or nurse might also suggest that everyone else in the house use an antifungal shampoo for a few weeks.   If the fungal infection might have come from a pet, have the pet checked by a vet.  Some other general tips to prevent fungal infections:   Do not share unwashed clothes, sports gear, or towels with other people.   Always wear slippers or sandals when at the gym, pool, or other public areas. This includes public showers.   Change your socks and underwear at least once a day.   Keep your skin clean and dry. Always dry yourself well after swimming or showering.  All topics are updated as new evidence becomes available and our peer review process is complete.  This topic retrieved from [employer] on: May 16, 2024.  Topic 93122 Version 11.0  Release: 32.4.3 - C32.135  © 2024 UpToDate, Inc. and/or its affiliates. All rights reserved.  picture 1: Ringworm     Ringworm can cause a ring-shaped, red, itchy rash on the skin.  Reproduced with permission from:  www.[employer]. Copyright [employer]. All rights reserved.  Graphic 126209 Version 2.0  picture 2: Athlete's foot     Athlete's foot can cause moist, raw skin between the toes.  Reproduced with permission from: www.[employer]. Copyright [employer]. All rights reserved.  Graphic 918421 Version 2.0  picture 3: Athlete's foot     People with athlete's foot often have flaky skin on the bottoms of their feet.  Graphic 415282 Version 1.0  picture 5: Fungal infection of the scalp     This photo shows tinea capitis, a fungal infection of the scalp.  Reproduced with permission from: www.skinsight[employer]. Copyright [employer]. All rights reserved.  Graphic 291093 Version 3.0  picture 6: Fungal infection of the scalp     A fungal infection on the scalp can cause scaly patches and hair loss.  Reproduced with permission from: www.[employer]. Copyright [employer]. All rights reserved.  Graphic 173247 Version 2.0  Consumer Information Use and Disclaimer   Disclaimer: This generalized information is a limited summary of diagnosis, treatment, and/or medication information. It is not meant to be comprehensive and should be used as a tool to help the user understand and/or assess potential diagnostic and treatment options. It does NOT include all information about conditions, treatments, medications, side effects, or risks that may apply to a specific patient. It is not intended to be medical advice or a substitute for the medical advice, diagnosis, or treatment of a health care provider based on the health care provider's examination and assessment of a patient's specific and unique circumstances. Patients must speak with a health care provider for complete information about their health, medical questions, and treatment options, including any risks or benefits regarding use of medications. This information does not endorse any treatments or medications as safe, effective, or approved for treating a specific patient. UpToDate, Inc. and  its affiliates disclaim any warranty or liability relating to this information or the use thereof.The use of this information is governed by the Terms of Use, available at https://www.wolterskluwer.com/en/know/clinical-effectiveness-terms. 2024© [employer] and its affiliates and/or licensors. All rights reserved.  Copyright   © 2024 [employer] and/or its affiliates. All rights reserved.

## 2025-05-17 NOTE — PROGRESS NOTES
[facility]        NAME: Chas Soliz is a 60 y.o. male  : 1965    MRN: [id number]  DATE: May 17, 2025  TIME: 7:48 PM    Assessment and Plan   Tinea pedis of both feet [B35.3]  1. Tinea pedis of both feet  clotrimazole (LOTRIMIN) 1 % cream      2. Tinea manus  clotrimazole (LOTRIMIN) 1 % cream            Patient Instructions       Follow up with PCP in 3-5 days.  Proceed to  ER if symptoms worsen.    If tests have been performed at [facility], our office will contact you with results if changes need to be made to the care plan discussed with you at the visit.  You can review your full results on [facility].    Chief Complaint     Chief Complaint   Patient presents with   • peeling skin     Patient reports peeling skin on palms of hands and feet. Patient reports he had blisters on the bottom of his feet 1 week ago, and now has peeling skin in the area.         History of Present Illness       HPI  Chas Soliz is a 60 y.o. male  who presented to [facility] today with a rash on his hands and feet for the past 3 weeks.  It satrted on his feet and then spread to his hands 2 days ago.  He has moist feet, and started as lumps and blisters and then staretd to peel.      Review of Systems   Review of Systems   Constitutional:  Negative for chills and fever.   HENT:  Negative for congestion, rhinorrhea and sore throat.    Respiratory:  Negative for cough and shortness of breath.    Cardiovascular:  Negative for chest pain.   Gastrointestinal:  Negative for diarrhea, nausea and vomiting.   Skin:  Positive for rash.   Neurological:  Negative for dizziness and headaches.         Current Medications     Current Medications[1]    Current Allergies     Allergies as of 2025 - Reviewed 2025   Allergen Reaction Noted   • No active allergies  2018            The following portions of the patient's history were reviewed and updated as appropriate: allergies, current  "medications, past family history, past medical history, past social history, past surgical history and problem list.     Past Medical History:   Diagnosis Date   • Anxiety    • Arthritis    • Bipolar disorder (HCC)    • Colon polyp    • COPD (chronic obstructive pulmonary disease) (HCC)    • Depression    • Diabetes mellitus (HCC)     type 2   • Gout    • High triglycerides    • Hypertension    • Hypothyroidism 02/20/2013   • Obesity    • Paranoid schizophrenia (HCC)    • Psychiatric disorder    • Sciatica 12/03/2013   • Seasonal allergies    • Sleep apnea    • Urinary retention        Past Surgical History:   Procedure Laterality Date   • COLONOSCOPY     • UMBILICAL HERNIA REPAIR     • WISDOM TOOTH EXTRACTION         Family History   Problem Relation Age of Onset   • Colon cancer Mother    • Coronary artery disease Father    • Hyperlipidemia Father    • Coronary artery disease Family          Medications have been verified.        Objective   /63   Pulse 58   Temp 97.6 °F (36.4 °C)   Resp 16   Ht 5' 9\" (1.753 m)   Wt 114 kg (252 lb)   SpO2 96%   BMI 37.21 kg/m²   No LMP for male patient.       Physical Exam     Physical Exam  Vitals and nursing note reviewed.   Constitutional:       Appearance: He is well-developed.   HENT:      Head: Normocephalic and atraumatic.     Cardiovascular:      Rate and Rhythm: Normal rate.   Pulmonary:      Effort: Pulmonary effort is normal. No respiratory distress.     Skin:     Findings: Rash present.      Comments: + peeling flaking hands and feet  Feet greater than palms of hands  Feet interdigital mild maceration present     Neurological:      Mental Status: He is alert and oriented to person, place, and time.     Psychiatric:         Mood and Affect: Mood normal.                          [1]    Current Outpatient Medications:   •  amLODIPine (NORVASC) 5 mg tablet, Take 1 tablet by mouth in the morning, Disp: , Rfl:   •  apixaban (ELIQUIS) 5 mg, Take 1 tablet (5 mg " total) by mouth 2 (two) times a day, Disp: 180 tablet, Rfl: 0  •  atorvastatin (LIPITOR) 80 mg tablet, Take 1 tablet (80 mg total) by mouth daily with dinner, Disp: 90 tablet, Rfl: 3  •  benzonatate (TESSALON PERLES) 100 mg capsule, Take 1 capsule (100 mg total) by mouth 3 (three) times a day as needed for cough, Disp: 20 capsule, Rfl: 0  •  clotrimazole (LOTRIMIN) 1 % cream, Apply topically 2 (two) times a day, Disp: 60 g, Rfl: 1  •  fenofibrate (TRICOR) 145 mg tablet, Take 1 tablet (145 mg total) by mouth daily with dinner, Disp: 90 tablet, Rfl: 3  •  finasteride (PROSCAR) 5 mg tablet, Take 1 tablet (5 mg total) by mouth daily, Disp: 90 tablet, Rfl: 3  •  FLUoxetine (PROzac) 20 mg capsule, Take 1 capsule (20 mg total) by mouth daily, Disp: 90 capsule, Rfl: 3  •  fluticasone (FLONASE) 50 mcg/act nasal spray, 2 sprays into each nostril daily, Disp: 1 g, Rfl: 0  •  haloperidol (HALDOL) 5 mg tablet, Take 0.5 tablets (2.5 mg total) by mouth daily at bedtime, Disp: 90 tablet, Rfl: 3  •  hydrocortisone (ANUSOL-HC) 2.5 % rectal cream, Apply topically 2 (two) times a day, Disp: 28 g, Rfl: 1  •  levothyroxine 150 mcg tablet, Take 1 tablet (150 mcg total) by mouth daily in the early morning, Disp: 100 tablet, Rfl: 3  •  metoprolol tartrate (LOPRESSOR) 25 mg tablet, TAKE 0.5 TABLETS (12.5 MG TOTAL) BY MOUTH EVERY 12 (TWELVE) HOURS, Disp: 90 tablet, Rfl: 1  •  nicotine (NICODERM CQ) 14 mg/24hr TD 24 hr patch, Place 1 patch on the skin over 24 hours every 24 hours, Disp: 28 patch, Rfl: 0  •  nicotine (NICODERM CQ) 7 mg/24hr TD 24 hr patch, Place 1 patch on the skin over 24 hours every 24 hours, Disp: 28 patch, Rfl: 0  •  nitrofurantoin (MACROBID) 100 mg capsule, Take 1 capsule (100 mg total) by mouth daily after lunch, Disp: 30 capsule, Rfl: 2  •  pantoprazole (PROTONIX) 40 mg tablet, Take 1 tablet (40 mg total) by mouth daily in the early morning, Disp: 90 tablet, Rfl: 0  •  Silodosin 8 MG CAPS, Take 1 capsule by mouth daily  at bedtime, Disp: 90 capsule, Rfl: 3  •  VITAMIN D, CHOLECALCIFEROL, PO, Take 50,000 Units by mouth once a week, Disp: , Rfl:   •  guaiFENesin (MUCINEX) 600 mg 12 hr tablet, Take 1 tablet (600 mg total) by mouth every 12 (twelve) hours (Patient not taking: Reported on 5/17/2025), Disp: 20 tablet, Rfl: 0    Current Facility-Administered Medications:   •  cyanocobalamin injection 1,000 mcg, 1,000 mcg, Intramuscular, Q30 Days, , 1,000 mcg at 01/30/25 1552

## 2025-05-27 ENCOUNTER — PROCEDURE VISIT (OUTPATIENT)
Dept: PODIATRY | Facility: CLINIC | Age: 60
End: 2025-05-27
Payer: COMMERCIAL

## 2025-05-27 VITALS — WEIGHT: 250 LBS | BODY MASS INDEX: 37.03 KG/M2 | HEIGHT: 69 IN

## 2025-05-27 DIAGNOSIS — B35.3 TINEA PEDIS OF BOTH FEET: ICD-10-CM

## 2025-05-27 DIAGNOSIS — L60.3 NAIL DYSTROPHY: ICD-10-CM

## 2025-05-27 DIAGNOSIS — E11.9 TYPE 2 DIABETES MELLITUS WITHOUT COMPLICATION, WITHOUT LONG-TERM CURRENT USE OF INSULIN (HCC): Primary | ICD-10-CM

## 2025-05-27 PROCEDURE — 99213 OFFICE O/P EST LOW 20 MIN: CPT | Performed by: PODIATRIST

## 2025-05-27 NOTE — PROGRESS NOTES
"Name: Chas Soliz      : 1965      MRN: [id number]  Encounter Provider: Luis Carolina DPM  Encounter Date: 2025   Encounter department: [facility] [place]  :  Assessment & Plan  Type 2 diabetes mellitus without complication, without long-term current use of insulin ([facility], [facility] within [facility])  Diagnosis and options discussed with patient  Patient agreeable to the plan as stated below    -DM foot risk is low. Recommend at risk foot care.   -Discussed DM risk to lower extremities, proper shoe gear, and daily monitoring of feet.   -Discussed weight loss and suitable exercise regiment.   -Reviewed most recent PCP visit on 2025  -Educated on A1C and the risks of poorly controlled Diabetes. Reviewed recent A1C:  Lab Results   Component Value Date    HGBA1C 5.6 2025    HGBA1C 5.8 (H) 2022    HGBA1C 5.7 (H) 2017   .     Lab Results   Component Value Date    HGBA1C 5.6 2025            Nail dystrophy  Trimmed out of courtesy today       Tinea pedis of both feet  He has clotrimazole.   Wash between toes with soap and water daily    Apply antifungal cream twice per day for 3-6 weeks    Apply baby powder or gold bond powder between toes during work, keep the skin as dry as possible.              History of Present Illness   HPI  Chas Soliz is a 60 y.o. male who presents with rash on the bottom of his feet. He was diagnosed with athletes foot last week and given clotrimazole which he has begun using. He noticed them a few weeks ago. He states his nails are also thick and he cannot trim them.       Review of Systems  As stated in HPI, otherwise normal    Medical History Reviewed by provider this encounter:  Tobacco  Allergies  Meds  Problems  Med Hx  Surg Hx  Fam Hx           Objective   Ht 5' 9\" (1.753 m)   Wt 113 kg (250 lb)   BMI 36.92 kg/m²      Physical Exam    Cardiovascular:      Pulses: no weak pulses.           Dorsalis pedis pulses are 2+ on the right side " and 2+ on the left side.        Posterior tibial pulses are 2+ on the right side and 2+ on the left side.   Feet:      Right foot:      Skin integrity: Skin breakdown and dry skin present. No ulcer.      Left foot:      Skin integrity: Skin breakdown and dry skin present. No ulcer.       Diabetic Foot Exam    Patient's shoes and socks removed.    Right Foot/Ankle   Right Foot Inspection  Skin Exam: skin intact, dry skin and maceration. No ulcer.     Toe Exam: No tenderness and  no right toe deformity    Sensory   Vibration: intact  Monofilament testing: intact    Vascular  Capillary refills: < 3 seconds  The right DP pulse is 2+. The right PT pulse is 2+.     Left Foot/Ankle  Left Foot Inspection  Skin Exam: skin intact, dry skin and maceration. No ulcer.     Toe Exam: No tenderness and no left toe deformity.     Sensory   Vibration: intact  Monofilament testing: intact    Vascular  Capillary refills: < 3 seconds  The left DP pulse is 2+. The left PT pulse is 2+.     Assign Risk Category  No deformity present  No loss of protective sensation  No weak pulses  Risk: 0

## 2025-06-06 ENCOUNTER — TELEPHONE (OUTPATIENT)
Dept: FAMILY MEDICINE CLINIC | Facility: CLINIC | Age: 60
End: 2025-06-06

## 2025-06-06 DIAGNOSIS — M19.90 ARTHRITIS: Primary | ICD-10-CM

## 2025-06-06 RX ORDER — DIFLUNISAL 500 MG/1
500 TABLET, FILM COATED ORAL
Qty: 60 TABLET | Refills: 2 | Status: SHIPPED | OUTPATIENT
Start: 2025-06-06

## 2025-06-06 NOTE — TELEPHONE ENCOUNTER
Patient called the RX Refill Line. Message is being forwarded to the office.     Patient is requesting a refill of Diflunisal 500 MG Oral Tablet (DOLOBID) be sent to [facility]/pharmacy #[id number] - [place], [place] - [address] [place] [address].  Patient is having Joint pain and is out of medication.     Please contact patient at [phone]

## 2025-06-10 ENCOUNTER — TELEPHONE (OUTPATIENT)
Age: 60
End: 2025-06-10

## 2025-06-10 NOTE — TELEPHONE ENCOUNTER
Pt has his holter scheduled for Monday. He would like to know if he is supposed to stop his ellequis for this? We may leave a detailed VM if he doesn't answer. Please advise.

## 2025-06-12 ENCOUNTER — TELEPHONE (OUTPATIENT)
Dept: GASTROENTEROLOGY | Facility: MEDICAL CENTER | Age: 60
End: 2025-06-12

## 2025-06-16 ENCOUNTER — HOSPITAL ENCOUNTER (OUTPATIENT)
Dept: NON INVASIVE DIAGNOSTICS | Facility: HOSPITAL | Age: 60
Discharge: HOME | End: 2025-06-16
Attending: INTERNAL MEDICINE
Payer: COMMERCIAL

## 2025-06-16 DIAGNOSIS — I48.91 ATRIAL FIBRILLATION, UNSPECIFIED TYPE (HCC): ICD-10-CM

## 2025-06-16 PROCEDURE — 93225 XTRNL ECG REC<48 HRS REC: CPT

## 2025-06-16 PROCEDURE — 93226 XTRNL ECG REC<48 HR SCAN A/R: CPT

## 2025-06-16 NOTE — PROGRESS NOTES
6/17/2025      Chief Complaint   Patient presents with   • Follow-up     3 month f/u     Assessment and Plan    60 y.o. male managed by Dr. Sevilla     1. Acute on chronic urinary retention  Assessment & Plan:  Neurogenic bladder   Unknown case   Chronic urinary retention managed with CIC x 4   Discussed that any chance of spontaneous voiding again will be contingent upon having his bladder empty and restoring contractility. Advised that it is unlikely he will void spontaneously again and will require self-catheterization or indwelling catheter in the form of urethral Mcclellan or suprapubic tube.   Cysto was completed by Dr. Sevilla- no prostate obstruction   Plan to continue with CIC x 4 and measure PVRs- cylinder provided   Plan to keep a log with outputs   Patient aware that if PVRs continue to decrease 100-150 mls that he can reduce CIC to 3-2 times per day   I recommenced he continues with x 4 for now   Plan to follow up in 4 months to reevaluate           History of Present Illness  Chas Soliz is a 60 y.o. male here for evaluation of urinary retention 2/13/2025.  Patient had 1.8 L of urine in the bladder and admitted to the the hospital for RICARDO and bladder bilateral hydronephrosis seen on CT scan.  He did have cystoscopy which did not show any prostate obstruction.  He was diagnosed with neurogenic bladder with unknown etiology.  He is now managed with CIC x 4/day.  He reports that he started to void independently on his own a little bit but he still notices a large amount through CIC.  He previously was not measuring this amount.  He denies any issues thus far with catheterization.  He continues on finasteride and silodosin as of now.  He reports tolerating medication fine.  No blood or burning with urination.        Lab Results   Component Value Date    PSA 0.246 01/24/2025    PSA 0.30 03/05/2024    PSA 0.3 05/12/2022           Lab Results   Component Value Date    HGBA1C 5.6 01/30/2025  "            Review of Systems   Constitutional:  Negative for chills and fever.   HENT:  Negative for ear pain and sore throat.    Eyes:  Negative for pain and visual disturbance.   Respiratory:  Negative for cough and shortness of breath.    Cardiovascular:  Negative for chest pain and palpitations.   Gastrointestinal:  Negative for abdominal pain and vomiting.   Genitourinary:  Positive for difficulty urinating (CIC). Negative for decreased urine volume, dysuria, frequency, hematuria and urgency.   Musculoskeletal:  Negative for arthralgias and back pain.   Skin:  Negative for color change and rash.   Neurological:  Negative for seizures and syncope.   All other systems reviewed and are negative.               Vitals  Vitals:    06/17/25 1410   BP: 112/70   BP Location: Left arm   Patient Position: Sitting   Cuff Size: Adult   Pulse: 57   SpO2: 96%   Weight: 114 kg (252 lb)   Height: 5' 9\" (1.753 m)       Physical Exam  Vitals reviewed.   Constitutional:       Appearance: Normal appearance.   HENT:      Head: Normocephalic and atraumatic.     Eyes:      Conjunctiva/sclera: Conjunctivae normal.     Pulmonary:      Effort: Pulmonary effort is normal.   Abdominal:      General: Abdomen is flat. There is no distension.      Palpations: Abdomen is soft.      Tenderness: There is no abdominal tenderness.     Musculoskeletal:         General: Normal range of motion.      Cervical back: Normal range of motion.     Skin:     General: Skin is warm and dry.     Neurological:      General: No focal deficit present.      Mental Status: He is alert and oriented to person, place, and time.     Psychiatric:         Mood and Affect: Mood normal.         Behavior: Behavior normal.         Thought Content: Thought content normal.         Judgment: Judgment normal.           Past History  Past Medical History[1]  Social History[2]  Tobacco Use History[3]  Family History[4]    The following portions of the patient's history were " reviewed and updated as appropriate: allergies, current medications, past medical history, past social history, past surgical history and problem list.    Results  No results found for this or any previous visit (from the past hour).]  Lab Results   Component Value Date    PSA 0.246 01/24/2025    PSA 0.30 03/05/2024    PSA 0.3 05/12/2022    PSA 0.7 02/10/2020     Lab Results   Component Value Date    CALCIUM 9.2 04/24/2025    K 4.3 04/24/2025    CO2 25 04/24/2025    CL 99 04/24/2025    BUN 27 (H) 04/24/2025    CREATININE 2.06 (H) 04/24/2025     Lab Results   Component Value Date    WBC 8.28 03/19/2025    HGB 10.5 (L) 03/19/2025    HCT 33.0 (L) 03/19/2025    MCV 97 03/19/2025     03/19/2025              [1]  Past Medical History:  Diagnosis Date   • Anxiety    • Arthritis    • Bipolar disorder (HCC)    • Colon polyp    • COPD (chronic obstructive pulmonary disease) (HCC)    • Depression    • Diabetes mellitus (HCC)     type 2   • Gout    • High triglycerides    • Hypertension    • Hypothyroidism 02/20/2013   • Obesity    • Paranoid schizophrenia (HCC)    • Psychiatric disorder    • Sciatica 12/03/2013   • Seasonal allergies    • Sleep apnea    • Urinary retention    [2]  Social History  Socioeconomic History   • Marital status: Single   Tobacco Use   • Smoking status: Every Day     Current packs/day: 0.25     Average packs/day: 0.3 packs/day for 35.0 years (8.8 ttl pk-yrs)     Types: Cigarettes, E-Cigarettes   • Smokeless tobacco: Never   Vaping Use   • Vaping status: Never Used   Substance and Sexual Activity   • Alcohol use: Not Currently   • Drug use: No   • Sexual activity: Not Currently     Partners: Female     Social Drivers of Health     Financial Resource Strain: Low Risk  (11/14/2023)    Overall Financial Resource Strain (CARDIA)    • Difficulty of Paying Living Expenses: Not hard at all   Food Insecurity: No Food Insecurity (2/13/2025)    Nursing - Inadequate Food Risk Classification    • Worried  About Running Out of Food in the Last Year: Never true    • Ran Out of Food in the Last Year: Never true    • Ran Out of Food in the Last Year: Never true   Transportation Needs: No Transportation Needs (3/10/2025)    [employer] : Transportation    • Lack of Transportation (Medical): No    • Lack of Transportation (Non-Medical): No    • Patient Unable or Declines to Respond: No   Physical Activity: Insufficiently Active (7/30/2020)    Exercise Vital Sign    • Days of Exercise per Week: 4 days    • Minutes of Exercise per Session: 30 min   Stress: No Stress Concern Present (7/30/2020)    [employer] - Occupational Stress Questionnaire    • Feeling of Stress : Not at all   Social Connections: Unknown (7/30/2020)    Social Connection and Isolation Panel    • Frequency of Communication with Friends and Family: Patient declined    • Frequency of Social Gatherings with Friends and Family: Patient declined    • Attends L[employer] Services: Patient declined    • Active Member of Clubs or Organizations: Patient declined    • Attends Club or Organization Meetings: Patient declined    • Marital Status: Patient declined   Intimate Partner Violence: Unknown (2/13/2025)    Nursing IPS    • Physically Hurt by Someone: No    • Hurt or Threatened by Someone: No   Housing Stability: Unknown (2/13/2025)    Nursing: Inadequate Housing Risk Classification    • Unable to Pay for Housing in the Last Year: No    • Has Housing: No   [3]  Social History  Tobacco Use   Smoking Status Every Day   • Current packs/day: 0.25   • Average packs/day: 0.3 packs/day for 35.0 years (8.8 ttl pk-yrs)   • Types: Cigarettes, E-Cigarettes   Smokeless Tobacco Never   [4]  Family History  Problem Relation Name Age of Onset   • Colon cancer Mother     • Coronary artery disease Father     • Hyperlipidemia Father     • Coronary artery disease Family

## 2025-06-17 ENCOUNTER — OFFICE VISIT (OUTPATIENT)
Dept: UROLOGY | Facility: CLINIC | Age: 60
End: 2025-06-17
Payer: COMMERCIAL

## 2025-06-17 VITALS
WEIGHT: 252 LBS | BODY MASS INDEX: 37.33 KG/M2 | HEART RATE: 57 BPM | OXYGEN SATURATION: 96 % | DIASTOLIC BLOOD PRESSURE: 70 MMHG | SYSTOLIC BLOOD PRESSURE: 112 MMHG | HEIGHT: 69 IN

## 2025-06-17 DIAGNOSIS — R33.9 ACUTE ON CHRONIC URINARY RETENTION: Primary | ICD-10-CM

## 2025-06-17 PROBLEM — N31.9 NEUROGENIC BLADDER: Status: ACTIVE | Noted: 2025-06-17

## 2025-06-17 PROCEDURE — 99213 OFFICE O/P EST LOW 20 MIN: CPT

## 2025-06-17 NOTE — ASSESSMENT & PLAN NOTE
Neurogenic bladder   Unknown case   Chronic urinary retention managed with CIC x 4   Discussed that any chance of spontaneous voiding again will be contingent upon having his bladder empty and restoring contractility. Advised that it is unlikely he will void spontaneously again and will require self-catheterization or indwelling catheter in the form of urethral Mcclellan or suprapubic tube.   Cysto was completed by Dr. Sevilla- no prostate obstruction   Plan to continue with CIC x 4 and measure PVRs- cylinder provided   Plan to keep a log with outputs   Patient aware that if PVRs continue to decrease 100-150 mls that he can reduce CIC to 3-2 times per day   I recommenced he continues with x 4 for now   Plan to follow up in 4 months to reevaluate

## 2025-06-23 DIAGNOSIS — I48.91 ATRIAL FIBRILLATION, UNSPECIFIED TYPE (HCC): ICD-10-CM

## 2025-06-24 ENCOUNTER — TELEPHONE (OUTPATIENT)
Age: 60
End: 2025-06-24

## 2025-06-24 PROCEDURE — 93227 XTRNL ECG REC<48 HR R&I: CPT | Performed by: STUDENT IN AN ORGANIZED HEALTH CARE EDUCATION/TRAINING PROGRAM

## 2025-06-24 RX ORDER — APIXABAN 5 MG/1
5 TABLET, FILM COATED ORAL 2 TIMES DAILY
Qty: 180 TABLET | Refills: 1 | Status: SHIPPED | OUTPATIENT
Start: 2025-06-24

## 2025-06-24 NOTE — TELEPHONE ENCOUNTER
Patient called in requesting advice of Primary Care Provider patient states he saw the results of the Holter monitor patient would like to know does he still have to take the   apixaban (Eliquis) 5 mg   Patient is requesting a call back at [phone]  Please Advise

## 2025-06-29 DIAGNOSIS — N40.0 ENLARGED PROSTATE: ICD-10-CM

## 2025-06-29 DIAGNOSIS — R33.9 URINARY RETENTION: ICD-10-CM

## 2025-06-30 RX ORDER — NITROFURANTOIN 25; 75 MG/1; MG/1
100 CAPSULE ORAL
Qty: 30 CAPSULE | Refills: 2 | Status: SHIPPED | OUTPATIENT
Start: 2025-06-30 | End: 2025-09-28

## 2025-07-01 NOTE — TELEPHONE ENCOUNTER
Spoke with the patient.  Informed him that Dr. Maravilla will go over the results of Holter at his next visit.  He should stay on the Eliquis for now.  He verbalized understanding.

## 2025-07-15 ENCOUNTER — TELEPHONE (OUTPATIENT)
Age: 60
End: 2025-07-15

## 2025-07-21 NOTE — TELEPHONE ENCOUNTER
Patient called in regards to wanting to know if provider had any advise for him. Patient is requesting a call back.

## 2025-07-23 NOTE — TELEPHONE ENCOUNTER
Tried to call the patient back to inform him to change his sleeping position and that Dr. Maravilla will discuss his symptoms at his upcoming appointment.  The patient has a voicemailbox that is not set up.  I was unable to leave a message.

## 2025-07-31 ENCOUNTER — OFFICE VISIT (OUTPATIENT)
Dept: FAMILY MEDICINE CLINIC | Facility: CLINIC | Age: 60
End: 2025-07-31
Payer: COMMERCIAL

## 2025-07-31 VITALS
HEIGHT: 69 IN | WEIGHT: 267 LBS | HEART RATE: 67 BPM | BODY MASS INDEX: 39.55 KG/M2 | DIASTOLIC BLOOD PRESSURE: 78 MMHG | OXYGEN SATURATION: 98 % | TEMPERATURE: 99.1 F | SYSTOLIC BLOOD PRESSURE: 130 MMHG

## 2025-07-31 DIAGNOSIS — F17.210 CIGARETTE SMOKER: ICD-10-CM

## 2025-07-31 DIAGNOSIS — G62.9 NEUROPATHY: ICD-10-CM

## 2025-07-31 DIAGNOSIS — E06.3 HYPOTHYROIDISM DUE TO HASHIMOTO'S THYROIDITIS: ICD-10-CM

## 2025-07-31 DIAGNOSIS — E11.8 TYPE II DIABETES MELLITUS WITH MANIFESTATIONS (HCC): Primary | ICD-10-CM

## 2025-07-31 DIAGNOSIS — K21.9 GASTROESOPHAGEAL REFLUX DISEASE WITHOUT ESOPHAGITIS: ICD-10-CM

## 2025-07-31 DIAGNOSIS — M25.431 SWELLING OF BOTH WRISTS: ICD-10-CM

## 2025-07-31 DIAGNOSIS — I48.0 PAROXYSMAL ATRIAL FIBRILLATION (HCC): ICD-10-CM

## 2025-07-31 DIAGNOSIS — M25.432 SWELLING OF BOTH WRISTS: ICD-10-CM

## 2025-07-31 DIAGNOSIS — I48.91 ATRIAL FIBRILLATION, UNSPECIFIED TYPE (HCC): ICD-10-CM

## 2025-07-31 PROBLEM — N17.9 ACUTE KIDNEY INJURY (HCC): Status: RESOLVED | Noted: 2024-11-04 | Resolved: 2025-07-31

## 2025-07-31 LAB — SL AMB POCT HEMOGLOBIN AIC: 5.4 (ref ?–6.5)

## 2025-07-31 PROCEDURE — 99214 OFFICE O/P EST MOD 30 MIN: CPT | Performed by: INTERNAL MEDICINE

## 2025-07-31 PROCEDURE — G2211 COMPLEX E/M VISIT ADD ON: HCPCS | Performed by: INTERNAL MEDICINE

## 2025-07-31 PROCEDURE — 83036 HEMOGLOBIN GLYCOSYLATED A1C: CPT | Performed by: INTERNAL MEDICINE

## 2025-07-31 RX ORDER — GABAPENTIN 100 MG/1
100 CAPSULE ORAL
Qty: 60 CAPSULE | Refills: 1 | Status: SHIPPED | OUTPATIENT
Start: 2025-07-31

## 2025-08-18 ENCOUNTER — TELEPHONE (OUTPATIENT)
Age: 60
End: 2025-08-18

## 2025-08-18 DIAGNOSIS — E53.8 VITAMIN B12 DEFICIENCY: Primary | ICD-10-CM

## 2025-08-18 DIAGNOSIS — E11.8 TYPE II DIABETES MELLITUS WITH MANIFESTATIONS (HCC): ICD-10-CM

## 2025-08-18 RX ORDER — LANOLIN ALCOHOL/MO/W.PET/CERES
1000 CREAM (GRAM) TOPICAL DAILY
Qty: 90 TABLET | Refills: 3 | Status: SHIPPED | OUTPATIENT
Start: 2025-08-18